# Patient Record
Sex: FEMALE | Race: WHITE | NOT HISPANIC OR LATINO | Employment: FULL TIME | URBAN - METROPOLITAN AREA
[De-identification: names, ages, dates, MRNs, and addresses within clinical notes are randomized per-mention and may not be internally consistent; named-entity substitution may affect disease eponyms.]

---

## 2024-06-04 ENCOUNTER — OFFICE VISIT (OUTPATIENT)
Dept: PRIMARY CARE | Facility: CLINIC | Age: 34
End: 2024-06-04
Payer: COMMERCIAL

## 2024-06-04 ENCOUNTER — APPOINTMENT (OUTPATIENT)
Dept: PRIMARY CARE | Facility: CLINIC | Age: 34
End: 2024-06-04
Payer: COMMERCIAL

## 2024-06-04 VITALS
WEIGHT: 119 LBS | OXYGEN SATURATION: 93 % | HEART RATE: 85 BPM | DIASTOLIC BLOOD PRESSURE: 54 MMHG | SYSTOLIC BLOOD PRESSURE: 90 MMHG | TEMPERATURE: 98.2 F

## 2024-06-04 DIAGNOSIS — R29.0 TETANY: ICD-10-CM

## 2024-06-04 DIAGNOSIS — R42 VERTIGO: ICD-10-CM

## 2024-06-04 DIAGNOSIS — R27.0 ATAXIA: Primary | ICD-10-CM

## 2024-06-04 PROCEDURE — 99204 OFFICE O/P NEW MOD 45 MIN: CPT

## 2024-06-04 RX ORDER — ESCITALOPRAM OXALATE 5 MG/1
TABLET ORAL
COMMUNITY
Start: 2024-05-05

## 2024-06-04 ASSESSMENT — PATIENT HEALTH QUESTIONNAIRE - PHQ9
2. FEELING DOWN, DEPRESSED OR HOPELESS: NOT AT ALL
SUM OF ALL RESPONSES TO PHQ9 QUESTIONS 1 AND 2: 0
1. LITTLE INTEREST OR PLEASURE IN DOING THINGS: NOT AT ALL

## 2024-06-04 ASSESSMENT — PAIN SCALES - GENERAL: PAINLEVEL: 2
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
## 2024-06-06 ENCOUNTER — LAB (OUTPATIENT)
Dept: LAB | Facility: LAB | Age: 34
End: 2024-06-06
Payer: COMMERCIAL

## 2024-06-06 DIAGNOSIS — R42 VERTIGO: ICD-10-CM

## 2024-06-06 DIAGNOSIS — R29.0 TETANY: ICD-10-CM

## 2024-06-06 DIAGNOSIS — R27.0 ATAXIA: ICD-10-CM

## 2024-06-06 LAB
ALBUMIN SERPL-MCNC: 4.4 G/DL (ref 3.5–5)
ALP BLD-CCNC: 66 U/L (ref 35–125)
ALT SERPL-CCNC: 18 U/L (ref 5–40)
ANION GAP SERPL CALC-SCNC: 9 MMOL/L
AST SERPL-CCNC: 21 U/L (ref 5–40)
BASOPHILS # BLD AUTO: 0.03 X10*3/UL (ref 0–0.1)
BASOPHILS NFR BLD AUTO: 0.6 %
BILIRUB SERPL-MCNC: 0.5 MG/DL (ref 0.1–1.2)
BUN SERPL-MCNC: 9 MG/DL (ref 8–25)
CALCIUM SERPL-MCNC: 9.1 MG/DL (ref 8.5–10.4)
CHLORIDE SERPL-SCNC: 108 MMOL/L (ref 97–107)
CO2 SERPL-SCNC: 26 MMOL/L (ref 24–31)
CREAT SERPL-MCNC: 0.6 MG/DL (ref 0.4–1.6)
EGFRCR SERPLBLD CKD-EPI 2021: >90 ML/MIN/1.73M*2
EOSINOPHIL # BLD AUTO: 0.05 X10*3/UL (ref 0–0.7)
EOSINOPHIL NFR BLD AUTO: 1 %
ERYTHROCYTE [DISTWIDTH] IN BLOOD BY AUTOMATED COUNT: 12.4 % (ref 11.5–14.5)
GLUCOSE SERPL-MCNC: 84 MG/DL (ref 65–99)
HCT VFR BLD AUTO: 38.7 % (ref 36–46)
HGB BLD-MCNC: 12.6 G/DL (ref 12–16)
IMM GRANULOCYTES # BLD AUTO: 0.01 X10*3/UL (ref 0–0.7)
IMM GRANULOCYTES NFR BLD AUTO: 0.2 % (ref 0–0.9)
LYMPHOCYTES # BLD AUTO: 1.47 X10*3/UL (ref 1.2–4.8)
LYMPHOCYTES NFR BLD AUTO: 29.7 %
MCH RBC QN AUTO: 28.1 PG (ref 26–34)
MCHC RBC AUTO-ENTMCNC: 32.6 G/DL (ref 32–36)
MCV RBC AUTO: 86 FL (ref 80–100)
MONOCYTES # BLD AUTO: 0.48 X10*3/UL (ref 0.1–1)
MONOCYTES NFR BLD AUTO: 9.7 %
NEUTROPHILS # BLD AUTO: 2.91 X10*3/UL (ref 1.2–7.7)
NEUTROPHILS NFR BLD AUTO: 58.8 %
NRBC BLD-RTO: 0 /100 WBCS (ref 0–0)
PLATELET # BLD AUTO: 207 X10*3/UL (ref 150–450)
POTASSIUM SERPL-SCNC: 4.3 MMOL/L (ref 3.4–5.1)
PROT SERPL-MCNC: 6.6 G/DL (ref 5.9–7.9)
RBC # BLD AUTO: 4.48 X10*6/UL (ref 4–5.2)
SODIUM SERPL-SCNC: 143 MMOL/L (ref 133–145)
TSH SERPL DL<=0.05 MIU/L-ACNC: 0.85 MIU/L (ref 0.27–4.2)
VIT B12 SERPL-MCNC: 433 PG/ML (ref 211–946)
WBC # BLD AUTO: 5 X10*3/UL (ref 4.4–11.3)

## 2024-06-06 PROCEDURE — 80053 COMPREHEN METABOLIC PANEL: CPT

## 2024-06-06 PROCEDURE — 82607 VITAMIN B-12: CPT

## 2024-06-06 PROCEDURE — 36415 COLL VENOUS BLD VENIPUNCTURE: CPT

## 2024-06-06 PROCEDURE — 84443 ASSAY THYROID STIM HORMONE: CPT

## 2024-06-06 PROCEDURE — 85025 COMPLETE CBC W/AUTO DIFF WBC: CPT

## 2024-06-09 ASSESSMENT — VISUAL ACUITY: OU: 1

## 2024-06-20 ENCOUNTER — APPOINTMENT (OUTPATIENT)
Dept: PRIMARY CARE | Facility: CLINIC | Age: 34
End: 2024-06-20
Payer: COMMERCIAL

## 2024-07-08 DIAGNOSIS — H91.92 DECREASED HEARING OF LEFT EAR: ICD-10-CM

## 2024-07-08 DIAGNOSIS — R42 VERTIGO: Primary | ICD-10-CM

## 2024-07-29 ENCOUNTER — TELEPHONE (OUTPATIENT)
Dept: PRIMARY CARE | Facility: CLINIC | Age: 34
End: 2024-07-29
Payer: COMMERCIAL

## 2024-07-29 DIAGNOSIS — F41.9 ANXIETY: ICD-10-CM

## 2024-07-29 RX ORDER — ESCITALOPRAM OXALATE 5 MG/1
5 TABLET ORAL DAILY
Qty: 90 TABLET | Refills: 1 | Status: SHIPPED | OUTPATIENT
Start: 2024-07-29 | End: 2024-07-30 | Stop reason: SDUPTHER

## 2024-07-30 ENCOUNTER — PATIENT MESSAGE (OUTPATIENT)
Dept: PRIMARY CARE | Facility: CLINIC | Age: 34
End: 2024-07-30
Payer: COMMERCIAL

## 2024-07-30 DIAGNOSIS — F41.9 ANXIETY: ICD-10-CM

## 2024-07-30 RX ORDER — ESCITALOPRAM OXALATE 5 MG/1
5 TABLET ORAL DAILY
Qty: 90 TABLET | Refills: 1 | Status: ON HOLD | OUTPATIENT
Start: 2024-07-30

## 2024-08-02 ENCOUNTER — HOSPITAL ENCOUNTER (EMERGENCY)
Facility: HOSPITAL | Age: 34
Discharge: HOME | End: 2024-08-03
Attending: GENERAL PRACTICE
Payer: COMMERCIAL

## 2024-08-02 ENCOUNTER — HOSPITAL ENCOUNTER (OUTPATIENT)
Dept: RADIOLOGY | Facility: CLINIC | Age: 34
Discharge: HOME | End: 2024-08-02
Payer: COMMERCIAL

## 2024-08-02 DIAGNOSIS — G72.9 MYOPATHY, UNSPECIFIED: ICD-10-CM

## 2024-08-02 DIAGNOSIS — G93.89 BRAIN MASS: Primary | ICD-10-CM

## 2024-08-02 DIAGNOSIS — R20.2 PARESTHESIA OF SKIN: ICD-10-CM

## 2024-08-02 PROCEDURE — 72141 MRI NECK SPINE W/O DYE: CPT

## 2024-08-02 PROCEDURE — A9575 INJ GADOTERATE MEGLUMI 0.1ML: HCPCS | Performed by: PSYCHIATRY & NEUROLOGY

## 2024-08-02 PROCEDURE — 2550000001 HC RX 255 CONTRASTS: Performed by: PSYCHIATRY & NEUROLOGY

## 2024-08-02 PROCEDURE — 70553 MRI BRAIN STEM W/O & W/DYE: CPT

## 2024-08-02 PROCEDURE — 99282 EMERGENCY DEPT VISIT SF MDM: CPT

## 2024-08-02 RX ORDER — GADOTERATE MEGLUMINE 376.9 MG/ML
10 INJECTION INTRAVENOUS
Status: COMPLETED | OUTPATIENT
Start: 2024-08-02 | End: 2024-08-02

## 2024-08-02 ASSESSMENT — PAIN SCALES - GENERAL
PAINLEVEL_OUTOF10: 0 - NO PAIN

## 2024-08-02 ASSESSMENT — PAIN - FUNCTIONAL ASSESSMENT: PAIN_FUNCTIONAL_ASSESSMENT: 0-10

## 2024-08-02 ASSESSMENT — COLUMBIA-SUICIDE SEVERITY RATING SCALE - C-SSRS
1. IN THE PAST MONTH, HAVE YOU WISHED YOU WERE DEAD OR WISHED YOU COULD GO TO SLEEP AND NOT WAKE UP?: NO
2. HAVE YOU ACTUALLY HAD ANY THOUGHTS OF KILLING YOURSELF?: NO
6. HAVE YOU EVER DONE ANYTHING, STARTED TO DO ANYTHING, OR PREPARED TO DO ANYTHING TO END YOUR LIFE?: NO

## 2024-08-02 NOTE — ED TRIAGE NOTES
Pt came in after she had a MRI an hour ago. Pt was told to go to the ER. They did not state what was wrong.

## 2024-08-03 ENCOUNTER — APPOINTMENT (OUTPATIENT)
Dept: CARDIOLOGY | Facility: HOSPITAL | Age: 34
DRG: 003 | End: 2024-08-03
Payer: COMMERCIAL

## 2024-08-03 ENCOUNTER — APPOINTMENT (OUTPATIENT)
Dept: RADIOLOGY | Facility: HOSPITAL | Age: 34
DRG: 003 | End: 2024-08-03
Payer: COMMERCIAL

## 2024-08-03 ENCOUNTER — HOSPITAL ENCOUNTER (INPATIENT)
Facility: HOSPITAL | Age: 34
End: 2024-08-03
Attending: STUDENT IN AN ORGANIZED HEALTH CARE EDUCATION/TRAINING PROGRAM | Admitting: NEUROLOGICAL SURGERY
Payer: COMMERCIAL

## 2024-08-03 ENCOUNTER — CLINICAL SUPPORT (OUTPATIENT)
Dept: EMERGENCY MEDICINE | Facility: HOSPITAL | Age: 34
DRG: 003 | End: 2024-08-03
Payer: COMMERCIAL

## 2024-08-03 VITALS
OXYGEN SATURATION: 98 % | BODY MASS INDEX: 22.45 KG/M2 | HEIGHT: 62 IN | TEMPERATURE: 97.5 F | WEIGHT: 122 LBS | HEART RATE: 85 BPM | SYSTOLIC BLOOD PRESSURE: 128 MMHG | DIASTOLIC BLOOD PRESSURE: 78 MMHG | RESPIRATION RATE: 16 BRPM

## 2024-08-03 DIAGNOSIS — I26.99 PULMONARY EMBOLISM, UNSPECIFIED CHRONICITY, UNSPECIFIED PULMONARY EMBOLISM TYPE, UNSPECIFIED WHETHER ACUTE COR PULMONALE PRESENT (MULTI): ICD-10-CM

## 2024-08-03 DIAGNOSIS — Q21.10 ATRIAL SEPTAL DEFECT (HHS-HCC): ICD-10-CM

## 2024-08-03 DIAGNOSIS — D36.10 SCHWANNOMA: Primary | ICD-10-CM

## 2024-08-03 DIAGNOSIS — Z98.890 S/P CRANIOTOMY: ICD-10-CM

## 2024-08-03 DIAGNOSIS — G89.18 ACUTE POST-OPERATIVE PAIN: ICD-10-CM

## 2024-08-03 DIAGNOSIS — F41.9 ANXIETY: ICD-10-CM

## 2024-08-03 LAB
AORTIC VALVE MEAN GRADIENT: 3 MMHG
AORTIC VALVE PEAK VELOCITY: 1.08 M/S
APPEARANCE UR: ABNORMAL
AV PEAK GRADIENT: 4.7 MMHG
AVA (PEAK VEL): 2.22 CM2
AVA (VTI): 2.03 CM2
BILIRUB UR STRIP.AUTO-MCNC: NEGATIVE MG/DL
COLOR UR: ABNORMAL
EJECTION FRACTION APICAL 4 CHAMBER: 55.2
EJECTION FRACTION: 57 %
GLUCOSE UR STRIP.AUTO-MCNC: NORMAL MG/DL
KETONES UR STRIP.AUTO-MCNC: NEGATIVE MG/DL
LEFT VENTRICLE INTERNAL DIMENSION DIASTOLE: 3.9 CM (ref 3.5–6)
LEFT VENTRICULAR OUTFLOW TRACT DIAMETER: 2 CM
LEUKOCYTE ESTERASE UR QL STRIP.AUTO: NEGATIVE
MITRAL VALVE E/A RATIO: 1.77
MITRAL VALVE E/E' RATIO: 5.2
NITRITE UR QL STRIP.AUTO: NEGATIVE
PH UR STRIP.AUTO: 6.5 [PH]
PROT UR STRIP.AUTO-MCNC: ABNORMAL MG/DL
RBC # UR STRIP.AUTO: NEGATIVE /UL
RBC #/AREA URNS AUTO: ABNORMAL /HPF
RIGHT VENTRICLE FREE WALL PEAK S': 11.7 CM/S
SP GR UR STRIP.AUTO: 1.03
TRICUSPID ANNULAR PLANE SYSTOLIC EXCURSION: 2.3 CM
UROBILINOGEN UR STRIP.AUTO-MCNC: NORMAL MG/DL
WBC #/AREA URNS AUTO: ABNORMAL /HPF

## 2024-08-03 PROCEDURE — 2500000001 HC RX 250 WO HCPCS SELF ADMINISTERED DRUGS (ALT 637 FOR MEDICARE OP)

## 2024-08-03 PROCEDURE — 93306 TTE W/DOPPLER COMPLETE: CPT

## 2024-08-03 PROCEDURE — 71260 CT THORAX DX C+: CPT | Performed by: STUDENT IN AN ORGANIZED HEALTH CARE EDUCATION/TRAINING PROGRAM

## 2024-08-03 PROCEDURE — 70480 CT ORBIT/EAR/FOSSA W/O DYE: CPT | Performed by: RADIOLOGY

## 2024-08-03 PROCEDURE — 86901 BLOOD TYPING SEROLOGIC RH(D): CPT

## 2024-08-03 PROCEDURE — 80069 RENAL FUNCTION PANEL: CPT

## 2024-08-03 PROCEDURE — 99285 EMERGENCY DEPT VISIT HI MDM: CPT | Mod: 25

## 2024-08-03 PROCEDURE — 2500000004 HC RX 250 GENERAL PHARMACY W/ HCPCS (ALT 636 FOR OP/ED)

## 2024-08-03 PROCEDURE — 85610 PROTHROMBIN TIME: CPT

## 2024-08-03 PROCEDURE — 70480 CT ORBIT/EAR/FOSSA W/O DYE: CPT

## 2024-08-03 PROCEDURE — 1200000002 HC GENERAL ROOM WITH TELEMETRY DAILY

## 2024-08-03 PROCEDURE — 2550000001 HC RX 255 CONTRASTS: Performed by: STUDENT IN AN ORGANIZED HEALTH CARE EDUCATION/TRAINING PROGRAM

## 2024-08-03 PROCEDURE — 93005 ELECTROCARDIOGRAM TRACING: CPT

## 2024-08-03 PROCEDURE — 84702 CHORIONIC GONADOTROPIN TEST: CPT

## 2024-08-03 PROCEDURE — 93306 TTE W/DOPPLER COMPLETE: CPT | Performed by: INTERNAL MEDICINE

## 2024-08-03 PROCEDURE — 99223 1ST HOSP IP/OBS HIGH 75: CPT | Performed by: ANESTHESIOLOGY

## 2024-08-03 PROCEDURE — 36415 COLL VENOUS BLD VENIPUNCTURE: CPT

## 2024-08-03 PROCEDURE — 70450 CT HEAD/BRAIN W/O DYE: CPT | Performed by: RADIOLOGY

## 2024-08-03 PROCEDURE — 85027 COMPLETE CBC AUTOMATED: CPT

## 2024-08-03 PROCEDURE — 81001 URINALYSIS AUTO W/SCOPE: CPT

## 2024-08-03 PROCEDURE — 70450 CT HEAD/BRAIN W/O DYE: CPT

## 2024-08-03 PROCEDURE — 74177 CT ABD & PELVIS W/CONTRAST: CPT

## 2024-08-03 PROCEDURE — 74177 CT ABD & PELVIS W/CONTRAST: CPT | Performed by: STUDENT IN AN ORGANIZED HEALTH CARE EDUCATION/TRAINING PROGRAM

## 2024-08-03 PROCEDURE — 71045 X-RAY EXAM CHEST 1 VIEW: CPT

## 2024-08-03 RX ORDER — ACETAMINOPHEN 325 MG/1
650 TABLET ORAL EVERY 6 HOURS SCHEDULED
Status: DISCONTINUED | OUTPATIENT
Start: 2024-08-03 | End: 2024-08-06

## 2024-08-03 RX ORDER — HEPARIN SODIUM 5000 [USP'U]/ML
5000 INJECTION, SOLUTION INTRAVENOUS; SUBCUTANEOUS EVERY 8 HOURS
Status: DISPENSED | OUTPATIENT
Start: 2024-08-03 | End: 2024-08-04

## 2024-08-03 RX ORDER — ACETAMINOPHEN 325 MG/1
975 TABLET ORAL ONCE
Status: COMPLETED | OUTPATIENT
Start: 2024-08-03 | End: 2024-08-03

## 2024-08-03 RX ORDER — OXYCODONE HYDROCHLORIDE 5 MG/1
2.5 TABLET ORAL EVERY 4 HOURS PRN
Status: DISCONTINUED | OUTPATIENT
Start: 2024-08-03 | End: 2024-08-06

## 2024-08-03 RX ORDER — POLYETHYLENE GLYCOL 3350 17 G/17G
17 POWDER, FOR SOLUTION ORAL DAILY
Status: DISCONTINUED | OUTPATIENT
Start: 2024-08-03 | End: 2024-08-06

## 2024-08-03 RX ORDER — ESCITALOPRAM OXALATE 5 MG/1
5 TABLET ORAL DAILY
Status: DISCONTINUED | OUTPATIENT
Start: 2024-08-03 | End: 2024-08-06

## 2024-08-03 RX ORDER — NALOXONE HYDROCHLORIDE 0.4 MG/ML
0.2 INJECTION, SOLUTION INTRAMUSCULAR; INTRAVENOUS; SUBCUTANEOUS EVERY 5 MIN PRN
Status: DISCONTINUED | OUTPATIENT
Start: 2024-08-03 | End: 2024-08-14 | Stop reason: HOSPADM

## 2024-08-03 RX ORDER — AMOXICILLIN 250 MG
2 CAPSULE ORAL 2 TIMES DAILY
Status: DISCONTINUED | OUTPATIENT
Start: 2024-08-03 | End: 2024-08-06

## 2024-08-03 RX ADMIN — ACETAMINOPHEN 650 MG: 325 TABLET ORAL at 17:32

## 2024-08-03 RX ADMIN — ESCITALOPRAM OXALATE 5 MG: 10 TABLET ORAL at 15:57

## 2024-08-03 RX ADMIN — PERFLUTREN 2.5 ML OF DILUTION: 6.52 INJECTION, SUSPENSION INTRAVENOUS at 14:39

## 2024-08-03 RX ADMIN — IOHEXOL 90 ML: 350 INJECTION, SOLUTION INTRAVENOUS at 11:35

## 2024-08-03 RX ADMIN — SENNOSIDES AND DOCUSATE SODIUM 2 TABLET: 50; 8.6 TABLET ORAL at 21:11

## 2024-08-03 RX ADMIN — ACETAMINOPHEN 975 MG: 325 TABLET ORAL at 02:13

## 2024-08-03 RX ADMIN — HEPARIN SODIUM 5000 UNITS: 5000 INJECTION INTRAVENOUS; SUBCUTANEOUS at 16:07

## 2024-08-03 SDOH — ECONOMIC STABILITY: HOUSING INSECURITY: AT ANY TIME IN THE PAST 12 MONTHS, WERE YOU HOMELESS OR LIVING IN A SHELTER (INCLUDING NOW)?: NO

## 2024-08-03 SDOH — SOCIAL STABILITY: SOCIAL INSECURITY: ARE THERE ANY APPARENT SIGNS OF INJURIES/BEHAVIORS THAT COULD BE RELATED TO ABUSE/NEGLECT?: NO

## 2024-08-03 SDOH — ECONOMIC STABILITY: INCOME INSECURITY: IN THE LAST 12 MONTHS, WAS THERE A TIME WHEN YOU WERE NOT ABLE TO PAY THE MORTGAGE OR RENT ON TIME?: NO

## 2024-08-03 SDOH — ECONOMIC STABILITY: TRANSPORTATION INSECURITY
IN THE PAST 12 MONTHS, HAS THE LACK OF TRANSPORTATION KEPT YOU FROM MEDICAL APPOINTMENTS OR FROM GETTING MEDICATIONS?: NO

## 2024-08-03 SDOH — SOCIAL STABILITY: SOCIAL INSECURITY: ABUSE: ADULT

## 2024-08-03 SDOH — ECONOMIC STABILITY: INCOME INSECURITY: HOW HARD IS IT FOR YOU TO PAY FOR THE VERY BASICS LIKE FOOD, HOUSING, MEDICAL CARE, AND HEATING?: NOT VERY HARD

## 2024-08-03 SDOH — ECONOMIC STABILITY: HOUSING INSECURITY: IN THE PAST 12 MONTHS, HOW MANY TIMES HAVE YOU MOVED WHERE YOU WERE LIVING?: 1

## 2024-08-03 SDOH — SOCIAL STABILITY: SOCIAL INSECURITY: DO YOU FEEL ANYONE HAS EXPLOITED OR TAKEN ADVANTAGE OF YOU FINANCIALLY OR OF YOUR PERSONAL PROPERTY?: NO

## 2024-08-03 SDOH — SOCIAL STABILITY: SOCIAL INSECURITY: ARE YOU OR HAVE YOU BEEN THREATENED OR ABUSED PHYSICALLY, EMOTIONALLY, OR SEXUALLY BY ANYONE?: NO

## 2024-08-03 SDOH — SOCIAL STABILITY: SOCIAL INSECURITY: DOES ANYONE TRY TO KEEP YOU FROM HAVING/CONTACTING OTHER FRIENDS OR DOING THINGS OUTSIDE YOUR HOME?: NO

## 2024-08-03 SDOH — SOCIAL STABILITY: SOCIAL INSECURITY: HAVE YOU HAD THOUGHTS OF HARMING ANYONE ELSE?: NO

## 2024-08-03 SDOH — ECONOMIC STABILITY: TRANSPORTATION INSECURITY
IN THE PAST 12 MONTHS, HAS LACK OF TRANSPORTATION KEPT YOU FROM MEETINGS, WORK, OR FROM GETTING THINGS NEEDED FOR DAILY LIVING?: NO

## 2024-08-03 SDOH — SOCIAL STABILITY: SOCIAL INSECURITY: DO YOU FEEL UNSAFE GOING BACK TO THE PLACE WHERE YOU ARE LIVING?: NO

## 2024-08-03 SDOH — SOCIAL STABILITY: SOCIAL INSECURITY: HAVE YOU HAD ANY THOUGHTS OF HARMING ANYONE ELSE?: NO

## 2024-08-03 SDOH — SOCIAL STABILITY: SOCIAL INSECURITY: HAS ANYONE EVER THREATENED TO HURT YOUR FAMILY OR YOUR PETS?: NO

## 2024-08-03 ASSESSMENT — LIFESTYLE VARIABLES
HOW OFTEN DO YOU HAVE A DRINK CONTAINING ALCOHOL: NEVER
HAVE PEOPLE ANNOYED YOU BY CRITICIZING YOUR DRINKING: NO
SKIP TO QUESTIONS 9-10: 1
HOW OFTEN DO YOU HAVE 6 OR MORE DRINKS ON ONE OCCASION: NEVER
EVER FELT BAD OR GUILTY ABOUT YOUR DRINKING: NO
HOW MANY STANDARD DRINKS CONTAINING ALCOHOL DO YOU HAVE ON A TYPICAL DAY: PATIENT DOES NOT DRINK
EVER HAD A DRINK FIRST THING IN THE MORNING TO STEADY YOUR NERVES TO GET RID OF A HANGOVER: NO
AUDIT-C TOTAL SCORE: 0
TOTAL SCORE: 0
HAVE YOU EVER FELT YOU SHOULD CUT DOWN ON YOUR DRINKING: NO
AUDIT-C TOTAL SCORE: 0

## 2024-08-03 ASSESSMENT — COGNITIVE AND FUNCTIONAL STATUS - GENERAL
MOBILITY SCORE: 24
DAILY ACTIVITIY SCORE: 24
MOBILITY SCORE: 24
DAILY ACTIVITIY SCORE: 24
PATIENT BASELINE BEDBOUND: NO

## 2024-08-03 ASSESSMENT — PAIN SCALES - GENERAL
PAINLEVEL_OUTOF10: 0 - NO PAIN
PAINLEVEL_OUTOF10: 3
PAINLEVEL_OUTOF10: 0 - NO PAIN
PAINLEVEL_OUTOF10: 0 - NO PAIN

## 2024-08-03 ASSESSMENT — ENCOUNTER SYMPTOMS
RESPIRATORY NEGATIVE: 1
EYES NEGATIVE: 1
CONSTITUTIONAL NEGATIVE: 1
ENDOCRINE NEGATIVE: 1
MUSCULOSKELETAL NEGATIVE: 1
GASTROINTESTINAL NEGATIVE: 1
CARDIOVASCULAR NEGATIVE: 1

## 2024-08-03 ASSESSMENT — ACTIVITIES OF DAILY LIVING (ADL)
FEEDING YOURSELF: INDEPENDENT
HEARING - LEFT EAR: FUNCTIONAL
WALKS IN HOME: INDEPENDENT
BATHING: INDEPENDENT
JUDGMENT_ADEQUATE_SAFELY_COMPLETE_DAILY_ACTIVITIES: YES
HEARING - RIGHT EAR: FUNCTIONAL
TOILETING: INDEPENDENT
GROOMING: INDEPENDENT
DRESSING YOURSELF: INDEPENDENT
ADEQUATE_TO_COMPLETE_ADL: YES
PATIENT'S MEMORY ADEQUATE TO SAFELY COMPLETE DAILY ACTIVITIES?: YES

## 2024-08-03 ASSESSMENT — COLUMBIA-SUICIDE SEVERITY RATING SCALE - C-SSRS
2. HAVE YOU ACTUALLY HAD ANY THOUGHTS OF KILLING YOURSELF?: NO
6. HAVE YOU EVER DONE ANYTHING, STARTED TO DO ANYTHING, OR PREPARED TO DO ANYTHING TO END YOUR LIFE?: NO
1. IN THE PAST MONTH, HAVE YOU WISHED YOU WERE DEAD OR WISHED YOU COULD GO TO SLEEP AND NOT WAKE UP?: NO

## 2024-08-03 ASSESSMENT — PAIN - FUNCTIONAL ASSESSMENT
PAIN_FUNCTIONAL_ASSESSMENT: 0-10

## 2024-08-03 ASSESSMENT — PATIENT HEALTH QUESTIONNAIRE - PHQ9
1. LITTLE INTEREST OR PLEASURE IN DOING THINGS: NOT AT ALL
2. FEELING DOWN, DEPRESSED OR HOPELESS: NOT AT ALL
SUM OF ALL RESPONSES TO PHQ9 QUESTIONS 1 & 2: 0

## 2024-08-03 ASSESSMENT — PAIN DESCRIPTION - LOCATION: LOCATION: HEAD

## 2024-08-03 NOTE — ED PROVIDER NOTES
HPI   Chief Complaint   Patient presents with    neuro consult       Patient is a 33-year-old female sent to [facility] for concern for intracranial mass concerning for schwannoma versus meningioma seen on outpatient imaging.  Initially presented with chronic vertigo, ataxia.          Patient History   Past Medical History:   Diagnosis Date    Depression     Visual impairment      History reviewed. No pertinent surgical history.  Family History   Problem Relation Name Age of Onset    Blood clot Mother Delmis Mantilla-  of clot 2016      labor Sister Megan      Social History     Tobacco Use    Smoking status: Never    Smokeless tobacco: Never   Substance Use Topics    Alcohol use: Never    Drug use: Not Currently     Frequency: 1.0 times per week     Types: Marijuana     Comment: Vape       Physical Exam   ED Triage Vitals   Temperature Heart Rate Respirations BP   24 0522 24 0522 24 0522 24 0522   36.7 °C (98 °F) 91 19 124/80      Pulse Ox Temp src Heart Rate Source Patient Position   24 0522 -- 24 1047 24 1047   97 %  Monitor Sitting      BP Location FiO2 (%)     24 1047 --     Right arm        Physical Exam  Constitutional:       Appearance: Normal appearance.   HENT:      Head: Normocephalic and atraumatic.   Eyes:      Extraocular Movements: Extraocular movements intact.   Cardiovascular:      Rate and Rhythm: Normal rate.   Pulmonary:      Effort: Pulmonary effort is normal.   Musculoskeletal:         General: Normal range of motion.      Cervical back: Normal range of motion.   Skin:     General: Skin is warm and dry.   Neurological:      Mental Status: She is alert.      Comments: Reported decreased hearing in the left ear.  Appropriate strength in upper and lower extremities.  Sensation intact in upper and lower extremities.  Ambulates without deficit.   Psychiatric:         Mood and Affect: Mood normal.         Behavior: Behavior normal.           ED  Course & MDM   Diagnoses as of 08/03/24 1215   Schwannoma                       [place] Coma Scale Score: 15                        Medical Decision Making  Patient is a 33-year-old female sent to [facility] for concern for intracranial mass concerning for schwannoma versus meningioma seen on outpatient imaging.  Neurosurgery consulted.  Recommended completion of imaging with CT chest abdomen pelvis, CT head, CT ICU that was obtained.  CT abdomen pelvis with lung nodules but no clear source of primary malignancy.  Patient admitted to neurosurgery for further management.    Patient seen and discussed with Dr. Singer Jonathan Granda MD, PhD  Emergency Medicine PGY3          Procedure  Procedures     Jonathan Granda MD  Resident  08/04/24 2219

## 2024-08-03 NOTE — H&P (VIEW-ONLY)
Consults    Reason For Consult  Preoperative eval    History Of Present Illness  Denae Nolasco is a 33 y.o. female presenting with left CP Angle tumor      On my review today, pt and her  and pt's father  together provided history though  needed to remind patient of some of her prior illnesses and deficits on multiple occasions during my conversation.  Pt noted worsening gait balance, intermittent slurred speech  lasting few min, bilateral arm tremors intermittently. She is an avid mountain bike rider and few months ago had a bad fall due to imbalance. Per  she veers into walls while walking. In addition, left sided hearing diminished over past few weeks.      When headaches worsened she was evaluated by Neurologist. An MRI showed Left CP Angle tumor and pt admitted for evaluation.    At baseline pt has the following medical conditions:    Congenital Siddharth Nystagmus  Atrial Septal defect repaired with transcatheter Occlusive device in 1997- serial follow with Pediatric Cardiology until 2006 and declared stable with no additional follow  ups needed.  Left Leg longer than Rt ( pt did not realize this until she went for a bicycle purchase).  Raynaud phenomenon      No known HTN/CAD/CHF/CKD/CVA/DM/DVT/PE/Congenital anomalies. No dysphagia/dysphonia/wt loss  No h/o skin lesion tumors/hyperpigmented patches/neurofibromatosis/developmental neuro cardiac anomalies.  Pt denies any developmental delays/ADHD    Physically agile and active and along with  biked 50 miles/d on some occasions without sob.  No hospitalizations in the past 10yrs for any illnesess          Past Medical History  She has a past medical history of Depression and Visual impairment.    Surgical History  She has no past surgical history on file.     Social History  She reports that she has never smoked. She has never used smokeless tobacco. She reports that she does not currently use drugs after having used the following drugs:  Marijuana. Frequency: 1.00 time per week. She reports that she does not drink alcohol.    Family History  Family History   Problem Relation Name Age of Onset    Blood clot Mother Delmis Mantilla-  of clot 2016      labor Sister Megan         Allergies  Patient has no known allergies.    Review of Systems  Review of Systems   Constitutional: Negative.    HENT: Negative.     Eyes: Negative.    Respiratory: Negative.     Cardiovascular: Negative.    Gastrointestinal: Negative.    Endocrine: Negative.    Genitourinary: Negative.    Musculoskeletal: Negative.    Skin: Negative.          Physical Exam  AOX 3, shy child like personality  No pedal edema, Rt LE shorter and slightly atrophic than left  Prominent upper jaw, no neck swellings  No pallor or JVD  SIS2- no murmurs  Lungs clear       Last Recorded Vitals  /73 (BP Location: Right arm, Patient Position: Sitting)   Pulse 87   Temp 36.7 °C (98 °F)   Resp 18   Wt 55.3 kg (122 lb)   SpO2 97%     Relevant Results  No results found for this or any previous visit (from the past 24 hour(s)).      Assessment/Plan     33yr old lady with h/o transcatheter ASD repair in  no presents with Left CP angle tumor needing intervention.    Surgery- Craniotomy and CP angle tumor resection ? Transcochlear vs retroSig    Timing- urgent to- time sensitive    On my eval she does not have any evidence of ACS/Acute CHF/Fatal arrhythmias/ Severe valvular disease    Her RCRI clinical risk score is low and she is due to proceed for Intermediate risk surgery.  Given her h/o ASD closure would like to get EKG and Echo as last follow up was .  Pt has excellent functional capacity at baseline being to bicycle long distances without limitations per .  If no abnormalities noted, pt is felt to be acceptable risk to proceed     Discussed with pt, family and Neurosurg team      Raad Quevedo MD

## 2024-08-03 NOTE — H&P
History Of Present Illness  Denae Nolasco is a 33 y.o. 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  Endorses mild headache and decreased hearing on the left side.  Otherwise denies change in vision, nausea, vomiting, weakness, change sensation, bowel/bladder dysfunction.    Patient not on any AC/AP.    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 No pertinent surgical history.     Social History  She reports that she has never smoked. She has never used smokeless tobacco. She reports that she does not currently use drugs after having used the following drugs: Marijuana. Frequency: 1.00 time per week. She reports that she does not drink alcohol.    Family History  Family History   Problem Relation Name Age of Onset    Blood clot Mother Delmis Mantilla-  of clot 2016      labor Sister Megan         Allergies  Patient has no known allergies.    Review of Systems   Review of systems was reviewed and otherwise negative other than what was listed in the HPI.    Physical Exam  GENERAL APPEARANCE:  No distress, alert, interactive and cooperative.   RESP: breathing comfortably  CARDIOVASCULAR: Regular rate and rhythm. Radial pulses +2 and equal. No swelling, varicosities, edema, or tenderness to palpation.   NEURO:     NAD, A&Ox3     Cranial Nerves II-XII: PERRL, EOMI, Face symmetric, Facial SILT, Palate/Tongue midline and symmetric, shoulder shrugs symmetric, hearing intact to finger rubs on the left       MOTOR:         Muscle bulk and tone were normal in both upper and lower extremities.          " RUE D5 / B5 / T5 / HG 5/ IO 5       LUE D5 / B5 / T5 / HG 5/ IO 5       Negative mckeon         RLE HF5 / KE 5/ DF 5/ PF 5       LLE HF5 / KE 5/ DF 5/ PF 5       No clonus      SENSORY:  In both upper and lower extremities, sensation was intact to light touch      COORDINATION:        BUE, finger-nose-finger was intact without dysmetria or overshoot.       BLE, heel-to-shin was intact.      PSYCH: appropriate  SKIN: no obvious lesions       Last Recorded Vitals  Blood pressure 120/73, pulse 87, temperature 36.7 °C (98 °F), resp. rate 18, height 1.575 m (5' 2\"), weight 55.3 kg (122 lb), last menstrual period 07/24/2024, SpO2 97%.    Relevant Results        No results found for this or any previous visit (from the past 24 hour(s)).       Assessment/Plan   Principal Problem:    Schwannoma      Denae Nolasco is a 33 y.o. 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  Has decreased hearing on left side otherwise no focal neurologic deficits.    Plan  Tele  OR Mon 8/5 for L retrosig for tumor resection  Sidagam consult for preop evaluation  CBC/RFP/coag/T&S/UA/EKG/CXR  PTOT  SCDs, [facility]             Nicole Davila MD    "

## 2024-08-03 NOTE — PROGRESS NOTES
ATTENDING NOTE for Kurt Ventura MD:    ATTENDING ATTESTATION:  The patient was seen by the resident/fellow.  I have personally performed a substantive portion of the encounter.  I have seen and examined the patient; agree with the workup, evaluation, MDM, management and diagnosis.  The care plan has been discussed with the resident/fellow; I have reviewed the resident/fellow´s note and agree with the documented findings with the exception/addition of the following:    Patient is a 33-year-old with history of headaches and decreased hearing in her left ear as well as balance issues who had an outpatient MRI that showed a large mass that was causing hydrocephalus and could be consistent with either a schwannoma or meningioma.  Patient was sent here for neurosurgical evaluation.  Patient is currently protecting her airway and does not seem to have evidence of herniation at this time.  Neurosurgery was consulted to help with disposition planning.    ---------------------------------------------------------

## 2024-08-03 NOTE — ED TRIAGE NOTES
Enters ED as transfer from [facility] for neuro consult r/t brain mass per pt. Associated symptoms include hearing loss, vertigo, HA pain, dizziness, unsteady gait, and muscle spasms.

## 2024-08-04 ENCOUNTER — APPOINTMENT (OUTPATIENT)
Dept: CARDIOLOGY | Facility: HOSPITAL | Age: 34
DRG: 003 | End: 2024-08-04
Payer: COMMERCIAL

## 2024-08-04 ENCOUNTER — APPOINTMENT (OUTPATIENT)
Dept: RADIOLOGY | Facility: HOSPITAL | Age: 34
DRG: 003 | End: 2024-08-04
Payer: COMMERCIAL

## 2024-08-04 ENCOUNTER — APPOINTMENT (OUTPATIENT)
Dept: RADIOLOGY | Facility: HOSPITAL | Age: 34
End: 2024-08-04
Payer: COMMERCIAL

## 2024-08-04 VITALS
HEART RATE: 84 BPM | RESPIRATION RATE: 16 BRPM | HEIGHT: 62 IN | BODY MASS INDEX: 22.45 KG/M2 | SYSTOLIC BLOOD PRESSURE: 95 MMHG | DIASTOLIC BLOOD PRESSURE: 63 MMHG | WEIGHT: 122 LBS | OXYGEN SATURATION: 97 % | TEMPERATURE: 98.4 F

## 2024-08-04 LAB
ABO GROUP (TYPE) IN BLOOD: NORMAL
ABO GROUP (TYPE) IN BLOOD: NORMAL
ALBUMIN SERPL BCP-MCNC: 4.3 G/DL (ref 3.4–5)
ALBUMIN SERPL BCP-MCNC: 4.4 G/DL (ref 3.4–5)
ANION GAP SERPL CALC-SCNC: 10 MMOL/L (ref 10–20)
ANION GAP SERPL CALC-SCNC: 11 MMOL/L (ref 10–20)
ANTIBODY SCREEN: NORMAL
ANTIBODY SCREEN: NORMAL
APPEARANCE UR: CLEAR
APTT PPP: 32 SECONDS (ref 27–38)
APTT PPP: 33 SECONDS (ref 27–38)
B-HCG SERPL-ACNC: <3 MIU/ML
BILIRUB UR STRIP.AUTO-MCNC: NEGATIVE MG/DL
BUN SERPL-MCNC: 9 MG/DL (ref 6–23)
BUN SERPL-MCNC: 9 MG/DL (ref 6–23)
CALCIUM SERPL-MCNC: 8.9 MG/DL (ref 8.6–10.6)
CALCIUM SERPL-MCNC: 9.7 MG/DL (ref 8.6–10.6)
CHLORIDE SERPL-SCNC: 105 MMOL/L (ref 98–107)
CHLORIDE SERPL-SCNC: 107 MMOL/L (ref 98–107)
CO2 SERPL-SCNC: 25 MMOL/L (ref 21–32)
CO2 SERPL-SCNC: 27 MMOL/L (ref 21–32)
COLOR UR: COLORLESS
CREAT SERPL-MCNC: 0.46 MG/DL (ref 0.5–1.05)
CREAT SERPL-MCNC: 0.57 MG/DL (ref 0.5–1.05)
EGFRCR SERPLBLD CKD-EPI 2021: >90 ML/MIN/1.73M*2
EGFRCR SERPLBLD CKD-EPI 2021: >90 ML/MIN/1.73M*2
ERYTHROCYTE [DISTWIDTH] IN BLOOD BY AUTOMATED COUNT: 12.1 % (ref 11.5–14.5)
ERYTHROCYTE [DISTWIDTH] IN BLOOD BY AUTOMATED COUNT: 12.5 % (ref 11.5–14.5)
GLUCOSE SERPL-MCNC: 129 MG/DL (ref 74–99)
GLUCOSE SERPL-MCNC: 82 MG/DL (ref 74–99)
GLUCOSE UR STRIP.AUTO-MCNC: NORMAL MG/DL
HCT VFR BLD AUTO: 39.1 % (ref 36–46)
HCT VFR BLD AUTO: 41.1 % (ref 36–46)
HGB BLD-MCNC: 12.8 G/DL (ref 12–16)
HGB BLD-MCNC: 13.6 G/DL (ref 12–16)
HOLD SPECIMEN: NORMAL
INR PPP: 1.1 (ref 0.9–1.1)
INR PPP: 1.1 (ref 0.9–1.1)
KETONES UR STRIP.AUTO-MCNC: NEGATIVE MG/DL
LEUKOCYTE ESTERASE UR QL STRIP.AUTO: NEGATIVE
MCH RBC QN AUTO: 28.3 PG (ref 26–34)
MCH RBC QN AUTO: 28.8 PG (ref 26–34)
MCHC RBC AUTO-ENTMCNC: 32.7 G/DL (ref 32–36)
MCHC RBC AUTO-ENTMCNC: 33.1 G/DL (ref 32–36)
MCV RBC AUTO: 87 FL (ref 80–100)
MCV RBC AUTO: 87 FL (ref 80–100)
NITRITE UR QL STRIP.AUTO: NEGATIVE
NRBC BLD-RTO: 0 /100 WBCS (ref 0–0)
NRBC BLD-RTO: 0 /100 WBCS (ref 0–0)
PH UR STRIP.AUTO: 6 [PH]
PHOSPHATE SERPL-MCNC: 3.7 MG/DL (ref 2.5–4.9)
PHOSPHATE SERPL-MCNC: 4.5 MG/DL (ref 2.5–4.9)
PLATELET # BLD AUTO: 237 X10*3/UL (ref 150–450)
PLATELET # BLD AUTO: 253 X10*3/UL (ref 150–450)
POTASSIUM SERPL-SCNC: 3.9 MMOL/L (ref 3.5–5.3)
POTASSIUM SERPL-SCNC: 4.4 MMOL/L (ref 3.5–5.3)
PROT UR STRIP.AUTO-MCNC: NEGATIVE MG/DL
PROTHROMBIN TIME: 11.9 SECONDS (ref 9.8–12.8)
PROTHROMBIN TIME: 12 SECONDS (ref 9.8–12.8)
RBC # BLD AUTO: 4.52 X10*6/UL (ref 4–5.2)
RBC # BLD AUTO: 4.73 X10*6/UL (ref 4–5.2)
RBC # UR STRIP.AUTO: NEGATIVE /UL
RH FACTOR (ANTIGEN D): NORMAL
RH FACTOR (ANTIGEN D): NORMAL
SODIUM SERPL-SCNC: 138 MMOL/L (ref 136–145)
SODIUM SERPL-SCNC: 139 MMOL/L (ref 136–145)
SP GR UR STRIP.AUTO: 1.01
UROBILINOGEN UR STRIP.AUTO-MCNC: NORMAL MG/DL
WBC # BLD AUTO: 4.7 X10*3/UL (ref 4.4–11.3)
WBC # BLD AUTO: 5.2 X10*3/UL (ref 4.4–11.3)

## 2024-08-04 PROCEDURE — 71045 X-RAY EXAM CHEST 1 VIEW: CPT

## 2024-08-04 PROCEDURE — 2500000001 HC RX 250 WO HCPCS SELF ADMINISTERED DRUGS (ALT 637 FOR MEDICARE OP)

## 2024-08-04 PROCEDURE — 85027 COMPLETE CBC AUTOMATED: CPT

## 2024-08-04 PROCEDURE — 86901 BLOOD TYPING SEROLOGIC RH(D): CPT

## 2024-08-04 PROCEDURE — 70460 CT HEAD/BRAIN W/DYE: CPT

## 2024-08-04 PROCEDURE — 2550000001 HC RX 255 CONTRASTS: Performed by: NEUROLOGICAL SURGERY

## 2024-08-04 PROCEDURE — 99222 1ST HOSP IP/OBS MODERATE 55: CPT | Performed by: NEUROLOGICAL SURGERY

## 2024-08-04 PROCEDURE — 84100 ASSAY OF PHOSPHORUS: CPT

## 2024-08-04 PROCEDURE — 81003 URINALYSIS AUTO W/O SCOPE: CPT

## 2024-08-04 PROCEDURE — 70553 MRI BRAIN STEM W/O & W/DYE: CPT

## 2024-08-04 PROCEDURE — 93010 ELECTROCARDIOGRAM REPORT: CPT | Performed by: INTERNAL MEDICINE

## 2024-08-04 PROCEDURE — 2500000004 HC RX 250 GENERAL PHARMACY W/ HCPCS (ALT 636 FOR OP/ED)

## 2024-08-04 PROCEDURE — 93005 ELECTROCARDIOGRAM TRACING: CPT

## 2024-08-04 PROCEDURE — 85610 PROTHROMBIN TIME: CPT

## 2024-08-04 PROCEDURE — 1200000002 HC GENERAL ROOM WITH TELEMETRY DAILY

## 2024-08-04 PROCEDURE — 70553 MRI BRAIN STEM W/O & W/DYE: CPT | Performed by: RADIOLOGY

## 2024-08-04 PROCEDURE — A9575 INJ GADOTERATE MEGLUMI 0.1ML: HCPCS | Performed by: NEUROLOGICAL SURGERY

## 2024-08-04 PROCEDURE — 36415 COLL VENOUS BLD VENIPUNCTURE: CPT

## 2024-08-04 RX ORDER — CHLORHEXIDINE GLUCONATE 40 MG/ML
SOLUTION TOPICAL ONCE
Status: COMPLETED | OUTPATIENT
Start: 2024-08-04 | End: 2024-08-04

## 2024-08-04 RX ORDER — GADOTERATE MEGLUMINE 376.9 MG/ML
10 INJECTION INTRAVENOUS
Status: COMPLETED | OUTPATIENT
Start: 2024-08-04 | End: 2024-08-04

## 2024-08-04 RX ADMIN — ACETAMINOPHEN 650 MG: 325 TABLET ORAL at 16:35

## 2024-08-04 RX ADMIN — HEPARIN SODIUM 5000 UNITS: 5000 INJECTION INTRAVENOUS; SUBCUTANEOUS at 12:41

## 2024-08-04 RX ADMIN — Medication: at 22:45

## 2024-08-04 RX ADMIN — GADOTERATE MEGLUMINE 10 ML: 376.9 INJECTION INTRAVENOUS at 08:25

## 2024-08-04 RX ADMIN — ESCITALOPRAM OXALATE 5 MG: 10 TABLET ORAL at 08:56

## 2024-08-04 ASSESSMENT — COGNITIVE AND FUNCTIONAL STATUS - GENERAL
DAILY ACTIVITIY SCORE: 24
MOBILITY SCORE: 24

## 2024-08-04 ASSESSMENT — PAIN - FUNCTIONAL ASSESSMENT
PAIN_FUNCTIONAL_ASSESSMENT: 0-10

## 2024-08-04 ASSESSMENT — PAIN SCALES - GENERAL
PAINLEVEL_OUTOF10: 1
PAINLEVEL_OUTOF10: 0 - NO PAIN

## 2024-08-04 NOTE — PROGRESS NOTES
"Denae Nolasco is a 33 y.o. female on day 1 of admission presenting with Schwannoma.    Subjective   No acute events overnight. Patient resting comfortably in bed and feeling ready for OR tomorrow.        Objective     Physical Exam    NAD, A&Ox3     Cranial Nerves II-XII: PERRL, EOMI, Face symmetric, Facial SILT, Palate/Tongue midline and symmetric, shoulder shrugs symmetric, L ear decr hearing        MOTOR:         Muscle bulk and tone were normal in both upper and lower extremities.           RUE D5 / B5 / T5 / HG 5/ IO 5       LUE D5 / B5 / T5 / HG 5/ IO 5       Negative mckeon          RLE HF5 / KE 5/ DF 5/ PF 5       LLE HF5 / KE 5/ DF 5/ PF 5       No clonus       SENSORY:  In both upper and lower extremities, sensation was intact to light touch       COORDINATION:        BUE, finger-nose-finger was intact without dysmetria or overshoot.       BLE, heel-to-shin was intact.       PSYCH: appropriate  SKIN: no obvious lesions      Last Recorded Vitals  Blood pressure 108/72, pulse 63, temperature 36 °C (96.8 °F), temperature source Oral, resp. rate 16, height 1.575 m (5' 2\"), weight 55.3 kg (122 lb), last menstrual period 07/24/2024, SpO2 97%.  Intake/Output last 3 Shifts:  No intake/output data recorded.    Relevant Results                             Assessment/Plan   Principal Problem:    Schwannoma    Denae Nolasco is a 33 y.o. 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  Has decreased hearing on left side otherwise no focal neurologic deficits.    Plan  Floor   OR Mon 8/5 for L retrosig for tumor resection   NPO @midnight  Hold AC/AP   Perioperative medicine recs for risk stratification and medical optimization   PTOT   SCDs           Will Rayo MD      "

## 2024-08-04 NOTE — CARE PLAN
The patient's goals for the shift include saftey    The clinical goals for the shift include Pt will remain safe and free from falls this shift      Problem: Pain - Adult  Goal: Verbalizes/displays adequate comfort level or baseline comfort level  Outcome: Progressing     Problem: Safety - Adult  Goal: Free from fall injury  Outcome: Progressing     Problem: Discharge Planning  Goal: Discharge to home or other facility with appropriate resources  Outcome: Progressing     Problem: Chronic Conditions and Co-morbidities  Goal: Patient's chronic conditions and co-morbidity symptoms are monitored and maintained or improved  Outcome: Progressing

## 2024-08-04 NOTE — PROGRESS NOTES
Pharmacy Medication History Review    Denae Nolasco is a 33 y.o. female admitted for Schwannoma. Pharmacy reviewed the patient's [id number] medications and allergies for accuracy.    Medications ADDED:  Iron OTC  Medications CHANGED:  N/A  Medications REMOVED:   N/A     The list below reflects the updated PTA list. Comments regarding how patient may be taking medications differently can be found in the Admit Orders Activity  Prior to Admission Medications   Prescriptions Last Dose Informant   escitalopram (Lexapro) 5 mg tablet  Self   Sig: Take 1 tablet (5 mg) by mouth once daily.   ferrous sulfate (IRON ORAL)  Self   Sig: Take 1 tablet by mouth once daily.      Facility-Administered Medications: None       The list below reflects the updated allergy list. Please review each documented allergy for additional clarification and justification.  Allergies  Reviewed by Duyen Harris RN on 8/3/2024   No Known Allergies         Patient declines M2B at discharge. Pharmacy has been updated to [facility] in [place].    Sources used to complete the med history include out patient fill history, OARRS, and patient interview   Reliable historian    Below are additional concerns with the patient's PTA list.  N/A    Alexx Carson PharmD  Transitions of Care Pharmacist  [facility]  Please reach out via Secure Chat for questions, or if no response call [facility] or [facility]

## 2024-08-04 NOTE — PROGRESS NOTES
Pharmacy Admission Order Reconciliation Review    Denae Nolasco is a 33 y.o. female admitted for Schwannoma. Pharmacy reviewed the patient's unreconciled admission medications.    Prior to admission medications that were reviewed and acted on by the pharmacist include:  Iron  These medications have been reconciled.     Any other unreconcilied medications have been addressed and will be ordered or held by the patient's medical team. Medications addressed by the pharmacist may be added or changed by the patient's medical team at any time.    Alexx Carson, PharmD  Transitions of Care Pharmacist  [facility]  Please reach out via Secure Chat for questions

## 2024-08-05 ENCOUNTER — APPOINTMENT (OUTPATIENT)
Dept: NEUROLOGY | Facility: HOSPITAL | Age: 34
DRG: 003 | End: 2024-08-05
Payer: COMMERCIAL

## 2024-08-05 ENCOUNTER — APPOINTMENT (OUTPATIENT)
Dept: RADIOLOGY | Facility: HOSPITAL | Age: 34
DRG: 003 | End: 2024-08-05
Payer: COMMERCIAL

## 2024-08-05 ENCOUNTER — ANESTHESIA EVENT (OUTPATIENT)
Dept: OPERATING ROOM | Facility: HOSPITAL | Age: 34
End: 2024-08-05
Payer: COMMERCIAL

## 2024-08-05 ENCOUNTER — ANESTHESIA (OUTPATIENT)
Dept: OPERATING ROOM | Facility: HOSPITAL | Age: 34
End: 2024-08-05
Payer: COMMERCIAL

## 2024-08-05 PROBLEM — Z98.890 PONV (POSTOPERATIVE NAUSEA AND VOMITING): Status: ACTIVE | Noted: 2024-08-05

## 2024-08-05 PROBLEM — R11.2 PONV (POSTOPERATIVE NAUSEA AND VOMITING): Status: ACTIVE | Noted: 2024-08-05

## 2024-08-05 LAB
ANION GAP BLDA CALCULATED.4IONS-SCNC: 11 MMO/L (ref 10–25)
ANION GAP BLDA CALCULATED.4IONS-SCNC: 12 MMO/L (ref 10–25)
ANION GAP BLDA CALCULATED.4IONS-SCNC: 12 MMO/L (ref 10–25)
ANION GAP BLDA CALCULATED.4IONS-SCNC: 14 MMO/L (ref 10–25)
ATRIAL RATE: 91 BPM
BASE EXCESS BLDA CALC-SCNC: -3.5 MMOL/L (ref -2–3)
BASE EXCESS BLDA CALC-SCNC: -4 MMOL/L (ref -2–3)
BASE EXCESS BLDA CALC-SCNC: -5.6 MMOL/L (ref -2–3)
BASE EXCESS BLDA CALC-SCNC: -5.7 MMOL/L (ref -2–3)
BASE EXCESS BLDA CALC-SCNC: -6.3 MMOL/L (ref -2–3)
BODY TEMPERATURE: 37 DEGREES CELSIUS
CA-I BLDA-SCNC: 1.06 MMOL/L (ref 1.1–1.33)
CA-I BLDA-SCNC: 1.15 MMOL/L (ref 1.1–1.33)
CA-I BLDA-SCNC: 1.15 MMOL/L (ref 1.1–1.33)
CA-I BLDA-SCNC: 1.18 MMOL/L (ref 1.1–1.33)
CHLORIDE BLDA-SCNC: 110 MMOL/L (ref 98–107)
CHLORIDE BLDA-SCNC: 110 MMOL/L (ref 98–107)
CHLORIDE BLDA-SCNC: 112 MMOL/L (ref 98–107)
CHLORIDE BLDA-SCNC: 115 MMOL/L (ref 98–107)
GLUCOSE BLDA-MCNC: 178 MG/DL (ref 74–99)
GLUCOSE BLDA-MCNC: 180 MG/DL (ref 74–99)
GLUCOSE BLDA-MCNC: 204 MG/DL (ref 74–99)
GLUCOSE BLDA-MCNC: 204 MG/DL (ref 74–99)
HCO3 BLDA-SCNC: 18 MMOL/L (ref 22–26)
HCO3 BLDA-SCNC: 18.1 MMOL/L (ref 22–26)
HCO3 BLDA-SCNC: 18.2 MMOL/L (ref 22–26)
HCO3 BLDA-SCNC: 18.5 MMOL/L (ref 22–26)
HCO3 BLDA-SCNC: 19.9 MMOL/L (ref 22–26)
HCT VFR BLD EST: 34 % (ref 36–46)
HCT VFR BLD EST: 35 % (ref 36–46)
HCT VFR BLD EST: 36 % (ref 36–46)
HCT VFR BLD EST: 39 % (ref 36–46)
HGB BLDA-MCNC: 11.3 G/DL (ref 12–16)
HGB BLDA-MCNC: 11.7 G/DL (ref 12–16)
HGB BLDA-MCNC: 12 G/DL (ref 12–16)
HGB BLDA-MCNC: 12.9 G/DL (ref 12–16)
HOLD SPECIMEN: NORMAL
INHALED O2 CONCENTRATION: 45 %
INHALED O2 CONCENTRATION: 45 %
INHALED O2 CONCENTRATION: 50 %
LACTATE BLDA-SCNC: 1.6 MMOL/L (ref 0.4–2)
LACTATE BLDA-SCNC: 1.7 MMOL/L (ref 0.4–2)
LACTATE BLDA-SCNC: 1.8 MMOL/L (ref 0.4–2)
LACTATE BLDA-SCNC: 2.4 MMOL/L (ref 0.4–2)
OXYHGB MFR BLDA: 97.4 % (ref 94–98)
OXYHGB MFR BLDA: 97.5 % (ref 94–98)
OXYHGB MFR BLDA: 97.6 % (ref 94–98)
OXYHGB MFR BLDA: 97.8 % (ref 94–98)
OXYHGB MFR BLDA: 97.9 % (ref 94–98)
P AXIS: 66 DEGREES
P OFFSET: 181 MS
P ONSET: 138 MS
PCO2 BLDA: 26 MM HG (ref 38–42)
PCO2 BLDA: 29 MM HG (ref 38–42)
PCO2 BLDA: 30 MM HG (ref 38–42)
PCO2 BLDA: 30 MM HG (ref 38–42)
PCO2 BLDA: 32 MM HG (ref 38–42)
PH BLDA: 7.36 PH (ref 7.38–7.42)
PH BLDA: 7.39 PH (ref 7.38–7.42)
PH BLDA: 7.4 PH (ref 7.38–7.42)
PH BLDA: 7.43 PH (ref 7.38–7.42)
PH BLDA: 7.46 PH (ref 7.38–7.42)
PO2 BLDA: 205 MM HG (ref 85–95)
PO2 BLDA: 211 MM HG (ref 85–95)
PO2 BLDA: 216 MM HG (ref 85–95)
PO2 BLDA: 225 MM HG (ref 85–95)
PO2 BLDA: 233 MM HG (ref 85–95)
POTASSIUM BLDA-SCNC: 3.3 MMOL/L (ref 3.5–5.3)
POTASSIUM BLDA-SCNC: 3.5 MMOL/L (ref 3.5–5.3)
POTASSIUM BLDA-SCNC: 3.5 MMOL/L (ref 3.5–5.3)
POTASSIUM BLDA-SCNC: 3.6 MMOL/L (ref 3.5–5.3)
PR INTERVAL: 156 MS
Q ONSET: 216 MS
QRS COUNT: 15 BEATS
QRS DURATION: 98 MS
QT INTERVAL: 378 MS
QTC CALCULATION(BAZETT): 464 MS
QTC FREDERICIA: 434 MS
R AXIS: 94 DEGREES
SAO2 % BLDA: 100 % (ref 94–100)
SODIUM BLDA-SCNC: 135 MMOL/L (ref 136–145)
SODIUM BLDA-SCNC: 139 MMOL/L (ref 136–145)
SODIUM BLDA-SCNC: 141 MMOL/L (ref 136–145)
SODIUM BLDA-SCNC: 142 MMOL/L (ref 136–145)
T AXIS: 33 DEGREES
T OFFSET: 405 MS
VENTRICULAR RATE: 91 BPM

## 2024-08-05 PROCEDURE — 3600000010 HC OR TIME - EACH INCREMENTAL 1 MINUTE - PROCEDURE LEVEL FIVE: Performed by: NEUROLOGICAL SURGERY

## 2024-08-05 PROCEDURE — 3600000005 HC OR TIME - INITIAL BASE CHARGE - PROCEDURE LEVEL FIVE: Performed by: NEUROLOGICAL SURGERY

## 2024-08-05 PROCEDURE — 61107 TDH PNXR IMPLT VENTR CATH: CPT | Performed by: NEUROLOGICAL SURGERY

## 2024-08-05 PROCEDURE — 84132 ASSAY OF SERUM POTASSIUM: CPT

## 2024-08-05 PROCEDURE — A61500 PR CRANIECT EXCIS SKULL BONE LESN: Performed by: ANESTHESIOLOGY

## 2024-08-05 PROCEDURE — 2500000004 HC RX 250 GENERAL PHARMACY W/ HCPCS (ALT 636 FOR OP/ED)

## 2024-08-05 PROCEDURE — 2500000004 HC RX 250 GENERAL PHARMACY W/ HCPCS (ALT 636 FOR OP/ED): Performed by: STUDENT IN AN ORGANIZED HEALTH CARE EDUCATION/TRAINING PROGRAM

## 2024-08-05 PROCEDURE — 2500000005 HC RX 250 GENERAL PHARMACY W/O HCPCS: Performed by: NEUROLOGICAL SURGERY

## 2024-08-05 PROCEDURE — 88307 TISSUE EXAM BY PATHOLOGIST: CPT | Mod: TC,SUR | Performed by: NEUROLOGICAL SURGERY

## 2024-08-05 PROCEDURE — 2720000007 HC OR 272 NO HCPCS: Performed by: NEUROLOGICAL SURGERY

## 2024-08-05 PROCEDURE — 71045 X-RAY EXAM CHEST 1 VIEW: CPT

## 2024-08-05 PROCEDURE — 61520 REMOVAL OF BRAIN LESION: CPT | Performed by: NEUROLOGICAL SURGERY

## 2024-08-05 PROCEDURE — 95870 NDL EMG LMTD STD MUSC 1 XTR: CPT

## 2024-08-05 PROCEDURE — 3700000001 HC GENERAL ANESTHESIA TIME - INITIAL BASE CHARGE: Performed by: NEUROLOGICAL SURGERY

## 2024-08-05 PROCEDURE — 8E09XBZ COMPUTER ASSISTED PROCEDURE OF HEAD AND NECK REGION: ICD-10-PCS | Performed by: NEUROLOGICAL SURGERY

## 2024-08-05 PROCEDURE — 00BN0ZZ EXCISION OF ACOUSTIC NERVE, OPEN APPROACH: ICD-10-PCS | Performed by: NEUROLOGICAL SURGERY

## 2024-08-05 PROCEDURE — 88331 PATH CONSLTJ SURG 1 BLK 1SPC: CPT | Performed by: PATHOLOGY

## 2024-08-05 PROCEDURE — 88307 TISSUE EXAM BY PATHOLOGIST: CPT | Performed by: PATHOLOGY

## 2024-08-05 PROCEDURE — 69990 MICROSURGERY ADD-ON: CPT | Performed by: NEUROLOGICAL SURGERY

## 2024-08-05 PROCEDURE — 2500000001 HC RX 250 WO HCPCS SELF ADMINISTERED DRUGS (ALT 637 FOR MEDICARE OP)

## 2024-08-05 PROCEDURE — 3700000002 HC GENERAL ANESTHESIA TIME - EACH INCREMENTAL 1 MINUTE: Performed by: NEUROLOGICAL SURGERY

## 2024-08-05 PROCEDURE — 84132 ASSAY OF SERUM POTASSIUM: CPT | Performed by: STUDENT IN AN ORGANIZED HEALTH CARE EDUCATION/TRAINING PROGRAM

## 2024-08-05 PROCEDURE — 82805 BLOOD GASES W/O2 SATURATION: CPT

## 2024-08-05 PROCEDURE — P9045 ALBUMIN (HUMAN), 5%, 250 ML: HCPCS | Mod: JZ | Performed by: STUDENT IN AN ORGANIZED HEALTH CARE EDUCATION/TRAINING PROGRAM

## 2024-08-05 PROCEDURE — C1713 ANCHOR/SCREW BN/BN,TIS/BN: HCPCS | Performed by: NEUROLOGICAL SURGERY

## 2024-08-05 PROCEDURE — 5A1945Z RESPIRATORY VENTILATION, 24-96 CONSECUTIVE HOURS: ICD-10-PCS | Performed by: NEUROLOGICAL SURGERY

## 2024-08-05 PROCEDURE — 2500000001 HC RX 250 WO HCPCS SELF ADMINISTERED DRUGS (ALT 637 FOR MEDICARE OP): Performed by: NEUROLOGICAL SURGERY

## 2024-08-05 PROCEDURE — 2780000003 HC OR 278 NO HCPCS: Performed by: NEUROLOGICAL SURGERY

## 2024-08-05 PROCEDURE — 1200000002 HC GENERAL ROOM WITH TELEMETRY DAILY

## 2024-08-05 PROCEDURE — 2500000005 HC RX 250 GENERAL PHARMACY W/O HCPCS

## 2024-08-05 PROCEDURE — 009630Z DRAINAGE OF CEREBRAL VENTRICLE WITH DRAINAGE DEVICE, PERCUTANEOUS APPROACH: ICD-10-PCS | Performed by: NEUROLOGICAL SURGERY

## 2024-08-05 PROCEDURE — C1760 CLOSURE DEV, VASC: HCPCS | Performed by: NEUROLOGICAL SURGERY

## 2024-08-05 PROCEDURE — 61781 SCAN PROC CRANIAL INTRA: CPT | Performed by: NEUROLOGICAL SURGERY

## 2024-08-05 DEVICE — CRANIAL CURVE
Type: IMPLANTABLE DEVICE | Site: SKULL | Status: FUNCTIONAL
Brand: MEDPOR TITAN

## 2024-08-05 DEVICE — DURA-GUARD DURAL REPAIR PATCH IS PREPARED FROM BOVINE PERICARDIUM WHICH IS CROSS-LINKED WITH GLUTARALDEHYDE. THE PERICARDIUM IS PROCURED FROM CATTLE ORIGINATING IN THE UNITED STATES. DURA-GUARD DURAL REPAIR PATCH IS CHEMICALLY STERILIZED USING ETHANOL AND PROPYLENE OXIDE. DURA-GUARD DURAL REPAIR PATCH HAS BEEN TREATED WITH 1 MOLAR SODIUM HYDROXIDE FOR A MINIMUM OF 60 MINUTES AT 20 - 25 C.  DURA-GUARD DURAL REPAIR PATCH IS PACKAGED IN A CONTAINER FILLED WITH STERILE, NON-PYROGENIC WATER CONTAINING PROPYLENE OXIDE. THE CONTENTS OF THE UNOPENED, UNDAMAGED CONTAINER ARE STERILE.
Type: IMPLANTABLE DEVICE | Site: CRANIAL | Status: FUNCTIONAL
Brand: DURA-GUARD DURAL REPAIR PATCH

## 2024-08-05 DEVICE — SCREW, 1.5 X 4 HT SD XDR: Type: IMPLANTABLE DEVICE | Site: CRANIAL | Status: FUNCTIONAL

## 2024-08-05 DEVICE — IMPLANTABLE DEVICE: Type: IMPLANTABLE DEVICE | Site: CRANIAL | Status: FUNCTIONAL

## 2024-08-05 RX ORDER — MIDAZOLAM HYDROCHLORIDE 1 MG/ML
INJECTION INTRAMUSCULAR; INTRAVENOUS AS NEEDED
Status: DISCONTINUED | OUTPATIENT
Start: 2024-08-05 | End: 2024-08-06

## 2024-08-05 RX ORDER — MANNITOL 20 G/100ML
INJECTION, SOLUTION INTRAVENOUS AS NEEDED
Status: DISCONTINUED | OUTPATIENT
Start: 2024-08-05 | End: 2024-08-06

## 2024-08-05 RX ORDER — CEFTRIAXONE 2 G/1
INJECTION, POWDER, FOR SOLUTION INTRAMUSCULAR; INTRAVENOUS AS NEEDED
Status: DISCONTINUED | OUTPATIENT
Start: 2024-08-05 | End: 2024-08-06

## 2024-08-05 RX ORDER — LIDOCAINE HCL/PF 100 MG/5ML
SYRINGE (ML) INTRAVENOUS AS NEEDED
Status: DISCONTINUED | OUTPATIENT
Start: 2024-08-05 | End: 2024-08-06

## 2024-08-05 RX ORDER — LIDOCAINE HYDROCHLORIDE AND EPINEPHRINE 5; 5 MG/ML; UG/ML
INJECTION, SOLUTION INFILTRATION; PERINEURAL AS NEEDED
Status: DISCONTINUED | OUTPATIENT
Start: 2024-08-05 | End: 2024-08-06 | Stop reason: HOSPADM

## 2024-08-05 RX ORDER — NIMODIPINE 30 MG/1
60 CAPSULE, LIQUID FILLED ORAL
Status: CANCELLED | OUTPATIENT
Start: 2024-08-05

## 2024-08-05 RX ORDER — SODIUM CHLORIDE 234 MG/ML
30 INJECTION, SOLUTION INTRAVENOUS ONCE
Status: COMPLETED | OUTPATIENT
Start: 2024-08-05 | End: 2024-08-05

## 2024-08-05 RX ORDER — ONDANSETRON HYDROCHLORIDE 2 MG/ML
INJECTION, SOLUTION INTRAVENOUS AS NEEDED
Status: DISCONTINUED | OUTPATIENT
Start: 2024-08-05 | End: 2024-08-06

## 2024-08-05 RX ORDER — ESMOLOL HYDROCHLORIDE 10 MG/ML
INJECTION INTRAVENOUS AS NEEDED
Status: DISCONTINUED | OUTPATIENT
Start: 2024-08-05 | End: 2024-08-06

## 2024-08-05 RX ORDER — VANCOMYCIN HYDROCHLORIDE 1 G/20ML
INJECTION, POWDER, LYOPHILIZED, FOR SOLUTION INTRAVENOUS AS NEEDED
Status: DISCONTINUED | OUTPATIENT
Start: 2024-08-05 | End: 2024-08-06

## 2024-08-05 RX ORDER — SODIUM CHLORIDE 0.9 G/100ML
IRRIGANT IRRIGATION AS NEEDED
Status: DISCONTINUED | OUTPATIENT
Start: 2024-08-05 | End: 2024-08-06 | Stop reason: HOSPADM

## 2024-08-05 RX ORDER — FUROSEMIDE 10 MG/ML
INJECTION INTRAMUSCULAR; INTRAVENOUS AS NEEDED
Status: DISCONTINUED | OUTPATIENT
Start: 2024-08-05 | End: 2024-08-06

## 2024-08-05 RX ORDER — SODIUM CHLORIDE 234 MG/ML
30 INJECTION, SOLUTION INTRAVENOUS ONCE
Status: CANCELLED | OUTPATIENT
Start: 2024-08-05 | End: 2024-08-05

## 2024-08-05 RX ORDER — ROCURONIUM BROMIDE 10 MG/ML
INJECTION, SOLUTION INTRAVENOUS AS NEEDED
Status: DISCONTINUED | OUTPATIENT
Start: 2024-08-05 | End: 2024-08-06

## 2024-08-05 RX ORDER — ALBUMIN HUMAN 50 G/1000ML
SOLUTION INTRAVENOUS AS NEEDED
Status: DISCONTINUED | OUTPATIENT
Start: 2024-08-05 | End: 2024-08-06

## 2024-08-05 RX ORDER — SCOLOPAMINE TRANSDERMAL SYSTEM 1 MG/1
PATCH, EXTENDED RELEASE TRANSDERMAL AS NEEDED
Status: DISCONTINUED | OUTPATIENT
Start: 2024-08-05 | End: 2024-08-06

## 2024-08-05 RX ORDER — ACETAMINOPHEN 10 MG/ML
INJECTION, SOLUTION INTRAVENOUS AS NEEDED
Status: DISCONTINUED | OUTPATIENT
Start: 2024-08-05 | End: 2024-08-06

## 2024-08-05 RX ORDER — PHENYLEPHRINE HCL IN 0.9% NACL 0.4MG/10ML
SYRINGE (ML) INTRAVENOUS AS NEEDED
Status: DISCONTINUED | OUTPATIENT
Start: 2024-08-05 | End: 2024-08-06

## 2024-08-05 RX ORDER — HYDROMORPHONE HYDROCHLORIDE 1 MG/ML
INJECTION, SOLUTION INTRAMUSCULAR; INTRAVENOUS; SUBCUTANEOUS AS NEEDED
Status: DISCONTINUED | OUTPATIENT
Start: 2024-08-05 | End: 2024-08-06

## 2024-08-05 RX ORDER — PHENYLEPHRINE 10 MG/250 ML(40 MCG/ML)IN 0.9 % SOD.CHLORIDE INTRAVENOUS
CONTINUOUS PRN
Status: DISCONTINUED | OUTPATIENT
Start: 2024-08-05 | End: 2024-08-06

## 2024-08-05 RX ORDER — SODIUM CHLORIDE, SODIUM LACTATE, POTASSIUM CHLORIDE, CALCIUM CHLORIDE 600; 310; 30; 20 MG/100ML; MG/100ML; MG/100ML; MG/100ML
INJECTION, SOLUTION INTRAVENOUS CONTINUOUS PRN
Status: DISCONTINUED | OUTPATIENT
Start: 2024-08-05 | End: 2024-08-06

## 2024-08-05 RX ORDER — FENTANYL CITRATE 50 UG/ML
INJECTION, SOLUTION INTRAMUSCULAR; INTRAVENOUS AS NEEDED
Status: DISCONTINUED | OUTPATIENT
Start: 2024-08-05 | End: 2024-08-06

## 2024-08-05 RX ORDER — SODIUM CHLORIDE 234 MG/ML
30 INJECTION, SOLUTION INTRAVENOUS ONCE
Status: DISCONTINUED | OUTPATIENT
Start: 2024-08-05 | End: 2024-08-06

## 2024-08-05 RX ORDER — PROPOFOL 10 MG/ML
INJECTION, EMULSION INTRAVENOUS CONTINUOUS PRN
Status: DISCONTINUED | OUTPATIENT
Start: 2024-08-05 | End: 2024-08-06

## 2024-08-05 SDOH — HEALTH STABILITY: MENTAL HEALTH: CURRENT SMOKER: 0

## 2024-08-05 ASSESSMENT — PAIN - FUNCTIONAL ASSESSMENT
PAIN_FUNCTIONAL_ASSESSMENT: 0-10
PAIN_FUNCTIONAL_ASSESSMENT: 0-10

## 2024-08-05 ASSESSMENT — PAIN SCALES - GENERAL
PAINLEVEL_OUTOF10: 0 - NO PAIN
PAINLEVEL_OUTOF10: 0 - NO PAIN

## 2024-08-05 NOTE — ANESTHESIA PREPROCEDURE EVALUATION
Patient: Denae Nolasco    Procedure Information       Date/Time: 08/05/24 0715    Procedure: Craniotomy with Excision Tissue (Left) - left retrosigmoid craniotomy for acoustic neuroma resection    Location: [facility] OR 25 / Virtual [facility] OR    Surgeons: Renetta Amanda MD            Relevant Problems   Anesthesia   (+) PONV (postoperative nausea and vomiting)   (-) Family history of malignant hyperthermia      Cardiac  ASD closure as a child       Pulmonary (within normal limits)      Neuro  Brain mass, left sided hearing issues, balance issues, dizziness      GI (within normal limits)      /Renal (within normal limits)      Liver (within normal limits)      Endocrine (within normal limits)      Hematology (within normal limits)       Clinical information reviewed:   Tobacco  Allergies  Meds   Med Hx  Surg Hx   Fam Hx  Soc Hx        NPO Detail:  NPO/Void Status  Date of Last Liquid: 08/04/24  Date of Last Solid: 08/04/24         Physical Exam    Airway  Mallampati: I  TM distance: >3 FB     Cardiovascular    Dental    Pulmonary    Abdominal            Anesthesia Plan    History of general anesthesia?: yes  History of complications of general anesthesia?: no    ASA 3     general   (GETA, arterial line, additional PIV access; central line per surgery team - to be placed by surgery team )  The patient is not a current smoker.    intravenous induction   Postoperative administration of opioids is intended.  Trial extubation is planned.  Anesthetic plan and risks discussed with patient.  Use of blood products discussed with patient who consented to blood products.    Plan discussed with attending.

## 2024-08-05 NOTE — ANESTHESIA PROCEDURE NOTES
Peripheral IV  Date/Time: 8/5/2024 8:05 AM      Placement  Needle size: 16 G  Laterality: right  Location: hand  Site prep: alcohol  Technique: anatomical landmarks  Attempts: 1

## 2024-08-05 NOTE — HOSPITAL COURSE
Denae Nolasco is a 33 y.o. female with a past medical history of raynaud's and depression who presented to the [facility] ED on 8/3/2024 with worsening vertigo and ataxia. 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 CT CAP with R middle lobe calcifications consistent wthi granulomatous disease, dilated CBD, intrahepatic dilatation (rec'd MRCP or ERCP). TTE EF 57% with no motion abnormality, no residual interatrial shunt with bubble study. 8/4 MRI IAC CISS stable. Patient admitted to neurosurgery service for operative management.     8/5 OR for L retrosigmoid craniotomy for tumor resection with RF EVD placement. CTH with resection bed hemorrhage.   8/6 MRI brain L transverse sigmoid sinus thrombosis, GTR.   8/7 Extubated   8/8 ENT scope with bilateral VC paralysis, L CB VII, XII paralysis.   8/9 Trach/PEG. BLE US negative.   8/10 CTH stable. EVD dc'd. ABX course complete. Tarsorrhapy completed per ophthalmology.   8/12 CTH stable. Downgrade to ODILIA. Holcomb replaced for acute retention.   8/14 ENT removed sutures from trach site. Patient accepted to acute rehab.     PT/OT evaluated patient and recommended placement at acute rehab after discharge.     Patient discharged with detailed instructions and scheduled outpatient follow up.

## 2024-08-05 NOTE — ED PROVIDER NOTES
HPI   Chief Complaint   Patient presents with    abnormal MRI       HPI: 33-year-old female presents for evaluation of a newly found brain mass.  She reports that for the past year she has been having issues with balance.  She used to ride technical trails on her mountain bike and reports that she can barely ride on a flat surface now.  She underwent an outpatient MRI today which showed a large brain mass.  She denies change in vision, fever, chills and states that she currently feels well      Limitations to history: None  Independent Historians: Patient  External Records Reviewed: HIE, outpatient notes, inpatient notes  ------------------------------------------------------------------------------------------------------------------------------------------  ROS: a ten point review of systems was performed and was negative except as per HPI.  ------------------------------------------------------------------------------------------------------------------------------------------  PMH / PSH: as per HPI, otherwise reviewed in EMR  MEDS: as per HPI, otherwise reviewed in EMR  ALLERGIES: as per HPI, otherwise reviewed in EMR  SocH:  as per HPI, otherwise reviewed in EMR  FH:  as per HPI, otherwise reviewed in EMR  ------------------------------------------------------------------------------------------------------------------------------------------  Physical Exam:  VS: As documented in the triage note and EMR flowsheet from this visit was reviewed  General: Well appearing. No acute distress.   Eyes:  Extraocular movements grossly intact. No scleral icterus. No discharge  HEENT:  Normocephalic.  Atraumatic  Neck: Moves neck freely. No gross masses  CV: Regular rhythm. No murmurs, rubs or gallops   Resp: Clear to auscultation bilaterally. No respiratory distress.    GI: Soft, no masses, nontender. No rebound tenderness or guarding  MSK: Symmetric muscle bulk. No deformities. No lower extremity edema.    Skin: Warm,  dry, intact.   Neuro: Alert and oriented.  Mild dysmetria of the left upper extremity.  No sensory deficits or facial droop.  Psych: Appropriate for situation  ------------------------------------------------------------------------------------------------------------------------------------------  Hospital Course / Medical Decision Making:  Independent Interpretations: MRI brain  EKG as interpreted by me: N/A    MDM: 33-year-old female presents for evaluation of a newly found brain mass.  She has mild dysmetria of the left upper extremity.  I spoke to the on-call neurosurgery resident who independently reviewed the brain MRI and asked that the patient come to the ED at [facility] for neurosurgery evaluation.  She is with her  who agrees to drive her there.  They agreed to go straight to the ED at [facility].    Discussion of Management with Other Providers:   I discussed the patient/results with: Emergency medicine team    Final diagnosis and disposition as below.    Labs Reviewed - No data to display                Patient History   Past Medical History:   Diagnosis Date    Depression     Visual impairment      No past surgical history on file.  Family History   Problem Relation Name Age of Onset    Blood clot Mother Delmis Mantilla-  of clot 2016      labor Sister Megan      Social History     Tobacco Use    Smoking status: Never    Smokeless tobacco: Never   Vaping Use    Vaping status: Never Used   Substance Use Topics    Alcohol use: Never    Drug use: Not Currently     Frequency: 1.0 times per week     Types: Marijuana     Comment: Vape       Physical Exam   ED Triage Vitals [24 1631]   Temperature Heart Rate Respirations BP   36.4 °C (97.5 °F) 86 16 121/73      Pulse Ox Temp Source Heart Rate Source Patient Position   98 % Temporal Monitor Sitting      BP Location FiO2 (%)     Left arm --       Physical Exam      ED Course & MDM   Diagnoses as of 24  1100   Brain mass                       [place] Coma Scale Score: 15                        Medical Decision Making      Procedure  Procedures     Wing Martinez DO  08/05/24 1102

## 2024-08-05 NOTE — ANESTHESIA PROCEDURE NOTES
Peripheral IV  Date/Time: 8/5/2024 8:04 AM      Placement  Needle size: 16 G  Laterality: right  Location: hand  Site prep: alcohol  Technique: anatomical landmarks  Attempts: 1

## 2024-08-05 NOTE — DOCUMENTATION CLARIFICATION NOTE
"    PATIENT:               ANGEL MARIE  ACCT #:                  [id number]  MRN:                       [id number]  :                       1990  ADMIT DATE:       8/3/2024 7:25 AM  DISCH DATE:  RESPONDING PROVIDER #:        [id number]          PROVIDER RESPONSE TEXT:    Radiological findings are clinically insignificant for cerebral edema    CDI QUERY TEXT:    Clarification    Instruction:    Based on your assessment of the patient and the clinical information, please provide the requested documentation by clicking on the appropriate radio button and enter any additional information if prompted.    Question: Please further clarify if there is a diagnosis related to the clinical information    When answering this query, please exercise your independent professional judgment. The fact that a question is being asked, does not imply that any particular answer is desired or expected.    The patient's clinical indicators include:  Clinical Information:  Patient is a 33 year old female who presented as a transfer with an intracranial mass concerning for schwannoma versus meningioma as seen on outpatient imaging.    Clinical Indicators:  From the CT of the Head on 8/3, \" There is hypo-density within the surrounding brain parenchymal compatible with surrounding brain parenchymal edema \"    From the MR IAC on 8/3, \" There is increased signal noted within the brachium pontis and cerebellum compatible with edema. \"    Treatment:  OR planning for mass resection, frequent neuro checks, safety precautions    Risk Factors:  Brain mass, brain compression, hydrocephalus, ataxia, vertigo, left ear hearing loss, slurred speech, tremor, vision loss  Options provided:  -- Cerebral Edema  -- Radiological findings are clinically insignificant for cerebral edema  -- Other - I will add my own diagnosis  -- Refer to Clinical Documentation Reviewer    Query created by: Emmie Browning on 2024 4:55 PM      Electronically signed by:  " SANTOS MARIEE MD 8/5/2024 6:48 AM

## 2024-08-05 NOTE — PROGRESS NOTES
"    [facility]  NEUROSCIENCE INTENSIVE CARE UNIT  RECOVERY NOTE       Patient Name: Denae Nolasco     MRN: [id number]     Admit Date: 8/3/2024     : 1990 AGE: 33 y.o. GENDER: female    Dx: Schwannoma  PROCEDURE: Left Retrosigmoid Craniotomy for Tumor Resection   Subjective    33 year-old female with past medical history of depression and visual impairment who presented to [facility] ED on 24 with complaints of hearing loss, vertigo, ataxia (since ), headache, dizziness, and muscle spasms. Patient transferred to [facility] for concern for intracranial mass concerning for schwannoma vs. meningioma seen on outpatient imaging. 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    Arrival to NSU: 24 at 0203  Report received from anesthesia and neurosurgery.    OR Events:   : s/p L retrosignmoid craniotomy for tumor resection with RF EVD placement    I/O:   3.6L crystalloids  250cc albumin   4.1L UOP      Interval Events: Admitted to NSU post-operative.     Objective   VITALS:  Heart Rate:  []   Temp:  [36 °C (96.8 °F)-37.1 °C (98.8 °F)]   Resp:  [15-18]   BP: (114-148)/()   Height:  [157.5 cm (5' 2\")]   Weight:  [55.3 kg (121 lb 14.6 oz)]   SpO2:  [98 %-100 %]      INTAKE/OUTPUT:    Intake/Output Summary (Last 24 hours) at 2024 0303  Last data filed at 2024 0213  Gross per 24 hour   Intake 3600 ml   Output 4175 ml   Net -575 ml      PHYSICAL EXAM:  NEURO:  - 2mm reactive to light (pupil checks only)   - RF EVD clamped   CV:  - RRR on telemetry, NSR  - Right radial arterial line in place  RESP:  - Intubated, regular, unlabored  - Vent: AC  :  - Indwelling catheter in place  GI:  - Abdomen NT/ND, soft  SKIN:  - Surgical incision clean/dry/intact    MEDICATIONS:  Scheduled: PRN: Continuous:   acetaminophen, 650 mg, nasogastric tube, q6h ENOCH  cefTRIAXone, 2 g, intravenous, q12h  dexAMETHasone, 6 mg, intravenous, " q4h  escitalopram, 5 mg, nasogastric tube, Daily  niMODipine, 60 mg, nasogastric tube, q4h ENOCH  perflutren protein A microsphere, 0.5 mL, intravenous, Once in imaging  polyethylene glycol, 17 g, nasogastric tube, Daily  sennosides-docusate sodium, 2 tablet, nasogastric tube, BID  vancomycin, 1,250 mg, intravenous, q12h     PRN medications: hydrALAZINE, labetaloL, naloxone, oxyCODONE, oxygen, vancomycin niCARdipine, 2.5-15 mg/hr, Last Rate: 2.5 mg/hr (08/06/24 0223)  propofol, 5-50 mcg/kg/min, Last Rate: 50 mcg/kg/min (08/06/24 0224)         LAB RESULTS:  Results from last 72 hours   Lab Units 08/04/24 1211 08/03/24  2350   GLUCOSE mg/dL 129* 82   SODIUM mmol/L 139 138   POTASSIUM mmol/L 4.4 3.9   CHLORIDE mmol/L 105 107   CO2 mmol/L 27 25   ANION GAP mmol/L 11 10   BUN mg/dL 9 9   CREATININE mg/dL 0.57 0.46*   EGFR mL/min/1.73m*2 >90 >90   CALCIUM mg/dL 9.7 8.9   PHOSPHORUS mg/dL 3.7 4.5   ALBUMIN g/dL 4.4 4.3      Results from last 72 hours   Lab Units 08/04/24  1211 08/03/24  2350   WBC AUTO x10*3/uL 4.7 5.2   NRBC AUTO /100 WBCs 0.0 0.0   RBC AUTO x10*6/uL 4.73 4.52   HEMOGLOBIN g/dL 13.6 12.8   HEMATOCRIT % 41.1 39.1   MCV fL 87 87   MCH pg 28.8 28.3   MCHC g/dL 33.1 32.7   RDW % 12.1 12.5   PLATELETS AUTO x10*3/uL 253 237      Results from last 72 hours   Lab Units 08/04/24  1211 08/03/24  2350   WBC AUTO x10*3/uL 4.7 5.2   NRBC AUTO /100 WBCs 0.0 0.0   RBC AUTO x10*6/uL 4.73 4.52   HEMOGLOBIN g/dL 13.6 12.8   HEMATOCRIT % 41.1 39.1   MCV fL 87 87   MCH pg 28.8 28.3   MCHC g/dL 33.1 32.7   RDW % 12.1 12.5   PLATELETS AUTO x10*3/uL 253 237      Results from last 72 hours   Lab Units 08/04/24  1211 08/03/24  2350   PROTIME seconds 11.9 12.0   INR  1.1 1.1   APTT seconds 32 33      Results from last 72 hours   Lab Units 08/04/24  1211 08/03/24  2350   ALBUMIN g/dL 4.4 4.3     IMAGING RESULTS:  XR chest 1 view   Final Result   1.  No evidence of acute cardiopulmonary process.   2. Medical devices as above.                   MACRO:   None        Signed by: Keshav Treadwell 8/5/2024 3:47 PM   Dictation workstation:   [id number]      CT head wo IV contrast volumetric surgical planning   Final Result   1. Preoperative volumetric CT imaging obtained. Mass within the left   cerebellopontine angle better characterized on same day MRI IAC. Mild   surrounding vasogenic edema.   2. Similar asymmetric effacement of the right basal cisterns and 4th   ventricle with mild upstream ventricular prominence. Mild asymmetry   of the lateral ventricles.        I personally reviewed the image(s)/study and resident interpretation   as stated by Dr. Delilah Rader MD. I agree with the findings as   stated. This study was interpreted at [facility], [place], [place].        MACRO:   None        Signed by: Scooter Vinson 8/5/2024 6:22 AM   Dictation workstation:   [id number]      XR chest 1 view   Final Result   1. No evidence of acute cardiopulmonary process.        Signed by: Cliff Juares 8/4/2024 2:11 PM   Dictation workstation:   [id number]      MR IAC w and wo IV contrast   Final Result   1. There is a heterogeneously enhancing mass within the left   cerebellar pontine angle cistern and internal auditory canal   measuring 3.5 x 2.9 x 3.2 cm. There is mild thickening of the   adjacent tentorium measuring 0.2 cm in thickness. Differential   considerations include a vestibular schwannoma or possibly a   meningioma. There is marked mass effect upon the leticia, brachium   pontis and cerebellum. There is near-complete effacement of the 4th   ventricle. There is dilatation of the lateral and 3rd ventricles,   similar to the prior exams.        MACRO:   None        Signed by: Suzy Martinez 8/4/2024 9:24 AM   Dictation workstation:   [id number]      Transthoracic Echo (TTE) Complete   Final Result      XR chest 1 view   Final Result   No evidence of acute intrathoracic abnormality.        Signed by: Aneesh Bhatti 8/3/2024  1:08 PM   Dictation workstation:   [id number]      CT internal auditory canals posterior fossa wo IV contrast   Final Result   There is again evidence of a large extra-axial mass centered within   left cerebellopontine angle cistern which is better defined on the   prior MRI with and without gadolinium dated 08/02/2024 when compared   with this noncontrast CT study. There is again evidence of mass   effect upon the adjacent brainstem, left cerebellar peduncle, and   cerebellum. There is hypodensity within the surrounding brain   parenchymal compatible with surrounding brain parenchymal edema.   There is again evidence of near-complete effacement of the 4th   ventricle. No abnormal calcification is noted associated with the   lesion. There is minimal asymmetric widening of the left internal   auditory canal when compared with the right.        There is again evidence of mild ventriculomegaly involving the   lateral ventricles and 3rd ventricle similar when compared with the   prior MRI without significant effacement of surrounding sulci of the   cerebral hemispheres.        Mild nonspecific white matter changes are again noted within cerebral   hemispheres bilaterally. While nonspecific, white matter changes can   be seen with small-vessel ischemic change or demyelinating processes   among others.        MACRO:   None.        Signed by: Kyle Zamora 8/3/2024 11:48 AM   Dictation workstation:   [id number]      CT head wo IV contrast   Final Result   There is again evidence of a large extra-axial mass centered within   left cerebellopontine angle cistern which is better defined on the   prior MRI with and without gadolinium dated 08/02/2024 when compared   with this noncontrast CT study. There is again evidence of mass   effect upon the adjacent brainstem, left cerebellar peduncle, and   cerebellum. There is hypodensity within the surrounding brain   parenchymal compatible with surrounding brain parenchymal edema.    There is again evidence of near-complete effacement of the 4th   ventricle. No abnormal calcification is noted associated with the   lesion. There is minimal asymmetric widening of the left internal   auditory canal when compared with the right.        There is again evidence of mild ventriculomegaly involving the   lateral ventricles and 3rd ventricle similar when compared with the   prior MRI without significant effacement of surrounding sulci of the   cerebral hemispheres.        Mild nonspecific white matter changes are again noted within cerebral   hemispheres bilaterally. While nonspecific, white matter changes can   be seen with small-vessel ischemic change or demyelinating processes   among others.        MACRO:   None.        Signed by: Kyle Zamora 8/3/2024 11:48 AM   Dictation workstation:   [id number]      CT chest abdomen pelvis w IV contrast   Final Result   1. Dilated common bile duct measuring to 1.1 cm with gradual   transition to normal caliber and associated mild intrahepatic biliary   dilation. No evidence of radiopaque choledocholithiasis, radiopaque   cholelithiasis, pancreatic duct dilation, or abnormal focal   pancreatic enhancement. Findings can be secondary to chronic   periampullary stricture, however neoplasm given patient's history can   not be excluded. Nonemergent MRCP and/or ERCP is recommended for   further assessment.   2. Heterogeneous cervix with hypodense areas, which can be secondary   to nabothian cysts. Nonemergent pelvic ultrasound can be obtained for   further assessment as per clinical discretion.   3. Subpleural nodular opacity within the right middle lobe with   internal calcifications, as above. Additional calcified granulomas   and calcified right hilar lymph nodes, compatible with remote   granulomatous disease.   4. Additional findings as discussed above.        I personally reviewed the images/study and I agree with the findings   as stated by Goyo  Randolph Hendricks MD (Radiology Resident).   This study was interpreted at [facility], [place], [place].        MACRO:   None.        Signed by: Griselda Gamez 8/3/2024 12:40 PM   Dictation workstation:   [id number]      CT head wo IV contrast    (Results Pending)   CT head wo IV contrast    (Results Pending)   XR abdomen 1 view    (Results Pending)   XR chest 1 view    (Results Pending)     Assessment/Plan    33 year-old female with past medical history of depression and visual impairment who presented to [facility] ED on 8/2/24 with complaints of hearing loss, vertigo, ataxia (since 2020), headache, dizziness, and muscle spasms. Patient transferred to [facility] for concern for intracranial mass concerning for schwannoma vs. meningioma seen on outpatient imaging. 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 Patient admitted to NSU s/p L retrosigmoid craniotomy for tumor resection.     NEURO:  #s/p L retrosignmoid craniotomy for tumor resection with RF EVD placement  #Depression  Assessment:  - Neurologically: see above  - RF EVD clamped per NSGY   - Na 139 (8/4/24)  - Home medication: escitalopram 5mg daily  Plan:  - NSU  - Neuro Checks: Q1H pupil checks   - Please keep RF EVD clamped per NSGY   - CTH STAT   - HOB>45 degrees  - Vanc/CTX   - -120  - Q6h Na, Goal Na>145, 23% HTS boluses as needed   - Nimodipine 60q4   - Dex 4q6  - Sedation: Propofol and Fentanyl, send lipid panel given high dose propofol throughout OR case  - Ok to wean prop to precedex if needed   - Pain: acetaminophen Q4H and oxycodone Q6H  - Nausea: ondansetron  - PT/OT/SLP  [] Post-op labs STAT    CARDIOVASCULAR:  #No active issues  Assessment:  - ECHO: EF 57%   Plan:  - Continue to monitor on telemetry  - -120    --> PRN: Labetalol, Hydralazine, and Nicardipine     RESPIRATORY:  #Respiratory insufficiency 2/2 craniotomy unable to extubate post-op  leading to remain intubated  Assessment:  - Vent: AC  Plan:  - Continuous pulse oximetry   - O2 PRN to maintain SpO2 > 94%, wean as tolerated  - Incentive spirometry while awake    RENAL/:  #No active issues  Assessment:  Results from last 72 hours   Lab Units 24  1211 24  2350   BUN mg/dL 9 9   CREATININE mg/dL 0.57 0.46*   Plan:  - Monitor with daily RFP  - Maintain velazquez catheter for: critically ill patient who need accurate urinary output measurements    FEN/GI:  Assessment:  - Last BM: PTA  Plan:  - Monitor and replace electrolytes per protocol  - Diet: NPO   - Bowel Regimen: Docusate-Senna BID and Miralax QD    ENDOCRINE:  Assessment:  Results from last 7 days   Lab Units 24  1211 24  2350   GLUCOSE mg/dL 129* 82      Plan:  - Accuchecks & ISS Q4H while NPO   - Hypoglycemia protocol     HEMATOLOGY:  #No active issues  Assessment:  Results from last 7 days   Lab Units 24  1211 24  2350   HEMOGLOBIN g/dL 13.6 12.8   HEMATOCRIT % 41.1 39.1   PLATELETS AUTO x10*3/uL 253 237   Plan:  - Continue to monitor with daily CBC and Coag panel    INFECTIOUS DISEASE:  #No active issues  Assessment:  Results from last 7 days   Lab Units 24  1211 24  2350   WBC AUTO x10*3/uL 4.7 5.2   - Temp (24hrs), Av.6 °C (97.9 °F), Min:36 °C (96.8 °F), Max:37.1 °C (98.8 °F)  Plan:  - Continue to monitor for s/sx of infection  - Pan culture for temperature > 38.4 C    MUSCULOSKELETAL:  - No acute issues    SKIN:  - No acute issues  - Turns and skin care per NSU protocol    ACCESS:  - PIVs  - Right radial arterial line (--)  - R subclavian CVC (--)    PROPHYLAXIS:  - DVT Ppx: SCDs  - GI Ppx: Pantoprazole    RESTRAINTS:  I agree with nursing assessment of the patient's need for restraints to protect the patient from injury and facilitate healing. The patient is unable to cooperate with the plan of care and at risk for disrupting critical therapy (i.e., removing medical devices,  lines, tubes and/or dressings).  Please see order for specifics. Restraints can be removed when the patient is able to cooperate with plan of care and allow healing to occur, or the medical devices at risk are discontinued by the medical team.     BLU Reyna-CNP  Neuroscience Intensive Care    Total critical care time of 60 minutes, with > 50% of time spent in direct contact with patient/family for education, counseling and coordination of care.

## 2024-08-05 NOTE — ANESTHESIA PROCEDURE NOTES
Central Venous Line:    Date/Time: 8/5/2024 8:30 AM    A central venous line was placed in the OR for the following indication(s):   Staffing  Performed: resident   Authorized by: Maritza Galarza DO    Performed by: Maritza Galarza DO  Number of Lumens: triple lumen          Additional notes:  Triple lumen catheter placed in right subclavian by neurosurgery resident and per supervision of the neurosurgery attending. NOT PLACED OR SUPERVISED by anesthesia team.

## 2024-08-05 NOTE — ANESTHESIA PROCEDURE NOTES
Arterial Line:    Date/Time: 8/5/2024 7:57 AM    Staffing  Performed: [facility]   Authorized by: Maritza Galarza DO    Performed by: MATT Hanna    An arterial line was placed. Procedure performed using ultrasound guidance.in the OR for the following indication(s): continuous blood pressure monitoring and blood sampling needed.    A 20 gauge (size), 1 and 3/4 inch (length), Angiocath (type) catheter was placed into the Right radial artery, secured by Tegaderm,   Seldinger technique not used.  Events:  patient tolerated procedure well with no complications.

## 2024-08-05 NOTE — ANESTHESIA PROCEDURE NOTES
Airway  Date/Time: 8/5/2024 7:55 AM  Urgency: elective    Airway not difficult    Staffing  Performed: CAA and COURTNEY   Authorized by: Maritza Galarza DO    Performed by: MATT Hanna  Patient location during procedure: OR    Indications and Patient Condition  Indications for airway management: anesthesia  Spontaneous Ventilation: absent  Sedation level: deep  Preoxygenated: yes  Mask difficulty assessment: 1 - vent by mask  Planned trial extubation    Final Airway Details  Final airway type: endotracheal airway      Successful airway: NIM tube  Cuffed: yes   Successful intubation technique: video laryngoscopy  Facilitating devices/methods: intubating stylet  Endotracheal tube insertion site: oral  Blade: Juan  Blade size: #3  Cormack-Lehane Classification: grade I - full view of glottis  Placement verified by: chest auscultation and capnometry   Measured from: lips  ETT to lips (cm): 23  Number of attempts at approach: 1

## 2024-08-05 NOTE — PROGRESS NOTES
"Denae Nolasco is a 33 y.o. female on day 2 of admission presenting with Schwannoma.    Subjective   No acute events overnight. Patient resting comfortably in bed and feeling ready for OR Today.        Objective     Physical Exam    NAD, A&Ox3     Cranial Nerves II-XII: PERRL, EOMI, Face symmetric, Facial SILT, Palate/Tongue midline and symmetric, shoulder shrugs symmetric, L ear decr hearing        MOTOR:         Muscle bulk and tone were normal in both upper and lower extremities.           RUE D5 / B5 / T5 / HG 5/ IO 5       LUE D5 / B5 / T5 / HG 5/ IO 5       Negative mckeon          RLE HF5 / KE 5/ DF 5/ PF 5       LLE HF5 / KE 5/ DF 5/ PF 5       No clonus       SENSORY:  In both upper and lower extremities, sensation was intact to light touch       COORDINATION:        BUE, finger-nose-finger was intact without dysmetria or overshoot.       BLE, heel-to-shin was intact.       PSYCH: appropriate  SKIN: no obvious lesions      Last Recorded Vitals  Blood pressure 121/80, pulse 103, temperature 37.1 °C (98.8 °F), temperature source Temporal, resp. rate 16, height 1.575 m (5' 2\"), weight 55.3 kg (121 lb 14.6 oz), last menstrual period 07/24/2024, SpO2 99%.  Intake/Output last 3 Shifts:  No intake/output data recorded.    Relevant Results                             Assessment/Plan   Principal Problem:    Schwannoma  Active Problems:    PONV (postoperative nausea and vomiting)    Denae Nolasco is a 33 y.o. 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  Has decreased hearing on left side otherwise no focal neurologic deficits.    Plan  OR Today 8/5 for L retrosig for tumor resection   Perioperative medicine recs for risk stratification and medical optimization   PTOT   SCDs           Will " MD Perri

## 2024-08-06 ENCOUNTER — APPOINTMENT (OUTPATIENT)
Dept: RADIOLOGY | Facility: HOSPITAL | Age: 34
DRG: 003 | End: 2024-08-06
Payer: COMMERCIAL

## 2024-08-06 LAB
ALBUMIN SERPL BCP-MCNC: 4.1 G/DL (ref 3.4–5)
ANION GAP BLDA CALCULATED.4IONS-SCNC: 14 MMO/L (ref 10–25)
ANION GAP BLDA CALCULATED.4IONS-SCNC: 16 MMO/L (ref 10–25)
ANION GAP SERPL CALC-SCNC: 19 MMOL/L (ref 10–20)
BASE EXCESS BLDA CALC-SCNC: -4.1 MMOL/L (ref -2–3)
BASE EXCESS BLDA CALC-SCNC: -5.2 MMOL/L (ref -2–3)
BASOPHILS # BLD AUTO: 0 X10*3/UL (ref 0–0.1)
BASOPHILS NFR BLD AUTO: 0 %
BODY TEMPERATURE: 37 DEGREES CELSIUS
BODY TEMPERATURE: ABNORMAL
BUN SERPL-MCNC: 5 MG/DL (ref 6–23)
CA-I BLD-SCNC: 1.09 MMOL/L (ref 1.1–1.33)
CA-I BLDA-SCNC: 1.06 MMOL/L (ref 1.1–1.33)
CA-I BLDA-SCNC: 1.12 MMOL/L (ref 1.1–1.33)
CALCIUM SERPL-MCNC: 8.1 MG/DL (ref 8.6–10.6)
CHLORIDE BLDA-SCNC: 108 MMOL/L (ref 98–107)
CHLORIDE BLDA-SCNC: 117 MMOL/L (ref 98–107)
CHLORIDE SERPL-SCNC: 109 MMOL/L (ref 98–107)
CHOLEST SERPL-MCNC: 179 MG/DL (ref 0–199)
CHOLESTEROL/HDL RATIO: 3.7
CO2 SERPL-SCNC: 19 MMOL/L (ref 21–32)
CREAT SERPL-MCNC: 0.49 MG/DL (ref 0.5–1.05)
EGFRCR SERPLBLD CKD-EPI 2021: >90 ML/MIN/1.73M*2
EOSINOPHIL # BLD AUTO: 0 X10*3/UL (ref 0–0.7)
EOSINOPHIL NFR BLD AUTO: 0 %
ERYTHROCYTE [DISTWIDTH] IN BLOOD BY AUTOMATED COUNT: 12.5 % (ref 11.5–14.5)
GLUCOSE BLD MANUAL STRIP-MCNC: 142 MG/DL (ref 74–99)
GLUCOSE BLD MANUAL STRIP-MCNC: 153 MG/DL (ref 74–99)
GLUCOSE BLD MANUAL STRIP-MCNC: 166 MG/DL (ref 74–99)
GLUCOSE BLDA-MCNC: 177 MG/DL (ref 74–99)
GLUCOSE BLDA-MCNC: 200 MG/DL (ref 74–99)
GLUCOSE SERPL-MCNC: 182 MG/DL (ref 74–99)
HCO3 BLDA-SCNC: 18.2 MMOL/L (ref 22–26)
HCO3 BLDA-SCNC: 20 MMOL/L (ref 22–26)
HCT VFR BLD AUTO: 32.5 % (ref 36–46)
HCT VFR BLD EST: 32 % (ref 36–46)
HCT VFR BLD EST: 40 % (ref 36–46)
HDLC SERPL-MCNC: 48.1 MG/DL
HGB BLD-MCNC: 11.3 G/DL (ref 12–16)
HGB BLDA-MCNC: 10.5 G/DL (ref 12–16)
HGB BLDA-MCNC: 13.4 G/DL (ref 12–16)
IMM GRANULOCYTES # BLD AUTO: 0.05 X10*3/UL (ref 0–0.7)
IMM GRANULOCYTES NFR BLD AUTO: 0.4 % (ref 0–0.9)
INHALED O2 CONCENTRATION: 40 %
INHALED O2 CONCENTRATION: 50 %
LACTATE BLDA-SCNC: 2.5 MMOL/L (ref 0.4–2)
LACTATE BLDA-SCNC: 2.6 MMOL/L (ref 0.4–2)
LACTATE BLDV-SCNC: 2.3 MMOL/L (ref 0.4–2)
LDLC SERPL CALC-MCNC: 83 MG/DL
LYMPHOCYTES # BLD AUTO: 0.22 X10*3/UL (ref 1.2–4.8)
LYMPHOCYTES NFR BLD AUTO: 1.9 %
MAGNESIUM SERPL-MCNC: 1.51 MG/DL (ref 1.6–2.4)
MCH RBC QN AUTO: 28.8 PG (ref 26–34)
MCHC RBC AUTO-ENTMCNC: 34.8 G/DL (ref 32–36)
MCV RBC AUTO: 83 FL (ref 80–100)
MONOCYTES # BLD AUTO: 0.5 X10*3/UL (ref 0.1–1)
MONOCYTES NFR BLD AUTO: 4.3 %
NEUTROPHILS # BLD AUTO: 10.8 X10*3/UL (ref 1.2–7.7)
NEUTROPHILS NFR BLD AUTO: 93.4 %
NON HDL CHOLESTEROL: 131 MG/DL (ref 0–149)
NRBC BLD-RTO: 0 /100 WBCS (ref 0–0)
OSMOLALITY SERPL: 297 MOSM/KG (ref 280–300)
OSMOLALITY SERPL: 302 MOSM/KG (ref 280–300)
OSMOLALITY SERPL: 311 MOSM/KG (ref 280–300)
OXYHGB MFR BLDA: 97.7 % (ref 94–98)
OXYHGB MFR BLDA: 98.4 % (ref 94–98)
PCO2 BLDA: 28 MM HG (ref 38–42)
PCO2 BLDA: 33 MM HG (ref 38–42)
PH BLDA: 7.39 PH (ref 7.38–7.42)
PH BLDA: 7.42 PH (ref 7.38–7.42)
PHOSPHATE SERPL-MCNC: 2.3 MG/DL (ref 2.5–4.9)
PLATELET # BLD AUTO: 218 X10*3/UL (ref 150–450)
PO2 BLDA: 201 MM HG (ref 85–95)
PO2 BLDA: 212 MM HG (ref 85–95)
POTASSIUM BLDA-SCNC: 3.3 MMOL/L (ref 3.5–5.3)
POTASSIUM BLDA-SCNC: 3.7 MMOL/L (ref 3.5–5.3)
POTASSIUM SERPL-SCNC: 3.2 MMOL/L (ref 3.5–5.3)
RBC # BLD AUTO: 3.92 X10*6/UL (ref 4–5.2)
SAO2 % BLDA: 100 % (ref 94–100)
SAO2 % BLDA: 99 % (ref 94–100)
SODIUM BLDA-SCNC: 139 MMOL/L (ref 136–145)
SODIUM BLDA-SCNC: 147 MMOL/L (ref 136–145)
SODIUM SERPL-SCNC: 144 MMOL/L (ref 136–145)
SODIUM SERPL-SCNC: 146 MMOL/L (ref 136–145)
SODIUM SERPL-SCNC: 149 MMOL/L (ref 136–145)
SODIUM SERPL-SCNC: 150 MMOL/L (ref 136–145)
TRIGL SERPL-MCNC: 242 MG/DL (ref 0–149)
VLDL: 48 MG/DL (ref 0–40)
WBC # BLD AUTO: 11.6 X10*3/UL (ref 4.4–11.3)

## 2024-08-06 PROCEDURE — 70450 CT HEAD/BRAIN W/O DYE: CPT

## 2024-08-06 PROCEDURE — 99291 CRITICAL CARE FIRST HOUR: CPT | Performed by: REGISTERED NURSE

## 2024-08-06 PROCEDURE — 2500000005 HC RX 250 GENERAL PHARMACY W/O HCPCS

## 2024-08-06 PROCEDURE — 94799 UNLISTED PULMONARY SVC/PX: CPT

## 2024-08-06 PROCEDURE — 83735 ASSAY OF MAGNESIUM: CPT | Performed by: REGISTERED NURSE

## 2024-08-06 PROCEDURE — 94002 VENT MGMT INPAT INIT DAY: CPT

## 2024-08-06 PROCEDURE — 2500000001 HC RX 250 WO HCPCS SELF ADMINISTERED DRUGS (ALT 637 FOR MEDICARE OP)

## 2024-08-06 PROCEDURE — 71045 X-RAY EXAM CHEST 1 VIEW: CPT | Performed by: RADIOLOGY

## 2024-08-06 PROCEDURE — 93010 ELECTROCARDIOGRAM REPORT: CPT | Performed by: INTERNAL MEDICINE

## 2024-08-06 PROCEDURE — 84132 ASSAY OF SERUM POTASSIUM: CPT

## 2024-08-06 PROCEDURE — 2500000004 HC RX 250 GENERAL PHARMACY W/ HCPCS (ALT 636 FOR OP/ED)

## 2024-08-06 PROCEDURE — 84295 ASSAY OF SERUM SODIUM: CPT | Performed by: REGISTERED NURSE

## 2024-08-06 PROCEDURE — 84132 ASSAY OF SERUM POTASSIUM: CPT | Performed by: REGISTERED NURSE

## 2024-08-06 PROCEDURE — 2500000001 HC RX 250 WO HCPCS SELF ADMINISTERED DRUGS (ALT 637 FOR MEDICARE OP): Performed by: REGISTERED NURSE

## 2024-08-06 PROCEDURE — 2020000001 HC ICU ROOM DAILY

## 2024-08-06 PROCEDURE — 2500000005 HC RX 250 GENERAL PHARMACY W/O HCPCS: Performed by: REGISTERED NURSE

## 2024-08-06 PROCEDURE — 80061 LIPID PANEL: CPT

## 2024-08-06 PROCEDURE — 82947 ASSAY GLUCOSE BLOOD QUANT: CPT

## 2024-08-06 PROCEDURE — 74018 RADEX ABDOMEN 1 VIEW: CPT

## 2024-08-06 PROCEDURE — 70553 MRI BRAIN STEM W/O & W/DYE: CPT

## 2024-08-06 PROCEDURE — 71045 X-RAY EXAM CHEST 1 VIEW: CPT

## 2024-08-06 PROCEDURE — 99291 CRITICAL CARE FIRST HOUR: CPT

## 2024-08-06 PROCEDURE — 83605 ASSAY OF LACTIC ACID: CPT

## 2024-08-06 PROCEDURE — 2500000002 HC RX 250 W HCPCS SELF ADMINISTERED DRUGS (ALT 637 FOR MEDICARE OP, ALT 636 FOR OP/ED)

## 2024-08-06 PROCEDURE — 83930 ASSAY OF BLOOD OSMOLALITY: CPT | Performed by: REGISTERED NURSE

## 2024-08-06 PROCEDURE — 2550000001 HC RX 255 CONTRASTS: Performed by: NEUROLOGICAL SURGERY

## 2024-08-06 PROCEDURE — 82330 ASSAY OF CALCIUM: CPT | Performed by: REGISTERED NURSE

## 2024-08-06 PROCEDURE — 70450 CT HEAD/BRAIN W/O DYE: CPT | Performed by: RADIOLOGY

## 2024-08-06 PROCEDURE — 99204 OFFICE O/P NEW MOD 45 MIN: CPT | Performed by: OTOLARYNGOLOGY

## 2024-08-06 PROCEDURE — 2500000004 HC RX 250 GENERAL PHARMACY W/ HCPCS (ALT 636 FOR OP/ED): Performed by: REGISTERED NURSE

## 2024-08-06 PROCEDURE — 3E0G76Z INTRODUCTION OF NUTRITIONAL SUBSTANCE INTO UPPER GI, VIA NATURAL OR ARTIFICIAL OPENING: ICD-10-PCS | Performed by: NEUROLOGICAL SURGERY

## 2024-08-06 PROCEDURE — C9113 INJ PANTOPRAZOLE SODIUM, VIA: HCPCS | Performed by: REGISTERED NURSE

## 2024-08-06 PROCEDURE — A9575 INJ GADOTERATE MEGLUMI 0.1ML: HCPCS | Performed by: NEUROLOGICAL SURGERY

## 2024-08-06 PROCEDURE — 37799 UNLISTED PX VASCULAR SURGERY: CPT | Performed by: REGISTERED NURSE

## 2024-08-06 PROCEDURE — 74018 RADEX ABDOMEN 1 VIEW: CPT | Performed by: RADIOLOGY

## 2024-08-06 PROCEDURE — 85025 COMPLETE CBC W/AUTO DIFF WBC: CPT | Performed by: REGISTERED NURSE

## 2024-08-06 PROCEDURE — 84132 ASSAY OF SERUM POTASSIUM: CPT | Performed by: STUDENT IN AN ORGANIZED HEALTH CARE EDUCATION/TRAINING PROGRAM

## 2024-08-06 PROCEDURE — 70553 MRI BRAIN STEM W/O & W/DYE: CPT | Performed by: STUDENT IN AN ORGANIZED HEALTH CARE EDUCATION/TRAINING PROGRAM

## 2024-08-06 PROCEDURE — 99214 OFFICE O/P EST MOD 30 MIN: CPT | Performed by: OTOLARYNGOLOGY

## 2024-08-06 RX ORDER — MAGNESIUM SULFATE HEPTAHYDRATE 40 MG/ML
2 INJECTION, SOLUTION INTRAVENOUS EVERY 6 HOURS PRN
Status: DISCONTINUED | OUTPATIENT
Start: 2024-08-06 | End: 2024-08-14

## 2024-08-06 RX ORDER — AMOXICILLIN 250 MG
2 CAPSULE ORAL 2 TIMES DAILY
Status: DISCONTINUED | OUTPATIENT
Start: 2024-08-06 | End: 2024-08-09

## 2024-08-06 RX ORDER — POTASSIUM CHLORIDE 29.8 MG/ML
40 INJECTION INTRAVENOUS ONCE
Status: COMPLETED | OUTPATIENT
Start: 2024-08-06 | End: 2024-08-06

## 2024-08-06 RX ORDER — ESCITALOPRAM OXALATE 5 MG/1
5 TABLET ORAL DAILY
Status: DISCONTINUED | OUTPATIENT
Start: 2024-08-06 | End: 2024-08-09

## 2024-08-06 RX ORDER — POLYETHYLENE GLYCOL 3350 17 G/17G
17 POWDER, FOR SOLUTION ORAL DAILY
Status: DISCONTINUED | OUTPATIENT
Start: 2024-08-06 | End: 2024-08-09

## 2024-08-06 RX ORDER — SODIUM CHLORIDE 9 MG/ML
75 INJECTION, SOLUTION INTRAVENOUS CONTINUOUS
Status: DISCONTINUED | OUTPATIENT
Start: 2024-08-06 | End: 2024-08-06

## 2024-08-06 RX ORDER — POTASSIUM CHLORIDE 20 MEQ/1
40 TABLET, EXTENDED RELEASE ORAL EVERY 6 HOURS PRN
Status: DISCONTINUED | OUTPATIENT
Start: 2024-08-06 | End: 2024-08-09

## 2024-08-06 RX ORDER — NOREPINEPHRINE BITARTRATE/D5W 8 MG/250ML
PLASTIC BAG, INJECTION (ML) INTRAVENOUS
Status: COMPLETED
Start: 2024-08-06 | End: 2024-08-06

## 2024-08-06 RX ORDER — LABETALOL HYDROCHLORIDE 5 MG/ML
5 INJECTION, SOLUTION INTRAVENOUS EVERY 10 MIN PRN
Status: DISCONTINUED | OUTPATIENT
Start: 2024-08-06 | End: 2024-08-11

## 2024-08-06 RX ORDER — DEXMEDETOMIDINE HYDROCHLORIDE 4 UG/ML
.2-1.5 INJECTION, SOLUTION INTRAVENOUS CONTINUOUS
Status: DISCONTINUED | OUTPATIENT
Start: 2024-08-06 | End: 2024-08-07

## 2024-08-06 RX ORDER — POTASSIUM CHLORIDE 20 MEQ/1
20 TABLET, EXTENDED RELEASE ORAL EVERY 6 HOURS PRN
Status: DISCONTINUED | OUTPATIENT
Start: 2024-08-06 | End: 2024-08-14

## 2024-08-06 RX ORDER — CEFTRIAXONE 2 G/50ML
2 INJECTION, SOLUTION INTRAVENOUS EVERY 12 HOURS
Status: COMPLETED | OUTPATIENT
Start: 2024-08-06 | End: 2024-08-10

## 2024-08-06 RX ORDER — POTASSIUM CHLORIDE 1.5 G/1.58G
20 POWDER, FOR SOLUTION ORAL EVERY 6 HOURS PRN
Status: DISCONTINUED | OUTPATIENT
Start: 2024-08-06 | End: 2024-08-14

## 2024-08-06 RX ORDER — POTASSIUM CHLORIDE 20 MEQ/1
40 TABLET, EXTENDED RELEASE ORAL EVERY 6 HOURS PRN
Status: DISCONTINUED | OUTPATIENT
Start: 2024-08-06 | End: 2024-08-06

## 2024-08-06 RX ORDER — INSULIN LISPRO 100 [IU]/ML
0-5 INJECTION, SOLUTION INTRAVENOUS; SUBCUTANEOUS
Status: DISCONTINUED | OUTPATIENT
Start: 2024-08-06 | End: 2024-08-14 | Stop reason: HOSPADM

## 2024-08-06 RX ORDER — MAGNESIUM SULFATE HEPTAHYDRATE 40 MG/ML
4 INJECTION, SOLUTION INTRAVENOUS EVERY 6 HOURS PRN
Status: DISCONTINUED | OUTPATIENT
Start: 2024-08-06 | End: 2024-08-14

## 2024-08-06 RX ORDER — PROPOFOL 10 MG/ML
5-50 INJECTION, EMULSION INTRAVENOUS CONTINUOUS
Status: DISCONTINUED | OUTPATIENT
Start: 2024-08-06 | End: 2024-08-06

## 2024-08-06 RX ORDER — POTASSIUM CHLORIDE 1.5 G/1.58G
20 POWDER, FOR SOLUTION ORAL EVERY 6 HOURS PRN
Status: DISCONTINUED | OUTPATIENT
Start: 2024-08-06 | End: 2024-08-06

## 2024-08-06 RX ORDER — PANTOPRAZOLE SODIUM 40 MG/1
40 TABLET, DELAYED RELEASE ORAL
Status: DISCONTINUED | OUTPATIENT
Start: 2024-08-06 | End: 2024-08-14 | Stop reason: HOSPADM

## 2024-08-06 RX ORDER — POTASSIUM CHLORIDE 29.8 MG/ML
40 INJECTION INTRAVENOUS EVERY 6 HOURS PRN
Status: DISCONTINUED | OUTPATIENT
Start: 2024-08-06 | End: 2024-08-14

## 2024-08-06 RX ORDER — NICARDIPINE HYDROCHLORIDE 0.2 MG/ML
2.5-15 INJECTION INTRAVENOUS CONTINUOUS
Status: DISCONTINUED | OUTPATIENT
Start: 2024-08-06 | End: 2024-08-07

## 2024-08-06 RX ORDER — SCOLOPAMINE TRANSDERMAL SYSTEM 1 MG/1
1 PATCH, EXTENDED RELEASE TRANSDERMAL
Status: DISCONTINUED | OUTPATIENT
Start: 2024-08-06 | End: 2024-08-14 | Stop reason: HOSPADM

## 2024-08-06 RX ORDER — GADOTERATE MEGLUMINE 376.9 MG/ML
10 INJECTION INTRAVENOUS
Status: COMPLETED | OUTPATIENT
Start: 2024-08-06 | End: 2024-08-06

## 2024-08-06 RX ORDER — CALCIUM GLUCONATE 20 MG/ML
2 INJECTION, SOLUTION INTRAVENOUS EVERY 6 HOURS PRN
Status: DISCONTINUED | OUTPATIENT
Start: 2024-08-06 | End: 2024-08-14

## 2024-08-06 RX ORDER — HYDRALAZINE HYDROCHLORIDE 20 MG/ML
10 INJECTION INTRAMUSCULAR; INTRAVENOUS EVERY 30 MIN PRN
Status: DISCONTINUED | OUTPATIENT
Start: 2024-08-06 | End: 2024-08-11

## 2024-08-06 RX ORDER — LABETALOL HYDROCHLORIDE 5 MG/ML
INJECTION, SOLUTION INTRAVENOUS
Status: DISPENSED
Start: 2024-08-06 | End: 2024-08-07

## 2024-08-06 RX ORDER — FENTANYL CITRATE-0.9 % NACL/PF 10 MCG/ML
25-200 PLASTIC BAG, INJECTION (ML) INTRAVENOUS CONTINUOUS
Status: DISCONTINUED | OUTPATIENT
Start: 2024-08-06 | End: 2024-08-07

## 2024-08-06 RX ORDER — NOREPINEPHRINE BITARTRATE/D5W 8 MG/250ML
.01-1 PLASTIC BAG, INJECTION (ML) INTRAVENOUS CONTINUOUS
Status: DISCONTINUED | OUTPATIENT
Start: 2024-08-06 | End: 2024-08-07

## 2024-08-06 RX ORDER — PANTOPRAZOLE SODIUM 40 MG/10ML
40 INJECTION, POWDER, LYOPHILIZED, FOR SOLUTION INTRAVENOUS
Status: DISCONTINUED | OUTPATIENT
Start: 2024-08-06 | End: 2024-08-14 | Stop reason: HOSPADM

## 2024-08-06 RX ORDER — POTASSIUM CHLORIDE 1.5 G/1.58G
40 POWDER, FOR SOLUTION ORAL EVERY 6 HOURS PRN
Status: DISCONTINUED | OUTPATIENT
Start: 2024-08-06 | End: 2024-08-06

## 2024-08-06 RX ORDER — DEXTROSE 50 % IN WATER (D50W) INTRAVENOUS SYRINGE
25
Status: DISCONTINUED | OUTPATIENT
Start: 2024-08-06 | End: 2024-08-14 | Stop reason: HOSPADM

## 2024-08-06 RX ORDER — DEXAMETHASONE SODIUM PHOSPHATE 10 MG/ML
6 INJECTION INTRAMUSCULAR; INTRAVENOUS EVERY 4 HOURS
Status: DISCONTINUED | OUTPATIENT
Start: 2024-08-06 | End: 2024-08-07

## 2024-08-06 RX ORDER — NOREPINEPHRINE BITARTRATE/D5W 8 MG/250ML
.01-1 PLASTIC BAG, INJECTION (ML) INTRAVENOUS CONTINUOUS
Status: DISCONTINUED | OUTPATIENT
Start: 2024-08-06 | End: 2024-08-06

## 2024-08-06 RX ORDER — NICARDIPINE HYDROCHLORIDE 0.2 MG/ML
2.5-15 INJECTION INTRAVENOUS CONTINUOUS
Status: DISCONTINUED | OUTPATIENT
Start: 2024-08-06 | End: 2024-08-06

## 2024-08-06 RX ORDER — POTASSIUM CHLORIDE 20 MEQ/1
20 TABLET, EXTENDED RELEASE ORAL EVERY 6 HOURS PRN
Status: DISCONTINUED | OUTPATIENT
Start: 2024-08-06 | End: 2024-08-06

## 2024-08-06 RX ORDER — LABETALOL HYDROCHLORIDE 5 MG/ML
5 INJECTION, SOLUTION INTRAVENOUS EVERY 10 MIN PRN
Status: DISCONTINUED | OUTPATIENT
Start: 2024-08-06 | End: 2024-08-06

## 2024-08-06 RX ORDER — SODIUM CHLORIDE 9 MG/ML
75 INJECTION, SOLUTION INTRAVENOUS CONTINUOUS
Status: DISCONTINUED | OUTPATIENT
Start: 2024-08-06 | End: 2024-08-08

## 2024-08-06 RX ORDER — OFLOXACIN 3 MG/ML
5 SOLUTION AURICULAR (OTIC) 2 TIMES DAILY
Status: DISPENSED | OUTPATIENT
Start: 2024-08-06 | End: 2024-08-13

## 2024-08-06 RX ORDER — POTASSIUM CHLORIDE 1.5 G/1.58G
40 POWDER, FOR SOLUTION ORAL EVERY 6 HOURS PRN
Status: DISCONTINUED | OUTPATIENT
Start: 2024-08-06 | End: 2024-08-09

## 2024-08-06 RX ORDER — VANCOMYCIN HYDROCHLORIDE 1 G/20ML
INJECTION, POWDER, LYOPHILIZED, FOR SOLUTION INTRAVENOUS DAILY PRN
Status: DISCONTINUED | OUTPATIENT
Start: 2024-08-06 | End: 2024-08-09

## 2024-08-06 RX ORDER — CALCIUM GLUCONATE 20 MG/ML
1 INJECTION, SOLUTION INTRAVENOUS EVERY 6 HOURS PRN
Status: DISCONTINUED | OUTPATIENT
Start: 2024-08-06 | End: 2024-08-14

## 2024-08-06 RX ORDER — SODIUM CHLORIDE 234 MG/ML
30 INJECTION, SOLUTION INTRAVENOUS ONCE
Status: COMPLETED | OUTPATIENT
Start: 2024-08-06 | End: 2024-08-06

## 2024-08-06 RX ORDER — OXYCODONE HYDROCHLORIDE 5 MG/1
2.5 TABLET ORAL EVERY 4 HOURS PRN
Status: DISCONTINUED | OUTPATIENT
Start: 2024-08-06 | End: 2024-08-09

## 2024-08-06 RX ORDER — DEXTROSE 50 % IN WATER (D50W) INTRAVENOUS SYRINGE
12.5
Status: DISCONTINUED | OUTPATIENT
Start: 2024-08-06 | End: 2024-08-14 | Stop reason: HOSPADM

## 2024-08-06 RX ORDER — ESOMEPRAZOLE MAGNESIUM 40 MG/1
40 GRANULE, DELAYED RELEASE ORAL
Status: DISCONTINUED | OUTPATIENT
Start: 2024-08-06 | End: 2024-08-14 | Stop reason: HOSPADM

## 2024-08-06 RX ORDER — HYDRALAZINE HYDROCHLORIDE 20 MG/ML
10 INJECTION INTRAMUSCULAR; INTRAVENOUS EVERY 30 MIN PRN
Status: DISCONTINUED | OUTPATIENT
Start: 2024-08-06 | End: 2024-08-06

## 2024-08-06 RX ORDER — ACETAMINOPHEN 325 MG/1
650 TABLET ORAL EVERY 6 HOURS SCHEDULED
Status: DISCONTINUED | OUTPATIENT
Start: 2024-08-06 | End: 2024-08-07

## 2024-08-06 RX ADMIN — SODIUM CHLORIDE 75 ML/HR: 9 INJECTION, SOLUTION INTRAVENOUS at 15:38

## 2024-08-06 RX ADMIN — Medication 0.02 MCG/KG/MIN: at 05:20

## 2024-08-06 RX ADMIN — POTASSIUM CHLORIDE 40 MEQ: 29.8 INJECTION, SOLUTION INTRAVENOUS at 04:51

## 2024-08-06 RX ADMIN — Medication 75 MCG/HR: at 20:57

## 2024-08-06 RX ADMIN — SENNOSIDES AND DOCUSATE SODIUM 2 TABLET: 50; 8.6 TABLET ORAL at 20:34

## 2024-08-06 RX ADMIN — Medication 30 PERCENT: at 08:05

## 2024-08-06 RX ADMIN — GADOTERATE MEGLUMINE 10 ML: 376.9 INJECTION INTRAVENOUS at 22:01

## 2024-08-06 RX ADMIN — NIMODIPINE 60 MG: 30 SOLUTION ORAL at 14:31

## 2024-08-06 RX ADMIN — DEXMEDETOMIDINE HYDROCHLORIDE 0.2 MCG/KG/HR: 400 INJECTION INTRAVENOUS at 03:32

## 2024-08-06 RX ADMIN — INSULIN LISPRO 1 UNITS: 100 INJECTION, SOLUTION INTRAVENOUS; SUBCUTANEOUS at 18:15

## 2024-08-06 RX ADMIN — ACETAMINOPHEN 650 MG: 325 TABLET ORAL at 18:15

## 2024-08-06 RX ADMIN — DEXAMETHASONE SODIUM PHOSPHATE 6 MG: 10 INJECTION INTRAMUSCULAR; INTRAVENOUS at 23:13

## 2024-08-06 RX ADMIN — SODIUM CHLORIDE 75 ML/HR: 9 INJECTION, SOLUTION INTRAVENOUS at 05:22

## 2024-08-06 RX ADMIN — ESCITALOPRAM 5 MG: 5 TABLET, FILM COATED ORAL at 09:23

## 2024-08-06 RX ADMIN — DEXAMETHASONE SODIUM PHOSPHATE 6 MG: 10 INJECTION INTRAMUSCULAR; INTRAVENOUS at 18:37

## 2024-08-06 RX ADMIN — CARBOXYMETHYLCELLULOSE SODIUM 1 DROP: 5 SOLUTION/ DROPS OPHTHALMIC at 23:15

## 2024-08-06 RX ADMIN — NIMODIPINE 60 MG: 30 SOLUTION ORAL at 18:15

## 2024-08-06 RX ADMIN — ACETAMINOPHEN 650 MG: 325 TABLET ORAL at 23:13

## 2024-08-06 RX ADMIN — POTASSIUM PHOSPHATE, MONOBASIC POTASSIUM PHOSPHATE, DIBASIC 15 MMOL: 224; 236 INJECTION, SOLUTION, CONCENTRATE INTRAVENOUS at 06:13

## 2024-08-06 RX ADMIN — NOREPINEPHRINE BITARTRATE 0.01 MCG/KG/MIN: 8 INJECTION, SOLUTION INTRAVENOUS at 09:39

## 2024-08-06 RX ADMIN — INSULIN LISPRO 1 UNITS: 100 INJECTION, SOLUTION INTRAVENOUS; SUBCUTANEOUS at 12:40

## 2024-08-06 RX ADMIN — CEFTRIAXONE SODIUM 2 G: 2 INJECTION, SOLUTION INTRAVENOUS at 15:38

## 2024-08-06 RX ADMIN — SODIUM CHLORIDE 500 ML: 9 INJECTION, SOLUTION INTRAVENOUS at 06:13

## 2024-08-06 RX ADMIN — ACETAMINOPHEN 650 MG: 325 TABLET ORAL at 12:40

## 2024-08-06 RX ADMIN — NOREPINEPHRINE BITARTRATE 0.02 MCG/KG/MIN: 8 INJECTION, SOLUTION INTRAVENOUS at 05:20

## 2024-08-06 RX ADMIN — PANTOPRAZOLE SODIUM 40 MG: 40 INJECTION, POWDER, FOR SOLUTION INTRAVENOUS at 09:23

## 2024-08-06 RX ADMIN — SODIUM CHLORIDE 500 ML: 9 INJECTION, SOLUTION INTRAVENOUS at 04:13

## 2024-08-06 RX ADMIN — NIMODIPINE 60 MG: 30 SOLUTION ORAL at 23:12

## 2024-08-06 RX ADMIN — DEXAMETHASONE SODIUM PHOSPHATE 6 MG: 10 INJECTION INTRAMUSCULAR; INTRAVENOUS at 12:40

## 2024-08-06 RX ADMIN — SCOPOLAMINE 1 PATCH: 1.5 PATCH, EXTENDED RELEASE TRANSDERMAL at 20:34

## 2024-08-06 RX ADMIN — DEXMEDETOMIDINE HYDROCHLORIDE 0.2 MCG/KG/HR: 400 INJECTION INTRAVENOUS at 18:17

## 2024-08-06 RX ADMIN — VANCOMYCIN HYDROCHLORIDE 1.25 G: 5 INJECTION, POWDER, LYOPHILIZED, FOR SOLUTION INTRAVENOUS at 23:00

## 2024-08-06 RX ADMIN — DEXAMETHASONE SODIUM PHOSPHATE 6 MG: 10 INJECTION INTRAMUSCULAR; INTRAVENOUS at 15:38

## 2024-08-06 RX ADMIN — PROPOFOL 50 MCG/KG/MIN: 10 INJECTION, EMULSION INTRAVENOUS at 02:24

## 2024-08-06 RX ADMIN — VASOPRESSIN 120 MEQ: 20 INJECTION, SOLUTION INTRAVENOUS at 04:21

## 2024-08-06 RX ADMIN — DEXAMETHASONE SODIUM PHOSPHATE 6 MG: 10 INJECTION INTRAMUSCULAR; INTRAVENOUS at 03:35

## 2024-08-06 RX ADMIN — DEXMEDETOMIDINE HYDROCHLORIDE 0.2 MCG/KG/HR: 400 INJECTION INTRAVENOUS at 21:03

## 2024-08-06 RX ADMIN — WHITE PETROLATUM 57.7 %-MINERAL OIL 31.9 % EYE OINTMENT 1 APPLICATION: at 23:16

## 2024-08-06 RX ADMIN — SODIUM CHLORIDE 100 ML/HR: 9 INJECTION, SOLUTION INTRAVENOUS at 23:45

## 2024-08-06 RX ADMIN — Medication 150 MCG/HR: at 08:32

## 2024-08-06 RX ADMIN — Medication 50 MCG/HR: at 03:16

## 2024-08-06 RX ADMIN — NICARDIPINE HYDROCHLORIDE 2.5 MG/HR: 0.2 INJECTION, SOLUTION INTRAVENOUS at 02:23

## 2024-08-06 RX ADMIN — VANCOMYCIN HYDROCHLORIDE 1250 MG: 5 INJECTION, POWDER, LYOPHILIZED, FOR SOLUTION INTRAVENOUS at 09:23

## 2024-08-06 RX ADMIN — CEFTRIAXONE SODIUM 2 G: 2 INJECTION, SOLUTION INTRAVENOUS at 04:14

## 2024-08-06 RX ADMIN — DEXAMETHASONE SODIUM PHOSPHATE 6 MG: 10 INJECTION INTRAMUSCULAR; INTRAVENOUS at 06:12

## 2024-08-06 RX ADMIN — Medication 50 PERCENT: at 02:00

## 2024-08-06 RX ADMIN — OFLOXACIN OTIC 5 DROP: 3 SOLUTION AURICULAR (OTIC) at 13:06

## 2024-08-06 ASSESSMENT — PAIN SCALES - GENERAL
PAINLEVEL_OUTOF10: 0 - NO PAIN

## 2024-08-06 ASSESSMENT — COGNITIVE AND FUNCTIONAL STATUS - GENERAL
DAILY ACTIVITIY SCORE: 6
DRESSING REGULAR UPPER BODY CLOTHING: TOTAL
DRESSING REGULAR LOWER BODY CLOTHING: TOTAL
MOVING TO AND FROM BED TO CHAIR: TOTAL
MOBILITY SCORE: 6
EATING MEALS: TOTAL
PERSONAL GROOMING: TOTAL
TOILETING: TOTAL
STANDING UP FROM CHAIR USING ARMS: TOTAL
MOVING TO AND FROM BED TO CHAIR: TOTAL
TURNING FROM BACK TO SIDE WHILE IN FLAT BAD: TOTAL
WALKING IN HOSPITAL ROOM: TOTAL
HELP NEEDED FOR BATHING: TOTAL
DRESSING REGULAR UPPER BODY CLOTHING: TOTAL
CLIMB 3 TO 5 STEPS WITH RAILING: TOTAL
TOILETING: TOTAL
MOBILITY SCORE: 6
HELP NEEDED FOR BATHING: TOTAL
DAILY ACTIVITIY SCORE: 6
WALKING IN HOSPITAL ROOM: TOTAL
TOILETING: TOTAL
CLIMB 3 TO 5 STEPS WITH RAILING: TOTAL
DAILY ACTIVITIY SCORE: 6
PERSONAL GROOMING: TOTAL
PERSONAL GROOMING: TOTAL
MOVING TO AND FROM BED TO CHAIR: TOTAL
MOVING FROM LYING ON BACK TO SITTING ON SIDE OF FLAT BED WITH BEDRAILS: TOTAL
STANDING UP FROM CHAIR USING ARMS: TOTAL
MOVING FROM LYING ON BACK TO SITTING ON SIDE OF FLAT BED WITH BEDRAILS: TOTAL
CLIMB 3 TO 5 STEPS WITH RAILING: TOTAL
DRESSING REGULAR LOWER BODY CLOTHING: TOTAL
EATING MEALS: TOTAL
MOBILITY SCORE: 6
MOVING FROM LYING ON BACK TO SITTING ON SIDE OF FLAT BED WITH BEDRAILS: TOTAL
WALKING IN HOSPITAL ROOM: TOTAL
HELP NEEDED FOR BATHING: TOTAL
EATING MEALS: TOTAL
DRESSING REGULAR UPPER BODY CLOTHING: TOTAL
STANDING UP FROM CHAIR USING ARMS: TOTAL
TURNING FROM BACK TO SIDE WHILE IN FLAT BAD: TOTAL
DRESSING REGULAR LOWER BODY CLOTHING: TOTAL
TURNING FROM BACK TO SIDE WHILE IN FLAT BAD: TOTAL

## 2024-08-06 ASSESSMENT — PAIN - FUNCTIONAL ASSESSMENT
PAIN_FUNCTIONAL_ASSESSMENT: CPOT (CRITICAL CARE PAIN OBSERVATION TOOL)

## 2024-08-06 NOTE — ANESTHESIA POSTPROCEDURE EVALUATION
Patient: Denae Nolasco    Procedure Summary       Date: 08/05/24 Room / Location: [facility] OR 25 / [facility] [place] OR    Anesthesia Start: 0725 Anesthesia Stop: 08/06/24 0217    Procedure: Craniotomy with Excision Tissue (Left: Head) Diagnosis:       Schwannoma      (Schwannoma [D36.10])    Surgeons: Renetta Amanda MD Responsible Provider: Hi Webb DO    Anesthesia Type: general ASA Status: 3            Anesthesia Type: general    Vitals Value Taken Time   /100 08/06/24 0202   Temp 36 08/06/24 0221   Pulse 115 08/06/24 0221   Resp 16 08/06/24 0221   SpO2 100 % 08/06/24 0221   Vitals shown include unfiled device data.    Anesthesia Post Evaluation    Patient location during evaluation: ICU  Patient participation: complete - patient cannot participate  Level of consciousness: sedated  Pain management: adequate  Airway patency: patent  Cardiovascular status: acceptable  Respiratory status: acceptable, ventilator, ETT and intubated  Hydration status: acceptable  Postoperative Nausea and Vomiting: none        No notable events documented.

## 2024-08-06 NOTE — CONSULTS
Vancomycin Dosing by Pharmacy- INITIAL    Denae Nolasco is a 33 y.o. year old female who Pharmacy has been consulted for vancomycin dosing for CNS/meningoencephalitis/surgical prophylaxis. Based on the patient's indication and renal status this patient will be dosed based on a goal AUC of 500-600.     Renal function is currently stable.    Visit Vitals  /80   Pulse 103   Temp 37.1 °C (98.8 °F) (Temporal)   Resp 18        Lab Results   Component Value Date    CREATININE 0.57 2024    CREATININE 0.46 (L) 2024    CREATININE 0.60 2024        Patient weight is as follows:   Vitals:    24 0703   Weight: 55.3 kg (121 lb 14.6 oz)       Cultures:  No results found for the encounter in last 14 days.        I/O last 3 completed shifts:  In: 1400 (25.3 mL/kg) [I.V.:500 (9 mL/kg); IV Piggyback:900]  Out: 950 (17.2 mL/kg) [Urine:950 (0.5 mL/kg/hr)]  Weight: 55.3 kg   I/O during current shift:  I/O this shift:  In: 2200 [I.V.:1000; IV Piggyback:1200]  Out: 3225 [Urine:3225]    Temp (24hrs), Av.6 °C (97.8 °F), Min:36 °C (96.8 °F), Max:37.1 °C (98.8 °F)         Assessment/Plan     Patient has already been given a loading dose of 2000 mg.  Will initiate vancomycin maintenance,  1250 mg every 12 hours.    This dosing regimen is predicted by [employer] to result in the following pharmacokinetic parameters:    Loading dose: N/A  Regimen: 1250 mg IV every 12 hours.  Start time: 08:51 on 2024  Exposure target: AUC24 (range)400-600 mg/L.hr   AUC24,ss: 547 mg/L.hr  Probability of AUC24 > 400: 79 %  Ctrough,ss: 14.9 mg/L  Probability of Ctrough,ss > 20: 30 %  Probability of nephrotoxicity (Lodise AQUILES ): 10 %    Follow-up level will be ordered on  at AM labs, unless clinically indicated sooner.  Will continue to monitor renal function daily while on vancomycin and order serum creatinine at least every 48 hours if not already ordered.  Follow for continued vancomycin needs, clinical response, and  signs/symptoms of toxicity.       AGUS LEMOS, PharmD

## 2024-08-06 NOTE — H&P (VIEW-ONLY)
ENT DEPARTMENT CONSULTATION NOTE  Name: Denae Nolasco  MRN: [id number]  : 1990  Consulting Team: Neurosurgery; MD Treasure  Reason for Consult: bleeding from R ear canal    History of Present Illness  The patient is a 33 y.o. female who presented to [facility] on 8/3/2024 for hearing loss, vertigo, ataxia, headache, dizziness, and muscle spasms and was found to have an intracranial mass concerning for left vestibular schwannoma with fourth ventricular effacement and brainstem compression.  Patient underwent left retrosigmoid craniotomy for tumor resection 2024.    ENT was consulted for bleeding from the right ear canal.  It was noted that the patient had nerve stimulation probe was placed in her right ear canal intraoperatively.  The patient is currently intubated and sedated was unable to provide ROS.        Review of Systems  14 point review of systems completed and all negative except as noted in HPI.    Past Medical History  Past Medical History:   Diagnosis Date    Depression     Visual impairment        Past Surgical History  History reviewed. No pertinent surgical history.    Allergies  No Known Allergies    Medications    Current Facility-Administered Medications:     acetaminophen (Tylenol) tablet 650 mg, 650 mg, nasogastric tube, q6h ENOCH, Margarette Floresis, APRN-CNP    calcium gluconate 1 g in sodium chloride (iso) IV 50 mL, 1 g, intravenous, q6h PRN, Nektarios Kriaris, APRN-CNP    calcium gluconate 2 g in sodium chloride (iso)  mL, 2 g, intravenous, q6h PRN, Nektarios Kriaris, APRN-CNP    cefTRIAXone (Rocephin) 2 g in dextrose (iso) IV 50 mL, 2 g, intravenous, q12h, Amanda Portillo MD, Stopped at 24 0444    dexAMETHasone (Decadron) injection 6 mg, 6 mg, intravenous, q4h, Amanda Portillo MD, 6 mg at 24 0612    dexmedeTOMIDine (Precedex) 400 mcg in 100 mL (4 mcg/mL) sodium chloride 0.9% infusion, 0.2-1.5 mcg/kg/hr, intravenous, Continuous, Margarette Gonzalez APRLENORE-CNP, Last  Rate: 8.3 mL/hr at 08/06/24 0445, 0.6 mcg/kg/hr at 08/06/24 0445    escitalopram (Lexapro) tablet 5 mg, 5 mg, nasogastric tube, Daily, Nektarios Kriaris, APRN-CNP    pantoprazole (ProtoNix) EC tablet 40 mg, 40 mg, oral, Daily before breakfast **OR** esomeprazole (NexIUM) suspension 40 mg, 40 mg, nasogastric tube, Daily before breakfast **OR** pantoprazole (ProtoNix) injection 40 mg, 40 mg, intravenous, Daily before breakfast, Nektarios Kriaris, APRN-CNP    fentanyl (Sublimaze) 1000 mcg in sodium chloride 0.9% 100 mL (10 mcg/mL) infusion (premix),  mcg/hr, intravenous, Continuous, Nektarios Kriaris, APRN-CNP, Last Rate: 12.5 mL/hr at 08/06/24 0621, 125 mcg/hr at 08/06/24 0621    [Held by provider] hydrALAZINE (Apresoline) injection 10 mg, 10 mg, intravenous, q30 min PRN, Nektarios Kriaris, APRN-CNP    [Held by provider] labetaloL (Normodyne,Trandate) injection 5 mg, 5 mg, intravenous, q10 min PRN, Nektarios Kriaris, APRN-CNP    magnesium sulfate 2 g in sterile water for injection 50 mL, 2 g, intravenous, q6h PRN, Nektarios Kriaris, APRN-CNP    magnesium sulfate 4 g in sterile water for injection 100 mL, 4 g, intravenous, q6h PRN, Nektarios Kriaris, APRN-CNP    naloxone (Narcan) injection 0.2 mg, 0.2 mg, intravenous, q5 min PRN, Nektarios Kriaris, APRN-CNP    [Held by provider] niCARdipine (Cardene) 40 mg in sodium chloride 200 mL (0.2 mg/mL) infusion (premix), 2.5-15 mg/hr, intravenous, Continuous, Nektarios Kriaris, APRN-CNP, Stopped at 08/06/24 0349    niMODipine (Nymalize) liquid 60 mg, 60 mg, nasogastric tube, q4h ENOCH, Margarette Gonzalez, APRN-CNP    norepinephrine (Levophed) 8 mg in dextrose 5% 250 mL (0.032 mg/mL) infusion (premix), 0.01-1 mcg/kg/min, intravenous, Continuous, Margarette Gonzalez APRN-CNP, Stopped at 08/06/24 0703    oxyCODONE (Roxicodone) immediate release tablet 2.5 mg, 2.5 mg, nasogastric tube, q4h PRN, Margarette Gonzalez, APRN-CNP    oxygen (O2) therapy, , inhalation, Continuous PRN -  O2/gases, Nektarios Kriaris, APRN-CNP, 30 percent at 24 0805    perflutren protein A microsphere (Optison) injection 0.5 mL, 0.5 mL, intravenous, Once in imaging, Nektarios Codyiaris, APRN-CNP    polyethylene glycol (Glycolax, Miralax) packet 17 g, 17 g, nasogastric tube, Daily, Nektarios Kriaris, APRN-CNP    potassium chloride CR (Klor-Con M20) ER tablet 20 mEq, 20 mEq, nasogastric tube, q6h PRN **OR** potassium chloride (Klor-Con) packet 20 mEq, 20 mEq, nasogastric tube, q6h PRN, Nektarios Kriaris, APRN-CNP    potassium chloride CR (Klor-Con M20) ER tablet 40 mEq, 40 mEq, nasogastric tube, q6h PRN **OR** potassium chloride (Klor-Con) packet 40 mEq, 40 mEq, nasogastric tube, q6h PRN, Nektarios Kriaris, APRN-CNP    potassium chloride 40 mEq in sterile water for injection 100 mL, 40 mEq, intravenous, q6h PRN, Nektarios Kriaris, APRN-CNP    potassium chloride 40 mEq in sterile water for injection 100 mL, 40 mEq, intravenous, Once, Nektarios Kriaris, APRN-CNP, Last Rate: 25 mL/hr at 24 0451, 40 mEq at 24 0451    potassium phosphates 15 mmol in dextrose 5% 250 mL IV, 15 mmol, intravenous, Once, Nektarios Kriaris, APRN-CNP, Last Rate: 63.8 mL/hr at 24 0613, 15 mmol at 24 0613    sennosides-docusate sodium (Becky-Colace) 8.6-50 mg per tablet 2 tablet, 2 tablet, nasogastric tube, BID, Nektarios Kriaris, APRN-CNP    sodium chloride 0.9% infusion, 75 mL/hr, intravenous, Continuous, Nektarios Kriaris, APRN-CNP, Last Rate: 75 mL/hr at 24, 75 mL/hr at 24 05    vancomycin (Vancocin) in dextrose 5 % water (D5W) 250 mL IV 1,250 mg, 1,250 mg, intravenous, q12h, Amanda Portillo MD    vancomycin (Vancocin) pharmacy to dose - pharmacy monitoring, , miscellaneous, Daily PRN, Amanda Portillo MD    Family History  Family History   Problem Relation Name Age of Onset    Blood clot Mother Delmis Mantilla-  of clot       labor Sister Megan        Social History  Social History      Socioeconomic History    Marital status:      Spouse name: Not on file    Number of children: Not on file    Years of education: Not on file    Highest education level: Not on file   Occupational History    Not on file   Tobacco Use    Smoking status: Never    Smokeless tobacco: Never   Vaping Use    Vaping status: Never Used   Substance and Sexual Activity    Alcohol use: Never    Drug use: Not Currently     Frequency: 1.0 times per week     Types: Marijuana     Comment: Vape    Sexual activity: Yes     Partners: Male     Birth control/protection: Male Sterilization   Other Topics Concern    Not on file   Social History Narrative    Not on file     Social Determinants of Health     Financial Resource Strain: Low Risk  (8/3/2024)    Overall Financial Resource Strain (CARDIA)     Difficulty of Paying Living Expenses: Not very hard   Food Insecurity: Not on file   Transportation Needs: No Transportation Needs (8/3/2024)    PRAPARE - Transportation     Lack of Transportation (Medical): No     Lack of Transportation (Non-Medical): No   Physical Activity: Not on file   Stress: Not on file   Social Connections: Not on file   Intimate Partner Violence: Not on file   Housing Stability: Low Risk  (8/3/2024)    Housing Stability Vital Sign     Unable to Pay for Housing in the Last Year: No     Number of Times Moved in the Last Year: 1     Homeless in the Last Year: No       Vital Signs  Vitals:    08/06/24 0700   BP: 120/83   Pulse: 60   Resp: 16   Temp:    SpO2: 100%       Physical Examination  GEN: Intubated and sedated  VOICE: Unable to assess  RESP: Intubated  Vent Mode: Volume control/assist control  FiO2 (%):  [40 %-50 %] 40 %  S RR:  [14-16] 14  S VT:  [350 mL-400 mL] 350 mL  PEEP/CPAP (cm H2O):  [5 cm H20] 5 cm H20  MAP (cm H2O):  [7.5-8.3] 8  CV: Clinically well perfused   NEURO: Sedated, unable to assess  HEAD: Incision on left scalp from neurosurgical intervention  EARS: Normal external ears, right EAC  with minimal blood in canal and perforation observed involving the annulus approximately 20-30% of the inferior aspect of the tympanic membrane.  Left EAC and tympanic membrane within normal limits.    NOSE: External nose appears normal    Laboratory and Data  Results for orders placed or performed during the hospital encounter of 08/03/24 (from the past 24 hour(s))   Blood Gas Arterial   Result Value Ref Range    POCT pH, Arterial 7.36 (L) 7.38 - 7.42 pH    POCT pCO2, Arterial 32 (L) 38 - 42 mm Hg    POCT pO2, Arterial 225 (H) 85 - 95 mm Hg    POCT SO2, Arterial 100 94 - 100 %    POCT Oxy Hemoglobin, Arterial 97.4 94.0 - 98.0 %    POCT Base Excess, Arterial -6.3 (L) -2.0 - 3.0 mmol/L    POCT HCO3 Calculated, Arterial 18.1 (L) 22.0 - 26.0 mmol/L    Patient Temperature 37.0 degrees Celsius    FiO2 45 %   BLOOD GAS ARTERIAL FULL PANEL   Result Value Ref Range    POCT pH, Arterial 7.39 7.38 - 7.42 pH    POCT pCO2, Arterial 30 (L) 38 - 42 mm Hg    POCT pO2, Arterial 233 (H) 85 - 95 mm Hg    POCT SO2, Arterial 100 94 - 100 %    POCT Oxy Hemoglobin, Arterial 97.5 94.0 - 98.0 %    POCT Hematocrit Calculated, Arterial 39.0 36.0 - 46.0 %    POCT Sodium, Arterial 139 136 - 145 mmol/L    POCT Potassium, Arterial 3.5 3.5 - 5.3 mmol/L    POCT Chloride, Arterial 110 (H) 98 - 107 mmol/L    POCT Ionized Calcium, Arterial 1.15 1.10 - 1.33 mmol/L    POCT Glucose, Arterial 204 (H) 74 - 99 mg/dL    POCT Lactate, Arterial 1.8 0.4 - 2.0 mmol/L    POCT Base Excess, Arterial -5.6 (L) -2.0 - 3.0 mmol/L    POCT HCO3 Calculated, Arterial 18.2 (L) 22.0 - 26.0 mmol/L    POCT Hemoglobin, Arterial 12.9 12.0 - 16.0 g/dL    POCT Anion Gap, Arterial 14 10 - 25 mmo/L    Patient Temperature 37.0 degrees Celsius    FiO2 45 %   Blood Gas Arterial Full Panel   Result Value Ref Range    POCT pH, Arterial 7.46 (H) 7.38 - 7.42 pH    POCT pCO2, Arterial 26 (L) 38 - 42 mm Hg    POCT pO2, Arterial 205 (H) 85 - 95 mm Hg    POCT SO2, Arterial 100 94 - 100 %     POCT Oxy Hemoglobin, Arterial 97.6 94.0 - 98.0 %    POCT Hematocrit Calculated, Arterial 36.0 36.0 - 46.0 %    POCT Sodium, Arterial 142 136 - 145 mmol/L    POCT Potassium, Arterial 3.6 3.5 - 5.3 mmol/L    POCT Chloride, Arterial 115 (H) 98 - 107 mmol/L    POCT Ionized Calcium, Arterial 1.18 1.10 - 1.33 mmol/L    POCT Glucose, Arterial 180 (H) 74 - 99 mg/dL    POCT Lactate, Arterial 1.7 0.4 - 2.0 mmol/L    POCT Base Excess, Arterial -4.0 (L) -2.0 - 3.0 mmol/L    POCT HCO3 Calculated, Arterial 18.5 (L) 22.0 - 26.0 mmol/L    POCT Hemoglobin, Arterial 12.0 12.0 - 16.0 g/dL    POCT Anion Gap, Arterial 12 10 - 25 mmo/L    Patient Temperature 37.0 degrees Celsius    FiO2 50 %   Blood Gas Arterial Full Panel   Result Value Ref Range    POCT pH, Arterial 7.43 (H) 7.38 - 7.42 pH    POCT pCO2, Arterial 30 (L) 38 - 42 mm Hg    POCT pO2, Arterial 211 (H) 85 - 95 mm Hg    POCT SO2, Arterial 100 94 - 100 %    POCT Oxy Hemoglobin, Arterial 97.9 94.0 - 98.0 %    POCT Hematocrit Calculated, Arterial 35.0 (L) 36.0 - 46.0 %    POCT Sodium, Arterial 141 136 - 145 mmol/L    POCT Potassium, Arterial 3.3 (L) 3.5 - 5.3 mmol/L    POCT Chloride, Arterial 112 (H) 98 - 107 mmol/L    POCT Ionized Calcium, Arterial 1.15 1.10 - 1.33 mmol/L    POCT Glucose, Arterial 178 (H) 74 - 99 mg/dL    POCT Lactate, Arterial 1.6 0.4 - 2.0 mmol/L    POCT Base Excess, Arterial -3.5 (L) -2.0 - 3.0 mmol/L    POCT HCO3 Calculated, Arterial 19.9 (L) 22.0 - 26.0 mmol/L    POCT Hemoglobin, Arterial 11.7 (L) 12.0 - 16.0 g/dL    POCT Anion Gap, Arterial 12 10 - 25 mmo/L    Patient Temperature 37.0 degrees Celsius    FiO2 50 %   Blood Gas Arterial Full Panel   Result Value Ref Range    POCT pH, Arterial 7.40 7.38 - 7.42 pH    POCT pCO2, Arterial 29 (L) 38 - 42 mm Hg    POCT pO2, Arterial 216 (H) 85 - 95 mm Hg    POCT SO2, Arterial 100 94 - 100 %    POCT Oxy Hemoglobin, Arterial 97.8 94.0 - 98.0 %    POCT Hematocrit Calculated, Arterial 34.0 (L) 36.0 - 46.0 %    POCT  Sodium, Arterial 135 (L) 136 - 145 mmol/L    POCT Potassium, Arterial 3.5 3.5 - 5.3 mmol/L    POCT Chloride, Arterial 110 (H) 98 - 107 mmol/L    POCT Ionized Calcium, Arterial 1.06 (L) 1.10 - 1.33 mmol/L    POCT Glucose, Arterial 204 (H) 74 - 99 mg/dL    POCT Lactate, Arterial 2.4 (H) 0.4 - 2.0 mmol/L    POCT Base Excess, Arterial -5.7 (L) -2.0 - 3.0 mmol/L    POCT HCO3 Calculated, Arterial 18.0 (L) 22.0 - 26.0 mmol/L    POCT Hemoglobin, Arterial 11.3 (L) 12.0 - 16.0 g/dL    POCT Anion Gap, Arterial 11 10 - 25 mmo/L    Patient Temperature 37.0 degrees Celsius    FiO2 50 %   Blood Gas Arterial Full Panel   Result Value Ref Range    POCT pH, Arterial 7.39 7.38 - 7.42 pH    POCT pCO2, Arterial 33 (L) 38 - 42 mm Hg    POCT pO2, Arterial 212 (H) 85 - 95 mm Hg    POCT SO2, Arterial 100 94 - 100 %    POCT Oxy Hemoglobin, Arterial 97.7 94.0 - 98.0 %    POCT Hematocrit Calculated, Arterial 40.0 36.0 - 46.0 %    POCT Sodium, Arterial 139 136 - 145 mmol/L    POCT Potassium, Arterial 3.3 (L) 3.5 - 5.3 mmol/L    POCT Chloride, Arterial 108 (H) 98 - 107 mmol/L    POCT Ionized Calcium, Arterial 1.12 1.10 - 1.33 mmol/L    POCT Glucose, Arterial 200 (H) 74 - 99 mg/dL    POCT Lactate, Arterial 2.6 (H) 0.4 - 2.0 mmol/L    POCT Base Excess, Arterial -4.1 (L) -2.0 - 3.0 mmol/L    POCT HCO3 Calculated, Arterial 20.0 (L) 22.0 - 26.0 mmol/L    POCT Hemoglobin, Arterial 13.4 12.0 - 16.0 g/dL    POCT Anion Gap, Arterial 14 10 - 25 mmo/L    Patient Temperature 37.0 degrees Celsius    FiO2 50 %   CBC and Auto Differential   Result Value Ref Range    WBC 11.6 (H) 4.4 - 11.3 x10*3/uL    nRBC 0.0 0.0 - 0.0 /100 WBCs    RBC 3.92 (L) 4.00 - 5.20 x10*6/uL    Hemoglobin 11.3 (L) 12.0 - 16.0 g/dL    Hematocrit 32.5 (L) 36.0 - 46.0 %    MCV 83 80 - 100 fL    MCH 28.8 26.0 - 34.0 pg    MCHC 34.8 32.0 - 36.0 g/dL    RDW 12.5 11.5 - 14.5 %    Platelets 218 150 - 450 x10*3/uL    Neutrophils % 93.4 40.0 - 80.0 %    Immature Granulocytes %, Automated 0.4 0.0  - 0.9 %    Lymphocytes % 1.9 13.0 - 44.0 %    Monocytes % 4.3 2.0 - 10.0 %    Eosinophils % 0.0 0.0 - 6.0 %    Basophils % 0.0 0.0 - 2.0 %    Neutrophils Absolute 10.80 (H) 1.20 - 7.70 x10*3/uL    Immature Granulocytes Absolute, Automated 0.05 0.00 - 0.70 x10*3/uL    Lymphocytes Absolute 0.22 (L) 1.20 - 4.80 x10*3/uL    Monocytes Absolute 0.50 0.10 - 1.00 x10*3/uL    Eosinophils Absolute 0.00 0.00 - 0.70 x10*3/uL    Basophils Absolute 0.00 0.00 - 0.10 x10*3/uL   Calcium, Ionized   Result Value Ref Range    POCT Calcium, Ionized 1.09 (L) 1.1 - 1.33 mmol/L   Renal Function Panel   Result Value Ref Range    Glucose 182 (H) 74 - 99 mg/dL    Sodium 144 136 - 145 mmol/L    Potassium 3.2 (L) 3.5 - 5.3 mmol/L    Chloride 109 (H) 98 - 107 mmol/L    Bicarbonate 19 (L) 21 - 32 mmol/L    Anion Gap 19 10 - 20 mmol/L    Urea Nitrogen 5 (L) 6 - 23 mg/dL    Creatinine 0.49 (L) 0.50 - 1.05 mg/dL    eGFR >90 >60 mL/min/1.73m*2    Calcium 8.1 (L) 8.6 - 10.6 mg/dL    Phosphorus 2.3 (L) 2.5 - 4.9 mg/dL    Albumin 4.1 3.4 - 5.0 g/dL   Magnesium   Result Value Ref Range    Magnesium 1.51 (L) 1.60 - 2.40 mg/dL   Lipid panel   Result Value Ref Range    Cholesterol 179 0 - 199 mg/dL    HDL-Cholesterol 48.1 mg/dL    Cholesterol/HDL Ratio 3.7     LDL Calculated 83 mg/dL    VLDL 48 (H) 0 - 40 mg/dL    Triglycerides 242 (H) 0 - 149 mg/dL    Non HDL Cholesterol 131 0 - 149 mg/dL   Sodium   Result Value Ref Range    Sodium 150 (H) 136 - 145 mmol/L   Osmolality   Result Value Ref Range    Osmolality, Serum 311 (H) 280 - 300 mOsm/kg         Assessment  Denae Nolasco is a 33 y.o. 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  ENT was consulted for bleeding from the right ear canal in the setting of nerve stimulation probe was placed in the ear canal intraoperatively.  Patient was found to have a right tympanic membrane perforation measuring approximately 20-30% involving the annulus in the  inferior aspect of the TM.    Recommendations  -Ofloxacin drops to the right ear canal BID for 7 days  -No other acute ENT intervention  -ENT to coordinate otology and audiology follow up outpatient    Radha Brown MD - PGY2  Otolaryngology - Head & Neck Surgery  [facility] [place]    ENT Consult pager: [phone]  ENT Peds pager: [phone]  ENT Head & Neck Surgery Phone: [phone]  ENT subspecialty team: Devorah individual resident who wrote today's note  ENT Outpatient scheduling number: [phone]  Please Page if Urgent      I saw and evaluated the patient. I personally obtained the key and critical portions of the history and physical exam or was physically present for key and critical portions performed by the resident/fellow. I reviewed the resident/fellow's documentation and discussed the patient with the resident/fellow. I agree with the resident/fellow's medical decision making as documented in the note.  - Exam confirms resident documentation as above with right TM perforation  - Plan as above with abx drops and otology & audiology follow up   - No family present at the time of my visit but ENT is happy to review with family when they are available - ENT did update primary team regarding findings as well as nursing team    Jayleen Jewell MD

## 2024-08-06 NOTE — PROGRESS NOTES
[facility]  NEUROSCIENCE INTENSIVE CARE UNIT  DAILY PROGRESS NOTE       Patient Name: Denae Nolasco   MRN: [id number]     Admit Date: 8/3/2024     : 1990 AGE: 33 y.o. GENDER: female        Subjective    Denae Nolasco is a 33 y.o. female with a past medical history of raynaud's and depression who presented to the [facility] ED on 8/3/2024 who worsening vertigo and ataxia. 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 Patient admitted to neurosurgery service for operative management.     : s/p L retrosigmoid craniotomy for tumor resection with RF EVD placement    Interval Events: Admitted to NSU post-operative     Objective   VITALS (24H):  Temp:  [36.4 °C (97.5 °F)-36.8 °C (98.2 °F)] 36.8 °C (98.2 °F)  Heart Rate:  [] 60  Resp:  [14-24] 16  BP: (107-151)/() 120/83  Arterial Line BP 1: ()/(44-84) 124/71  FiO2 (%):  [40 %-50 %] 40 %  INTAKE/OUTPUT:  Intake/Output Summary (Last 24 hours) at 2024 0717  Last data filed at 2024 0700  Gross per 24 hour   Intake 4762.37 ml   Output 4910 ml   Net -147.63 ml     VENT SETTINGS:  Vent Mode: Volume control/assist control  FiO2 (%):  [40 %-50 %] 40 %  S RR:  [16] 16  S VT:  [400 mL] 400 mL  PEEP/CPAP (cm H2O):  [5 cm H20] 5 cm H20  MAP (cm H2O):  [7.5-8.3] 7.5     PHYSICAL EXAM:  NEURO:  - 3mm reactive to light (pupil checks only)  - RF EVD clamped  CV:  - RRR on telemetry, NSR  - Right radial arterial line in place  RESP:  - Intubated, regular, unlabored  - Vent: AC  :  - Indwelling catheter in place  GI:  - Abdomen NT/ND, soft  SKIN:  - Surgical incision clean/dry/Intact    MEDICATIONS:  Scheduled: PRN: Continuous:   acetaminophen, 650 mg, nasogastric tube, q6h ENOCH  cefTRIAXone, 2 g, intravenous, q12h  dexAMETHasone, 6 mg, intravenous, q4h  escitalopram, 5 mg, nasogastric tube, Daily  pantoprazole, 40 mg, oral, Daily before breakfast   Or  esomeprazole, 40 mg,  nasogastric tube, Daily before breakfast   Or  pantoprazole, 40 mg, intravenous, Daily before breakfast  niMODipine, 60 mg, nasogastric tube, q4h ENOCH  perflutren protein A microsphere, 0.5 mL, intravenous, Once in imaging  polyethylene glycol, 17 g, nasogastric tube, Daily  potassium chloride, 40 mEq, intravenous, Once  potassium phosphate, 15 mmol, intravenous, Once  sennosides-docusate sodium, 2 tablet, nasogastric tube, BID  vancomycin, 1,250 mg, intravenous, q12h     PRN medications: calcium gluconate, calcium gluconate, [Held by provider] hydrALAZINE, [Held by provider] labetaloL, magnesium sulfate, magnesium sulfate, naloxone, oxyCODONE, oxygen, potassium chloride CR **OR** potassium chloride, potassium chloride CR **OR** potassium chloride, potassium chloride, vancomycin dexmedeTOMIDine, 0.2-1.5 mcg/kg/hr, Last Rate: 0.6 mcg/kg/hr (08/06/24 0445)  fentaNYL,  mcg/hr, Last Rate: 125 mcg/hr (08/06/24 0621)  [Held by provider] niCARdipine, 2.5-15 mg/hr, Last Rate: Stopped (08/06/24 0349)  norepinephrine, 0.01-1 mcg/kg/min, Last Rate: Stopped (08/06/24 0703)  sodium chloride 0.9%, 75 mL/hr, Last Rate: 75 mL/hr (08/06/24 0522)         LAB RESULTS:      IMAGING RESULTS:  XR abdomen 1 view   Preliminary Result   1. Enteric tube tip projects over the expected distal stomach/gastric   antrum versus proximal duodenum.   2. Nonobstructive nonspecific bowel-gas pattern.             I personally reviewed the images/study and I agree with Dr. Kiran Hernandez findings as stated. This study was interpreted at [facility], [place], [place]             Dictation workstation:   [id number]      XR chest 1 view         CT head wo IV contrast   Final Result   1. New extensive postoperative changes from left retrosigmoid   craniectomy with cranioplasty for mass resection. There is persistent   mass effect within the posterior fossa with effacement of the 4th   ventricle.   2. Right frontal  approach ventriculostomy shunt with catheter tip   terminating in the foramen of Monro. There has been some interval   decrease in the caliber of the left lateral and 3rd ventricles   compared with the previous exam.   3. New small extra-axial hematoma near the right frontal arcelia hole.   There is either artifact versus small subdural hematoma along the   left cerebral convexity.        I personally reviewed the images/study and I agree with Dr. Kiran Hernandez findings as stated.        MACRO:   None        Signed by: Gi Cohen 8/6/2024 7:03 AM   Dictation workstation:   [id number]      XR chest 1 view   Final Result   1.  No evidence of acute cardiopulmonary process.   2. Medical devices as above.                  MACRO:   None        Signed by: Keshav Treadwell 8/5/2024 3:47 PM   Dictation workstation:   [id number]      CT head wo IV contrast volumetric surgical planning   Final Result   1. Preoperative volumetric CT imaging obtained. Mass within the left   cerebellopontine angle better characterized on same day MRI IAC. Mild   surrounding vasogenic edema.   2. Similar asymmetric effacement of the right basal cisterns and 4th   ventricle with mild upstream ventricular prominence. Mild asymmetry   of the lateral ventricles.        I personally reviewed the image(s)/study and resident interpretation   as stated by Dr. Delilah Rader MD. I agree with the findings as   stated. This study was interpreted at [facility], [place], [place].        MACRO:   None        Signed by: Scooter Vinson 8/5/2024 6:22 AM   Dictation workstation:   [id number]      XR chest 1 view   Final Result   1. No evidence of acute cardiopulmonary process.        Signed by: Cliff Juares 8/4/2024 2:11 PM   Dictation workstation:   [id number]      MR IAC w and wo IV contrast   Final Result   1. There is a heterogeneously enhancing mass within the left   cerebellar pontine angle cistern and internal  auditory canal   measuring 3.5 x 2.9 x 3.2 cm. There is mild thickening of the   adjacent tentorium measuring 0.2 cm in thickness. Differential   considerations include a vestibular schwannoma or possibly a   meningioma. There is marked mass effect upon the leticia, brachium   pontis and cerebellum. There is near-complete effacement of the 4th   ventricle. There is dilatation of the lateral and 3rd ventricles,   similar to the prior exams.        MACRO:   None        Signed by: Suzy Martinez 8/4/2024 9:24 AM   Dictation workstation:   [id number]      Transthoracic Echo (TTE) Complete   Final Result      XR chest 1 view   Final Result   No evidence of acute intrathoracic abnormality.        Signed by: Aneesh Bhatti 8/3/2024 1:08 PM   Dictation workstation:   [id number]      CT internal auditory canals posterior fossa wo IV contrast   Final Result   There is again evidence of a large extra-axial mass centered within   left cerebellopontine angle cistern which is better defined on the   prior MRI with and without gadolinium dated 08/02/2024 when compared   with this noncontrast CT study. There is again evidence of mass   effect upon the adjacent brainstem, left cerebellar peduncle, and   cerebellum. There is hypodensity within the surrounding brain   parenchymal compatible with surrounding brain parenchymal edema.   There is again evidence of near-complete effacement of the 4th   ventricle. No abnormal calcification is noted associated with the   lesion. There is minimal asymmetric widening of the left internal   auditory canal when compared with the right.        There is again evidence of mild ventriculomegaly involving the   lateral ventricles and 3rd ventricle similar when compared with the   prior MRI without significant effacement of surrounding sulci of the   cerebral hemispheres.        Mild nonspecific white matter changes are again noted within cerebral   hemispheres bilaterally. While nonspecific, white matter  changes can   be seen with small-vessel ischemic change or demyelinating processes   among others.        MACRO:   None.        Signed by: Kyle Zamora 8/3/2024 11:48 AM   Dictation workstation:   [id number]      CT head wo IV contrast   Final Result   There is again evidence of a large extra-axial mass centered within   left cerebellopontine angle cistern which is better defined on the   prior MRI with and without gadolinium dated 08/02/2024 when compared   with this noncontrast CT study. There is again evidence of mass   effect upon the adjacent brainstem, left cerebellar peduncle, and   cerebellum. There is hypodensity within the surrounding brain   parenchymal compatible with surrounding brain parenchymal edema.   There is again evidence of near-complete effacement of the 4th   ventricle. No abnormal calcification is noted associated with the   lesion. There is minimal asymmetric widening of the left internal   auditory canal when compared with the right.        There is again evidence of mild ventriculomegaly involving the   lateral ventricles and 3rd ventricle similar when compared with the   prior MRI without significant effacement of surrounding sulci of the   cerebral hemispheres.        Mild nonspecific white matter changes are again noted within cerebral   hemispheres bilaterally. While nonspecific, white matter changes can   be seen with small-vessel ischemic change or demyelinating processes   among others.        MACRO:   None.        Signed by: Kyle Zamora 8/3/2024 11:48 AM   Dictation workstation:   [id number]      CT chest abdomen pelvis w IV contrast   Final Result   1. Dilated common bile duct measuring to 1.1 cm with gradual   transition to normal caliber and associated mild intrahepatic biliary   dilation. No evidence of radiopaque choledocholithiasis, radiopaque   cholelithiasis, pancreatic duct dilation, or abnormal focal   pancreatic enhancement. Findings can be secondary to chronic    periampullary stricture, however neoplasm given patient's history can   not be excluded. Nonemergent MRCP and/or ERCP is recommended for   further assessment.   2. Heterogeneous cervix with hypodense areas, which can be secondary   to nabothian cysts. Nonemergent pelvic ultrasound can be obtained for   further assessment as per clinical discretion.   3. Subpleural nodular opacity within the right middle lobe with   internal calcifications, as above. Additional calcified granulomas   and calcified right hilar lymph nodes, compatible with remote   granulomatous disease.   4. Additional findings as discussed above.        I personally reviewed the images/study and I agree with the findings   as stated by Goyo Hendricks MD (Radiology Resident).   This study was interpreted at [facility], [place], [place].        MACRO:   None.        Signed by: Griselda Gamez 8/3/2024 12:40 PM   Dictation workstation:   [id number]      CT head wo IV contrast    (Results Pending)         Assessment/Plan      33 year-old female with past medical history of depression and visual impairment who presented to OSH ED on 8/2/24 with complaints of hearing loss, vertigo, ataxia (since 2020), headache, dizziness, and muscle spasms. Patient transferred to [facility] for concern for intracranial mass concerning for schwannoma vs. meningioma seen on outpatient imaging. 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 Patient admitted to NSU s/p L retrosigmoid craniotomy for tumor resection.     NEURO:  #s/p L retrosigmoid craniotomy for tumor resection w RF EVD placement  Assessment:  - See physical exam  - RF EVD clamped per NSGY  - Na 150 8/6 0600  - 500ml NS bolus this AM  Plan:  - NSU  - Neuro Checks: Q1H pupil checks  - Keep RF EVD clamped  - CTH this AM  - HOB >45 degrees  - Vanc/CTX  - -140  - q6 Na, Goal Na>145, boluses as needed  -  Nimodipine 60q4  - Dex 4q6  - Precedex and fent, weaning propofol  - Pain: acetaminophen Q4H and oxycodone Q6H  - Nausea: ondansetron  - PT/OT/SLP  - ABG (repeat lactate)  - Keep net even IOs    CARDIOVASCULAR:  #no active issues  Assessment:  - ECHO: EF 57%    Plan:  - Continue to monitor on telemetry  - -120    --> PRN: Labetalol, Hydralazine, and Nicardipine     RESPIRATORY:  #Respiratory insufficiency 2/2 craniotomy unable to extubate post-op  Assessment:  - Vent: AC  Plan:  - Continuous pulse oximetry   - Consider weaning to extubate depending on CT head  - O2 PRN to maintain SpO2 > 94%, wean as tolerated    RENAL/:  #no active issues  Assessment:  Results from last 72 hours   Lab Units 24  0216 24  1211   BUN mg/dL 5* 9   CREATININE mg/dL 0.49* 0.57       Plan:  - Monitor with daily RFP  - Maintain velazquez catheter for: critically ill patient who need accurate urinary output measurements    FEN/GI:  #no active issues  Assessment:  - Last BM: PTA  Plan:  - Monitor and replace electrolytes per protocol  - IVF: NS @ 75 mL/hr  - Diet: NPO   - Bowel Regimen: Docusate-Senna BID and Miralax QD    ENDOCRINE:  Assessment:  Results from last 7 days   Lab Units 24  0216 24  1211   GLUCOSE mg/dL 182* 129*      Plan:  - Accuchecks & ISS     HEMATOLOGY:  #no active issues  Assessment:  - Baseline Hgb: 12.8  - Baseline Plts: 237  Results from last 7 days   Lab Units 24  0216 24  1211   HEMOGLOBIN g/dL 11.3* 13.6   HEMATOCRIT % 32.5* 41.1   PLATELETS AUTO x10*3/uL 218 253     Plan:  - Continue to monitor with daily CBC and Coag panel    INFECTIOUS DISEASE:  #no active issues  Assessment:  Results from last 7 days   Lab Units 24  0216 24  1211   WBC AUTO x10*3/uL 11.6* 4.7    - Temp (24hrs), Av.6 °C (97.9 °F), Min:36.4 °C (97.5 °F), Max:36.8 °C (98.2 °F)     Plan:  - Continue to monitor for s/sx of infection  - Pan culture for temperature > 38.4 C    MUSCULOSKELETAL:  -  No acute issues    ENT:  - Right tympanic membrane perforation     - ENT oxacillin drops BID for 7 days    SKIN:  - No acute issues  - Turns and skin care per NSU protocol    ACCESS:  - PIVs  - Right radial arterial line (8/5--)  - R subclavian CVC (8/5--)    PROPHYLAXIS:  - DVT Ppx: SCDs  - GI Ppx: Pantoprazole    RESTRAINTS:  I agree with nursing assessment of the patient's need for restraints to protect the patient from injury and facilitate healing. The patient is unable to cooperate with the plan of care and at risk for disrupting critical therapy (i.e., removing medical devices, lines, tubes and/or dressings).  Please see order for specifics. Restraints can be removed when the patient is able to cooperate with plan of care and allow healing to occur, or the medical devices at risk are discontinued by the medical team.     Kyle Ma MD  Neuroscience Intensive Care       Plan of care to be discussed with neurocritical care attending

## 2024-08-06 NOTE — CONSULTS
ENT DEPARTMENT CONSULTATION NOTE  Name: Denae Nolasco  MRN: [id number]  : 1990  Consulting Team: Neurosurgery; MD Treasure  Reason for Consult: bleeding from R ear canal    History of Present Illness  The patient is a 33 y.o. 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  Patient underwent left retrosigmoid craniotomy for tumor resection 2024.    ENT was consulted for bleeding from the right ear canal.  It was noted that the patient had nerve stimulation probe was placed in her right ear canal intraoperatively.  The patient is currently intubated and sedated was unable to provide ROS.        Review of Systems  14 point review of systems completed and all negative except as noted in HPI.    Past Medical History  Past Medical History:   Diagnosis Date    Depression     Visual impairment        Past Surgical History  History reviewed. No pertinent surgical history.    Allergies  No Known Allergies    Medications    Current Facility-Administered Medications:     acetaminophen (Tylenol) tablet 650 mg, 650 mg, nasogastric tube, q6h ENOCH, Margarette Floresis, APRN-CNP    calcium gluconate 1 g in sodium chloride (iso) IV 50 mL, 1 g, intravenous, q6h PRN, Nektarios Kriaris, APRN-CNP    calcium gluconate 2 g in sodium chloride (iso)  mL, 2 g, intravenous, q6h PRN, Nektarios Kriaris, APRN-CNP    cefTRIAXone (Rocephin) 2 g in dextrose (iso) IV 50 mL, 2 g, intravenous, q12h, Amanda Portillo MD, Stopped at 24 0444    dexAMETHasone (Decadron) injection 6 mg, 6 mg, intravenous, q4h, Aamnda Portillo MD, 6 mg at 24 0612    dexmedeTOMIDine (Precedex) 400 mcg in 100 mL (4 mcg/mL) sodium chloride 0.9% infusion, 0.2-1.5 mcg/kg/hr, intravenous, Continuous, Margarette Gonzalez APRLENORE-CNP, Last  Rate: 8.3 mL/hr at 08/06/24 0445, 0.6 mcg/kg/hr at 08/06/24 0445    escitalopram (Lexapro) tablet 5 mg, 5 mg, nasogastric tube, Daily, Nektarios Kriaris, APRN-CNP    pantoprazole (ProtoNix) EC tablet 40 mg, 40 mg, oral, Daily before breakfast **OR** esomeprazole (NexIUM) suspension 40 mg, 40 mg, nasogastric tube, Daily before breakfast **OR** pantoprazole (ProtoNix) injection 40 mg, 40 mg, intravenous, Daily before breakfast, Nektarios Kriaris, APRN-CNP    fentanyl (Sublimaze) 1000 mcg in sodium chloride 0.9% 100 mL (10 mcg/mL) infusion (premix),  mcg/hr, intravenous, Continuous, Nektarios Kriaris, APRN-CNP, Last Rate: 12.5 mL/hr at 08/06/24 0621, 125 mcg/hr at 08/06/24 0621    [Held by provider] hydrALAZINE (Apresoline) injection 10 mg, 10 mg, intravenous, q30 min PRN, Nektarios Kriaris, APRN-CNP    [Held by provider] labetaloL (Normodyne,Trandate) injection 5 mg, 5 mg, intravenous, q10 min PRN, Nektarios Kriaris, APRN-CNP    magnesium sulfate 2 g in sterile water for injection 50 mL, 2 g, intravenous, q6h PRN, Nektarios Kriaris, APRN-CNP    magnesium sulfate 4 g in sterile water for injection 100 mL, 4 g, intravenous, q6h PRN, Nektarios Kriaris, APRN-CNP    naloxone (Narcan) injection 0.2 mg, 0.2 mg, intravenous, q5 min PRN, Nektarios Kriaris, APRN-CNP    [Held by provider] niCARdipine (Cardene) 40 mg in sodium chloride 200 mL (0.2 mg/mL) infusion (premix), 2.5-15 mg/hr, intravenous, Continuous, Nektarios Kriaris, APRN-CNP, Stopped at 08/06/24 0349    niMODipine (Nymalize) liquid 60 mg, 60 mg, nasogastric tube, q4h ENOCH, Margarette Gonzalez, APRN-CNP    norepinephrine (Levophed) 8 mg in dextrose 5% 250 mL (0.032 mg/mL) infusion (premix), 0.01-1 mcg/kg/min, intravenous, Continuous, Margarette Gonzalez APRN-CNP, Stopped at 08/06/24 0703    oxyCODONE (Roxicodone) immediate release tablet 2.5 mg, 2.5 mg, nasogastric tube, q4h PRN, Margarette Gonzalez, APRN-CNP    oxygen (O2) therapy, , inhalation, Continuous PRN -  O2/gases, Nektarios Kriaris, APRN-CNP, 30 percent at 24 0805    perflutren protein A microsphere (Optison) injection 0.5 mL, 0.5 mL, intravenous, Once in imaging, Nektarios Codyiaris, APRN-CNP    polyethylene glycol (Glycolax, Miralax) packet 17 g, 17 g, nasogastric tube, Daily, Nektarios Kriaris, APRN-CNP    potassium chloride CR (Klor-Con M20) ER tablet 20 mEq, 20 mEq, nasogastric tube, q6h PRN **OR** potassium chloride (Klor-Con) packet 20 mEq, 20 mEq, nasogastric tube, q6h PRN, Nektarios Kriaris, APRN-CNP    potassium chloride CR (Klor-Con M20) ER tablet 40 mEq, 40 mEq, nasogastric tube, q6h PRN **OR** potassium chloride (Klor-Con) packet 40 mEq, 40 mEq, nasogastric tube, q6h PRN, Nektarios Kriaris, APRN-CNP    potassium chloride 40 mEq in sterile water for injection 100 mL, 40 mEq, intravenous, q6h PRN, Nektarios Kriaris, APRN-CNP    potassium chloride 40 mEq in sterile water for injection 100 mL, 40 mEq, intravenous, Once, Nektarios Kriaris, APRN-CNP, Last Rate: 25 mL/hr at 24 0451, 40 mEq at 24 0451    potassium phosphates 15 mmol in dextrose 5% 250 mL IV, 15 mmol, intravenous, Once, Nektarios Kriaris, APRN-CNP, Last Rate: 63.8 mL/hr at 24 0613, 15 mmol at 24 0613    sennosides-docusate sodium (Becky-Colace) 8.6-50 mg per tablet 2 tablet, 2 tablet, nasogastric tube, BID, Nektarios Kriaris, APRN-CNP    sodium chloride 0.9% infusion, 75 mL/hr, intravenous, Continuous, Nektarios Kriaris, APRN-CNP, Last Rate: 75 mL/hr at 24, 75 mL/hr at 24 05    vancomycin (Vancocin) in dextrose 5 % water (D5W) 250 mL IV 1,250 mg, 1,250 mg, intravenous, q12h, Amanda Portillo MD    vancomycin (Vancocin) pharmacy to dose - pharmacy monitoring, , miscellaneous, Daily PRN, Amanda Portillo MD    Family History  Family History   Problem Relation Name Age of Onset    Blood clot Mother Delmis Mantilla-  of clot       labor Sister Megan        Social History  Social History      Socioeconomic History    Marital status:      Spouse name: Not on file    Number of children: Not on file    Years of education: Not on file    Highest education level: Not on file   Occupational History    Not on file   Tobacco Use    Smoking status: Never    Smokeless tobacco: Never   Vaping Use    Vaping status: Never Used   Substance and Sexual Activity    Alcohol use: Never    Drug use: Not Currently     Frequency: 1.0 times per week     Types: Marijuana     Comment: Vape    Sexual activity: Yes     Partners: Male     Birth control/protection: Male Sterilization   Other Topics Concern    Not on file   Social History Narrative    Not on file     Social Determinants of Health     Financial Resource Strain: Low Risk  (8/3/2024)    Overall Financial Resource Strain (CARDIA)     Difficulty of Paying Living Expenses: Not very hard   Food Insecurity: Not on file   Transportation Needs: No Transportation Needs (8/3/2024)    PRAPARE - Transportation     Lack of Transportation (Medical): No     Lack of Transportation (Non-Medical): No   Physical Activity: Not on file   Stress: Not on file   Social Connections: Not on file   Intimate Partner Violence: Not on file   Housing Stability: Low Risk  (8/3/2024)    Housing Stability Vital Sign     Unable to Pay for Housing in the Last Year: No     Number of Times Moved in the Last Year: 1     Homeless in the Last Year: No       Vital Signs  Vitals:    08/06/24 0700   BP: 120/83   Pulse: 60   Resp: 16   Temp:    SpO2: 100%       Physical Examination  GEN: Intubated and sedated  VOICE: Unable to assess  RESP: Intubated  Vent Mode: Volume control/assist control  FiO2 (%):  [40 %-50 %] 40 %  S RR:  [14-16] 14  S VT:  [350 mL-400 mL] 350 mL  PEEP/CPAP (cm H2O):  [5 cm H20] 5 cm H20  MAP (cm H2O):  [7.5-8.3] 8  CV: Clinically well perfused   NEURO: Sedated, unable to assess  HEAD: Incision on left scalp from neurosurgical intervention  EARS: Normal external ears, right EAC  with minimal blood in canal and perforation observed involving the annulus approximately 20-30% of the inferior aspect of the tympanic membrane.  Left EAC and tympanic membrane within normal limits.    NOSE: External nose appears normal    Laboratory and Data  Results for orders placed or performed during the hospital encounter of 08/03/24 (from the past 24 hour(s))   Blood Gas Arterial   Result Value Ref Range    POCT pH, Arterial 7.36 (L) 7.38 - 7.42 pH    POCT pCO2, Arterial 32 (L) 38 - 42 mm Hg    POCT pO2, Arterial 225 (H) 85 - 95 mm Hg    POCT SO2, Arterial 100 94 - 100 %    POCT Oxy Hemoglobin, Arterial 97.4 94.0 - 98.0 %    POCT Base Excess, Arterial -6.3 (L) -2.0 - 3.0 mmol/L    POCT HCO3 Calculated, Arterial 18.1 (L) 22.0 - 26.0 mmol/L    Patient Temperature 37.0 degrees Celsius    FiO2 45 %   BLOOD GAS ARTERIAL FULL PANEL   Result Value Ref Range    POCT pH, Arterial 7.39 7.38 - 7.42 pH    POCT pCO2, Arterial 30 (L) 38 - 42 mm Hg    POCT pO2, Arterial 233 (H) 85 - 95 mm Hg    POCT SO2, Arterial 100 94 - 100 %    POCT Oxy Hemoglobin, Arterial 97.5 94.0 - 98.0 %    POCT Hematocrit Calculated, Arterial 39.0 36.0 - 46.0 %    POCT Sodium, Arterial 139 136 - 145 mmol/L    POCT Potassium, Arterial 3.5 3.5 - 5.3 mmol/L    POCT Chloride, Arterial 110 (H) 98 - 107 mmol/L    POCT Ionized Calcium, Arterial 1.15 1.10 - 1.33 mmol/L    POCT Glucose, Arterial 204 (H) 74 - 99 mg/dL    POCT Lactate, Arterial 1.8 0.4 - 2.0 mmol/L    POCT Base Excess, Arterial -5.6 (L) -2.0 - 3.0 mmol/L    POCT HCO3 Calculated, Arterial 18.2 (L) 22.0 - 26.0 mmol/L    POCT Hemoglobin, Arterial 12.9 12.0 - 16.0 g/dL    POCT Anion Gap, Arterial 14 10 - 25 mmo/L    Patient Temperature 37.0 degrees Celsius    FiO2 45 %   Blood Gas Arterial Full Panel   Result Value Ref Range    POCT pH, Arterial 7.46 (H) 7.38 - 7.42 pH    POCT pCO2, Arterial 26 (L) 38 - 42 mm Hg    POCT pO2, Arterial 205 (H) 85 - 95 mm Hg    POCT SO2, Arterial 100 94 - 100 %     POCT Oxy Hemoglobin, Arterial 97.6 94.0 - 98.0 %    POCT Hematocrit Calculated, Arterial 36.0 36.0 - 46.0 %    POCT Sodium, Arterial 142 136 - 145 mmol/L    POCT Potassium, Arterial 3.6 3.5 - 5.3 mmol/L    POCT Chloride, Arterial 115 (H) 98 - 107 mmol/L    POCT Ionized Calcium, Arterial 1.18 1.10 - 1.33 mmol/L    POCT Glucose, Arterial 180 (H) 74 - 99 mg/dL    POCT Lactate, Arterial 1.7 0.4 - 2.0 mmol/L    POCT Base Excess, Arterial -4.0 (L) -2.0 - 3.0 mmol/L    POCT HCO3 Calculated, Arterial 18.5 (L) 22.0 - 26.0 mmol/L    POCT Hemoglobin, Arterial 12.0 12.0 - 16.0 g/dL    POCT Anion Gap, Arterial 12 10 - 25 mmo/L    Patient Temperature 37.0 degrees Celsius    FiO2 50 %   Blood Gas Arterial Full Panel   Result Value Ref Range    POCT pH, Arterial 7.43 (H) 7.38 - 7.42 pH    POCT pCO2, Arterial 30 (L) 38 - 42 mm Hg    POCT pO2, Arterial 211 (H) 85 - 95 mm Hg    POCT SO2, Arterial 100 94 - 100 %    POCT Oxy Hemoglobin, Arterial 97.9 94.0 - 98.0 %    POCT Hematocrit Calculated, Arterial 35.0 (L) 36.0 - 46.0 %    POCT Sodium, Arterial 141 136 - 145 mmol/L    POCT Potassium, Arterial 3.3 (L) 3.5 - 5.3 mmol/L    POCT Chloride, Arterial 112 (H) 98 - 107 mmol/L    POCT Ionized Calcium, Arterial 1.15 1.10 - 1.33 mmol/L    POCT Glucose, Arterial 178 (H) 74 - 99 mg/dL    POCT Lactate, Arterial 1.6 0.4 - 2.0 mmol/L    POCT Base Excess, Arterial -3.5 (L) -2.0 - 3.0 mmol/L    POCT HCO3 Calculated, Arterial 19.9 (L) 22.0 - 26.0 mmol/L    POCT Hemoglobin, Arterial 11.7 (L) 12.0 - 16.0 g/dL    POCT Anion Gap, Arterial 12 10 - 25 mmo/L    Patient Temperature 37.0 degrees Celsius    FiO2 50 %   Blood Gas Arterial Full Panel   Result Value Ref Range    POCT pH, Arterial 7.40 7.38 - 7.42 pH    POCT pCO2, Arterial 29 (L) 38 - 42 mm Hg    POCT pO2, Arterial 216 (H) 85 - 95 mm Hg    POCT SO2, Arterial 100 94 - 100 %    POCT Oxy Hemoglobin, Arterial 97.8 94.0 - 98.0 %    POCT Hematocrit Calculated, Arterial 34.0 (L) 36.0 - 46.0 %    POCT  Sodium, Arterial 135 (L) 136 - 145 mmol/L    POCT Potassium, Arterial 3.5 3.5 - 5.3 mmol/L    POCT Chloride, Arterial 110 (H) 98 - 107 mmol/L    POCT Ionized Calcium, Arterial 1.06 (L) 1.10 - 1.33 mmol/L    POCT Glucose, Arterial 204 (H) 74 - 99 mg/dL    POCT Lactate, Arterial 2.4 (H) 0.4 - 2.0 mmol/L    POCT Base Excess, Arterial -5.7 (L) -2.0 - 3.0 mmol/L    POCT HCO3 Calculated, Arterial 18.0 (L) 22.0 - 26.0 mmol/L    POCT Hemoglobin, Arterial 11.3 (L) 12.0 - 16.0 g/dL    POCT Anion Gap, Arterial 11 10 - 25 mmo/L    Patient Temperature 37.0 degrees Celsius    FiO2 50 %   Blood Gas Arterial Full Panel   Result Value Ref Range    POCT pH, Arterial 7.39 7.38 - 7.42 pH    POCT pCO2, Arterial 33 (L) 38 - 42 mm Hg    POCT pO2, Arterial 212 (H) 85 - 95 mm Hg    POCT SO2, Arterial 100 94 - 100 %    POCT Oxy Hemoglobin, Arterial 97.7 94.0 - 98.0 %    POCT Hematocrit Calculated, Arterial 40.0 36.0 - 46.0 %    POCT Sodium, Arterial 139 136 - 145 mmol/L    POCT Potassium, Arterial 3.3 (L) 3.5 - 5.3 mmol/L    POCT Chloride, Arterial 108 (H) 98 - 107 mmol/L    POCT Ionized Calcium, Arterial 1.12 1.10 - 1.33 mmol/L    POCT Glucose, Arterial 200 (H) 74 - 99 mg/dL    POCT Lactate, Arterial 2.6 (H) 0.4 - 2.0 mmol/L    POCT Base Excess, Arterial -4.1 (L) -2.0 - 3.0 mmol/L    POCT HCO3 Calculated, Arterial 20.0 (L) 22.0 - 26.0 mmol/L    POCT Hemoglobin, Arterial 13.4 12.0 - 16.0 g/dL    POCT Anion Gap, Arterial 14 10 - 25 mmo/L    Patient Temperature 37.0 degrees Celsius    FiO2 50 %   CBC and Auto Differential   Result Value Ref Range    WBC 11.6 (H) 4.4 - 11.3 x10*3/uL    nRBC 0.0 0.0 - 0.0 /100 WBCs    RBC 3.92 (L) 4.00 - 5.20 x10*6/uL    Hemoglobin 11.3 (L) 12.0 - 16.0 g/dL    Hematocrit 32.5 (L) 36.0 - 46.0 %    MCV 83 80 - 100 fL    MCH 28.8 26.0 - 34.0 pg    MCHC 34.8 32.0 - 36.0 g/dL    RDW 12.5 11.5 - 14.5 %    Platelets 218 150 - 450 x10*3/uL    Neutrophils % 93.4 40.0 - 80.0 %    Immature Granulocytes %, Automated 0.4 0.0  - 0.9 %    Lymphocytes % 1.9 13.0 - 44.0 %    Monocytes % 4.3 2.0 - 10.0 %    Eosinophils % 0.0 0.0 - 6.0 %    Basophils % 0.0 0.0 - 2.0 %    Neutrophils Absolute 10.80 (H) 1.20 - 7.70 x10*3/uL    Immature Granulocytes Absolute, Automated 0.05 0.00 - 0.70 x10*3/uL    Lymphocytes Absolute 0.22 (L) 1.20 - 4.80 x10*3/uL    Monocytes Absolute 0.50 0.10 - 1.00 x10*3/uL    Eosinophils Absolute 0.00 0.00 - 0.70 x10*3/uL    Basophils Absolute 0.00 0.00 - 0.10 x10*3/uL   Calcium, Ionized   Result Value Ref Range    POCT Calcium, Ionized 1.09 (L) 1.1 - 1.33 mmol/L   Renal Function Panel   Result Value Ref Range    Glucose 182 (H) 74 - 99 mg/dL    Sodium 144 136 - 145 mmol/L    Potassium 3.2 (L) 3.5 - 5.3 mmol/L    Chloride 109 (H) 98 - 107 mmol/L    Bicarbonate 19 (L) 21 - 32 mmol/L    Anion Gap 19 10 - 20 mmol/L    Urea Nitrogen 5 (L) 6 - 23 mg/dL    Creatinine 0.49 (L) 0.50 - 1.05 mg/dL    eGFR >90 >60 mL/min/1.73m*2    Calcium 8.1 (L) 8.6 - 10.6 mg/dL    Phosphorus 2.3 (L) 2.5 - 4.9 mg/dL    Albumin 4.1 3.4 - 5.0 g/dL   Magnesium   Result Value Ref Range    Magnesium 1.51 (L) 1.60 - 2.40 mg/dL   Lipid panel   Result Value Ref Range    Cholesterol 179 0 - 199 mg/dL    HDL-Cholesterol 48.1 mg/dL    Cholesterol/HDL Ratio 3.7     LDL Calculated 83 mg/dL    VLDL 48 (H) 0 - 40 mg/dL    Triglycerides 242 (H) 0 - 149 mg/dL    Non HDL Cholesterol 131 0 - 149 mg/dL   Sodium   Result Value Ref Range    Sodium 150 (H) 136 - 145 mmol/L   Osmolality   Result Value Ref Range    Osmolality, Serum 311 (H) 280 - 300 mOsm/kg         Assessment  Denae Nolasco is a 33 y.o. 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  ENT was consulted for bleeding from the right ear canal in the setting of nerve stimulation probe was placed in the ear canal intraoperatively.  Patient was found to have a right tympanic membrane perforation measuring approximately 20-30% involving the annulus in the  inferior aspect of the TM.    Recommendations  -Ofloxacin drops to the right ear canal BID for 7 days  -No other acute ENT intervention  -ENT to coordinate otology and audiology follow up outpatient    Radha Brown MD - PGY2  Otolaryngology - Head & Neck Surgery  [facility]    ENT Consult pager: [phone]  ENT Peds pager: [phone]  ENT Head & Neck Surgery Phone: [phone]  ENT subspecialty team: Devorah individual resident who wrote today's note  ENT Outpatient scheduling number: [phone]  Please Page if Urgent      I saw and evaluated the patient. I personally obtained the key and critical portions of the history and physical exam or was physically present for key and critical portions performed by the resident/fellow. I reviewed the resident/fellow's documentation and discussed the patient with the resident/fellow. I agree with the resident/fellow's medical decision making as documented in the note.  - Exam confirms resident documentation as above with right TM perforation  - Plan as above with abx drops and otology & audiology follow up   - No family present at the time of my visit but ENT is happy to review with family when they are available - ENT did update primary team regarding findings as well as nursing team    Jayleen Jewell MD

## 2024-08-06 NOTE — CARE PLAN
Problem: Pain - Adult  Goal: Verbalizes/displays adequate comfort level or baseline comfort level  Outcome: Progressing     Problem: Safety - Adult  Goal: Free from fall injury  Outcome: Progressing     Problem: Discharge Planning  Goal: Discharge to home or other facility with appropriate resources  Outcome: Progressing     Problem: Chronic Conditions and Co-morbidities  Goal: Patient's chronic conditions and co-morbidity symptoms are monitored and maintained or improved  Outcome: Progressing     Problem: Safety - Medical Restraint  Goal: Remains free of injury from restraints (Restraint for Interference with Medical Device)  Outcome: Progressing  Goal: Free from restraint(s) (Restraint for Interference with Medical Device)  Outcome: Progressing   The patient's goals for the shift include saftey    The clinical goals for the shift include Pt will remain safe and free from falls this shift

## 2024-08-06 NOTE — PROGRESS NOTES
"Denae Nolasco is a 33 y.o. female on day 3 of admission presenting with Schwannoma.    Subjective   Patient tachycardic after return from OR. Given fentanyl bolus w/ min improvement. Lactate slightly elevated, fluid bolus given with improvement in HR     Objective     Physical Exam  Intubated   OU3R  EVD in place     Last Recorded Vitals  Blood pressure 120/83, pulse 60, temperature 36.8 °C (98.2 °F), temperature source Temporal, resp. rate 16, height 1.575 m (5' 2.01\"), weight 55.3 kg (121 lb 14.6 oz), last menstrual period 07/24/2024, SpO2 100%.  Intake/Output last 3 Shifts:  I/O last 3 completed shifts:  In: 4273.7 (77.3 mL/kg) [I.V.:1623.7 (29.4 mL/kg); IV Piggyback:2650]  Out: 4910 (88.8 mL/kg) [Urine:4910 (2.5 mL/kg/hr)]  Weight: 55.3 kg     Relevant Results  Lab Results   Component Value Date    WBC 11.6 (H) 08/06/2024    HGB 11.3 (L) 08/06/2024    HCT 32.5 (L) 08/06/2024    MCV 83 08/06/2024     08/06/2024     Lab Results   Component Value Date    GLUCOSE 182 (H) 08/06/2024    CALCIUM 8.1 (L) 08/06/2024     (H) 08/06/2024    K 3.2 (L) 08/06/2024    CO2 19 (L) 08/06/2024     (H) 08/06/2024    BUN 5 (L) 08/06/2024    CREATININE 0.49 (L) 08/06/2024        This patient currently has cardiac telemetry ordered; if you would like to modify or discontinue the telemetry order, click here to go to the orders activity to modify/discontinue the order.  This patient has a central line   Reason for the central line remaining today? Hemodynamic monitoring      This patient is intubated   Reason for patient to remain intubated today? 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, CT CAP R middle lobe " calcifications c/w granulomatous disease, dilated CBD, intrahepatic dilatation (rec'd MRCP or ERCP), CTH/CT IAC done, TTE EF 57% no wall motion abnormality, no residual interatrial shunt w bubble study, 8/4 MRI IAC CISS stable, vCTH done, 8/5 s/p L retrosig for tumor resection, RF EVD, 8/6 CTH resection bed hemorrhage    NSU  Pupil checks  RF EVD (clamped, transduce ICPs)  HOB >45  Na goal >145 w/ 23% boluses PRN  Q6 Na  Strict IO   ENT recs - R ear injury  Will obtain BLE DVT US this week   CTH this morning  SCDs  Con't vanc, ceftriaxone  Dex 6q4           Jermaine Pleitez MD

## 2024-08-07 ENCOUNTER — APPOINTMENT (OUTPATIENT)
Dept: RADIOLOGY | Facility: HOSPITAL | Age: 34
DRG: 003 | End: 2024-08-07
Payer: COMMERCIAL

## 2024-08-07 LAB
ALBUMIN SERPL BCP-MCNC: 3.7 G/DL (ref 3.4–5)
ANION GAP BLDA CALCULATED.4IONS-SCNC: 11 MMO/L (ref 10–25)
ANION GAP SERPL CALC-SCNC: 14 MMOL/L (ref 10–20)
BASE EXCESS BLDA CALC-SCNC: 2.2 MMOL/L (ref -2–3)
BASOPHILS # BLD AUTO: 0.01 X10*3/UL (ref 0–0.1)
BASOPHILS NFR BLD AUTO: 0.1 %
BODY TEMPERATURE: ABNORMAL
BUN SERPL-MCNC: 7 MG/DL (ref 6–23)
CA-I BLD-SCNC: 1.05 MMOL/L (ref 1.1–1.33)
CA-I BLDA-SCNC: 1.11 MMOL/L (ref 1.1–1.33)
CALCIUM SERPL-MCNC: 7.8 MG/DL (ref 8.6–10.6)
CHLORIDE BLDA-SCNC: 105 MMOL/L (ref 98–107)
CHLORIDE SERPL-SCNC: 112 MMOL/L (ref 98–107)
CO2 SERPL-SCNC: 23 MMOL/L (ref 21–32)
CREAT SERPL-MCNC: 0.35 MG/DL (ref 0.5–1.05)
EGFRCR SERPLBLD CKD-EPI 2021: >90 ML/MIN/1.73M*2
EOSINOPHIL # BLD AUTO: 0 X10*3/UL (ref 0–0.7)
EOSINOPHIL NFR BLD AUTO: 0 %
ERYTHROCYTE [DISTWIDTH] IN BLOOD BY AUTOMATED COUNT: 13.5 % (ref 11.5–14.5)
GLUCOSE BLD MANUAL STRIP-MCNC: 144 MG/DL (ref 74–99)
GLUCOSE BLD MANUAL STRIP-MCNC: 150 MG/DL (ref 74–99)
GLUCOSE BLD MANUAL STRIP-MCNC: 150 MG/DL (ref 74–99)
GLUCOSE BLD MANUAL STRIP-MCNC: 154 MG/DL (ref 74–99)
GLUCOSE BLD MANUAL STRIP-MCNC: 162 MG/DL (ref 74–99)
GLUCOSE BLDA-MCNC: 165 MG/DL (ref 74–99)
GLUCOSE SERPL-MCNC: 176 MG/DL (ref 74–99)
HCO3 BLDA-SCNC: 26.5 MMOL/L (ref 22–26)
HCT VFR BLD AUTO: 28.7 % (ref 36–46)
HCT VFR BLD EST: 30 % (ref 36–46)
HGB BLD-MCNC: 9.5 G/DL (ref 12–16)
HGB BLDA-MCNC: 10 G/DL (ref 12–16)
IMM GRANULOCYTES # BLD AUTO: 0.13 X10*3/UL (ref 0–0.7)
IMM GRANULOCYTES NFR BLD AUTO: 0.8 % (ref 0–0.9)
INHALED O2 CONCENTRATION: 21 %
LACTATE BLDA-SCNC: 1.4 MMOL/L (ref 0.4–2)
LYMPHOCYTES # BLD AUTO: 0.23 X10*3/UL (ref 1.2–4.8)
LYMPHOCYTES NFR BLD AUTO: 1.4 %
MAGNESIUM SERPL-MCNC: 1.65 MG/DL (ref 1.6–2.4)
MCH RBC QN AUTO: 28.7 PG (ref 26–34)
MCHC RBC AUTO-ENTMCNC: 33.1 G/DL (ref 32–36)
MCV RBC AUTO: 87 FL (ref 80–100)
MONOCYTES # BLD AUTO: 0.55 X10*3/UL (ref 0.1–1)
MONOCYTES NFR BLD AUTO: 3.3 %
NEUTROPHILS # BLD AUTO: 15.62 X10*3/UL (ref 1.2–7.7)
NEUTROPHILS NFR BLD AUTO: 94.4 %
NRBC BLD-RTO: 0 /100 WBCS (ref 0–0)
OSMOLALITY SERPL: 288 MOSM/KG (ref 280–300)
OSMOLALITY SERPL: 297 MOSM/KG (ref 280–300)
OSMOLALITY SERPL: 299 MOSM/KG (ref 280–300)
OSMOLALITY SERPL: 304 MOSM/KG (ref 280–300)
OXYHGB MFR BLDA: 95.5 % (ref 94–98)
PCO2 BLDA: 39 MM HG (ref 38–42)
PH BLDA: 7.44 PH (ref 7.38–7.42)
PHOSPHATE SERPL-MCNC: 3.1 MG/DL (ref 2.5–4.9)
PLATELET # BLD AUTO: 166 X10*3/UL (ref 150–450)
PO2 BLDA: 80 MM HG (ref 85–95)
POTASSIUM BLDA-SCNC: 4.2 MMOL/L (ref 3.5–5.3)
POTASSIUM SERPL-SCNC: 3.3 MMOL/L (ref 3.5–5.3)
RBC # BLD AUTO: 3.31 X10*6/UL (ref 4–5.2)
SAO2 % BLDA: 98 % (ref 94–100)
SODIUM BLDA-SCNC: 138 MMOL/L (ref 136–145)
SODIUM SERPL-SCNC: 140 MMOL/L (ref 136–145)
SODIUM SERPL-SCNC: 144 MMOL/L (ref 136–145)
SODIUM SERPL-SCNC: 146 MMOL/L (ref 136–145)
SODIUM SERPL-SCNC: 146 MMOL/L (ref 136–145)
VANCOMYCIN SERPL-MCNC: 10.3 UG/ML (ref 5–20)
WBC # BLD AUTO: 16.5 X10*3/UL (ref 4.4–11.3)

## 2024-08-07 PROCEDURE — 94003 VENT MGMT INPAT SUBQ DAY: CPT

## 2024-08-07 PROCEDURE — 2500000005 HC RX 250 GENERAL PHARMACY W/O HCPCS

## 2024-08-07 PROCEDURE — 2500000004 HC RX 250 GENERAL PHARMACY W/ HCPCS (ALT 636 FOR OP/ED)

## 2024-08-07 PROCEDURE — 99291 CRITICAL CARE FIRST HOUR: CPT

## 2024-08-07 PROCEDURE — 2020000001 HC ICU ROOM DAILY

## 2024-08-07 PROCEDURE — 74018 RADEX ABDOMEN 1 VIEW: CPT | Performed by: RADIOLOGY

## 2024-08-07 PROCEDURE — 83930 ASSAY OF BLOOD OSMOLALITY: CPT | Performed by: REGISTERED NURSE

## 2024-08-07 PROCEDURE — 2500000002 HC RX 250 W HCPCS SELF ADMINISTERED DRUGS (ALT 637 FOR MEDICARE OP, ALT 636 FOR OP/ED)

## 2024-08-07 PROCEDURE — 84295 ASSAY OF SERUM SODIUM: CPT | Performed by: REGISTERED NURSE

## 2024-08-07 PROCEDURE — 84132 ASSAY OF SERUM POTASSIUM: CPT | Performed by: STUDENT IN AN ORGANIZED HEALTH CARE EDUCATION/TRAINING PROGRAM

## 2024-08-07 PROCEDURE — 84295 ASSAY OF SERUM SODIUM: CPT

## 2024-08-07 PROCEDURE — 43761 REPOSITION GASTROSTOMY TUBE: CPT

## 2024-08-07 PROCEDURE — 71045 X-RAY EXAM CHEST 1 VIEW: CPT | Performed by: RADIOLOGY

## 2024-08-07 PROCEDURE — 37799 UNLISTED PX VASCULAR SURGERY: CPT | Performed by: REGISTERED NURSE

## 2024-08-07 PROCEDURE — 2500000001 HC RX 250 WO HCPCS SELF ADMINISTERED DRUGS (ALT 637 FOR MEDICARE OP): Performed by: REGISTERED NURSE

## 2024-08-07 PROCEDURE — 80202 ASSAY OF VANCOMYCIN: CPT

## 2024-08-07 PROCEDURE — 82947 ASSAY GLUCOSE BLOOD QUANT: CPT

## 2024-08-07 PROCEDURE — 2500000004 HC RX 250 GENERAL PHARMACY W/ HCPCS (ALT 636 FOR OP/ED): Performed by: REGISTERED NURSE

## 2024-08-07 PROCEDURE — C9113 INJ PANTOPRAZOLE SODIUM, VIA: HCPCS | Performed by: REGISTERED NURSE

## 2024-08-07 PROCEDURE — 74018 RADEX ABDOMEN 1 VIEW: CPT

## 2024-08-07 PROCEDURE — 71045 X-RAY EXAM CHEST 1 VIEW: CPT

## 2024-08-07 PROCEDURE — 2500000004 HC RX 250 GENERAL PHARMACY W/ HCPCS (ALT 636 FOR OP/ED): Performed by: STUDENT IN AN ORGANIZED HEALTH CARE EDUCATION/TRAINING PROGRAM

## 2024-08-07 RX ORDER — DEXAMETHASONE SODIUM PHOSPHATE 10 MG/ML
6 INJECTION INTRAMUSCULAR; INTRAVENOUS EVERY 6 HOURS
Status: DISCONTINUED | OUTPATIENT
Start: 2024-08-07 | End: 2024-08-08

## 2024-08-07 RX ORDER — DEXAMETHASONE SODIUM PHOSPHATE 10 MG/ML
6 INJECTION INTRAMUSCULAR; INTRAVENOUS EVERY 4 HOURS
Status: DISCONTINUED | OUTPATIENT
Start: 2024-08-07 | End: 2024-08-07

## 2024-08-07 RX ORDER — HEPARIN SODIUM 5000 [USP'U]/ML
5000 INJECTION, SOLUTION INTRAVENOUS; SUBCUTANEOUS EVERY 8 HOURS
Status: DISCONTINUED | OUTPATIENT
Start: 2024-08-07 | End: 2024-08-14 | Stop reason: HOSPADM

## 2024-08-07 RX ORDER — HEPARIN SODIUM 5000 [USP'U]/ML
5000 INJECTION, SOLUTION INTRAVENOUS; SUBCUTANEOUS EVERY 12 HOURS
Status: DISCONTINUED | OUTPATIENT
Start: 2024-08-07 | End: 2024-08-07

## 2024-08-07 RX ORDER — ONDANSETRON HYDROCHLORIDE 2 MG/ML
4 INJECTION, SOLUTION INTRAVENOUS EVERY 4 HOURS PRN
Status: DISCONTINUED | OUTPATIENT
Start: 2024-08-07 | End: 2024-08-07

## 2024-08-07 RX ORDER — PROCHLORPERAZINE EDISYLATE 5 MG/ML
10 INJECTION INTRAMUSCULAR; INTRAVENOUS EVERY 6 HOURS PRN
Status: DISCONTINUED | OUTPATIENT
Start: 2024-08-07 | End: 2024-08-14 | Stop reason: HOSPADM

## 2024-08-07 RX ORDER — PROCHLORPERAZINE MALEATE 10 MG
10 TABLET ORAL EVERY 6 HOURS PRN
Status: DISCONTINUED | OUTPATIENT
Start: 2024-08-07 | End: 2024-08-14 | Stop reason: HOSPADM

## 2024-08-07 RX ORDER — ONDANSETRON HYDROCHLORIDE 2 MG/ML
4 INJECTION, SOLUTION INTRAVENOUS EVERY 6 HOURS
Status: DISCONTINUED | OUTPATIENT
Start: 2024-08-07 | End: 2024-08-08

## 2024-08-07 RX ORDER — POTASSIUM CHLORIDE 29.8 MG/ML
40 INJECTION INTRAVENOUS ONCE
Status: COMPLETED | OUTPATIENT
Start: 2024-08-07 | End: 2024-08-07

## 2024-08-07 RX ORDER — ONDANSETRON HYDROCHLORIDE 2 MG/ML
4 INJECTION, SOLUTION INTRAVENOUS ONCE
Status: COMPLETED | OUTPATIENT
Start: 2024-08-07 | End: 2024-08-07

## 2024-08-07 RX ORDER — POTASSIUM CHLORIDE 1.5 G/1.58G
40 POWDER, FOR SOLUTION ORAL ONCE
Status: COMPLETED | OUTPATIENT
Start: 2024-08-07 | End: 2024-08-07

## 2024-08-07 RX ORDER — ONDANSETRON HYDROCHLORIDE 2 MG/ML
INJECTION, SOLUTION INTRAVENOUS
Status: COMPLETED
Start: 2024-08-07 | End: 2024-08-07

## 2024-08-07 RX ORDER — PROCHLORPERAZINE 25 MG/1
25 SUPPOSITORY RECTAL EVERY 12 HOURS PRN
Status: DISCONTINUED | OUTPATIENT
Start: 2024-08-07 | End: 2024-08-14 | Stop reason: HOSPADM

## 2024-08-07 RX ADMIN — NIMODIPINE 60 MG: 30 SOLUTION ORAL at 02:54

## 2024-08-07 RX ADMIN — CALCIUM GLUCONATE 1 G: 20 INJECTION, SOLUTION INTRAVENOUS at 01:37

## 2024-08-07 RX ADMIN — SODIUM CHLORIDE 125 ML/HR: 9 INJECTION, SOLUTION INTRAVENOUS at 05:59

## 2024-08-07 RX ADMIN — ACETAMINOPHEN 650 MG: 325 TABLET ORAL at 05:56

## 2024-08-07 RX ADMIN — POTASSIUM CHLORIDE 40 MEQ: 1.5 POWDER, FOR SOLUTION ORAL at 01:37

## 2024-08-07 RX ADMIN — WHITE PETROLATUM 57.7 %-MINERAL OIL 31.9 % EYE OINTMENT 1 APPLICATION: at 03:25

## 2024-08-07 RX ADMIN — CARBOXYMETHYLCELLULOSE SODIUM 1 DROP: 5 SOLUTION/ DROPS OPHTHALMIC at 20:20

## 2024-08-07 RX ADMIN — ONDANSETRON 4 MG: 2 INJECTION INTRAMUSCULAR; INTRAVENOUS at 10:39

## 2024-08-07 RX ADMIN — NIMODIPINE 60 MG: 30 SOLUTION ORAL at 17:58

## 2024-08-07 RX ADMIN — DEXAMETHASONE SODIUM PHOSPHATE 6 MG: 10 INJECTION INTRAMUSCULAR; INTRAVENOUS at 06:02

## 2024-08-07 RX ADMIN — POTASSIUM CHLORIDE 40 MEQ: 29.8 INJECTION, SOLUTION INTRAVENOUS at 03:06

## 2024-08-07 RX ADMIN — CEFTRIAXONE SODIUM 2 G: 2 INJECTION, SOLUTION INTRAVENOUS at 15:44

## 2024-08-07 RX ADMIN — DEXAMETHASONE SODIUM PHOSPHATE 6 MG: 10 INJECTION INTRAMUSCULAR; INTRAVENOUS at 20:22

## 2024-08-07 RX ADMIN — INSULIN LISPRO 1 UNITS: 100 INJECTION, SOLUTION INTRAVENOUS; SUBCUTANEOUS at 09:05

## 2024-08-07 RX ADMIN — NIMODIPINE 60 MG: 30 SOLUTION ORAL at 13:00

## 2024-08-07 RX ADMIN — VANCOMYCIN HYDROCHLORIDE 1.75 G: 5 INJECTION, POWDER, LYOPHILIZED, FOR SOLUTION INTRAVENOUS at 10:19

## 2024-08-07 RX ADMIN — OXYCODONE HYDROCHLORIDE 2.5 MG: 5 TABLET ORAL at 12:59

## 2024-08-07 RX ADMIN — POLYETHYLENE GLYCOL 3350 17 G: 17 POWDER, FOR SOLUTION ORAL at 09:08

## 2024-08-07 RX ADMIN — PROCHLORPERAZINE EDISYLATE 10 MG: 5 INJECTION INTRAMUSCULAR; INTRAVENOUS at 15:44

## 2024-08-07 RX ADMIN — HEPARIN SODIUM 5000 UNITS: 5000 INJECTION INTRAVENOUS; SUBCUTANEOUS at 02:54

## 2024-08-07 RX ADMIN — NIMODIPINE 60 MG: 30 SOLUTION ORAL at 05:57

## 2024-08-07 RX ADMIN — OFLOXACIN OTIC 5 DROP: 3 SOLUTION AURICULAR (OTIC) at 09:07

## 2024-08-07 RX ADMIN — WHITE PETROLATUM 57.7 %-MINERAL OIL 31.9 % EYE OINTMENT 1 APPLICATION: at 09:07

## 2024-08-07 RX ADMIN — ONDANSETRON HYDROCHLORIDE 4 MG: 2 INJECTION, SOLUTION INTRAVENOUS at 10:39

## 2024-08-07 RX ADMIN — ONDANSETRON 4 MG: 2 INJECTION INTRAMUSCULAR; INTRAVENOUS at 19:45

## 2024-08-07 RX ADMIN — VANCOMYCIN HYDROCHLORIDE 1.75 G: 5 INJECTION, POWDER, LYOPHILIZED, FOR SOLUTION INTRAVENOUS at 20:23

## 2024-08-07 RX ADMIN — PANTOPRAZOLE SODIUM 40 MG: 40 INJECTION, POWDER, FOR SOLUTION INTRAVENOUS at 09:05

## 2024-08-07 RX ADMIN — DEXAMETHASONE SODIUM PHOSPHATE 6 MG: 10 INJECTION INTRAMUSCULAR; INTRAVENOUS at 09:19

## 2024-08-07 RX ADMIN — WHITE PETROLATUM 57.7 %-MINERAL OIL 31.9 % EYE OINTMENT 1 APPLICATION: at 20:20

## 2024-08-07 RX ADMIN — DEXAMETHASONE SODIUM PHOSPHATE 6 MG: 10 INJECTION INTRAMUSCULAR; INTRAVENOUS at 15:43

## 2024-08-07 RX ADMIN — CARBOXYMETHYLCELLULOSE SODIUM 1 DROP: 5 SOLUTION/ DROPS OPHTHALMIC at 16:13

## 2024-08-07 RX ADMIN — HEPARIN SODIUM 5000 UNITS: 5000 INJECTION INTRAVENOUS; SUBCUTANEOUS at 10:19

## 2024-08-07 RX ADMIN — HEPARIN SODIUM 5000 UNITS: 5000 INJECTION INTRAVENOUS; SUBCUTANEOUS at 18:01

## 2024-08-07 RX ADMIN — WHITE PETROLATUM 57.7 %-MINERAL OIL 31.9 % EYE OINTMENT 1 APPLICATION: at 13:00

## 2024-08-07 RX ADMIN — OFLOXACIN OTIC 5 DROP: 3 SOLUTION AURICULAR (OTIC) at 20:23

## 2024-08-07 RX ADMIN — SENNOSIDES AND DOCUSATE SODIUM 2 TABLET: 50; 8.6 TABLET ORAL at 09:03

## 2024-08-07 RX ADMIN — CARBOXYMETHYLCELLULOSE SODIUM 1 DROP: 5 SOLUTION/ DROPS OPHTHALMIC at 13:00

## 2024-08-07 RX ADMIN — SODIUM CHLORIDE 75 ML/HR: 9 INJECTION, SOLUTION INTRAVENOUS at 18:14

## 2024-08-07 RX ADMIN — NIMODIPINE 60 MG: 30 SOLUTION ORAL at 09:19

## 2024-08-07 RX ADMIN — CEFTRIAXONE SODIUM 2 G: 2 INJECTION, SOLUTION INTRAVENOUS at 03:19

## 2024-08-07 RX ADMIN — CARBOXYMETHYLCELLULOSE SODIUM 1 DROP: 5 SOLUTION/ DROPS OPHTHALMIC at 09:06

## 2024-08-07 RX ADMIN — Medication 5 MG: at 15:21

## 2024-08-07 RX ADMIN — MAGNESIUM SULFATE HEPTAHYDRATE 4 G: 40 INJECTION, SOLUTION INTRAVENOUS at 01:37

## 2024-08-07 RX ADMIN — ONDANSETRON 4 MG: 2 INJECTION INTRAMUSCULAR; INTRAVENOUS at 14:12

## 2024-08-07 RX ADMIN — WHITE PETROLATUM 57.7 %-MINERAL OIL 31.9 % EYE OINTMENT 1 APPLICATION: at 16:13

## 2024-08-07 RX ADMIN — DEXAMETHASONE SODIUM PHOSPHATE 6 MG: 10 INJECTION INTRAMUSCULAR; INTRAVENOUS at 02:54

## 2024-08-07 RX ADMIN — Medication 2 L/MIN: at 09:41

## 2024-08-07 RX ADMIN — ESCITALOPRAM 5 MG: 5 TABLET, FILM COATED ORAL at 09:05

## 2024-08-07 RX ADMIN — CARBOXYMETHYLCELLULOSE SODIUM 1 DROP: 5 SOLUTION/ DROPS OPHTHALMIC at 03:25

## 2024-08-07 ASSESSMENT — PAIN - FUNCTIONAL ASSESSMENT
PAIN_FUNCTIONAL_ASSESSMENT: 0-10
PAIN_FUNCTIONAL_ASSESSMENT: CPOT (CRITICAL CARE PAIN OBSERVATION TOOL)
PAIN_FUNCTIONAL_ASSESSMENT: CPOT (CRITICAL CARE PAIN OBSERVATION TOOL)
PAIN_FUNCTIONAL_ASSESSMENT: 0-10

## 2024-08-07 ASSESSMENT — PAIN SCALES - GENERAL
PAINLEVEL_OUTOF10: 0 - NO PAIN
PAINLEVEL_OUTOF10: 5 - MODERATE PAIN

## 2024-08-07 ASSESSMENT — PAIN DESCRIPTION - LOCATION: LOCATION: HEAD

## 2024-08-07 NOTE — PROGRESS NOTES
Speech-Language Pathology                 Therapy Communication Note    Patient Name: Dneae Nolasco  MRN: [id number]  Today's Date: 8/7/2024     Discipline: Speech Language Pathology    Missed Visit Reason:  Pt too lethargic and nauseated to participate in a clinical swallow evaluation. Discuss with medical team to switch out NG with dobhoff to minimize affect of short term means of nutrition on swallowing.     Missed Time: Attempt

## 2024-08-07 NOTE — SIGNIFICANT EVENT
08/07/24 0910   Readings   Resp (!) 9   Daily Screen   Total RSBI 23   Weaning Parameters   Weaning Start Time 0700   Weaning Vital Capacity 980 mL   Negative Inspiratory Force (NIF) -33   Spontaneous Minute Volume (MV) 3.69   Weaning Tidal Volume 422 mL   Respiratory Depth/Rhythm Regular   Respiratory Effort Unlabored     + cuffleak

## 2024-08-07 NOTE — CARE PLAN
Problem: Pain - Adult  Goal: Verbalizes/displays adequate comfort level or baseline comfort level  Outcome: Progressing     Problem: Safety - Adult  Goal: Free from fall injury  Outcome: Progressing     Problem: Discharge Planning  Goal: Discharge to home or other facility with appropriate resources  Outcome: Progressing     Problem: Chronic Conditions and Co-morbidities  Goal: Patient's chronic conditions and co-morbidity symptoms are monitored and maintained or improved  Outcome: Progressing     Problem: Safety - Medical Restraint  Goal: Remains free of injury from restraints (Restraint for Interference with Medical Device)  Outcome: Progressing  Goal: Free from restraint(s) (Restraint for Interference with Medical Device)  Outcome: Progressing

## 2024-08-07 NOTE — CARE PLAN
Problem: Pain - Adult  Goal: Verbalizes/displays adequate comfort level or baseline comfort level  Outcome: Progressing     Problem: Safety - Adult  Goal: Free from fall injury  Outcome: Met     Problem: Discharge Planning  Goal: Discharge to home or other facility with appropriate resources  Outcome: Progressing     Problem: Safety - Medical Restraint  Goal: Remains free of injury from restraints (Restraint for Interference with Medical Device)  Outcome: Met  Goal: Free from restraint(s) (Restraint for Interference with Medical Device)  Outcome: Met     Problem: Skin  Goal: Prevent/manage excess moisture  Outcome: Progressing  Goal: Prevent/minimize sheer/friction injuries  Outcome: Progressing  Goal: Promote/optimize nutrition  Outcome: Progressing  Goal: Promote skin healing  Outcome: Progressing     Problem: Respiratory  Goal: Clear secretions with interventions this shift  Outcome: Progressing  Goal: Minimize anxiety/maximize coping throughout shift  Outcome: Progressing  Goal: Patent airway maintained this shift  Outcome: Met  Goal: Increase self care and/or family involvement in next 24 hours  Outcome: Progressing

## 2024-08-07 NOTE — PROGRESS NOTES
[facility]  NEUROSCIENCE INTENSIVE CARE UNIT  DAILY PROGRESS NOTE       Patient Name: Denae Nolasco   MRN: [id number]     Admit Date: 8/3/2024     : 1990 AGE: 33 y.o. GENDER: female        Subjective    Denae Nolasco is a 33 y.o. female with a past medical history of raynaud's and depression who presented to the [facility] ED on 8/3/2024 who worsening vertigo and ataxia. 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 Patient admitted to neurosurgery service for operative management.     : s/p L retrosigmoid craniotomy for tumor resection with RF EVD placement      Interval Events: Admitted to NSU post-operative     Objective   VITALS (24H):  Temp:  [35.8 °C (96.4 °F)-36.2 °C (97.2 °F)] 35.9 °C (96.6 °F)  Heart Rate:  [] 93  Resp:  [10-18] 12  BP: ()/(59-81) 119/71  Arterial Line BP 1: ()/(46-85) 131/61  FiO2 (%):  [30 %] 30 %  INTAKE/OUTPUT:  Intake/Output Summary (Last 24 hours) at 2024 0915  Last data filed at 2024 0706  Gross per 24 hour   Intake 3621.95 ml   Output 1615 ml   Net 2006.95 ml     VENT SETTINGS:  Vent Mode: Pressure support  FiO2 (%):  [30 %] 30 %  S RR:  [12] 12  S VT:  [350 mL] 350 mL  PEEP/CPAP (cm H2O):  [5 cm H20] 5 cm H20  WV SUP:  [5 cm H20-8 cm H20] 5 cm H20  MAP (cm H2O):  [7-7.3] 7.3     PHYSICAL EXAM:  NEURO:  - 3mm reactive to light  - EOV  - L HB4  - L side 4/5  - RUE prox 2/5, distal 4-/5  - RLE 4/5  - RF EVD clamped  CV:  - RRR on telemetry, NSR  - Right radial arterial line in place  RESP:  - Intubated, regular, unlabored  - Vent: AC  :  - Indwelling catheter in place  GI:  - Abdomen NT/ND, soft  SKIN:  - Surgical incision clean/dry/Intact    MEDICATIONS:  Scheduled: PRN: Continuous:   acetaminophen, 650 mg, nasogastric tube, q6h ENOCH  cefTRIAXone, 2 g, intravenous, q12h  dexAMETHasone, 6 mg, intravenous, q4h  escitalopram, 5 mg, nasogastric tube, Daily  pantoprazole, 40 mg,  oral, Daily before breakfast   Or  esomeprazole, 40 mg, nasogastric tube, Daily before breakfast   Or  pantoprazole, 40 mg, intravenous, Daily before breakfast  heparin (porcine), 5,000 Units, subcutaneous, q8h  insulin lispro, 0-5 Units, subcutaneous, TID  lubricating eye drops, 1 drop, Left Eye, q4h  niMODipine, 60 mg, nasogastric tube, q4h ENOCH  ofloxacin, 5 drop, Right Ear, BID  perflutren protein A microsphere, 0.5 mL, intravenous, Once in imaging  polyethylene glycol, 17 g, nasogastric tube, Daily  scopolamine, 1 patch, transdermal, q72h  sennosides-docusate sodium, 2 tablet, nasogastric tube, BID  vancomycin (Vancocin) 1.75 g in sodium chloride 0.9% 250 mL IV, 1,750 mg, intravenous, q12h  white petrolatum-mineral oiL, 1 Application, Left Eye, q4h     PRN medications: calcium gluconate, calcium gluconate, dextrose, dextrose, glucagon, glucagon, hydrALAZINE, labetaloL, magnesium sulfate, magnesium sulfate, naloxone, oxyCODONE, oxygen, potassium chloride CR **OR** potassium chloride, potassium chloride CR **OR** potassium chloride, potassium chloride, vancomycin dexmedeTOMIDine, 0.2-1.5 mcg/kg/hr, Last Rate: 0.1 mcg/kg/hr (08/07/24 0225)  fentaNYL,  mcg/hr, Last Rate: Stopped (08/07/24 0658)  niCARdipine, 2.5-15 mg/hr  norepinephrine, 0.01-1 mcg/kg/min, Last Rate: Stopped (08/06/24 1216)  sodium chloride 0.9%, 125 mL/hr, Last Rate: 125 mL/hr (08/07/24 0559)         LAB RESULTS:      IMAGING RESULTS:  MR brain w and wo IV contrast   Final Result   1. Postsurgical changes from left retrosigmoid craniectomy for   cerebellopontine angle mass resection. Filling defect within the left   transverse and sigmoid sinuses compatible with dural venous sinus   thrombosis.   2. Persistent similar mass effect within the posterior fossa with   effacement of the 4th ventricle and inferior displacement of the   cerebellar tonsils through the foramen magnum.   3. Right frontal approach ventriculostomy catheter in unchanged    position. Unchanged right frontal subdural and intraparenchymal   hematoma along the catheter tract.        I personally reviewed the images/study and I agree with Patrica Givens DO's (radiology resident) findings as stated. This study   was interpreted at [place], [place].        MACRO:   Patrica Givens discussed the significance and urgency of this   critical finding by telephone with  Nicole Davila on 8/6/2024 at 11:12   pm.  (**-RCF-**) Findings:  See findings.             Signed by: Nikolay Bailey 8/7/2024 7:36 AM   Dictation workstation:   [id number]      CT head wo IV contrast   Final Result   1. Postsurgical change of left retrosigmoid craniectomy with   cranioplasty for left vestibular schwannoma resection with persistent   mass effect within the posterior fossa with effacement of the 4th   ventricle and slight inferior displacement of the cerebellar tonsils   into the foramen magnum.   2. Slight interval increase in a extra-axial fluid collection   immediately deep to the craniotomy defect measuring 6 mm. Otherwise,   stable appearance of postoperative blood products scattered within   the posterior fossa adjacent to the resection cavity as well as an   additional subdural hematoma along the left tentorial leaflet.   3. Right frontal approach ventriculostomy shunt catheter unchanged in   positioning when compared to prior exam. Persistent overlying   subdural hematoma measuring up to 0.4 cm along the right frontal lobe.             I personally reviewed the images/study, and I agree with the findings   as stated above. This study was interpreted at [place], [place].        MACRO:   None        Signed by: Madi Parham 8/6/2024 10:23 AM   Dictation workstation:   [id number]      XR abdomen 1 view   Final Result   1. Enteric tube tip projects over the expected body of stomach.   2. Nonobstructive  nonspecific bowel-gas pattern.             I personally reviewed the images/study and I agree with Dr. Kiran Hernandez findings as stated. This study was interpreted at [place], [place]        Signed by: Scooter Aldrich 8/6/2024 6:45 PM   Dictation workstation:   [id number]      XR chest 1 view   Final Result   1.  No evidence of acute cardiopulmonary process.   2. Similarly positioned medical devices as above.        I personally reviewed the images/study and I agree with Dr. Kiran Hernandez findings as stated. This study was interpreted at [place], [place]        MACRO:   None        Signed by: Scooter Aldrich 8/6/2024 6:44 PM   Dictation workstation:   [id number]      CT head wo IV contrast   Final Result   1. New extensive postoperative changes from left retrosigmoid   craniectomy with cranioplasty for mass resection. There is persistent   mass effect within the posterior fossa with effacement of the 4th   ventricle.   2. Right frontal approach ventriculostomy shunt with catheter tip   terminating in the foramen of Monro. There has been some interval   decrease in the caliber of the left lateral and 3rd ventricles   compared with the previous exam.   3. New small extra-axial hematoma near the right frontal arcelia hole.   There is either artifact versus small subdural hematoma along the   left cerebral convexity.        I personally reviewed the images/study and I agree with Dr. Kiran Hernandez findings as stated.        MACRO:   None        Signed by: Gi Cohen 8/6/2024 7:03 AM   Dictation workstation:   [id number]      XR chest 1 view   Final Result   1.  No evidence of acute cardiopulmonary process.   2. Medical devices as above.                  MACRO:   None        Signed by: Keshav Treadwell 8/5/2024 3:47 PM   Dictation workstation:   [id number]      CT head wo IV contrast volumetric surgical planning    Final Result   1. Preoperative volumetric CT imaging obtained. Mass within the left   cerebellopontine angle better characterized on same day MRI IAC. Mild   surrounding vasogenic edema.   2. Similar asymmetric effacement of the right basal cisterns and 4th   ventricle with mild upstream ventricular prominence. Mild asymmetry   of the lateral ventricles.        I personally reviewed the image(s)/study and resident interpretation   as stated by Dr. Delilah Rader MD. I agree with the findings as   stated. This study was interpreted at [facility], [place], [place].        MACRO:   None        Signed by: Scooter Vinson 8/5/2024 6:22 AM   Dictation workstation:   [id number]      XR chest 1 view   Final Result   1. No evidence of acute cardiopulmonary process.        Signed by: Cliff Juares 8/4/2024 2:11 PM   Dictation workstation:   [id number]      MR IAC w and wo IV contrast   Final Result   1. There is a heterogeneously enhancing mass within the left   cerebellar pontine angle cistern and internal auditory canal   measuring 3.5 x 2.9 x 3.2 cm. There is mild thickening of the   adjacent tentorium measuring 0.2 cm in thickness. Differential   considerations include a vestibular schwannoma or possibly a   meningioma. There is marked mass effect upon the leticia, brachium   pontis and cerebellum. There is near-complete effacement of the 4th   ventricle. There is dilatation of the lateral and 3rd ventricles,   similar to the prior exams.        MACRO:   None        Signed by: Suzy Martinez 8/4/2024 9:24 AM   Dictation workstation:   [id number]      Transthoracic Echo (TTE) Complete   Final Result      XR chest 1 view   Final Result   No evidence of acute intrathoracic abnormality.        Signed by: Aneesh Bhatti 8/3/2024 1:08 PM   Dictation workstation:   [id number]      CT internal auditory canals posterior fossa wo IV contrast   Final Result   There is again evidence of a large  extra-axial mass centered within   left cerebellopontine angle cistern which is better defined on the   prior MRI with and without gadolinium dated 08/02/2024 when compared   with this noncontrast CT study. There is again evidence of mass   effect upon the adjacent brainstem, left cerebellar peduncle, and   cerebellum. There is hypodensity within the surrounding brain   parenchymal compatible with surrounding brain parenchymal edema.   There is again evidence of near-complete effacement of the 4th   ventricle. No abnormal calcification is noted associated with the   lesion. There is minimal asymmetric widening of the left internal   auditory canal when compared with the right.        There is again evidence of mild ventriculomegaly involving the   lateral ventricles and 3rd ventricle similar when compared with the   prior MRI without significant effacement of surrounding sulci of the   cerebral hemispheres.        Mild nonspecific white matter changes are again noted within cerebral   hemispheres bilaterally. While nonspecific, white matter changes can   be seen with small-vessel ischemic change or demyelinating processes   among others.        MACRO:   None.        Signed by: Kyle Zamora 8/3/2024 11:48 AM   Dictation workstation:   [id number]      CT head wo IV contrast   Final Result   There is again evidence of a large extra-axial mass centered within   left cerebellopontine angle cistern which is better defined on the   prior MRI with and without gadolinium dated 08/02/2024 when compared   with this noncontrast CT study. There is again evidence of mass   effect upon the adjacent brainstem, left cerebellar peduncle, and   cerebellum. There is hypodensity within the surrounding brain   parenchymal compatible with surrounding brain parenchymal edema.   There is again evidence of near-complete effacement of the 4th   ventricle. No abnormal calcification is noted associated with the   lesion. There is minimal  asymmetric widening of the left internal   auditory canal when compared with the right.        There is again evidence of mild ventriculomegaly involving the   lateral ventricles and 3rd ventricle similar when compared with the   prior MRI without significant effacement of surrounding sulci of the   cerebral hemispheres.        Mild nonspecific white matter changes are again noted within cerebral   hemispheres bilaterally. While nonspecific, white matter changes can   be seen with small-vessel ischemic change or demyelinating processes   among others.        MACRO:   None.        Signed by: Kyle Zamora 8/3/2024 11:48 AM   Dictation workstation:   [id number]      CT chest abdomen pelvis w IV contrast   Final Result   1. Dilated common bile duct measuring to 1.1 cm with gradual   transition to normal caliber and associated mild intrahepatic biliary   dilation. No evidence of radiopaque choledocholithiasis, radiopaque   cholelithiasis, pancreatic duct dilation, or abnormal focal   pancreatic enhancement. Findings can be secondary to chronic   periampullary stricture, however neoplasm given patient's history can   not be excluded. Nonemergent MRCP and/or ERCP is recommended for   further assessment.   2. Heterogeneous cervix with hypodense areas, which can be secondary   to nabothian cysts. Nonemergent pelvic ultrasound can be obtained for   further assessment as per clinical discretion.   3. Subpleural nodular opacity within the right middle lobe with   internal calcifications, as above. Additional calcified granulomas   and calcified right hilar lymph nodes, compatible with remote   granulomatous disease.   4. Additional findings as discussed above.        I personally reviewed the images/study and I agree with the findings   as stated by Goyo Hendricks MD (Radiology Resident).   This study was interpreted at [facility], [place], [place].        MACRO:   None.         Signed by: Griselda Gamez 8/3/2024 12:40 PM   Dictation workstation:   [id number]      XR chest 1 view    (Results Pending)         Assessment/Plan      33 year-old female with past medical history of depression and visual impairment who presented to [facility] ED on 8/2/24 with complaints of hearing loss, vertigo, ataxia (since 2020), headache, dizziness, and muscle spasms. Patient transferred to [facility] for concern for intracranial mass concerning for schwannoma vs. meningioma seen on outpatient imaging. 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 Patient admitted to NSU s/p L retrosigmoid craniotomy for tumor resection.     NEURO:  #s/p L retrosigmoid craniotomy for tumor resection w RF EVD placement  Assessment:  - See physical exam  - RF EVD clamped per NSGY  - Na 144<-146<-146 8/7 0600  - 75mL/hr NS  Plan:  - NSU  - Neuro Checks: Q1H pupil checks  - Keep RF EVD clamped  - HOB >45 degrees  - Vanc/CTX  - -140  - q6 Na, Goal Na>145, boluses as needed  - Nimodipine 60q4  - Dex 6q6  - Precedex and fent, weaning propofol  - Pain: acetaminophen Q4H and oxycodone Q6H  - Nausea: ondansetron  - PT/OT/SLP  - ABG (repeat lactate)  - Keep net even IOs    CARDIOVASCULAR:  #no active issues  Assessment:  - ECHO: EF 57%    Plan:  - Continue to monitor on telemetry  - -140    --> PRN: Labetalol, Hydralazine, and Nicardipine     RESPIRATORY:  #Respiratory insufficiency 2/2 craniotomy unable to extubate post-op  Assessment:  - Extubated today  - Chest x-ray- low concern for pneumonia  Plan:  - Continuous pulse oximetry   - O2 PRN to maintain SpO2 > 94%, wean as tolerated    RENAL/:  #no active issues  Assessment:  Results from last 72 hours   Lab Units 08/06/24  2326 08/06/24  0216   BUN mg/dL 7 5*   CREATININE mg/dL 0.35* 0.49*       Plan:  - Monitor with daily RFP  - Maintain velazquez catheter for: critically ill patient who need accurate urinary output  measurements    FEN/GI:  #no active issues  Assessment:  - Last BM: PTA  - Has NG  Plan:  - Monitor and replace electrolytes per protocol  - Have nutrition see  - IVF: NS @ 75 mL/hr  - Diet: NPO   - Bowel Regimen: Docusate-Senna BID and Miralax QD    ENDOCRINE:  Assessment:  Results from last 7 days   Lab Units 24  0813 24  2326 24  1948 24  1658 24  1203 24  0216   POCT GLUCOSE mg/dL 162*  --  142* 153* 166*  --    GLUCOSE mg/dL  --  176*  --   --   --  182*      Plan:  - Accuchecks & ISS     HEMATOLOGY:  #no active issues  Assessment:  - Baseline Hgb: 12.8  - Baseline Plts: 237  Results from last 7 days   Lab Units 24  0216   HEMOGLOBIN g/dL 9.5* 11.3*   HEMATOCRIT % 28.7* 32.5*   PLATELETS AUTO x10*3/uL 166 218     Plan:  - Continue to monitor with daily CBC and Coag panel    INFECTIOUS DISEASE:  #no active issues  Assessment:  Results from last 7 days   Lab Units 24  23224  0216   WBC AUTO x10*3/uL 16.5* 11.6*    - Temp (24hrs), Av.9 °C (96.7 °F), Min:35.8 °C (96.4 °F), Max:36.2 °C (97.2 °F)     Plan:  - Continue to monitor for s/sx of infection  - Pan culture for temperature > 38.4 C    MUSCULOSKELETAL:  - No acute issues    ENT:  - Right tympanic membrane perforation     - ENT oxacillin drops BID for 7 days    SKIN:  - No acute issues  - Turns and skin care per NSU protocol    ACCESS:  - PIVs  - Right radial arterial line (--)  - R subclavian CVC (--)    PROPHYLAXIS:  - DVT Ppx: SCDs  - GI Ppx: Pantoprazole    RESTRAINTS:  I agree with nursing assessment of the patient's need for restraints to protect the patient from injury and facilitate healing. The patient is unable to cooperate with the plan of care and at risk for disrupting critical therapy (i.e., removing medical devices, lines, tubes and/or dressings).  Please see order for specifics. Restraints can be removed when the patient is able to cooperate with plan of care and  allow healing to occur, or the medical devices at risk are discontinued by the medical team.     Kyle Ma MD  Neuroscience Intensive Care       Plan of care to be discussed with neurocritical care attending

## 2024-08-07 NOTE — PROGRESS NOTES
"Denae Nolasco is a 33 y.o. female on day 4 of admission presenting with Schwannoma.    Subjective   NAEO    Objective     Physical Exam  Intubated   EOV  OU3R  L HB4  L 4/5  RUE prox 2 dis 4-  RLE 4/5    Last Recorded Vitals  Blood pressure 105/60, pulse 71, temperature 36 °C (96.8 °F), temperature source Temporal, resp. rate 12, height 1.575 m (5' 2.01\"), weight 59.7 kg (131 lb 9.8 oz), last menstrual period 07/24/2024, SpO2 100%.  Intake/Output last 3 Shifts:  I/O last 3 completed shifts:  In: 6065.8 (109.7 mL/kg) [I.V.:2915.8 (52.7 mL/kg); IV Piggyback:3150]  Out: 5415 (97.9 mL/kg) [Urine:5415 (2.7 mL/kg/hr)]  Weight: 55.3 kg     Relevant Results  Lab Results   Component Value Date    WBC 16.5 (H) 08/06/2024    HGB 9.5 (L) 08/06/2024    HCT 28.7 (L) 08/06/2024    MCV 87 08/06/2024     08/06/2024     Lab Results   Component Value Date    GLUCOSE 176 (H) 08/06/2024    CALCIUM 7.8 (L) 08/06/2024     (H) 08/06/2024     (H) 08/06/2024    K 3.3 (L) 08/06/2024    CO2 23 08/06/2024     (H) 08/06/2024    BUN 7 08/06/2024    CREATININE 0.35 (L) 08/06/2024        This patient currently has cardiac telemetry ordered; if you would like to modify or discontinue the telemetry order, click here to go to the orders activity to modify/discontinue the order.  This patient has a central line   Reason for the central line remaining today? Hemodynamic monitoring      This patient is intubated   Reason for patient to remain intubated today? 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, CT CAP R middle lobe calcifications c/w granulomatous disease, dilated CBD, intrahepatic dilatation (rec'd MRCP or ERCP), " CTH/CT IAC done, TTE EF 57% no wall motion abnormality, no residual interatrial shunt w bubble study, 8/4 MRI IAC CISS stable, vCTH done, 8/5 s/p L retrosig for tumor resection, RF EVD, 8/6 CTH resection bed hemorrhage     8/6 MRI brain L transverse sigmoid sinus thrombosis, GTR    NSU  WTE in AM   RF EVD (clamped, transduce ICPs)  HOB >45  Na goal >145 w/ 23% boluses PRN  Q6 Na  Strict IO   Dex 6q4  ENT recs - R ear injury  Will obtain BLE DVT US this week   SCDs, SQH  Con't vanc, ceftriaxone         Nicole Davila MD

## 2024-08-07 NOTE — PROGRESS NOTES
Social Work Discharge Planning note:    -Patient discussed during interdisciplinary rounds.   -Team members present: Fellow, PT and OT, and SW  -Plan per medical team: Pt is not medically ready for discharge.   -Payer: MMO  -Status: Inpatient  -Discharge disposition: TBD - SW will follow for PT and OT recommendations, and to assist with discharge planning.   -Support to Pt/family: Pt is currently intubated and sedated so SW met with Pt's , Cristóbal, to offer support and to further discuss discharge planning. Cristóbal reported that he and Pt live in a two story house with no step to get in and, if needed, they would be able to do a first floor set up. Cristóbal reported that Pt was fully independent with her ADL's at baseline. Cristóbal anticipates that Pt may need rehab placement and he plans to follow PT and OT discharge level of care recommendations. Whether Pt returns home at discharge or after a rehab placement, Cristóbal reported that he will be able to take FMLA and can provide up to 24 hour assistance in the home if needed. SW will follow to assist with FMLA. Cristóbal reported there to be no other issues or concerns at this time. SW will continue to follow for emotional/incidental support to Pt/family.    -Potential Barriers: none  -Anticipated Date of Discharge:  8/13/24    SOLEDAD [place], JULIANNAW

## 2024-08-07 NOTE — CARE PLAN
Interprofessional Rounds    Summary:  schwannoma resection, extubated this morning. Trending sodiums, continue ICU status,  involved in care.     Participants: Advance Practice Provider, Dietitian, Ethicist, Physical Therapist, Physician, RN, and     Care Plan Reviewed with:  Interdisciplinary Team

## 2024-08-07 NOTE — PROGRESS NOTES
Vancomycin Dosing by Pharmacy- FOLLOW UP    Denae Nolasco is a 33 y.o. year old female who Pharmacy has been consulted for vancomycin dosing for other (surgical prophylaxis) . Based on the patient's indication and renal status this patient is being dosed based on a goal AUC of 400-600.     Renal function is currently stable.    Current vancomycin dose: 1250 mg given every 12 hours    Estimated vancomycin AUC on current dose: 367 mg/L.hr     Visit Vitals  /71   Pulse 93   Temp 35.9 °C (96.6 °F) (Temporal)   Resp 12        Lab Results   Component Value Date    CREATININE 0.35 (L) 2024    CREATININE 0.49 (L) 2024    CREATININE 0.57 2024    CREATININE 0.46 (L) 2024        Patient weight is as follows:   Vitals:    24 0000   Weight: 59.7 kg (131 lb 9.8 oz)       Cultures:  No results found for the encounter in last 14 days.       I/O last 3 completed shifts:  In: 6755.5 (113.2 mL/kg) [I.V.:3810.5 (63.8 mL/kg); NG/GT:270; IV Piggyback:2675]  Out: 5525 (92.5 mL/kg) [Urine:5525 (2.6 mL/kg/hr)]  Weight: 59.7 kg   I/O during current shift:  I/O this shift:  In: 128.8 [I.V.:128.8]  Out: 150 [Urine:150]    Temp (24hrs), Av °C (96.8 °F), Min:35.8 °C (96.4 °F), Max:36.2 °C (97.2 °F)      Assessment/Plan    Below goal AUC. Orders placed for new vancomcyin regimen of 1750 every 12 hours to begin at 1100.     This dosing regimen is predicted by [employer] to result in the following pharmacokinetic parameters:  Loading dose: N/A  Regimen: 1750 mg IV every 12 hours.  Start time: 11:00 on 2024  Exposure target: AUC24 (range)400-600 mg/L.hr   AUC24,ss: 514 mg/L.hr  Probability of AUC24 > 400: 93 %  Ctrough,ss: 10.9 mg/L  Probability of Ctrough,ss > 20: 0 %  Probability of nephrotoxicity (Lodise AQUILES ): 6 %    The next level will be obtained on 8 at AM. May be obtained sooner if clinically indicated.   Will continue to monitor renal function daily while on vancomycin and order serum  creatinine at least every 48 hours if not already ordered.  Follow for continued vancomycin needs, clinical response, and signs/symptoms of toxicity.       TEDDY NELSON, PharmD

## 2024-08-07 NOTE — PROCEDURES
Patient requiring small bore feeding tube placement for medication and nutrition administration. All labs and images examined for appropriateness of tube placement. Procedure explained to the patient and/or family.    Patient positioned and Cortrak tubing prepped per device protocol. Tubing inserted into the left nare and, using Cortrak electromagnetic sensing device and tubing, was advanced per imaging guidance into gastric antrum and advanced post-pyloric. Tube is bridled at 78 cm at the nares. Chest x-ray ordered to confirm placement.

## 2024-08-08 ENCOUNTER — APPOINTMENT (OUTPATIENT)
Dept: VASCULAR MEDICINE | Facility: HOSPITAL | Age: 34
DRG: 003 | End: 2024-08-08
Payer: COMMERCIAL

## 2024-08-08 LAB
ALBUMIN SERPL BCP-MCNC: 3.9 G/DL (ref 3.4–5)
ANION GAP BLDA CALCULATED.4IONS-SCNC: ABNORMAL MMOL/L
ANION GAP SERPL CALC-SCNC: 10 MMOL/L (ref 10–20)
ATRIAL RATE: 74 BPM
BASE EXCESS BLDA CALC-SCNC: 2 MMOL/L (ref -2–3)
BASOPHILS # BLD AUTO: 0.01 X10*3/UL (ref 0–0.1)
BASOPHILS NFR BLD AUTO: 0.1 %
BODY TEMPERATURE: ABNORMAL
BUN SERPL-MCNC: 6 MG/DL (ref 6–23)
CA-I BLD-SCNC: 1.14 MMOL/L (ref 1.1–1.33)
CA-I BLDA-SCNC: 1.11 MMOL/L (ref 1.1–1.33)
CALCIUM SERPL-MCNC: 8.5 MG/DL (ref 8.6–10.6)
CHLORIDE BLDA-SCNC: ABNORMAL MMOL/L
CHLORIDE SERPL-SCNC: 105 MMOL/L (ref 98–107)
CO2 SERPL-SCNC: 29 MMOL/L (ref 21–32)
CREAT SERPL-MCNC: 0.36 MG/DL (ref 0.5–1.05)
EGFRCR SERPLBLD CKD-EPI 2021: >90 ML/MIN/1.73M*2
EOSINOPHIL # BLD AUTO: 0 X10*3/UL (ref 0–0.7)
EOSINOPHIL NFR BLD AUTO: 0 %
ERYTHROCYTE [DISTWIDTH] IN BLOOD BY AUTOMATED COUNT: 13.3 % (ref 11.5–14.5)
GLUCOSE BLD MANUAL STRIP-MCNC: 120 MG/DL (ref 74–99)
GLUCOSE BLD MANUAL STRIP-MCNC: 143 MG/DL (ref 74–99)
GLUCOSE BLD MANUAL STRIP-MCNC: 163 MG/DL (ref 74–99)
GLUCOSE BLDA-MCNC: 134 MG/DL (ref 74–99)
GLUCOSE SERPL-MCNC: 152 MG/DL (ref 74–99)
HCO3 BLDA-SCNC: 26.5 MMOL/L (ref 22–26)
HCT VFR BLD AUTO: 27.3 % (ref 36–46)
HCT VFR BLD EST: 31 % (ref 36–46)
HGB BLD-MCNC: 9.4 G/DL (ref 12–16)
HGB BLDA-MCNC: 10.3 G/DL (ref 12–16)
IMM GRANULOCYTES # BLD AUTO: 0.12 X10*3/UL (ref 0–0.7)
IMM GRANULOCYTES NFR BLD AUTO: 0.7 % (ref 0–0.9)
INHALED O2 CONCENTRATION: 21 %
LACTATE BLDA-SCNC: 0.5 MMOL/L (ref 0.4–2)
LYMPHOCYTES # BLD AUTO: 0.32 X10*3/UL (ref 1.2–4.8)
LYMPHOCYTES NFR BLD AUTO: 2 %
MAGNESIUM SERPL-MCNC: 2.31 MG/DL (ref 1.6–2.4)
MCH RBC QN AUTO: 28.8 PG (ref 26–34)
MCHC RBC AUTO-ENTMCNC: 34.4 G/DL (ref 32–36)
MCV RBC AUTO: 84 FL (ref 80–100)
MONOCYTES # BLD AUTO: 0.7 X10*3/UL (ref 0.1–1)
MONOCYTES NFR BLD AUTO: 4.3 %
NEUTROPHILS # BLD AUTO: 14.98 X10*3/UL (ref 1.2–7.7)
NEUTROPHILS NFR BLD AUTO: 92.9 %
NRBC BLD-RTO: 0 /100 WBCS (ref 0–0)
OSMOLALITY SERPL: 292 MOSM/KG (ref 280–300)
OSMOLALITY SERPL: 293 MOSM/KG (ref 280–300)
OSMOLALITY SERPL: 295 MOSM/KG (ref 280–300)
OXYHGB MFR BLDA: 96.7 % (ref 94–98)
P AXIS: 71 DEGREES
P OFFSET: 189 MS
P ONSET: 141 MS
PCO2 BLDA: 40 MM HG (ref 38–42)
PH BLDA: 7.43 PH (ref 7.38–7.42)
PHOSPHATE SERPL-MCNC: 2.1 MG/DL (ref 2.5–4.9)
PLATELET # BLD AUTO: 165 X10*3/UL (ref 150–450)
PO2 BLDA: 95 MM HG (ref 85–95)
POTASSIUM BLDA-SCNC: 3.6 MMOL/L (ref 3.5–5.3)
POTASSIUM SERPL-SCNC: 3.8 MMOL/L (ref 3.5–5.3)
PR INTERVAL: 170 MS
Q ONSET: 226 MS
QRS COUNT: 12 BEATS
QRS DURATION: 92 MS
QT INTERVAL: 432 MS
QTC CALCULATION(BAZETT): 479 MS
QTC FREDERICIA: 463 MS
R AXIS: 86 DEGREES
RBC # BLD AUTO: 3.26 X10*6/UL (ref 4–5.2)
SAO2 % BLDA: 98 % (ref 94–100)
SODIUM BLDA-SCNC: 142 MMOL/L (ref 136–145)
SODIUM SERPL-SCNC: 140 MMOL/L (ref 136–145)
SODIUM SERPL-SCNC: 140 MMOL/L (ref 136–145)
SODIUM SERPL-SCNC: 142 MMOL/L (ref 136–145)
SODIUM SERPL-SCNC: 143 MMOL/L (ref 136–145)
T AXIS: 68 DEGREES
T OFFSET: 442 MS
VENTRICULAR RATE: 74 BPM
WBC # BLD AUTO: 16.1 X10*3/UL (ref 4.4–11.3)

## 2024-08-08 PROCEDURE — 2500000004 HC RX 250 GENERAL PHARMACY W/ HCPCS (ALT 636 FOR OP/ED)

## 2024-08-08 PROCEDURE — 2020000001 HC ICU ROOM DAILY

## 2024-08-08 PROCEDURE — 85025 COMPLETE CBC W/AUTO DIFF WBC: CPT | Performed by: REGISTERED NURSE

## 2024-08-08 PROCEDURE — 83930 ASSAY OF BLOOD OSMOLALITY: CPT | Performed by: REGISTERED NURSE

## 2024-08-08 PROCEDURE — 84295 ASSAY OF SERUM SODIUM: CPT

## 2024-08-08 PROCEDURE — 2500000001 HC RX 250 WO HCPCS SELF ADMINISTERED DRUGS (ALT 637 FOR MEDICARE OP)

## 2024-08-08 PROCEDURE — 31575 DIAGNOSTIC LARYNGOSCOPY: CPT | Performed by: OTOLARYNGOLOGY

## 2024-08-08 PROCEDURE — 97166 OT EVAL MOD COMPLEX 45 MIN: CPT | Mod: GO

## 2024-08-08 PROCEDURE — 82947 ASSAY GLUCOSE BLOOD QUANT: CPT

## 2024-08-08 PROCEDURE — 82330 ASSAY OF CALCIUM: CPT | Performed by: REGISTERED NURSE

## 2024-08-08 PROCEDURE — 2500000005 HC RX 250 GENERAL PHARMACY W/O HCPCS

## 2024-08-08 PROCEDURE — 2500000001 HC RX 250 WO HCPCS SELF ADMINISTERED DRUGS (ALT 637 FOR MEDICARE OP): Performed by: REGISTERED NURSE

## 2024-08-08 PROCEDURE — 92607 EX FOR SPEECH DEVICE RX 1HR: CPT | Mod: GN | Performed by: SPEECH-LANGUAGE PATHOLOGIST

## 2024-08-08 PROCEDURE — 2500000004 HC RX 250 GENERAL PHARMACY W/ HCPCS (ALT 636 FOR OP/ED): Performed by: STUDENT IN AN ORGANIZED HEALTH CARE EDUCATION/TRAINING PROGRAM

## 2024-08-08 PROCEDURE — C9113 INJ PANTOPRAZOLE SODIUM, VIA: HCPCS | Performed by: REGISTERED NURSE

## 2024-08-08 PROCEDURE — 99291 CRITICAL CARE FIRST HOUR: CPT

## 2024-08-08 PROCEDURE — 0CJS8ZZ INSPECTION OF LARYNX, VIA NATURAL OR ARTIFICIAL OPENING ENDOSCOPIC: ICD-10-PCS | Performed by: OTOLARYNGOLOGY

## 2024-08-08 PROCEDURE — 97530 THERAPEUTIC ACTIVITIES: CPT | Mod: GP

## 2024-08-08 PROCEDURE — 97530 THERAPEUTIC ACTIVITIES: CPT | Mod: GO

## 2024-08-08 PROCEDURE — 2500000004 HC RX 250 GENERAL PHARMACY W/ HCPCS (ALT 636 FOR OP/ED): Performed by: REGISTERED NURSE

## 2024-08-08 PROCEDURE — 37799 UNLISTED PX VASCULAR SURGERY: CPT | Performed by: REGISTERED NURSE

## 2024-08-08 PROCEDURE — 83735 ASSAY OF MAGNESIUM: CPT | Performed by: REGISTERED NURSE

## 2024-08-08 PROCEDURE — 2500000002 HC RX 250 W HCPCS SELF ADMINISTERED DRUGS (ALT 637 FOR MEDICARE OP, ALT 636 FOR OP/ED)

## 2024-08-08 PROCEDURE — 99232 SBSQ HOSP IP/OBS MODERATE 35: CPT | Performed by: OTOLARYNGOLOGY

## 2024-08-08 PROCEDURE — 80069 RENAL FUNCTION PANEL: CPT | Performed by: REGISTERED NURSE

## 2024-08-08 PROCEDURE — 84132 ASSAY OF SERUM POTASSIUM: CPT

## 2024-08-08 PROCEDURE — 83930 ASSAY OF BLOOD OSMOLALITY: CPT

## 2024-08-08 PROCEDURE — 97162 PT EVAL MOD COMPLEX 30 MIN: CPT | Mod: GP

## 2024-08-08 PROCEDURE — 92610 EVALUATE SWALLOWING FUNCTION: CPT | Mod: GN | Performed by: SPEECH-LANGUAGE PATHOLOGIST

## 2024-08-08 RX ORDER — SODIUM CHLORIDE 0.9 % (FLUSH) 0.9 %
250 SYRINGE (ML) INJECTION EVERY 6 HOURS
Status: DISCONTINUED | OUTPATIENT
Start: 2024-08-08 | End: 2024-08-08

## 2024-08-08 RX ORDER — SODIUM CHLORIDE 0.9 % (FLUSH) 0.9 %
150 SYRINGE (ML) INJECTION EVERY 8 HOURS SCHEDULED
Status: DISCONTINUED | OUTPATIENT
Start: 2024-08-08 | End: 2024-08-08

## 2024-08-08 RX ORDER — LANOLIN ALCOHOL/MO/W.PET/CERES
100 CREAM (GRAM) TOPICAL DAILY
Status: DISCONTINUED | OUTPATIENT
Start: 2024-08-08 | End: 2024-08-09

## 2024-08-08 RX ORDER — ONDANSETRON HYDROCHLORIDE 2 MG/ML
4 INJECTION, SOLUTION INTRAVENOUS EVERY 6 HOURS PRN
Status: DISCONTINUED | OUTPATIENT
Start: 2024-08-08 | End: 2024-08-14 | Stop reason: HOSPADM

## 2024-08-08 RX ORDER — SODIUM CHLORIDE 1000 MG
2000 TABLET, SOLUBLE MISCELLANEOUS
Status: DISCONTINUED | OUTPATIENT
Start: 2024-08-08 | End: 2024-08-08

## 2024-08-08 RX ORDER — SODIUM,POTASSIUM PHOSPHATES 280-250MG
1 POWDER IN PACKET (EA) ORAL ONCE
Status: COMPLETED | OUTPATIENT
Start: 2024-08-08 | End: 2024-08-08

## 2024-08-08 RX ADMIN — NIMODIPINE 60 MG: 30 SOLUTION ORAL at 02:29

## 2024-08-08 RX ADMIN — CARBOXYMETHYLCELLULOSE SODIUM 1 DROP: 5 SOLUTION/ DROPS OPHTHALMIC at 11:35

## 2024-08-08 RX ADMIN — HEPARIN SODIUM 5000 UNITS: 5000 INJECTION INTRAVENOUS; SUBCUTANEOUS at 10:07

## 2024-08-08 RX ADMIN — WHITE PETROLATUM 57.7 %-MINERAL OIL 31.9 % EYE OINTMENT 1 APPLICATION: at 00:14

## 2024-08-08 RX ADMIN — WHITE PETROLATUM 57.7 %-MINERAL OIL 31.9 % EYE OINTMENT 1 APPLICATION: at 08:07

## 2024-08-08 RX ADMIN — NIMODIPINE 60 MG: 30 SOLUTION ORAL at 21:43

## 2024-08-08 RX ADMIN — SENNOSIDES AND DOCUSATE SODIUM 2 TABLET: 50; 8.6 TABLET ORAL at 21:37

## 2024-08-08 RX ADMIN — VANCOMYCIN HYDROCHLORIDE 1.75 G: 5 INJECTION, POWDER, LYOPHILIZED, FOR SOLUTION INTRAVENOUS at 08:07

## 2024-08-08 RX ADMIN — ONDANSETRON 4 MG: 2 INJECTION INTRAMUSCULAR; INTRAVENOUS at 06:17

## 2024-08-08 RX ADMIN — ESCITALOPRAM 5 MG: 5 TABLET, FILM COATED ORAL at 08:06

## 2024-08-08 RX ADMIN — CEFTRIAXONE SODIUM 2 G: 2 INJECTION, SOLUTION INTRAVENOUS at 04:42

## 2024-08-08 RX ADMIN — HEPARIN SODIUM 5000 UNITS: 5000 INJECTION INTRAVENOUS; SUBCUTANEOUS at 02:28

## 2024-08-08 RX ADMIN — PANTOPRAZOLE SODIUM 40 MG: 40 INJECTION, POWDER, FOR SOLUTION INTRAVENOUS at 08:06

## 2024-08-08 RX ADMIN — NIMODIPINE 60 MG: 30 SOLUTION ORAL at 06:06

## 2024-08-08 RX ADMIN — WHITE PETROLATUM 57.7 %-MINERAL OIL 31.9 % EYE OINTMENT 1 APPLICATION: at 19:40

## 2024-08-08 RX ADMIN — THIAMINE HCL TAB 100 MG 100 MG: 100 TAB at 15:09

## 2024-08-08 RX ADMIN — CARBOXYMETHYLCELLULOSE SODIUM 1 DROP: 5 SOLUTION/ DROPS OPHTHALMIC at 00:14

## 2024-08-08 RX ADMIN — WHITE PETROLATUM 57.7 %-MINERAL OIL 31.9 % EYE OINTMENT 1 APPLICATION: at 15:35

## 2024-08-08 RX ADMIN — POTASSIUM & SODIUM PHOSPHATES POWDER PACK 280-160-250 MG 1 PACKET: 280-160-250 PACK at 06:07

## 2024-08-08 RX ADMIN — OFLOXACIN OTIC 5 DROP: 3 SOLUTION AURICULAR (OTIC) at 08:07

## 2024-08-08 RX ADMIN — CEFTRIAXONE SODIUM 2 G: 2 INJECTION, SOLUTION INTRAVENOUS at 15:39

## 2024-08-08 RX ADMIN — DEXAMETHASONE SODIUM PHOSPHATE 4 MG: 4 INJECTION, SOLUTION INTRA-ARTICULAR; INTRALESIONAL; INTRAMUSCULAR; INTRAVENOUS; SOFT TISSUE at 08:06

## 2024-08-08 RX ADMIN — DEXAMETHASONE SODIUM PHOSPHATE 4 MG: 4 INJECTION, SOLUTION INTRA-ARTICULAR; INTRALESIONAL; INTRAMUSCULAR; INTRAVENOUS; SOFT TISSUE at 21:37

## 2024-08-08 RX ADMIN — CARBOXYMETHYLCELLULOSE SODIUM 1 DROP: 5 SOLUTION/ DROPS OPHTHALMIC at 04:42

## 2024-08-08 RX ADMIN — NIMODIPINE 60 MG: 30 SOLUTION ORAL at 15:09

## 2024-08-08 RX ADMIN — ONDANSETRON 4 MG: 2 INJECTION INTRAMUSCULAR; INTRAVENOUS at 00:21

## 2024-08-08 RX ADMIN — DEXAMETHASONE SODIUM PHOSPHATE 6 MG: 10 INJECTION INTRAMUSCULAR; INTRAVENOUS at 02:28

## 2024-08-08 RX ADMIN — SENNOSIDES AND DOCUSATE SODIUM 2 TABLET: 50; 8.6 TABLET ORAL at 08:06

## 2024-08-08 RX ADMIN — NIMODIPINE 60 MG: 30 SOLUTION ORAL at 18:31

## 2024-08-08 RX ADMIN — CARBOXYMETHYLCELLULOSE SODIUM 1 DROP: 5 SOLUTION/ DROPS OPHTHALMIC at 08:06

## 2024-08-08 RX ADMIN — NIMODIPINE 60 MG: 30 SOLUTION ORAL at 10:07

## 2024-08-08 RX ADMIN — WHITE PETROLATUM 57.7 %-MINERAL OIL 31.9 % EYE OINTMENT 1 APPLICATION: at 11:35

## 2024-08-08 RX ADMIN — INSULIN LISPRO 1 UNITS: 100 INJECTION, SOLUTION INTRAVENOUS; SUBCUTANEOUS at 11:35

## 2024-08-08 RX ADMIN — DEXAMETHASONE SODIUM PHOSPHATE 4 MG: 4 INJECTION, SOLUTION INTRA-ARTICULAR; INTRALESIONAL; INTRAMUSCULAR; INTRAVENOUS; SOFT TISSUE at 15:09

## 2024-08-08 RX ADMIN — WHITE PETROLATUM 57.7 %-MINERAL OIL 31.9 % EYE OINTMENT 1 APPLICATION: at 04:42

## 2024-08-08 RX ADMIN — OXYCODONE HYDROCHLORIDE 2.5 MG: 5 TABLET ORAL at 02:28

## 2024-08-08 RX ADMIN — SODIUM CHLORIDE 75 ML/HR: 9 INJECTION, SOLUTION INTRAVENOUS at 06:49

## 2024-08-08 RX ADMIN — VANCOMYCIN HYDROCHLORIDE 1.75 G: 5 INJECTION, POWDER, LYOPHILIZED, FOR SOLUTION INTRAVENOUS at 21:37

## 2024-08-08 RX ADMIN — SODIUM CHLORIDE 75 ML/HR: 9 INJECTION, SOLUTION INTRAVENOUS at 07:25

## 2024-08-08 RX ADMIN — CARBOXYMETHYLCELLULOSE SODIUM 1 DROP: 5 SOLUTION/ DROPS OPHTHALMIC at 15:34

## 2024-08-08 RX ADMIN — CARBOXYMETHYLCELLULOSE SODIUM 1 DROP: 5 SOLUTION/ DROPS OPHTHALMIC at 19:39

## 2024-08-08 RX ADMIN — HEPARIN SODIUM 5000 UNITS: 5000 INJECTION INTRAVENOUS; SUBCUTANEOUS at 18:31

## 2024-08-08 RX ADMIN — POLYETHYLENE GLYCOL 3350 17 G: 17 POWDER, FOR SOLUTION ORAL at 08:06

## 2024-08-08 RX ADMIN — OFLOXACIN OTIC 5 DROP: 3 SOLUTION AURICULAR (OTIC) at 21:37

## 2024-08-08 ASSESSMENT — PAIN SCALES - GENERAL
PAINLEVEL_OUTOF10: 5 - MODERATE PAIN
PAINLEVEL_OUTOF10: 0 - NO PAIN
PAINLEVEL_OUTOF10: 4
PAINLEVEL_OUTOF10: 0 - NO PAIN

## 2024-08-08 ASSESSMENT — PAIN - FUNCTIONAL ASSESSMENT
PAIN_FUNCTIONAL_ASSESSMENT: 0-10

## 2024-08-08 ASSESSMENT — COGNITIVE AND FUNCTIONAL STATUS - GENERAL
DRESSING REGULAR LOWER BODY CLOTHING: A LOT
CLIMB 3 TO 5 STEPS WITH RAILING: TOTAL
WALKING IN HOSPITAL ROOM: TOTAL
HELP NEEDED FOR BATHING: A LOT
STANDING UP FROM CHAIR USING ARMS: A LOT
TOILETING: A LOT
MOVING FROM LYING ON BACK TO SITTING ON SIDE OF FLAT BED WITH BEDRAILS: A LITTLE
TURNING FROM BACK TO SIDE WHILE IN FLAT BAD: A LOT
PERSONAL GROOMING: A LOT
EATING MEALS: TOTAL
MOVING TO AND FROM BED TO CHAIR: TOTAL
DAILY ACTIVITIY SCORE: 11
DRESSING REGULAR UPPER BODY CLOTHING: A LOT
MOBILITY SCORE: 10

## 2024-08-08 ASSESSMENT — ACTIVITIES OF DAILY LIVING (ADL)
ADL_ASSISTANCE: INDEPENDENT
ADL_ASSISTANCE: INDEPENDENT
BATHING_ASSISTANCE: MAXIMAL
ADLS_ADDRESSED: NO

## 2024-08-08 NOTE — PROGRESS NOTES
"    Daily Progress Note - 08/08/24    Denae Nolasco is a 33 y.o. female on day 5 of admission presenting with Schwannoma.    Subjective:  Called by neurosurgery team given very weak voice.  Preoperatively, the patient was able to converse and had minimal neurologic deficits with mild weakness of cranial nerve VII according to the neurosurgery team but no other cranial nerve or ambulatory deficits.  When evaluating the patient, the patient had a very breathy and soft voice that was only able to produce a whisper.    Objective:  Constitutional: Extubated, breathing room air, appears somnolent  Voice: [id number]; breathy voice with easy tiring  Respiration:  Breathing room air no stridor  Neuro: Patient is unable to move right upper extremity.  Patient's facial nerve is completely out House-Brackman 6/6 on the left side.  Patient notes intact sensation bilaterally.  Cranial nerve XI severely diminished left side.  Tongue deviating to the right.  No mobility of the palate.  Very little soft whisper voice produced.  Bilateral lower extremities mobile with good strength.  Head and Face:  Symmetric facial features, no masses or lesions  Nose: Dobbhoff in the left nare  Oral Cavity/Oropharynx/Lips:  Normal mucous membranes, normal floor of mouth/tongue/OP, no masses or lesions.  Tongue deviation to the right  Neck/Lymph:  No LAD, no thyroid masses  Skin:  Neck skin is without scar or injury    Last Recorded Vitals:  Blood pressure 116/75, pulse 104, temperature 37.5 °C (99.5 °F), resp. rate 11, height 1.575 m (5' 2.01\"), weight 59.7 kg (131 lb 9.8 oz), last menstrual period 07/24/2024, SpO2 94%.    Intake/Output last 3 Shifts:  I/O last 3 completed shifts:  In: 4598 (77 mL/kg) [I.V.:3108 (52.1 mL/kg); NG/GT:330; IV Piggyback:1160]  Out: 5930 (99.3 mL/kg) [Urine:5930 (2.8 mL/kg/hr)]  Weight: 59.7 kg     Relevant Results:    Lab Results   Component Value Date    WBC 16.1 (H) 08/08/2024    HGB 9.4 (L) 08/08/2024    HCT 27.3 " (L) 08/08/2024    MCV 84 08/08/2024     08/08/2024     Lab Results   Component Value Date    GLUCOSE 152 (H) 08/08/2024    CALCIUM 8.5 (L) 08/08/2024     08/08/2024     08/08/2024    K 3.8 08/08/2024    CO2 29 08/08/2024     08/08/2024    BUN 6 08/08/2024    CREATININE 0.36 (L) 08/08/2024       Procedure Note: Flexible Nasolaryngoscopy  4% lidocaine mixed with phenylephrine was prepared and dripped into the nose. It was placed in the right nare. Following an appropriate amount of time to allow for adequate anesthesia, a flexible fiberoptic nasolaryngoscope was placed into the patient's right naris. The nasal cavity, nasopharynx, oropharynx, hypopharynx, and all endolaryngeal structures were visualized and were normal except as listed below. Significant findings included:  -Bilateral vocal cord immobility in the paramedian position  -No significant secretion burden      Assessment & Plan:    Denae Nolasco is a 33 y.o. 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  ENT was initially consulted for bleeding from the right ear canal in the setting of nerve stimulation probe was placed in the ear canal intraoperatively.  Patient was found to have a right tympanic membrane perforation measuring approximately 20-30% involving the annulus in the inferior aspect of the TM.  ENT was reengaged for vocal cord evaluation in the setting of recent extubation and concerns for the patient having developed intracranial hemorrhage.  The patient's bilateral vocal cords are mobile, left facial nerve completely out, left shoulder shrug cranial nerve XI out, tongue deviated to the right, and right upper extremity immobile.    Recommendations:  -Closely monitor airway status.  Patient has bilateral vocal cord immobility/paralysis and may need tracheostomy placement if airway status decompensates  -Recommend ophthalmology consultation given incomplete closure of  eye on the left  -Recommend moisture chamber, moisturizing eyedrops, and taping left eye at night pending further ophthalmology recommendations    Radha Brown MD - PGY2  Otolaryngology - Head & Neck Surgery  [facility]    ENT Consult pager: [phone]  ENT Peds pager: [phone]  ENT Head & Neck Surgery Phone: [phone]  ENT subspecialty team: [place] individual resident who wrote today's note  ENT Outpatient scheduling number: [phone]  Please Page if Urgent

## 2024-08-08 NOTE — PROGRESS NOTES
"Denae Nolasco is a 33 y.o. female on day 5 of admission presenting with Schwannoma.    Subjective   NAEO    Objective     Physical Exam  Awake, nods to questions, attempts to whisper   OU3R  L HB6  R CN XII palsy  + R corneal, weak L corneal  L 5/5  RUE prox 1 dis 4-  BLE 5/5    Last Recorded Vitals  Blood pressure 113/78, pulse 79, temperature 37.5 °C (99.5 °F), resp. rate 13, height 1.575 m (5' 2.01\"), weight 59.7 kg (131 lb 9.8 oz), last menstrual period 07/24/2024, SpO2 97%.  Intake/Output last 3 Shifts:  I/O last 3 completed shifts:  In: 5458.8 (91.4 mL/kg) [I.V.:3803.8 (63.7 mL/kg); NG/GT:330; IV Piggyback:1325]  Out: 4010 (67.2 mL/kg) [Urine:4010 (1.9 mL/kg/hr)]  Weight: 59.7 kg     Relevant Results  Lab Results   Component Value Date    WBC 16.1 (H) 08/08/2024    HGB 9.4 (L) 08/08/2024    HCT 27.3 (L) 08/08/2024    MCV 84 08/08/2024     08/08/2024     Lab Results   Component Value Date    GLUCOSE 152 (H) 08/08/2024    CALCIUM 8.5 (L) 08/08/2024     08/08/2024     08/08/2024    K 3.8 08/08/2024    CO2 29 08/08/2024     08/08/2024    BUN 6 08/08/2024    CREATININE 0.36 (L) 08/08/2024        This patient currently has cardiac telemetry ordered; if you would like to modify or discontinue the telemetry order, click here to go to the orders activity to modify/discontinue the order.  This patient has a central line   Reason for the central line remaining today? Hemodynamic monitoring      This patient is intubated   Reason for patient to remain intubated today? 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, CT CAP R middle lobe calcifications c/w granulomatous disease, dilated CBD, " intrahepatic dilatation (rec'd MRCP or ERCP), CTH/CT IAC done, TTE EF 57% no wall motion abnormality, no residual interatrial shunt w bubble study, 8/4 MRI IAC CISS stable, vCTH done, 8/5 s/p L retrosig for tumor resection, RF EVD, 8/6 CTH resection bed hemorrhage     8/6 MRI brain L transverse sigmoid sinus thrombosis, GTR    8/7 s/p extubation    Plan:  NSU  RF EVD (clamped, transduce ICPs)  HOB >45  Na goal >140, space out Na checks  Strict IO   Dex taper  ENT recs - R ear injury, will scope in AM 8/8 re vocal cord injury  BLE DVT US today   Con't vanc, ceftriaxone (8/10)  SCDs, SQH  PTOT - ok to work with without restriction, please work with every day       Jigna Lund MD

## 2024-08-08 NOTE — PROGRESS NOTES
Physical Therapy    Physical Therapy Evaluation & Treatment    Patient Name: Denae oNlasco  MRN: [id number]  Today's Date: 8/8/2024   Room: 13/13-A  Time Calculation  Start Time: 1023  Stop Time: 1056  Time Calculation (min): 33 min    Assessment/Plan   PT Assessment  PT Assessment Results: Decreased strength, Decreased endurance, Impaired balance, Decreased mobility, Decreased coordination, Pain, Impaired vision  Rehab Prognosis: Excellent  Barriers to Discharge: Medical acuity  Evaluation/Treatment Tolerance: Patient tolerated treatment well  Strengths: Attitude of self, Support and attitude of living partners  End of Session Communication: Bedside nurse  Assessment Comment: Pt to benefit from ongoing PT services to address the above limitations and to prepare pt for timely return to prior level of function  End of Session Patient Position: Bed, 3 rail up, Alarm off, not on at start of session  IP OR SWING BED PT PLAN  Inpatient or Swing Bed: Inpatient  PT Plan  Treatment/Interventions: Bed mobility, Transfer training, Gait training, Stair training, Balance training, Neuromuscular re-education, Strengthening, Endurance training, Therapeutic exercise, Therapeutic activity, Home exercise program, Positioning, Postural re-education  PT Plan: Ongoing PT  PT Frequency: 5 times per week  PT Discharge Recommendations: High intensity level of continued care  Equipment Recommended upon Discharge:  (TBD)  PT Recommended Transfer Status: Assist x2, Assistive device  PT - OK to Discharge: Yes (When medically ready)      Subjective   General Visit Information:  Reason for Referral: Pt p/w worsening vertigo and ataxia. 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 On CT CAP: R middle lobe calcifications c/w granulomatous disease, dilated CBD, intrahepatic dilatation. 8/5 s/p L retrosig for tumor resection, RF EVD, 8/6 CTH resection bed hemorrhage. 8/6 MRI brain L transverse  sigmoid sinus thrombosis, GTR. 8/7 s/p extubation.  Past Medical History Relevant to Rehab: ASD s/p closure in 1997, raynaud, depression  Co-Treatment:  (Rehab aide assisted with session)  Prior to Session Communication: Bedside nurse  Patient Position Received: Bed, 3 rail up, Alarm off, not on at start of session  Family/Caregiver Present: Yes  Caregiver Feedback: Pt's spouse present and supportive     Home Living:  Home Living  Type of Home: House  Lives With: Spouse  Home Adaptive Equipment: None  Home Layout: Two level, 1/2 bath on main level, Stairs to alternate level with rails  Alternate Level Stairs-Number of Steps: 12  Home Access: Stairs to enter without rails  Entrance Stairs-Number of Steps: 1 small threshold step    Prior Level of Function:  Prior Function Per Pt/Caregiver Report  Level of [place]: Independent with ADLs and functional transfers, Independent with homemaking with ambulation  ADL Assistance: Independent  Homemaking Assistance: Independent  Ambulatory Assistance: Independent  Vocational: Full time employment ()  Leisure: Playing Dance Dance Revolution, biking  Hand Dominance: Right  Prior Function Comments: Drives, denies history of falling, community ambulator    Precautions:  Precautions  Hearing/Visual Limitations: R-beating horizontal nystagmus with R smooth pursuit. Reduced saccadic gain. Crosses midline bilaterally. Hearing WFL  Medical Precautions: Fall precautions, External Ventricular Device (EVD)  Precautions Comment: SBP <140. EVD clamped for mobility.    Vital Signs:  Vital Signs  Heart Rate: 105 (115 max, 101 post)  SpO2: 96 % (96-97%)  BP: 121/75 (129/76 (87) during, 121/79 (91) post)  MAP (mmHg): 88  BP Method: Automatic    Objective   Lines/Tubes/Drains:  CVC 08/05/24 Triple lumen Right Subclavian (Active)   Number of days: 3       Arterial Line 08/05/24 Right Radial (Active)   Number of days: 3       Urethral Catheter Double-lumen;Non-latex 16 Fr. (Active)    Number of days: 3       NG/OG/Feeding Tube Left nostril (Active)   Number of days: 0       Intracranial Pressure/Ventriculostomy Ventricular drainage catheter with ICP transducer Right (Active)   Number of days: 2       Continuous Medications/Drips:  sodium chloride 0.9%, 75 mL/hr, Last Rate: 75 mL/hr (08/08/24 6263)        Oxygen: room air    Pain:  Pain Assessment  Pain Assessment: 0-10  0-10 (Numeric) Pain Score: 5 - Moderate pain  Pain Type: Acute pain  Pain Location: Head  Pain Interventions:  (Mobility + rest)    Cognition:  Cognition  Overall Cognitive Status: Within Functional Limits  Orientation Level: Oriented X4  Cognition Comments: Expressive language deficits appreciated.  Insight: Within function limits  Impulsive: Within functional limits  Processing Speed: Within funtional limits      Extremity/Trunk Assessments:  Strength:       RUE   RUE : Exceptions to WFL (PROM WFL)  RUE Strength  R Shoulder Flexion: 2+/5  R Elbow Flexion: 2+/5  R Elbow Extension: 2+/5    LUE   LUE: Exceptions to WFL (ROM WFL)  LUE Strength  L Shoulder Flexion: 3+/5  L Elbow Flexion: 3+/5  L Elbow Extension: 3+/5    RLE   RLE : Exceptions to WFL (ROM WFL)  Strength RLE  R Hip Flexion: 3+/5  R Knee Flexion: 4/5  R Knee Extension: 4/5  R Ankle Dorsiflexion: 4/5  R Ankle Plantar Flexion: 4/5    LLE   LLE : Exceptions to WFL (ROM WFL)  Strength LLE  L Hip Flexion: 3+/5  L Knee Flexion: 4/5  L Knee Extension: 4/5  L Ankle Dorsiflexion: 4/5  L Ankle Plantar Flexion: 4/5    General Assessments:         Activity Tolerance  Endurance: Tolerates 30 min exercise with multiple rests  Early Mobility/Exercise Safety Screen: Proceed with mobilization - No exclusion criteria met  Sensation  Light Touch: No apparent deficits  Coordination  Movements are Fluid and Coordinated: No  Upper Body Coordination: Bradykinesia  Static Sitting Balance  Static Sitting-Balance Support: Bilateral upper extremity supported, Feet supported  Static  Sitting-Comment/Number of Minutes: MIN A x1 progressing to close sup with cues for UE support  Dynamic Sitting Balance  Dynamic Sitting-Balance Support: Bilateral upper extremity supported, Feet supported  Dynamic Sitting-Comments: MIN A x1 to scoot to edge of bed  Static Standing Balance  Static Standing-Balance Support: Bilateral upper extremity supported  Static Standing-Level of Assistance: Maximum assistance (x1)  Static Standing-Comment/Number of Minutes: 1  min  Dynamic Standing Balance  Dynamic Standing-Balance Support: Bilateral upper extremity supported  Dynamic Standing-Comments: MOD A x2    Functional Assessments:  Bed Mobility  Bed Mobility: Yes  Bed Mobility 1  Bed Mobility 1: Supine to sitting, Sitting to supine  Level of Assistance 1: Maximum assistance, +1 to manage equipment (x1)  Bed Mobility Comments 1: HOB elevated      Ambulation/Gait Training  Ambulation/Gait Training Performed: Yes  Ambulation/Gait Training 1  Surface 1: Level tile  Device 1: Rolling walker  Assistance 1: Moderate assistance (x2)  Quality of Gait 1: Narrow base of support, Diminished heel strike, Decreased step length, Shuffling gait, Antalgic (Therapist assists with walker mgmt and maintaining R grasp on walker. Cues for increased B step length and proper posture.)  Comments/Distance (ft) 1: 3 ft laterally at bedside    Stairs  Stairs: No         Treatment:  Skilled PT treatment was provided beyond the scope of evaluation, as follows:    Transfers  Transfer: Yes  Transfer 1  Transfer From 1: Bed to  Transfer to 1: Stand  Technique 1: Sit to stand, Stand to sit  Transfer Device 1: Walker  Transfer Level of Assistance 1: Maximum assistance (x1)  Trials/Comments 1: Performs 2x. Verbal/tactile cues for proper hand placement and controlling speed.    Therapeutic Activity  Therapeutic Activity Performed: Yes  Therapeutic Activity 1: Progressive chair position + vitals monitoring to promote increased tolerance of upright position  prior to sitting at edge of bed      Outcome Measures:  [facility] Basic Mobility  Turning from your back to your side while in a flat bed without using bedrails: A little  Moving from lying on your back to sitting on the side of a flat bed without using bedrails: A lot  Moving to and from bed to chair (including a wheelchair): Total  Standing up from a chair using your arms (e.g. wheelchair or bedside chair): A lot  To walk in hospital room: Total  Climbing 3-5 steps with railing: Total  Basic Mobility - Total Score: 10                   FSS-ICU  Ambulation: Walks <50 feet with any assistance x1 or walks any distance with assistance x2 people  Rolling: Minimal assistance (performs 75% or more of task)  Sitting: Minimal assistance (performs 75% or more of task)  Transfer Sit-to-Stand: Maximal assistance (performs 25% - 49% of task)  Transfer Supine-to-Sit: Maximal assistance (performs 25% - 49% of task)  Total Score: 13    ICU Mobility Screen  Early Mobility/Exercise Safety Screen: Proceed with mobilization - No exclusion criteria met  ICU Mobility Scale: Walking with assistance of 2 or more people                        Encounter Problems       Encounter Problems (Active)       PT Problem       Patient will score >/= 24/28 points on the [place] to indicate low risk of falling.        Start:  08/08/24    Expected End:  08/22/24            Patient will ambulate at least 50  ft. with </= CGA and LRD to improve tolerance of community distances.          Start:  08/08/24    Expected End:  08/22/24            Patient will perform sit to stand and stand to sit transfers with </= close sup and LRD to increase functional strength.        Start:  08/08/24    Expected End:  08/22/24            BLE 5/5       Start:  08/08/24    Expected End:  08/22/24            Patient will ascend and descend >/= 1 step with LRD and </= CGA to facilitate safe navigation of step to enter home.          Start:  08/08/24    Expected End:  08/22/24                Pain - Adult              Education Documentation  Precautions, taught by Norma Lennon, PT at 8/8/2024 11:56 AM.  Learner: Significant Other, Patient  Readiness: Acceptance  Method: Explanation  Response: Verbalizes Understanding    Body Mechanics, taught by Norma Lennon PT at 8/8/2024 11:56 AM.  Learner: Significant Other, Patient  Readiness: Acceptance  Method: Explanation  Response: Verbalizes Understanding    Mobility Training, taught by Norma Lennon PT at 8/8/2024 11:56 AM.  Learner: Significant Other, Patient  Readiness: Acceptance  Method: Explanation  Response: Verbalizes Understanding    Education Comments  No comments found.            08/08/24 at 11:57 AM   Norma Lennon PT   Rehab Office: [phone]

## 2024-08-08 NOTE — PROGRESS NOTES
[facility]  NEUROSCIENCE INTENSIVE CARE UNIT  DAILY PROGRESS NOTE       Patient Name: Denae Nolsaco   MRN: [id number]     Admit Date: 8/3/2024     : 1990 AGE: 33 y.o. GENDER: female        Subjective    Denae Nolasco is a 33 y.o. female with a past medical history of raynaud's and depression who presented to the [facility] ED on 8/3/2024 who worsening vertigo and ataxia. 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 Patient admitted to neurosurgery service for operative management.     : s/p L retrosigmoid craniotomy for tumor resection with RF EVD placement  : Patient extubated    Interval Events: NAEON     Objective   VITALS (24H):  Temp:  [35.9 °C (96.6 °F)-37.6 °C (99.7 °F)] 37.5 °C (99.5 °F)  Heart Rate:  [] 104  Resp:  [6-25] 11  BP: (108-131)/(65-90) 116/75  Arterial Line BP 1: (119-155)/(57-84) 123/65  INTAKE/OUTPUT:  Intake/Output Summary (Last 24 hours) at 2024 0737  Last data filed at 2024 0724  Gross per 24 hour   Intake 2778.75 ml   Output 4720 ml   Net -1941.25 ml     VENT SETTINGS:  Vent Mode: Pressure support     PHYSICAL EXAM:  NEURO:  - 3mm reactive to light  - EOV, Ox3  - Brisk R corneal, weak L corneal  - L HB4  - LUE 4/5  - RUE prox 2/5, distal 4-/5  - RLE HF4 ow 5  - LLE 5  - RF EVD clamped  CV:  - RRR on telemetry, NSR  - Right radial arterial line in place  RESP:  - Intubated, regular, unlabored  - Vent: AC  :  - Indwelling catheter in place  GI:  - Abdomen NT/ND, soft  SKIN:  - Surgical incision clean/dry/Intact    MEDICATIONS:  Scheduled: PRN: Continuous:   cefTRIAXone, 2 g, intravenous, q12h  dexAMETHasone, 4 mg, intravenous, q6h  escitalopram, 5 mg, nasogastric tube, Daily  pantoprazole, 40 mg, oral, Daily before breakfast   Or  esomeprazole, 40 mg, nasogastric tube, Daily before breakfast   Or  pantoprazole, 40 mg, intravenous, Daily before breakfast  heparin (porcine), 5,000 Units,  subcutaneous, q8h  insulin lispro, 0-5 Units, subcutaneous, TID  lubricating eye drops, 1 drop, Left Eye, q4h  niMODipine, 60 mg, nasogastric tube, q4h ENOCH  ofloxacin, 5 drop, Right Ear, BID  perflutren protein A microsphere, 0.5 mL, intravenous, Once in imaging  polyethylene glycol, 17 g, nasogastric tube, Daily  scopolamine, 1 patch, transdermal, q72h  sennosides-docusate sodium, 2 tablet, nasogastric tube, BID  sodium chloride, 500 mL, intravenous, Once  vancomycin (Vancocin) 1.75 g in sodium chloride 0.9% 250 mL IV, 1,750 mg, intravenous, q12h  white petrolatum-mineral oiL, 1 Application, Left Eye, q4h     PRN medications: calcium gluconate, calcium gluconate, dextrose, dextrose, glucagon, glucagon, hydrALAZINE, labetaloL, magnesium sulfate, magnesium sulfate, naloxone, ondansetron, oxyCODONE, oxygen, potassium chloride CR **OR** potassium chloride, potassium chloride CR **OR** potassium chloride, potassium chloride, prochlorperazine **OR** prochlorperazine **OR** prochlorperazine, vancomycin sodium chloride 0.9%, 75 mL/hr, Last Rate: 75 mL/hr (08/08/24 2213)         LAB RESULTS:      IMAGING RESULTS:  XR abdomen 1 view         XR chest 1 view   Final Result   1.  No evidence of acute cardiopulmonary process.   2. Medical devices as above.                  MACRO:   None        Signed by: Davy Ball 8/7/2024 2:12 PM   Dictation workstation:   [id number]      MR brain w and wo IV contrast   Final Result   1. Postsurgical changes from left retrosigmoid craniectomy for   cerebellopontine angle mass resection. Filling defect within the left   transverse and sigmoid sinuses compatible with dural venous sinus   thrombosis.   2. Persistent similar mass effect within the posterior fossa with   effacement of the 4th ventricle and inferior displacement of the   cerebellar tonsils through the foramen magnum.   3. Right frontal approach ventriculostomy catheter in unchanged   position. Unchanged right frontal subdural and  intraparenchymal   hematoma along the catheter tract.        I personally reviewed the images/study and I agree with Patrica Givens DO's (radiology resident) findings as stated. This study   was interpreted at [place], [place].        MACRO:   Patrica Givens discussed the significance and urgency of this   critical finding by telephone with  Nicole Davila on 8/6/2024 at 11:12   pm.  (**-RCF-**) Findings:  See findings.             Signed by: Nikolay Bailey 8/7/2024 7:36 AM   Dictation workstation:   [id number]      CT head wo IV contrast   Final Result   1. Postsurgical change of left retrosigmoid craniectomy with   cranioplasty for left vestibular schwannoma resection with persistent   mass effect within the posterior fossa with effacement of the 4th   ventricle and slight inferior displacement of the cerebellar tonsils   into the foramen magnum.   2. Slight interval increase in a extra-axial fluid collection   immediately deep to the craniotomy defect measuring 6 mm. Otherwise,   stable appearance of postoperative blood products scattered within   the posterior fossa adjacent to the resection cavity as well as an   additional subdural hematoma along the left tentorial leaflet.   3. Right frontal approach ventriculostomy shunt catheter unchanged in   positioning when compared to prior exam. Persistent overlying   subdural hematoma measuring up to 0.4 cm along the right frontal lobe.             I personally reviewed the images/study, and I agree with the findings   as stated above. This study was interpreted at [place], [place].        MACRO:   None        Signed by: Madi Parham 8/6/2024 10:23 AM   Dictation workstation:   [id number]      XR abdomen 1 view   Final Result   1. Enteric tube tip projects over the expected body of stomach.   2. Nonobstructive nonspecific bowel-gas pattern.             I  personally reviewed the images/study and I agree with Dr. Kiran Hernandez findings as stated. This study was interpreted at [place], [place]        Signed by: Scooter Aldrich 8/6/2024 6:45 PM   Dictation workstation:   [id number]      XR chest 1 view   Final Result   1.  No evidence of acute cardiopulmonary process.   2. Similarly positioned medical devices as above.        I personally reviewed the images/study and I agree with Dr. Kiran Hernandez findings as stated. This study was interpreted at [place], [place]        MACRO:   None        Signed by: Scooter Aldrich 8/6/2024 6:44 PM   Dictation workstation:   [id number]      CT head wo IV contrast   Final Result   1. New extensive postoperative changes from left retrosigmoid   craniectomy with cranioplasty for mass resection. There is persistent   mass effect within the posterior fossa with effacement of the 4th   ventricle.   2. Right frontal approach ventriculostomy shunt with catheter tip   terminating in the foramen of Monro. There has been some interval   decrease in the caliber of the left lateral and 3rd ventricles   compared with the previous exam.   3. New small extra-axial hematoma near the right frontal arcelia hole.   There is either artifact versus small subdural hematoma along the   left cerebral convexity.        I personally reviewed the images/study and I agree with Dr. Kiran Hernandez findings as stated.        MACRO:   None        Signed by: Gi Cohen 8/6/2024 7:03 AM   Dictation workstation:   [id number]      XR chest 1 view   Final Result   1.  No evidence of acute cardiopulmonary process.   2. Medical devices as above.                  MACRO:   None        Signed by: Keshav Treadwell 8/5/2024 3:47 PM   Dictation workstation:   [id number]      CT head wo IV contrast volumetric surgical planning   Final Result   1. Preoperative volumetric CT  imaging obtained. Mass within the left   cerebellopontine angle better characterized on same day MRI IAC. Mild   surrounding vasogenic edema.   2. Similar asymmetric effacement of the right basal cisterns and 4th   ventricle with mild upstream ventricular prominence. Mild asymmetry   of the lateral ventricles.        I personally reviewed the image(s)/study and resident interpretation   as stated by Dr. Delilah Rader MD. I agree with the findings as   stated. This study was interpreted at [facility], [place], [place].        MACRO:   None        Signed by: Scooter Vinson 8/5/2024 6:22 AM   Dictation workstation:   [id number]      XR chest 1 view   Final Result   1. No evidence of acute cardiopulmonary process.        Signed by: Cliff Juares 8/4/2024 2:11 PM   Dictation workstation:   [id number]      MR IAC w and wo IV contrast   Final Result   1. There is a heterogeneously enhancing mass within the left   cerebellar pontine angle cistern and internal auditory canal   measuring 3.5 x 2.9 x 3.2 cm. There is mild thickening of the   adjacent tentorium measuring 0.2 cm in thickness. Differential   considerations include a vestibular schwannoma or possibly a   meningioma. There is marked mass effect upon the leticia, brachium   pontis and cerebellum. There is near-complete effacement of the 4th   ventricle. There is dilatation of the lateral and 3rd ventricles,   similar to the prior exams.        MACRO:   None        Signed by: Suzy Martinez 8/4/2024 9:24 AM   Dictation workstation:   [id number]      Transthoracic Echo (TTE) Complete   Final Result      XR chest 1 view   Final Result   No evidence of acute intrathoracic abnormality.        Signed by: Aneesh Bhatti 8/3/2024 1:08 PM   Dictation workstation:   [id number]      CT internal auditory canals posterior fossa wo IV contrast   Final Result   There is again evidence of a large extra-axial mass centered within   left  cerebellopontine angle cistern which is better defined on the   prior MRI with and without gadolinium dated 08/02/2024 when compared   with this noncontrast CT study. There is again evidence of mass   effect upon the adjacent brainstem, left cerebellar peduncle, and   cerebellum. There is hypodensity within the surrounding brain   parenchymal compatible with surrounding brain parenchymal edema.   There is again evidence of near-complete effacement of the 4th   ventricle. No abnormal calcification is noted associated with the   lesion. There is minimal asymmetric widening of the left internal   auditory canal when compared with the right.        There is again evidence of mild ventriculomegaly involving the   lateral ventricles and 3rd ventricle similar when compared with the   prior MRI without significant effacement of surrounding sulci of the   cerebral hemispheres.        Mild nonspecific white matter changes are again noted within cerebral   hemispheres bilaterally. While nonspecific, white matter changes can   be seen with small-vessel ischemic change or demyelinating processes   among others.        MACRO:   None.        Signed by: Kyle Zamora 8/3/2024 11:48 AM   Dictation workstation:   [id number]      CT head wo IV contrast   Final Result   There is again evidence of a large extra-axial mass centered within   left cerebellopontine angle cistern which is better defined on the   prior MRI with and without gadolinium dated 08/02/2024 when compared   with this noncontrast CT study. There is again evidence of mass   effect upon the adjacent brainstem, left cerebellar peduncle, and   cerebellum. There is hypodensity within the surrounding brain   parenchymal compatible with surrounding brain parenchymal edema.   There is again evidence of near-complete effacement of the 4th   ventricle. No abnormal calcification is noted associated with the   lesion. There is minimal asymmetric widening of the left internal    auditory canal when compared with the right.        There is again evidence of mild ventriculomegaly involving the   lateral ventricles and 3rd ventricle similar when compared with the   prior MRI without significant effacement of surrounding sulci of the   cerebral hemispheres.        Mild nonspecific white matter changes are again noted within cerebral   hemispheres bilaterally. While nonspecific, white matter changes can   be seen with small-vessel ischemic change or demyelinating processes   among others.        MACRO:   None.        Signed by: Kyle Zamora 8/3/2024 11:48 AM   Dictation workstation:   [id number]      CT chest abdomen pelvis w IV contrast   Final Result   1. Dilated common bile duct measuring to 1.1 cm with gradual   transition to normal caliber and associated mild intrahepatic biliary   dilation. No evidence of radiopaque choledocholithiasis, radiopaque   cholelithiasis, pancreatic duct dilation, or abnormal focal   pancreatic enhancement. Findings can be secondary to chronic   periampullary stricture, however neoplasm given patient's history can   not be excluded. Nonemergent MRCP and/or ERCP is recommended for   further assessment.   2. Heterogeneous cervix with hypodense areas, which can be secondary   to nabothian cysts. Nonemergent pelvic ultrasound can be obtained for   further assessment as per clinical discretion.   3. Subpleural nodular opacity within the right middle lobe with   internal calcifications, as above. Additional calcified granulomas   and calcified right hilar lymph nodes, compatible with remote   granulomatous disease.   4. Additional findings as discussed above.        I personally reviewed the images/study and I agree with the findings   as stated by Goyo Hendricks MD (Radiology Resident).   This study was interpreted at [facility], [place], [place].        MACRO:   None.        Signed by: Griselda Gamez 8/3/2024 12:40  PM   Dictation workstation:   [id number]      Vascular US lower extremity venous duplex bilateral    (Results Pending)         Assessment/Plan      33 year-old female with past medical history of depression and visual impairment who presented to OSH ED on 8/2/24 with complaints of hearing loss, vertigo, ataxia (since 2020), headache, dizziness, and muscle spasms. Patient transferred to [facility] for concern for intracranial mass concerning for schwannoma vs. meningioma seen on outpatient imaging. 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 Patient admitted to NSU s/p L retrosigmoid craniotomy for tumor resection.     NEURO:  #s/p L retrosigmoid craniotomy for tumor resection w RF EVD placement  Assessment:  - See physical exam  - RF EVD clamped per NSGY  - Na 140<-140<-144<-146<-146  - 75mL/hr NS  Plan:  - NSU  - Neuro Checks: Q1H pupil checks  - Keep RF EVD clamped  - Transition from maintenance fluids to tube feeds/flushes  - HOB >45 degrees  - Vanc/CTX (till 8/10)  - -140  - q12 Na, Goal Na>140  - Nimodipine 60q4  - Dex 4q6 (from 6q6)  - Pain: acetaminophen Q4H and oxycodone Q6H  - Nausea: ondansetron PRN  - PT/OT/SLP/nutrition  - ABG (oxygen)  - Keep net even IOs    CARDIOVASCULAR:  #no active issues  Assessment:  - ECHO: EF 57%    Plan:  - Continue to monitor on telemetry  - -140    --> PRN: Labetalol, Hydralazine, and Nicardipine     RESPIRATORY:  #Edematous uvula  #Dysphonia  Assessment:  - extubated yesterday  Plan:  - ENT to scope  - Continuous pulse oximetry   - O2 PRN to maintain SpO2 > 94%, wean as tolerated  - SLP    RENAL/:  #no active issues  Assessment:  Results from last 72 hours   Lab Units 08/08/24  0020 08/06/24  2326   BUN mg/dL 6 7   CREATININE mg/dL 0.36* 0.35*       Plan:  - Monitor with daily RFP  - Maintain velazquez catheter for: critically ill patient who need accurate urinary output measurements    FEN/GI:  #no active  issues  Assessment:  - Last BM: PTA  - Has NG  Plan:  - Monitor and replace electrolytes per protocol  - Have nutrition see  - IVF: NS @ 75 mL/hr  - Diet: NPO   - Bowel Regimen: Docusate-Senna BID and Miralax QD    ENDOCRINE:  Assessment:  Results from last 7 days   Lab Units 24  0020 24  1940 24  1809 24  1553 24  1140 24  0813 24  2326 24  1948   POCT GLUCOSE mg/dL  --  150* 144* 154* 150* 162*  --  142*   GLUCOSE mg/dL 152*  --   --   --   --   --  176*  --       Plan:  - Accuchecks & ISS     HEMATOLOGY:  #no active issues  Assessment:  - Baseline Hgb: 12.8  - Baseline Plts: 237  Results from last 7 days   Lab Units 24  0020 24  2326   HEMOGLOBIN g/dL 9.4* 9.5*   HEMATOCRIT % 27.3* 28.7*   PLATELETS AUTO x10*3/uL 165 166     Plan:  - Continue to monitor with daily CBC and Coag panel    INFECTIOUS DISEASE:  #no active issues  Assessment:  Results from last 7 days   Lab Units 24  0020 24  2326   WBC AUTO x10*3/uL 16.1* 16.5*    - Temp (24hrs), Av.9 °C (98.4 °F), Min:35.9 °C (96.6 °F), Max:37.6 °C (99.7 °F)     Plan:  - Continue to monitor for s/sx of infection  - Pan culture for temperature > 38.4 C    MUSCULOSKELETAL:  - No acute issues    ENT:  - Right tympanic membrane perforation     - ENT oxacillin drops BID for 7 days    SKIN:  - No acute issues  - Turns and skin care per NSU protocol    ACCESS:  - PIVs  - Right radial arterial line (--)  - R subclavian CVC (--)    PROPHYLAXIS:  - DVT Ppx: SCDs  - GI Ppx: Pantoprazole    RESTRAINTS:  I agree with nursing assessment of the patient's need for restraints to protect the patient from injury and facilitate healing. The patient is unable to cooperate with the plan of care and at risk for disrupting critical therapy (i.e., removing medical devices, lines, tubes and/or dressings).  Please see order for specifics. Restraints can be removed when the patient is able to cooperate with plan  of care and allow healing to occur, or the medical devices at risk are discontinued by the medical team.     Kyle Ma MD  Neuroscience Intensive Care       Plan of care to be discussed with neurocritical care attending

## 2024-08-08 NOTE — PROGRESS NOTES
Speech-Language Pathology  Adult Inpatient Clinical Bedside Swallow and Speech Generation Device Evaluation    Patient Name: Denae Nolasco  MRN: [id number]  Today's Date: 8/8/2024   Start Time: 850  Stop Time: 942  Time Calculation (min): 52  Swallow evaluation: 20 min  Generation speech device evaluate: 32    History of Present Illness:   Denae Nolasco is a 33 y.o. female with h/o raynaud, depression p/w worsening vertigo, ataxia (since 2020), MRI L vestibular schwannoma w/ 4th ventricular effacement and brainstem compression       Assessment:   Clinical bedside swallow evaluation completed. Pt A&O x4. L facial, labial and lingual weakness and decrease ROM. Limited ability to produce a bilabial seal, tongue deviated to the R. No soft palate elevation. Pt essentially aphonic with only a whisper for a voice. Given ice chips x3, adequate mastication and manipulation without any anterior loss. Pt appeared to swallow for each ice chip, however difficulty to determine toleration due to very weak cough. And most likely reduced sensation. Deferred further PO trials due to high risk for potential aspiration. Pt would benefit from continued SLP services in order to determine readiness for PO vs need for an instrumental exam.     Speech Generation device: Pt essentially aphonic therefore assessed effectiveness of communication board to aid communicating wants and needs. Initially trial of a 5x5 board with basic picture symbols. Pt only require min verbal cueing to scan to L to ensure 100% accuracy. Trial of EZ-board which is more phrases and woods. Pt successfully ID 90% of the word/phrases without cueing. 100% when again cue to scan to the L. Left both boards in room for medical staff and pt to utilize for communication until and if voice returns. SLP will continue to follow.      Recommendations:  NPO    Frequent, aggressive oral care is strongly recommended to improve infection control as well as reduce dental plaque  and bacteria on oropharyngeal surfaces which may increase the risk nosocomial infections, including pneumonia.    OK for small amounts of ice chips (one at a time, 1-3x/hour) for pleasure/swallow stimulation, but only after aggressive oral care to avoid colonization of bacteria within oral cavity.    Goal:   Pt. will tolerate least restrictive diet without overt s/s of aspiration with 100% accuracy.    Pt will utilize communication boards independently with medical staff and family.        Plan:  SLP Services Indicated: Yes  Frequency: 2x week  Discussed POC with patient  SLP - OK to Discharge    Pain:   0-10  0 = No pain.     Inpatient Education:  Extensive education provided to patient regarding current swallow function, recommendations/results, and POC.      Consultations/Referrals/Coordination of Services:   N/A

## 2024-08-08 NOTE — CARE PLAN
Problem: Pain - Adult  Goal: Verbalizes/displays adequate comfort level or baseline comfort level  Outcome: Met     Problem: Discharge Planning  Goal: Discharge to home or other facility with appropriate resources  Outcome: Progressing     Problem: Skin  Goal: Participates in plan/prevention/treatment measures  Outcome: Progressing  Goal: Prevent/minimize sheer/friction injuries  Outcome: Progressing  Goal: Promote/optimize nutrition  Outcome: Progressing     Problem: Respiratory  Goal: Clear secretions with interventions this shift  Outcome: Met  Goal: No signs of respiratory distress (eg. Use of accessory muscles. Peds grunting)  Outcome: Met

## 2024-08-08 NOTE — CONSULTS
"Nutrition Initial Assessment:   Nutrition Assessment    Reason for Assessment: Tube feeding assessment and order    Patient is a 33 y.o. female presenting with complaints of hearing loss, vertigo, ataxia (since 2020), headache, dizziness, and muscle spasms (noted symptoms worsening).   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    8/4 MRI IAC CISS stable  8/5 s/p L retrosig for tumor resection, RF EVD  8/6 CTH resection bed hemorrhage; MRI brain L transverse sigmoid sinus thrombosis, GTR  8/7 s/p extubation    Cortrak in place (KUB read with tip terminating in the gastric antrum) as pt not cleared for diet at this time.       Nutrition History:  Energy Intake: Good > 75 %  Food and Nutrient History: Pt able to nod for yes and shake head for no. Her  was at bedside and provided majority of information. They noted no recent change/decrease in appetite or intake. Mentioned that she eats a variety of foods/does not restrict any food groups. Denied ONS  Vitamin/Herbal Supplement Use: Iron  Food Allergies/Intolerances:  None  GI Symptoms: None  Oral Problems: None       Anthropometrics:  Height: 157.5 cm (5' 2.01\")   Weight: 59.7 kg (131 lb 9.8 oz)   BMI (Calculated): 24.07  IBW/kg (Dietitian Calculated): 50 kg  Percent of IBW: 119 %       Weight History:   Wt Readings from Last 10 Encounters:   08/08/24 59.7 kg (131 lb 9.8 oz)   08/02/24 55.3 kg (122 lb)   08/02/24 55.3 kg (122 lb)   06/04/24 54 kg (119 lb)     Weight Change %:  Significant Weight Loss: No    Nutrition Focused Physical Exam Findings:    Subcutaneous Fat Loss:   Orbital Fat Pads: Severe (dark circles, hollowing and loose skin)  Buccal Fat Pads: Mild-Moderate (flat cheeks, minimal bounce)  Triceps: Mild-Moderate (less than ample fat tissue)  Muscle Wasting:  Temporalis: Severe (hollowed scooping depression)  Pectoralis (Clavicular Region): Mild-Moderate (some protrusion of clavicle)  Deltoid/Trapezius: " Mild-Moderate (slight protrusion of acromion process)  Interosseous: Well nourished (muscle bulges)  Quadriceps: Mild-Moderate (mild depression on inner and outer thigh)  Physical Findings:  Hair: Negative  Nails: Negative    Nutrition Significant Labs:  CBC Trend:   Results from last 7 days   Lab Units 08/08/24  0020 08/06/24  2326 08/06/24  0216 08/04/24  1211   WBC AUTO x10*3/uL 16.1* 16.5* 11.6* 4.7   RBC AUTO x10*6/uL 3.26* 3.31* 3.92* 4.73   HEMOGLOBIN g/dL 9.4* 9.5* 11.3* 13.6   HEMATOCRIT % 27.3* 28.7* 32.5* 41.1   MCV fL 84 87 83 87   PLATELETS AUTO x10*3/uL 165 166 218 253    , BMP Trend:   Results from last 7 days   Lab Units 08/08/24  0734 08/08/24  0020 08/07/24  0552 08/06/24 2326 08/06/24  0601 08/06/24  0216 08/04/24  1211   GLUCOSE mg/dL  --  152*  --  176*  --  182* 129*   CALCIUM mg/dL  --  8.5*  --  7.8*  --  8.1* 9.7   SODIUM mmol/L 142 140  140   < > 146*  146*   < > 144 139   POTASSIUM mmol/L  --  3.8  --  3.3*  --  3.2* 4.4   CO2 mmol/L  --  29  --  23  --  19* 27   CHLORIDE mmol/L  --  105  --  112*  --  109* 105   BUN mg/dL  --  6  --  7  --  5* 9   CREATININE mg/dL  --  0.36*  --  0.35*  --  0.49* 0.57    < > = values in this interval not displayed.   BG POCT trend:   Results from last 7 days   Lab Units 08/08/24  1114 08/08/24  0812 08/07/24  1940 08/07/24  1809 08/07/24  1553   POCT GLUCOSE mg/dL 163* 120* 150* 144* 154*    , Renal Lab Trend:   Results from last 7 days   Lab Units 08/08/24  0734 08/08/24  0020 08/07/24  0552 08/06/24  2326 08/06/24  0601 08/06/24  0216 08/04/24  1211 08/04/24  1211   POTASSIUM mmol/L  --  3.8  --  3.3*  --  3.2*  --  4.4   PHOSPHORUS mg/dL  --  2.1*  --  3.1  --  2.3*  --  3.7   SODIUM mmol/L 142 140  140   < > 146*  146*   < > 144  --  139   MAGNESIUM mg/dL  --  2.31  --  1.65  --  1.51*   < >  --    EGFR mL/min/1.73m*2  --  >90  --  >90  --  >90  --  >90   BUN mg/dL  --  6  --  7  --  5*  --  9   CREATININE mg/dL  --  0.36*  --  0.35*  --  0.49*   --  0.57    < > = values in this interval not displayed.        Nutrition Specific Medications:  Scheduled medications  cefTRIAXone, 2 g, intravenous, q12h  dexAMETHasone, 4 mg, intravenous, q6h  escitalopram, 5 mg, nasogastric tube, Daily  pantoprazole, 40 mg, oral, Daily before breakfast   Or  esomeprazole, 40 mg, nasogastric tube, Daily before breakfast   Or  pantoprazole, 40 mg, intravenous, Daily before breakfast  heparin (porcine), 5,000 Units, subcutaneous, q8h  insulin lispro, 0-5 Units, subcutaneous, TID  lubricating eye drops, 1 drop, Left Eye, q4h  niMODipine, 60 mg, nasogastric tube, q4h ENOCH  ofloxacin, 5 drop, Right Ear, BID  perflutren protein A microsphere, 0.5 mL, intravenous, Once in imaging  polyethylene glycol, 17 g, nasogastric tube, Daily  scopolamine, 1 patch, transdermal, q72h  sennosides-docusate sodium, 2 tablet, nasogastric tube, BID  vancomycin (Vancocin) 1.75 g in sodium chloride 0.9% 250 mL IV, 1,750 mg, intravenous, q12h  white petrolatum-mineral oiL, 1 Application, Left Eye, q4h      Continuous medications  sodium chloride 0.9%, 75 mL/hr, Last Rate: 75 mL/hr (08/08/24 0725)      I/O:   Last BM Date:  (prior to admission); Stool Appearance: Unable to assess (08/06/24 1600)    Dietary Orders (From admission, onward)       Start     Ordered    08/05/24 0001  NPO Diet; Effective midnight  Diet effective midnight         08/04/24 1041                     Estimated Needs:   Total Energy Estimated Needs (kCal): 1550 kCal  Method for Estimating Needs: kcal/kg per wt of 55.3kg  Total Protein Estimated Needs (g): 80 g  Method for Estimating Needs: ~1.5g/kg per wt of 55.3kg  Total Fluid Estimated Needs (mL):  (per team)        Nutrition Diagnosis   Malnutrition Diagnosis  Patient has Malnutrition Diagnosis:  (pt with varying degree of muscle/fat loss; however, uncertain etiology as she reported good appetite and stable weight, additionally no documented comorbidity - unclear if NFPE findings are  nutrition related at this time)    Nutrition Diagnosis  Patient has Nutrition Diagnosis: Yes  Diagnosis Status (1): New  Nutrition Diagnosis 1: Increased nutrient needs  Related to (1): increased metabolic demand  As Evidenced by (1): s/p tumor resection       Nutrition Interventions/Recommendations         Nutrition Prescription:  Individualized Nutrition Prescription Provided for : enteral nutrition to meet ~1550kcals and 80g pro        Nutrition Interventions:   Interventions: Enteral Nutrition  Continue to monitor and replete phos as needed prior to starting TF.   Tube feed recs:   Start Isosource 1.5 @ 10ml/hr, increase by 10mls every 8-10hrs to reach goal of 40ml/hr.   Add 1 packet Pro Stat AWC daily.    Additional Interventions:   Recommend 100mg thiamine daily x 7 days.   Additional water flushes per team.     TF + Pro Stat provides 960mls, 1540kcals, 82g pro, 734mls free H2O       Nutrition Monitoring and Evaluation   Food/Nutrient Related History Monitoring  Monitoring and Evaluation Plan: Enteral and parenteral nutrition intake  Enteral and Parenteral Nutrition Intake: Enteral nutrition intake  Criteria: meet >/=75% of needs; TF tolerance    Body Composition/Growth/Weight History  Monitoring and Evaluation Plan: Weight  Weight: Measured weight  Criteria: Weekly weights    Biochemical Data, Medical Tests and Procedures  Monitoring and Evaluation Plan: Electrolyte/renal panel, Glucose/endocrine profile  Criteria: WNL with TF titration    Time Spent (min): 60 minutes

## 2024-08-08 NOTE — PROGRESS NOTES
Occupational Therapy    Evaluation and Treatment    Patient Name: Denae Nolasco  MRN: [id number]  Today's Date: 8/8/2024  Room: 13/13  Time Calculation  Start Time: 1204  Stop Time: 1235  Time Calculation (min): 31 min    Assessment  IP OT Assessment  Prognosis: Good  Barriers to Discharge:  (medical complexity)  Evaluation/Treatment Tolerance: Patient limited by fatigue  Medical Staff Made Aware: Yes  End of Session Communication: Bedside nurse  End of Session Patient Position: Bed, 3 rail up, Alarm off, not on at start of session  Plan:  Inpatient Plan  Treatment Interventions: ADL retraining, Functional transfer training, Visual perceptual retraining, UE strengthening/ROM, Endurance training, Cognitive reorientation, Patient/family training, Equipment evaluation/education, Neuromuscular reeducation, Fine motor coordination activities, Compensatory technique education  OT Frequency: 4 times per week  OT Discharge Recommendations: High intensity level of continued care  Equipment Recommended upon Discharge:  (TBD)  OT Recommended Transfer Status: Maximum assist  OT - OK to Discharge: Yes  OT Assessment  OT Assessment Results: Decreased ADL status, Decreased upper extremity range of motion, Decreased upper extremity strength, Decreased cognition, Decreased endurance, Visual deficit, Decreased functional mobility, Decreased fine motor control, Decreased gross motor control, Decreased IADLs  Prognosis: Good  Barriers to Discharge:  (medical complexity)  Evaluation/Treatment Tolerance: Patient limited by fatigue  Medical Staff Made Aware: Yes  Strengths: Attitude of self, Support and attitude of living partners    Subjective   Current Problem:  1. Schwannoma  Case Request Operating Room: Craniotomy with Excision Tissue    Case Request Operating Room: Craniotomy with Excision Tissue    Surgical Pathology Exam    Surgical Pathology Exam    CANCELED: Surgical Pathology Exam    CANCELED: Surgical Pathology Exam      2.  Atrial septal defect (HHS-HCC)  Transthoracic Echo (TTE) Complete    Transthoracic Echo (TTE) Complete    CANCELED: Vascular US lower extremity venous duplex bilateral    CANCELED: Vascular US lower extremity venous duplex bilateral      3. Pulmonary embolism, unspecified chronicity, unspecified pulmonary embolism type, unspecified whether acute cor pulmonale present (Multi)  Vascular US lower extremity venous duplex bilateral    Vascular US lower extremity venous duplex bilateral        General:  Reason for Referral: Pt p/w worsening vertigo and ataxia. 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 On CT CAP: R middle lobe calcifications c/w granulomatous disease, dilated CBD, intrahepatic dilatation. 8/5 s/p L retrosig for tumor resection, RF EVD, 8/6 CTH resection bed hemorrhage. 8/6 MRI brain L transverse sigmoid sinus thrombosis, GTR. 8/7 s/p extubation.  Past Medical History Relevant to Rehab: ASD s/p closure in 1997, raynaud, depression  Prior to Session Communication: Bedside nurse  Patient Position Received: Bed, 3 rail up, Alarm off, not on at start of session  Family/Caregiver Present: Yes  Caregiver Feedback: Pt's spouse present and supportive during session  General Comment: Pt drowsy but pleasant and cooperative throughout session   Precautions:  Hearing/Visual Limitations: Hearing WFL, R beating nystagmus, pt wears glasses for distance at baseline  Medical Precautions: Fall precautions, External Ventricular Device (EVD)  Precautions Comment: SBP <140. EVD clamped for mobility.  Vital Signs:  Heart Rate: 94 (post 80)  Heart Rate Source: Monitor  Resp: 11  SpO2: 96 % (post 97)  BP: 120/79 (post 116/90)  MAP (mmHg): 90  Pain:  Pain Assessment  Pain Assessment: 0-10  0-10 (Numeric) Pain Score: 0 - No pain  Lines/Tubes/Drains:  CVC 08/05/24 Triple lumen Right Subclavian (Active)   Number of days: 3       Arterial Line 08/05/24 Right Radial (Active)   Number  of days: 3       Urethral Catheter Double-lumen;Non-latex 16 Fr. (Active)   Number of days: 3       NG/OG/Feeding Tube Left nostril (Active)   Number of days: 0       Intracranial Pressure/Ventriculostomy Ventricular drainage catheter with ICP transducer Right (Active)   Number of days: 2         Objective   Cognition:  Overall Cognitive Status: Within Functional Limits  Orientation Level: Oriented X4  Following Commands:  (Pt followed 90% commands with repetition and increased time)  Cognition Comments: soft voice, difficulty projecting voice. drowsy but able to maintain arousal throughout session with stimulation and cueing.  Insight: Within function limits  Impulsive: Within functional limits  Processing Speed: Delayed        Sears Agitation Sedation Scale  Sears Agitation Sedation Scale (RASS): Drowsy  Home Living:  Type of Home: House  Lives With: Spouse  Home Adaptive Equipment: None  Home Layout: Two level, 1/2 bath on main level, Stairs to alternate level with rails, Bed/bath upstairs, Laundry main level  Alternate Level Stairs-Number of Steps: 12  Home Access: Stairs to enter without rails  Entrance Stairs-Number of Steps: 1 threshold step  Bathroom Shower/Tub: Tub/shower unit  Bathroom Toilet: Standard   Prior Function:  Level of [place]: Independent with ADLs and functional transfers, Independent with homemaking with ambulation  ADL Assistance: Independent  Homemaking Assistance: Independent  Ambulatory Assistance: Independent  Vocational: Full time employment ()  Leisure: Playing Dance Dance Revolution, biking, walking  Hand Dominance: Right  Prior Function Comments: drives (-) falls  IADL History:     ADL:  Eating Assistance: Total  Grooming Assistance: Moderate  Grooming Deficit:  (oral hygiene management)  Bathing Assistance: Maximal  UE Dressing Assistance: Moderate  LE Dressing Assistance: Maximal  LE Dressing Deficit: Don/doff R sock  Toileting Assistance with Device:  Maximal  Activity Tolerance:  Endurance: Tolerates 10 - 20 min exercise with multiple rests  Early Mobility/Exercise Safety Screen: Proceed with mobilization - No exclusion criteria met  Balance:  Dynamic Sitting Balance  Dynamic Sitting-Comments: Min-Mod A  Dynamic Standing Balance  Dynamic Standing-Comments: Max A  Static Sitting Balance  Static Sitting-Level of Assistance:  (CGA-Mod A)  Static Standing Balance  Static Standing-Level of Assistance: Moderate assistance  Bed Mobility/Transfers: Bed Mobility/Transfers: Bed Mobility  Bed Mobility: Yes  Bed Mobility 1  Bed Mobility 1: Supine to sitting, Sitting to supine  Level of Assistance 1: Maximum assistance  Bed Mobility Comments 1: HOB elevated, cues for sequencing  Functional Mobility  Functional Mobility Performed: Yes  Functional Mobility 1  Comments 1: compelted side steps with pt with B arm in arm assist in front of pt with LE blocking, completing with Max A   and Transfers  Transfer: Yes  Transfer 1  Technique 1: Sit to stand, Stand to sit  Transfer Device 1:  (HHA)  Transfer Level of Assistance 1: Maximum assistance  Trials/Comments 1: cues for proper hand placement and LE placement.  IADL's:      Vision: Vision - Basic Assessment  Current Vision: Wears glasses for distance only   and Vision - Complex Assessment  Vision Comments: R beating nystagmus  Sensation:  Light Touch: No apparent deficits  Strength:     Perception:  Inattention/Neglect: Appears intact  Coordination:  Movements are Fluid and Coordinated: No  Upper Body Coordination: Bradykinesia  Finger to Nose: Bradykinesia  Heel to Shin: Bradykinesia   Hand Function:  Hand Function  Gross Grasp: Impaired (L grasp 3+/5, L grasp 3/5)  Coordination: Impaired  Extremities:   RUE   RUE :  (PROM WFL, limited shld flex AROM ~0-30 degrees, R elbow flex 3/4 AROM)  RUE Strength  R Shoulder Flexion: 2/5  R Shoulder Extension: 2+/5  R Shoulder ABduction: 3-/5  R Elbow Flexion: 2-/5  R Elbow Extension: 2+/5  R  Forearm Pronation: 2/5  R Forearm Supination: 2-/5  R Wrist Flexion: 3-/5  R Wrist Extension: 3-/5, LUE   LUE:  (AROM WFL)  LUE Strength  L Shoulder Flexion: 3+/5  L Shoulder Extension: 4-/5  L Shoulder ABduction: 3+/5  L Elbow Flexion: 4/5  L Elbow Extension: 4/5, RLE   RLE :  (R LE AROM WFL, strength >/= 3+/5), and LLE   LLE :  (AROM WFL, strength >/= 3+/5)      Outcome Measures: [facility] Daily Activity  Putting on and taking off regular lower body clothing: A lot  Bathing (including washing, rinsing, drying): A lot  Putting on and taking off regular upper body clothing: A lot  Toileting, which includes using toilet, bedpan or urinal: A lot  Taking care of personal grooming such as brushing teeth: A lot  Eating Meals: Total  Daily Activity - Total Score: 11    Confusion Assessment Method-ICU (CAM-ICU)  Feature 3: Altered Level of Consciousness: Positive   ICU Mobility Screen  Early Mobility/Exercise Safety Screen: Proceed with mobilization - No exclusion criteria met  ICU Mobility Scale: Marching on spot (at bedside),   Sears Agitation Sedation Scale  Sears Agitation Sedation Scale (RASS): Drowsy      Education Documentation  Body Mechanics, taught by Jelly Anderson OT at 8/8/2024  3:01 PM.  Learner: Patient  Readiness: Acceptance  Method: Explanation, Demonstration  Response: Verbalizes Understanding, Demonstrated Understanding    Precautions, taught by Jelly Anderson OT at 8/8/2024  3:01 PM.  Learner: Patient  Readiness: Acceptance  Method: Explanation, Demonstration  Response: Verbalizes Understanding, Demonstrated Understanding    ADL Training, taught by Jelly Anderson OT at 8/8/2024  3:01 PM.  Learner: Patient  Readiness: Acceptance  Method: Explanation, Demonstration  Response: Verbalizes Understanding, Demonstrated Understanding    Education Comments  No comments found.        Goals:   Encounter Problems       Encounter Problems (Active)       ADLs       Patient will perform UB and LB bathing  with SBA using AE as needed.       Start:  08/08/24    Expected End:  08/22/24            Patient with complete upper body dressing with Mod I.       Start:  08/08/24    Expected End:  08/22/24            Patient with complete lower body dressing with SBA using AE and adaptive tech as needed.       Start:  08/08/24    Expected End:  08/22/24            Patient will complete daily grooming tasks with Mod I using AE as needed.       Start:  08/08/24    Expected End:  08/22/24            Patient will complete toileting including hygiene clothing management/hygiene with SBA.       Start:  08/08/24    Expected End:  08/22/24               BALANCE       Pt will tolerate dynamic sitting EOB > 20 min IND while completing ADL and functional tasks.         Start:  08/08/24    Expected End:  08/22/24               COGNITION/SAFETY       Patient will score WFL on standardized cognitive assessment within reasonable time frame       Start:  08/08/24    Expected End:  08/22/24               MOBILITY       Pt will perform functional mobility household distances with SBA using LRAD without LOB and demonstrating good safety awareness.        Start:  08/08/24    Expected End:  08/22/24               TRANSFERS       Pt will perform bed mobility in simulated home environment with SBA.       Start:  08/08/24    Expected End:  08/22/24            Pt will perform functional transfers on various surfaces with SBA using LRAD with good safety awareness.        Start:  08/08/24    Expected End:  08/22/24                   Treatment Completed on Evaluation    Activities of Daily Living:        Pt required [place] assist and cueing to maintain L hand grasp on oral suction yankauer brush to brush teeth with increased time to complete. Required Mod A.                 Therapy/Activity:     Therapeutic Activity  Therapeutic Activity Performed: Yes  Therapeutic Activity 1: Pt sat EOB x 15min with CGA-mod A with [place] and trunk flexion, cues needed  for UE hand placement and maintaining upright, midline position.  Therapeutic Activity 2: Pt completed 2 trials sit <> stand with Max A x 1 with arm in arm assist with LE blocking. pt completed 2 trials side steps at EOB with Max A x 1 with assist for lateral weight-shifting, sequencing, and clearing LE from floor.       08/08/24 at 3:01 PM   Jelly Anderson, OT   Rehab Office: [phone]

## 2024-08-09 ENCOUNTER — ANESTHESIA EVENT (OUTPATIENT)
Dept: OPERATING ROOM | Facility: HOSPITAL | Age: 34
End: 2024-08-09
Payer: COMMERCIAL

## 2024-08-09 ENCOUNTER — APPOINTMENT (OUTPATIENT)
Dept: VASCULAR MEDICINE | Facility: HOSPITAL | Age: 34
DRG: 003 | End: 2024-08-09
Payer: COMMERCIAL

## 2024-08-09 ENCOUNTER — APPOINTMENT (OUTPATIENT)
Dept: RADIOLOGY | Facility: HOSPITAL | Age: 34
DRG: 003 | End: 2024-08-09
Payer: COMMERCIAL

## 2024-08-09 ENCOUNTER — ANESTHESIA (OUTPATIENT)
Dept: OPERATING ROOM | Facility: HOSPITAL | Age: 34
End: 2024-08-09
Payer: COMMERCIAL

## 2024-08-09 LAB
ALBUMIN SERPL BCP-MCNC: 4 G/DL (ref 3.4–5)
ANION GAP SERPL CALC-SCNC: 15 MMOL/L (ref 10–20)
ATRIAL RATE: 127 BPM
BASOPHILS # BLD AUTO: 0.01 X10*3/UL (ref 0–0.1)
BASOPHILS NFR BLD AUTO: 0.1 %
BUN SERPL-MCNC: 13 MG/DL (ref 6–23)
CA-I BLD-SCNC: 1.14 MMOL/L (ref 1.1–1.33)
CALCIUM SERPL-MCNC: 8.8 MG/DL (ref 8.6–10.6)
CHLORIDE SERPL-SCNC: 104 MMOL/L (ref 98–107)
CO2 SERPL-SCNC: 27 MMOL/L (ref 21–32)
CREAT SERPL-MCNC: 0.36 MG/DL (ref 0.5–1.05)
EGFRCR SERPLBLD CKD-EPI 2021: >90 ML/MIN/1.73M*2
EOSINOPHIL # BLD AUTO: 0 X10*3/UL (ref 0–0.7)
EOSINOPHIL NFR BLD AUTO: 0 %
ERYTHROCYTE [DISTWIDTH] IN BLOOD BY AUTOMATED COUNT: 12.4 % (ref 11.5–14.5)
GLUCOSE BLD MANUAL STRIP-MCNC: 116 MG/DL (ref 74–99)
GLUCOSE BLD MANUAL STRIP-MCNC: 118 MG/DL (ref 74–99)
GLUCOSE BLD MANUAL STRIP-MCNC: 119 MG/DL (ref 74–99)
GLUCOSE BLD MANUAL STRIP-MCNC: 124 MG/DL (ref 74–99)
GLUCOSE SERPL-MCNC: 128 MG/DL (ref 74–99)
HCG UR QL IA.RAPID: NEGATIVE
HCT VFR BLD AUTO: 30.9 % (ref 36–46)
HGB BLD-MCNC: 10.7 G/DL (ref 12–16)
IMM GRANULOCYTES # BLD AUTO: 0.08 X10*3/UL (ref 0–0.7)
IMM GRANULOCYTES NFR BLD AUTO: 1 % (ref 0–0.9)
LABORATORY COMMENT REPORT: NORMAL
LYMPHOCYTES # BLD AUTO: 0.42 X10*3/UL (ref 1.2–4.8)
LYMPHOCYTES NFR BLD AUTO: 5.5 %
Lab: NORMAL
MAGNESIUM SERPL-MCNC: 2.29 MG/DL (ref 1.6–2.4)
MCH RBC QN AUTO: 28.4 PG (ref 26–34)
MCHC RBC AUTO-ENTMCNC: 34.6 G/DL (ref 32–36)
MCV RBC AUTO: 82 FL (ref 80–100)
MONOCYTES # BLD AUTO: 0.36 X10*3/UL (ref 0.1–1)
MONOCYTES NFR BLD AUTO: 4.7 %
NEUTROPHILS # BLD AUTO: 6.75 X10*3/UL (ref 1.2–7.7)
NEUTROPHILS NFR BLD AUTO: 88.7 %
NRBC BLD-RTO: 0 /100 WBCS (ref 0–0)
P AXIS: 53 DEGREES
P OFFSET: 195 MS
P ONSET: 145 MS
PATH REPORT.FINAL DX SPEC: NORMAL
PATH REPORT.GROSS SPEC: NORMAL
PATH REPORT.RELEVANT HX SPEC: NORMAL
PATH REPORT.TOTAL CANCER: NORMAL
PHOSPHATE SERPL-MCNC: 2.7 MG/DL (ref 2.5–4.9)
PLATELET # BLD AUTO: 191 X10*3/UL (ref 150–450)
POTASSIUM SERPL-SCNC: 3.6 MMOL/L (ref 3.5–5.3)
PR INTERVAL: 140 MS
Q ONSET: 215 MS
QRS COUNT: 21 BEATS
QRS DURATION: 96 MS
QT INTERVAL: 332 MS
QTC CALCULATION(BAZETT): 482 MS
QTC FREDERICIA: 426 MS
R AXIS: 84 DEGREES
RBC # BLD AUTO: 3.77 X10*6/UL (ref 4–5.2)
SODIUM SERPL-SCNC: 139 MMOL/L (ref 136–145)
SODIUM SERPL-SCNC: 141 MMOL/L (ref 136–145)
SODIUM SERPL-SCNC: 142 MMOL/L (ref 136–145)
T AXIS: 42 DEGREES
T OFFSET: 381 MS
VANCOMYCIN SERPL-MCNC: 30.1 UG/ML (ref 5–20)
VENTRICULAR RATE: 127 BPM
WBC # BLD AUTO: 7.6 X10*3/UL (ref 4.4–11.3)

## 2024-08-09 PROCEDURE — 31600 PLANNED TRACHEOSTOMY: CPT | Performed by: OTOLARYNGOLOGY

## 2024-08-09 PROCEDURE — 82947 ASSAY GLUCOSE BLOOD QUANT: CPT

## 2024-08-09 PROCEDURE — 37799 UNLISTED PX VASCULAR SURGERY: CPT | Performed by: REGISTERED NURSE

## 2024-08-09 PROCEDURE — 2500000004 HC RX 250 GENERAL PHARMACY W/ HCPCS (ALT 636 FOR OP/ED)

## 2024-08-09 PROCEDURE — 84295 ASSAY OF SERUM SODIUM: CPT

## 2024-08-09 PROCEDURE — 83735 ASSAY OF MAGNESIUM: CPT | Performed by: REGISTERED NURSE

## 2024-08-09 PROCEDURE — 93970 EXTREMITY STUDY: CPT | Performed by: STUDENT IN AN ORGANIZED HEALTH CARE EDUCATION/TRAINING PROGRAM

## 2024-08-09 PROCEDURE — 2500000001 HC RX 250 WO HCPCS SELF ADMINISTERED DRUGS (ALT 637 FOR MEDICARE OP)

## 2024-08-09 PROCEDURE — 2500000005 HC RX 250 GENERAL PHARMACY W/O HCPCS: Performed by: STUDENT IN AN ORGANIZED HEALTH CARE EDUCATION/TRAINING PROGRAM

## 2024-08-09 PROCEDURE — 0DH63UZ INSERTION OF FEEDING DEVICE INTO STOMACH, PERCUTANEOUS APPROACH: ICD-10-PCS | Performed by: OTOLARYNGOLOGY

## 2024-08-09 PROCEDURE — 3700000002 HC GENERAL ANESTHESIA TIME - EACH INCREMENTAL 1 MINUTE: Performed by: STUDENT IN AN ORGANIZED HEALTH CARE EDUCATION/TRAINING PROGRAM

## 2024-08-09 PROCEDURE — 2500000001 HC RX 250 WO HCPCS SELF ADMINISTERED DRUGS (ALT 637 FOR MEDICARE OP): Performed by: STUDENT IN AN ORGANIZED HEALTH CARE EDUCATION/TRAINING PROGRAM

## 2024-08-09 PROCEDURE — 2020000001 HC ICU ROOM DAILY

## 2024-08-09 PROCEDURE — 0B113F4 BYPASS TRACHEA TO CUTANEOUS WITH TRACHEOSTOMY DEVICE, PERCUTANEOUS APPROACH: ICD-10-PCS | Performed by: OTOLARYNGOLOGY

## 2024-08-09 PROCEDURE — 2500000005 HC RX 250 GENERAL PHARMACY W/O HCPCS

## 2024-08-09 PROCEDURE — 93970 EXTREMITY STUDY: CPT

## 2024-08-09 PROCEDURE — 3700000001 HC GENERAL ANESTHESIA TIME - INITIAL BASE CHARGE: Performed by: STUDENT IN AN ORGANIZED HEALTH CARE EDUCATION/TRAINING PROGRAM

## 2024-08-09 PROCEDURE — 3600000004 HC OR TIME - INITIAL BASE CHARGE - PROCEDURE LEVEL FOUR: Performed by: STUDENT IN AN ORGANIZED HEALTH CARE EDUCATION/TRAINING PROGRAM

## 2024-08-09 PROCEDURE — 2500000001 HC RX 250 WO HCPCS SELF ADMINISTERED DRUGS (ALT 637 FOR MEDICARE OP): Performed by: REGISTERED NURSE

## 2024-08-09 PROCEDURE — 2500000004 HC RX 250 GENERAL PHARMACY W/ HCPCS (ALT 636 FOR OP/ED): Performed by: REGISTERED NURSE

## 2024-08-09 PROCEDURE — 85025 COMPLETE CBC W/AUTO DIFF WBC: CPT | Performed by: REGISTERED NURSE

## 2024-08-09 PROCEDURE — 3600000009 HC OR TIME - EACH INCREMENTAL 1 MINUTE - PROCEDURE LEVEL FOUR: Performed by: STUDENT IN AN ORGANIZED HEALTH CARE EDUCATION/TRAINING PROGRAM

## 2024-08-09 PROCEDURE — 37799 UNLISTED PX VASCULAR SURGERY: CPT

## 2024-08-09 PROCEDURE — 99291 CRITICAL CARE FIRST HOUR: CPT

## 2024-08-09 PROCEDURE — 43246 EGD PLACE GASTROSTOMY TUBE: CPT | Performed by: OTOLARYNGOLOGY

## 2024-08-09 PROCEDURE — 84100 ASSAY OF PHOSPHORUS: CPT | Performed by: REGISTERED NURSE

## 2024-08-09 PROCEDURE — 80202 ASSAY OF VANCOMYCIN: CPT

## 2024-08-09 PROCEDURE — 82330 ASSAY OF CALCIUM: CPT | Performed by: REGISTERED NURSE

## 2024-08-09 PROCEDURE — 81025 URINE PREGNANCY TEST: CPT

## 2024-08-09 PROCEDURE — 2720000007 HC OR 272 NO HCPCS: Performed by: STUDENT IN AN ORGANIZED HEALTH CARE EDUCATION/TRAINING PROGRAM

## 2024-08-09 RX ORDER — PHENYLEPHRINE HYDROCHLORIDE 10 MG/ML
INJECTION INTRAVENOUS AS NEEDED
Status: DISCONTINUED | OUTPATIENT
Start: 2024-08-09 | End: 2024-08-09

## 2024-08-09 RX ORDER — PROPOFOL 10 MG/ML
INJECTION, EMULSION INTRAVENOUS AS NEEDED
Status: DISCONTINUED | OUTPATIENT
Start: 2024-08-09 | End: 2024-08-09

## 2024-08-09 RX ORDER — ONDANSETRON HYDROCHLORIDE 2 MG/ML
INJECTION, SOLUTION INTRAVENOUS AS NEEDED
Status: DISCONTINUED | OUTPATIENT
Start: 2024-08-09 | End: 2024-08-09

## 2024-08-09 RX ORDER — FENTANYL CITRATE 50 UG/ML
INJECTION, SOLUTION INTRAMUSCULAR; INTRAVENOUS AS NEEDED
Status: DISCONTINUED | OUTPATIENT
Start: 2024-08-09 | End: 2024-08-09

## 2024-08-09 RX ORDER — HYDROMORPHONE HYDROCHLORIDE 1 MG/ML
INJECTION, SOLUTION INTRAMUSCULAR; INTRAVENOUS; SUBCUTANEOUS
Status: COMPLETED
Start: 2024-08-09 | End: 2024-08-09

## 2024-08-09 RX ORDER — CEFAZOLIN 1 G/1
INJECTION, POWDER, FOR SOLUTION INTRAVENOUS AS NEEDED
Status: DISCONTINUED | OUTPATIENT
Start: 2024-08-09 | End: 2024-08-09

## 2024-08-09 RX ORDER — LANOLIN ALCOHOL/MO/W.PET/CERES
100 CREAM (GRAM) TOPICAL DAILY
Status: COMPLETED | OUTPATIENT
Start: 2024-08-10 | End: 2024-08-14

## 2024-08-09 RX ORDER — OXYCODONE HYDROCHLORIDE 5 MG/1
2.5 TABLET ORAL EVERY 4 HOURS PRN
Status: DISCONTINUED | OUTPATIENT
Start: 2024-08-09 | End: 2024-08-14 | Stop reason: HOSPADM

## 2024-08-09 RX ORDER — SODIUM CHLORIDE, SODIUM LACTATE, POTASSIUM CHLORIDE, CALCIUM CHLORIDE 600; 310; 30; 20 MG/100ML; MG/100ML; MG/100ML; MG/100ML
INJECTION, SOLUTION INTRAVENOUS CONTINUOUS PRN
Status: DISCONTINUED | OUTPATIENT
Start: 2024-08-09 | End: 2024-08-09

## 2024-08-09 RX ORDER — WATER 1 ML/ML
IRRIGANT IRRIGATION AS NEEDED
Status: DISCONTINUED | OUTPATIENT
Start: 2024-08-09 | End: 2024-08-09 | Stop reason: HOSPADM

## 2024-08-09 RX ORDER — POLYETHYLENE GLYCOL 3350 17 G/17G
17 POWDER, FOR SOLUTION ORAL DAILY
Status: DISCONTINUED | OUTPATIENT
Start: 2024-08-10 | End: 2024-08-14 | Stop reason: HOSPADM

## 2024-08-09 RX ORDER — ESCITALOPRAM OXALATE 10 MG/1
5 TABLET ORAL DAILY
Status: DISCONTINUED | OUTPATIENT
Start: 2024-08-10 | End: 2024-08-14 | Stop reason: HOSPADM

## 2024-08-09 RX ORDER — LIDOCAINE HYDROCHLORIDE AND EPINEPHRINE 10; 10 MG/ML; UG/ML
INJECTION, SOLUTION INFILTRATION; PERINEURAL AS NEEDED
Status: DISCONTINUED | OUTPATIENT
Start: 2024-08-09 | End: 2024-08-09 | Stop reason: HOSPADM

## 2024-08-09 RX ORDER — POTASSIUM CHLORIDE 1.5 G/1.58G
40 POWDER, FOR SOLUTION ORAL EVERY 6 HOURS PRN
Status: DISCONTINUED | OUTPATIENT
Start: 2024-08-09 | End: 2024-08-14

## 2024-08-09 RX ORDER — SODIUM CHLORIDE 0.9 G/100ML
IRRIGANT IRRIGATION AS NEEDED
Status: DISCONTINUED | OUTPATIENT
Start: 2024-08-09 | End: 2024-08-09 | Stop reason: HOSPADM

## 2024-08-09 RX ORDER — POTASSIUM CHLORIDE 20 MEQ/1
40 TABLET, EXTENDED RELEASE ORAL EVERY 6 HOURS PRN
Status: DISCONTINUED | OUTPATIENT
Start: 2024-08-09 | End: 2024-08-14

## 2024-08-09 RX ORDER — LIDOCAINE HCL/PF 100 MG/5ML
SYRINGE (ML) INTRAVENOUS AS NEEDED
Status: DISCONTINUED | OUTPATIENT
Start: 2024-08-09 | End: 2024-08-09

## 2024-08-09 RX ORDER — ROCURONIUM BROMIDE 10 MG/ML
INJECTION, SOLUTION INTRAVENOUS AS NEEDED
Status: DISCONTINUED | OUTPATIENT
Start: 2024-08-09 | End: 2024-08-09

## 2024-08-09 RX ORDER — HYDROMORPHONE HYDROCHLORIDE 1 MG/ML
0.2 INJECTION, SOLUTION INTRAMUSCULAR; INTRAVENOUS; SUBCUTANEOUS
Status: DISCONTINUED | OUTPATIENT
Start: 2024-08-09 | End: 2024-08-13

## 2024-08-09 RX ORDER — AMOXICILLIN 250 MG
2 CAPSULE ORAL 2 TIMES DAILY
Status: DISCONTINUED | OUTPATIENT
Start: 2024-08-09 | End: 2024-08-14 | Stop reason: HOSPADM

## 2024-08-09 RX ORDER — ESMOLOL HYDROCHLORIDE 10 MG/ML
INJECTION INTRAVENOUS AS NEEDED
Status: DISCONTINUED | OUTPATIENT
Start: 2024-08-09 | End: 2024-08-09

## 2024-08-09 RX ORDER — SODIUM CHLORIDE 9 MG/ML
50 INJECTION, SOLUTION INTRAVENOUS CONTINUOUS
Status: DISCONTINUED | OUTPATIENT
Start: 2024-08-09 | End: 2024-08-14

## 2024-08-09 RX ADMIN — WHITE PETROLATUM 57.7 %-MINERAL OIL 31.9 % EYE OINTMENT 1 APPLICATION: at 00:06

## 2024-08-09 RX ADMIN — OFLOXACIN OTIC 5 DROP: 3 SOLUTION AURICULAR (OTIC) at 20:22

## 2024-08-09 RX ADMIN — CEFTRIAXONE SODIUM 2 G: 2 INJECTION, SOLUTION INTRAVENOUS at 04:08

## 2024-08-09 RX ADMIN — CARBOXYMETHYLCELLULOSE SODIUM 1 DROP: 5 SOLUTION/ DROPS OPHTHALMIC at 00:07

## 2024-08-09 RX ADMIN — OFLOXACIN OTIC 5 DROP: 3 SOLUTION AURICULAR (OTIC) at 08:17

## 2024-08-09 RX ADMIN — SCOPOLAMINE 1 PATCH: 1.5 PATCH, EXTENDED RELEASE TRANSDERMAL at 21:00

## 2024-08-09 RX ADMIN — SENNOSIDES AND DOCUSATE SODIUM 2 TABLET: 50; 8.6 TABLET ORAL at 08:17

## 2024-08-09 RX ADMIN — NIMODIPINE 60 MG: 30 SOLUTION ORAL at 17:06

## 2024-08-09 RX ADMIN — CARBOXYMETHYLCELLULOSE SODIUM 1 DROP: 5 SOLUTION/ DROPS OPHTHALMIC at 20:15

## 2024-08-09 RX ADMIN — WHITE PETROLATUM 57.7 %-MINERAL OIL 31.9 % EYE OINTMENT 1 APPLICATION: at 11:21

## 2024-08-09 RX ADMIN — POTASSIUM CHLORIDE 40 MEQ: 1.5 POWDER, FOR SOLUTION ORAL at 08:16

## 2024-08-09 RX ADMIN — VANCOMYCIN HYDROCHLORIDE 1500 MG: 500 INJECTION, POWDER, LYOPHILIZED, FOR SOLUTION INTRAVENOUS at 10:22

## 2024-08-09 RX ADMIN — CARBOXYMETHYLCELLULOSE SODIUM 1 DROP: 5 SOLUTION/ DROPS OPHTHALMIC at 15:34

## 2024-08-09 RX ADMIN — WHITE PETROLATUM 57.7 %-MINERAL OIL 31.9 % EYE OINTMENT 1 APPLICATION: at 15:34

## 2024-08-09 RX ADMIN — NIMODIPINE 60 MG: 30 SOLUTION ORAL at 05:31

## 2024-08-09 RX ADMIN — NIMODIPINE 60 MG: 30 SOLUTION ORAL at 10:22

## 2024-08-09 RX ADMIN — ESCITALOPRAM 5 MG: 5 TABLET, FILM COATED ORAL at 08:17

## 2024-08-09 RX ADMIN — SODIUM CHLORIDE 50 ML/HR: 9 INJECTION, SOLUTION INTRAVENOUS at 02:53

## 2024-08-09 RX ADMIN — THIAMINE HCL TAB 100 MG 100 MG: 100 TAB at 08:17

## 2024-08-09 RX ADMIN — WHITE PETROLATUM 57.7 %-MINERAL OIL 31.9 % EYE OINTMENT 1 APPLICATION: at 08:16

## 2024-08-09 RX ADMIN — HYDROMORPHONE HYDROCHLORIDE 0.2 MG: 1 INJECTION, SOLUTION INTRAMUSCULAR; INTRAVENOUS; SUBCUTANEOUS at 14:58

## 2024-08-09 RX ADMIN — CARBOXYMETHYLCELLULOSE SODIUM 1 DROP: 5 SOLUTION/ DROPS OPHTHALMIC at 04:08

## 2024-08-09 RX ADMIN — ESOMEPRAZOLE MAGNESIUM 40 MG: 40 FOR SUSPENSION ORAL at 08:17

## 2024-08-09 RX ADMIN — CARBOXYMETHYLCELLULOSE SODIUM 1 DROP: 5 SOLUTION/ DROPS OPHTHALMIC at 08:17

## 2024-08-09 RX ADMIN — DEXAMETHASONE SODIUM PHOSPHATE 4 MG: 4 INJECTION, SOLUTION INTRA-ARTICULAR; INTRALESIONAL; INTRAMUSCULAR; INTRAVENOUS; SOFT TISSUE at 14:56

## 2024-08-09 RX ADMIN — CEFTRIAXONE SODIUM 2 G: 2 INJECTION, SOLUTION INTRAVENOUS at 15:34

## 2024-08-09 RX ADMIN — DEXAMETHASONE SODIUM PHOSPHATE 4 MG: 4 INJECTION, SOLUTION INTRA-ARTICULAR; INTRALESIONAL; INTRAMUSCULAR; INTRAVENOUS; SOFT TISSUE at 08:17

## 2024-08-09 RX ADMIN — Medication 30 PERCENT: at 16:12

## 2024-08-09 RX ADMIN — NIMODIPINE 60 MG: 30 SOLUTION ORAL at 21:00

## 2024-08-09 RX ADMIN — WHITE PETROLATUM 57.7 %-MINERAL OIL 31.9 % EYE OINTMENT 1 APPLICATION: at 04:08

## 2024-08-09 RX ADMIN — WHITE PETROLATUM 57.7 %-MINERAL OIL 31.9 % EYE OINTMENT 1 APPLICATION: at 20:23

## 2024-08-09 RX ADMIN — POLYETHYLENE GLYCOL 3350 17 G: 17 POWDER, FOR SOLUTION ORAL at 08:16

## 2024-08-09 RX ADMIN — DEXAMETHASONE SODIUM PHOSPHATE 4 MG: 4 INJECTION, SOLUTION INTRA-ARTICULAR; INTRALESIONAL; INTRAMUSCULAR; INTRAVENOUS; SOFT TISSUE at 02:52

## 2024-08-09 RX ADMIN — SENNOSIDES AND DOCUSATE SODIUM 2 TABLET: 50; 8.6 TABLET ORAL at 20:22

## 2024-08-09 RX ADMIN — CARBOXYMETHYLCELLULOSE SODIUM 1 DROP: 5 SOLUTION/ DROPS OPHTHALMIC at 11:21

## 2024-08-09 RX ADMIN — NIMODIPINE 60 MG: 30 SOLUTION ORAL at 01:55

## 2024-08-09 RX ADMIN — DEXAMETHASONE SODIUM PHOSPHATE 4 MG: 4 INJECTION, SOLUTION INTRA-ARTICULAR; INTRALESIONAL; INTRAMUSCULAR; INTRAVENOUS; SOFT TISSUE at 20:22

## 2024-08-09 ASSESSMENT — PAIN DESCRIPTION - LOCATION: LOCATION: ABDOMEN

## 2024-08-09 ASSESSMENT — PAIN SCALES - GENERAL
PAINLEVEL_OUTOF10: 3
PAINLEVEL_OUTOF10: 0 - NO PAIN

## 2024-08-09 ASSESSMENT — PAIN - FUNCTIONAL ASSESSMENT
PAIN_FUNCTIONAL_ASSESSMENT: 0-10

## 2024-08-09 NOTE — CARE PLAN
The patient's goals for the shift include saftey    The clinical goals for the shift include Pt will remain safe and free from falls this shift      Problem: Discharge Planning  Goal: Discharge to home or other facility with appropriate resources  Outcome: Progressing     Problem: Chronic Conditions and Co-morbidities  Goal: Patient's chronic conditions and co-morbidity symptoms are monitored and maintained or improved  Outcome: Progressing     Problem: Skin  Goal: Decreased wound size/increased tissue granulation at next dressing change  Outcome: Progressing  Flowsheets (Taken 8/9/2024 0854)  Decreased wound size/increased tissue granulation at next dressing change: Utilize specialty bed per algorithm  Goal: Participates in plan/prevention/treatment measures  Outcome: Progressing  Goal: Prevent/manage excess moisture  Outcome: Progressing  Goal: Prevent/minimize sheer/friction injuries  Outcome: Progressing  Goal: Promote/optimize nutrition  Outcome: Progressing  Goal: Promote skin healing  Outcome: Progressing     Problem: Respiratory  Goal: Minimize anxiety/maximize coping throughout shift  Outcome: Progressing  Goal: Minimal/no exertional discomfort or dyspnea this shift  Outcome: Progressing  Goal: Tolerate mechanical ventilation evidenced by VS/agitation level this shift  Outcome: Progressing  Goal: Tolerate pulmonary toileting this shift  Outcome: Progressing  Goal: Verbalize decreased shortness of breath this shift  Outcome: Progressing  Goal: Wean oxygen to maintain O2 saturation per order/standard this shift  Outcome: Progressing  Goal: Increase self care and/or family involvement in next 24 hours  Outcome: Progressing

## 2024-08-09 NOTE — OP NOTE
Tracheostomy Operative Note     Date: 2024  OR Location: [facility] OR    Name: Denae Nolasco, : 1990, Age: 33 y.o., MRN: [id number], Sex: female    Diagnosis  Pre-op Diagnosis      * Schwannoma [D36.10]     * Acute respiratory failure     * Multiple cranial neuropathies Post-op Diagnosis     * Schwannoma [D36.10]    * Acute respiratory failure    * Multiple cranial neuropathies     Procedures  Creation Tracheostomy - Percutaneous  [id number] - MT TRACHEOSTOMY PLANNED SEPARATE PROCEDURE      Surgeons   Jayleen Jewell MD    Resident/Fellow/Other Assistant:  Radha Brown MD Resident    Procedure Summary  Anesthesia: General  ASA: III  Anesthesia Staff: Anesthesiologist: Mtailde Grace MD  C-AA: MATT Moncada  Estimated Blood Loss: 5mL  Intra-op Medications: * Intraprocedure medication information is unavailable because the case start and end events have not been set *           Anesthesia Record               Intraprocedure I/O Totals       None           Specimen: No specimens collected     Staff:   Circulator: Markel Bryan Person: Nelda Loomis Circulator: Isac      Drains and/or Catheters:   Gastrostomy/Enterostomy PEG-jejunostomy 1 20 Fr. LUQ (Active)   Surrounding Skin Dry;Intact 24 1500   Drain Status Clamped 24 1500   Dressing Status Clean;Dry 24 1500   Dressing Intervention New dressing 24 1500       Urethral Catheter Double-lumen;Non-latex 16 Fr. (Active)   Site Assessment Clean;Skin intact 24 0800   Collection Container Standard drainage bag 24 0800   Securement Method Securing device (Describe) 24 0800   Reason for Continuing Urinary Catheterization accurate hourly measurement of urine volume in a critically ill patient that cannot be assessed by other volumes and urine collection strategies 24 0800   Output (mL) 100 mL 24 1500       Intracranial Pressure/Ventriculostomy Ventricular drainage catheter with ICP transducer Right  (Active)   Drain Status Clamped 08/09/24 1500   Level At external auditory meatus 08/09/24 0000   Site Assessment Clean;Dry 08/09/24 1500   Drainage No drainage 08/08/24 0400   Output (mL) 0 mL 08/07/24 0800   CSF Color Other (Comment) 08/06/24 1900   Dressing Status Other (Comment) 08/08/24 0400   Ventric/ICP Waveform Appropriate 08/09/24 1500   Ventric/ICP Interventions Zeroed and calibrated;Leveled 08/09/24 0700   Site Care Performed 08/09/24 0700       [REMOVED] Closed/Suction Drain Right Scalp Other (Comment) (Removed)       [REMOVED] NG/OG/Feeding Tube Right nostril (Removed)   Tube Status Clamped 08/07/24 0800   Placement Verification Measurements 08/07/24 1600   Distal Tube Measurement 60 cm 08/07/24 0800   Site Assessment Clean;Dry;Intact 08/07/24 1600   Tube Securement Taped to nostril center 08/07/24 0400   Intake (mL) 90 mL 08/07/24 0500   Intake - Flush (mL) 60 mL 08/07/24 1000       [REMOVED] NG/OG/Feeding Tube Left nostril (Removed)   Tube Status Clamped 08/09/24 0700   Placement Verification Measurements 08/09/24 0700   Distal Tube Measurement 78 cm 08/09/24 0700   Site Assessment Clean;Dry;Intact 08/09/24 0700   Tube Securement Nasal bridle 08/09/24 0700   Feeding Tube Flushed With Tap water 08/08/24 2000   Intake (mL) 10 mL 08/08/24 2300   Intake - Flush (mL) 80 mL 08/09/24 0800       Tourniquet Times:         Implants:     Findings: percutaneous tracheostomy with placement of #6 shiley tracheotomy    Indications: Denae Nolasco is an 33 y.o. female who is having surgery for Schwannoma [D36.10]. The patient is s/p left sided vestibular schwannoma resection and was found to have bilateral vocal cord immobility after extubation. The patient also had altered swallowing function.    The patient was seen in the preoperative area. The risks, benefits, complications, treatment options, non-operative alternatives, expected recovery and outcomes were discussed with the patient. The possibilities of  reaction to medication, pulmonary aspiration, injury to surrounding structures, bleeding, recurrent infection, the need for additional procedures, failure to diagnose a condition, and creating a complication requiring transfusion or operation were discussed with the patient. The patient concurred with the proposed plan, giving informed consent.  The site of surgery was properly noted/marked if necessary per policy. The patient has been actively warmed in preoperative area. Preoperative antibiotics have been ordered and given within 1 hours of incision. Venous thrombosis prophylaxis have been ordered including bilateral sequential compression devices    Procedure Details:   After appropriate patient identification, patient was brought down to the operating room and transferred to the operating table in a supine position. After this, general anesthesia was induced and a proper timeout was performed. Patient was then turned 90 degrees towards the ENT team.    The patient was sedated and laid back in supine position. A shoulder roll was placed to hyperextend the neck. Following proper time-out, a bronchoscope was inserted into the patient's endotracheal tube. The anterior neck was prepped and draped in the usual sterile fashion. A small 2 cm vertical incision was made along the patient's anterior neck just inferior to the cricoid. The endotracheal tube was then slowly withdrawn to allow for visualization of the trachea with the bronchoscope. A hemostat was used to palpate the tracheal rings to confirm the appropriate placement. A needle with an overlying catheter was then carefully inserted into the patient's trachea under direct visualization. After adequate positioning was confirmed, the Seldinger technique was used to dilate the puncture site in serial fashion. Upon placement of the dilator, a 6-0 cuffed Shiley tracheostomy tube was inserted into the patient's trachea with minimal resistance. The bronchoscope was  then removed from the patient's endotracheal tube and placed in the tracheostomy to confirm appropriate positioning. The cuff of the tracheostomy tube was then inflated, and the tracheostomy was sutured to the skin using 2-0 silk. The tracheostomy collar was fitted. The patient tolerated the procedure well. There were no complications.      The anterior neck was then cleansed and dried, and the patient was turned back to anesthesia and subsequently back to the NSU in stable condition      Complications:  None; patient tolerated the procedure well.    Disposition: ICU - extubated and stable.  Condition: stable     Additional Details:  N/A    Attending Attestation: I was present for the entirety of the procedure(s).     Jayleen Jewell MD

## 2024-08-09 NOTE — PROGRESS NOTES
"Denae Nolasco is a 33 y.o. female on day 6 of admission presenting with Schwannoma.    Subjective   NAEO    Objective     Physical Exam  Awake, nods to questions, attempts to whisper   OU3R  L HB6  + R corneal, weak L corneal  L 5/5  RUE prox 2 dis 3  LUE prox 3 dis 4-  BLE 5/5    Last Recorded Vitals  Blood pressure 116/74, pulse 76, temperature 37.3 °C (99.1 °F), resp. rate 17, height 1.575 m (5' 2.01\"), weight 59.7 kg (131 lb 9.8 oz), last menstrual period 07/24/2024, SpO2 97%.  Intake/Output last 3 Shifts:  I/O last 3 completed shifts:  In: 4413.2 (73.9 mL/kg) [I.V.:2558.2 (42.9 mL/kg); NG/GT:320; IV Piggyback:1535]  Out: 6870 (115.1 mL/kg) [Urine:6870 (3.2 mL/kg/hr)]  Weight: 59.7 kg     Relevant Results  Lab Results   Component Value Date    WBC 16.1 (H) 08/08/2024    HGB 9.4 (L) 08/08/2024    HCT 27.3 (L) 08/08/2024    MCV 84 08/08/2024     08/08/2024     Lab Results   Component Value Date    GLUCOSE 152 (H) 08/08/2024    CALCIUM 8.5 (L) 08/08/2024     08/08/2024    K 3.8 08/08/2024    CO2 29 08/08/2024     08/08/2024    BUN 6 08/08/2024    CREATININE 0.36 (L) 08/08/2024        This patient currently has cardiac telemetry ordered; if you would like to modify or discontinue the telemetry order, click here to go to the orders activity to modify/discontinue the order.  This patient has a central line   Reason for the central line remaining today? Hemodynamic monitoring      This patient is intubated   Reason for patient to remain intubated today? 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, CT CAP R middle lobe calcifications c/w granulomatous disease, dilated CBD, intrahepatic dilatation " (rec'd MRCP or ERCP), CTH/CT IAC done, TTE EF 57% no wall motion abnormality, no residual interatrial shunt w bubble study, 8/4 MRI IAC CISS stable, vCTH done, 8/5 s/p L retrosig for tumor resection, RF EVD, 8/6 CTH resection bed hemorrhage    8/6 MRI brain L transverse sigmoid sinus thrombosis, GTR  8/7 s/p extubation    Plan:  NSU  RF EVD (clamped, transduce ICPs)  HOB >45  Na goal >140, space out Na checks  Strict IO   Dex taper  ENT recs - R ear injury, ENT scoped 8/8 and found complete b/l VC paralysis, trach today   Early PEG, will consult surg endo in AM   BLE DVT US today   Con't vanc, ceftriaxone (8/10)  SCDs, SQH  PTOT - ok to work with without restriction, please work with every day       Nicole Davila MD

## 2024-08-09 NOTE — PROGRESS NOTES
Vancomycin Dosing by Pharmacy  FOLLOW UP    Denae Nolasco is a 33 y.o. female who Pharmacy is consulted to dose vancomycin for surgical prophylaxis.     Based on the patient's indication and renal status, this patient is being dosed based on a goal vancomycin AUC of 400-600.     Current vancomycin dose: 1750 mg every 12 hours    Estimated vancomycin AUC on current dose: 681 mg/L.hr    Renal function is currently stable.    Estimated Creatinine Clearance: 125 mL/min (A) (by C-G formula based on SCr of 0.36 mg/dL (L)).      Results from last 7 days   Lab Units 08/09/24  0410 08/08/24  0020 08/06/24  2326 08/06/24  0216   CREATININE mg/dL 0.36* 0.36* 0.35* 0.49*   BUN mg/dL 13 6 7 5*   WBC AUTO x10*3/uL 7.6 16.1* 16.5* 11.6*        Visit Vitals  /69   Pulse 80   Temp 37.4 °C (99.3 °F)   Resp 17       Assessment/Plan    AUC is above goal range. Orders placed for new vancomycin regimen of 1500 mg every 12 hours. This dosing regimen is predicted by [employer] to result in the following pharmacokinetic parameters:  AUC24,ss: 586 mg/L.hr  Probability of AUC24 > 400: 100 %  Ctrough,ss: 17 mg/L  Probability of Ctrough,ss > 20: 24 %  Probability of nephrotoxicity (Lodise AQUILES 2009): 13 %  Anticipated stop date is 8/10. No further levels needed at this time.  Will continue to monitor renal function daily while on vancomycin and order serum creatinine at least every 48 hours if not already ordered.  Will follow for continued vancomycin needs, clinical response, and signs/symptoms of toxicity.       Brittanie Smith, TanmayD

## 2024-08-09 NOTE — CARE PLAN
The patient's goals for the shift include safety    The clinical goals for the shift include Pt will remain safe and free from falls this shift    Over the shift, the patient did not make progress toward the following goals. Barriers to progression include safia. Recommendations to address these barriers include safia.

## 2024-08-09 NOTE — ANESTHESIA PREPROCEDURE EVALUATION
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, CT CAP R middle lobe calcifications c/w granulomatous disease, dilated CBD, intrahepatic dilatation (rec'd MRCP or ERCP), CTH/CT IAC done, TTE EF 57% no wall motion abnormality, no residual interatrial shunt w bubble study, 8/4 MRI IAC CISS stable, vCTH done, 8/5 s/p L retrosig for tumor resection, RF EVD, 8/6 CTH resection bed hemorrhage     8/6 MRI brain L transverse sigmoid sinus thrombosis, GTR  8/7 s/p extubation         Past Medical History:   Diagnosis Date   • Depression    • Visual impairment  Schwannoma          Relevant Problems   Anesthesia   (+) PONV (postoperative nausea and vomiting)     Social Connections: Not on file        Chemistry    Lab Results   Component Value Date/Time     08/09/2024 0819    K 3.6 08/09/2024 0410     08/09/2024 0410    CO2 27 08/09/2024 0410    BUN 13 08/09/2024 0410    CREATININE 0.36 (L) 08/09/2024 0410    Lab Results   Component Value Date/Time    CALCIUM 8.8 08/09/2024 0410    ALKPHOS 66 06/06/2024 0752    AST 21 06/06/2024 0752    ALT 18 06/06/2024 0752    BILITOT 0.5 06/06/2024 0752          Lab Results   Component Value Date/Time    WBC 7.6 08/09/2024 0410    HGB 10.7 (L) 08/09/2024 0410    HCT 30.9 (L) 08/09/2024 0410     08/09/2024 0410     Lab Results   Component Value Date/Time    PROTIME 11.9 08/04/2024 1211    INR 1.1 08/04/2024 1211     Encounter Date: 08/03/24   Electrocardiogram, 12-lead PRN ACS symptoms   Result Value    Ventricular Rate 74    Atrial Rate 74    NJ Interval 170    QRS Duration  92    QT Interval 432    QTC Calculation(Bazett) 479    P Axis 71    R Axis 86    T Axis 68    QRS Count 12    Q Onset 226    P Onset 141    P Offset 189    T Offset 442    QTC Fredericia 463    Narrative    Normal sinus rhythm with sinus arrhythmia  Normal ECG  When compared with ECG of 04-AUG-2024 21:20,  Sinus rhythm has replaced Ectopic atrial rhythm  QRS axis Shifted left  Confirmed by Polo Greene ([id number]) on 8/8/2024 2:36:50 PM     No results found for this or any previous visit from the past 1095 days.   Clinical information reviewed:   Tobacco  Allergies  Meds   Med Hx  Surg Hx   Fam Hx  Soc Hx        NPO Detail:  No data recorded     Physical Exam    Airway  Mallampati: III  TM distance: >3 FB  Neck ROM: full     Cardiovascular    Dental - normal exam     Pulmonary    Abdominal        Anesthesia Plan    History of general anesthesia?: yes  History of complications of general anesthesia?: yes    ASA 3     general     intravenous and inhalational induction   Anesthetic plan and risks discussed with patient and spouse.    Plan discussed with CAA.

## 2024-08-09 NOTE — PROGRESS NOTES
Vancomycin Dosing by Pharmacy- Cessation of Therapy    Consult to pharmacy for vancomycin dosing has been discontinued by the prescriber, pharmacy will sign off at this time.    Please call pharmacy if there are further questions or re-enter a consult if vancomycin is resumed.     Brielle Ruiz, PharmD

## 2024-08-09 NOTE — CARE PLAN
The patient's goals for the shift include saftey    The clinical goals for the shift include Pt will remain safe and free from falls this shift      Problem: Discharge Planning  Goal: Discharge to home or other facility with appropriate resources  8/9/2024 0854 by Radha Thomas RN  Outcome: Progressing  8/9/2024 0854 by Radha Thomas RN  Outcome: Progressing     Problem: Chronic Conditions and Co-morbidities  Goal: Patient's chronic conditions and co-morbidity symptoms are monitored and maintained or improved  8/9/2024 0854 by Radha Thomas RN  Outcome: Progressing  8/9/2024 0854 by Radha Thomas RN  Outcome: Progressing     Problem: Skin  Goal: Decreased wound size/increased tissue granulation at next dressing change  8/9/2024 0854 by Radha Thomas RN  Outcome: Progressing  8/9/2024 0854 by Radha Thomas RN  Outcome: Progressing  Flowsheets (Taken 8/9/2024 0854)  Decreased wound size/increased tissue granulation at next dressing change: Utilize specialty bed per algorithm  Goal: Participates in plan/prevention/treatment measures  8/9/2024 0854 by Radha Thomas RN  Outcome: Progressing  8/9/2024 0854 by Radha Thomas RN  Outcome: Progressing  Goal: Prevent/manage excess moisture  8/9/2024 0854 by Radha Thomas RN  Outcome: Progressing  8/9/2024 0854 by Radha Thomas RN  Outcome: Progressing  Goal: Prevent/minimize sheer/friction injuries  8/9/2024 0854 by Radha Thomas RN  Outcome: Progressing  8/9/2024 0854 by Radha Thomas RN  Outcome: Progressing  Goal: Promote/optimize nutrition  8/9/2024 0854 by Radha Thomas RN  Outcome: Progressing  8/9/2024 0854 by Radha Thomas RN  Outcome: Progressing  Goal: Promote skin healing  8/9/2024 0854 by Radha Thomas RN  Outcome: Progressing  8/9/2024 0854 by Radha Thomas RN  Outcome: Progressing     Problem: Respiratory  Goal: Minimize anxiety/maximize coping throughout shift  8/9/2024 0854 by Radha Thomas RN  Outcome: Progressing  8/9/2024 0854 by Radha  EVELIO Thomas  Outcome: Progressing  Goal: Minimal/no exertional discomfort or dyspnea this shift  8/9/2024 0854 by Radha Thomas RN  Outcome: Progressing  8/9/2024 0854 by Radha Thomas RN  Outcome: Progressing  Goal: Tolerate mechanical ventilation evidenced by VS/agitation level this shift  8/9/2024 0854 by Radha Thomas RN  Outcome: Progressing  8/9/2024 0854 by Radha Thomas RN  Outcome: Progressing  Goal: Tolerate pulmonary toileting this shift  8/9/2024 0854 by Radha Thomas RN  Outcome: Progressing  8/9/2024 0854 by Radha Thomas RN  Outcome: Progressing  Goal: Verbalize decreased shortness of breath this shift  8/9/2024 0854 by Radha Thomas RN  Outcome: Progressing  8/9/2024 0854 by Radha Thomas RN  Outcome: Progressing  Goal: Wean oxygen to maintain O2 saturation per order/standard this shift  8/9/2024 0854 by Radha Thomas RN  Outcome: Progressing  8/9/2024 0854 by Radha Thomas RN  Outcome: Progressing  Goal: Increase self care and/or family involvement in next 24 hours  8/9/2024 0854 by Radha Thomas RN  Outcome: Progressing  8/9/2024 0854 by Radha Thomas RN  Outcome: Progressing

## 2024-08-09 NOTE — ANESTHESIA PROCEDURE NOTES
Airway  Date/Time: 8/9/2024 1:29 PM  Urgency: elective    Airway not difficult    Staffing  Performed: MATT   Authorized by: Matilde Grace MD    Performed by: MATT Moncada  Patient location during procedure: OR    Indications and Patient Condition  Indications for airway management: anesthesia  Spontaneous ventilation: present  Sedation level: deep  Preoxygenated: no  Patient position: sniffing  MILS maintained throughout  Mask difficulty assessment: 0 - not attempted  No planned trial extubation    Final Airway Details  Final airway type: endotracheal airway      Successful airway: ETT  Cuffed: yes   Successful intubation technique: video laryngoscopy  Facilitating devices/methods: intubating stylet and cricoid pressure  Endotracheal tube insertion site: oral  Blade: Juan (glide)  Blade size: #3  ETT size (mm): 7.0  Cormack-Lehane Classification: grade I - full view of glottis  Placement verified by: chest auscultation and capnometry   Measured from: gums  ETT to gums (cm): 22  Number of attempts at approach: 1    Additional Comments  RSI performed, cricoid pressure held throughout. BMV not attempted. Atraumatic intubation, dentition in tact

## 2024-08-09 NOTE — OP NOTE
[facility] - Operative Report  Name: Denae Nolasco   MRN: [id number]  Date of Procedure: 08/09/24  Location: [facility]    Attending Surgeon: Placido Martin     Resident/Fellow: Radha Brown    Preoperative Diagnosis:  Dysphagia    Postoperative Diagnosis:  Same    Operative indications:  This patient is status post cranial surgery and has multiple neck cranial nerve paralysis.  She is in need of enteral nutrition.  It was felt that a PEG was indicated.  Procedure:  Gastro esophagoscopy and insertion of feeding tube (PEG)  Operative procedure:  This patient is already under general anesthesia following a tracheotomy by one of my partners.  The patient is properly positioned.  The gastroscope was introduced all the way down to the stomach.  The stomach was inflated.  The abdomen was prepped and draped in usual sterile fashion.  The area to place the PEG was determined by manual pressure and transillumination.  A needle was inserted with negative pressure and no air was obtained until the needle was seen in the stomach.  The tract was injected with Xylocaine 1% to 100,000 epinephrine.  A small cutaneous incision was made.  The trocar and catheter were inserted.  The catheter was retrieved endoscopically with a snare.  The guidewire was then inserted retrieving a snare and pulled into the oral cavity.  The PEG tube was attached to guidewire and pulled into its proper position on the abdominal wall where was secured with the provided bumper.  Light dressing with antibiotic ointment was applied around the PEG site.  The tube was secured to the abdomen.  The gastroscope was then utilized to examine the entirety of the stomach from the gastroesophageal junction to the pylorus was found to be negative.  The PEG site is benign in appearance.  The entire length of the esophagus is also negative.  The procedure was then terminated.  The patient was sent back to the intensive care unit.  This is  dictated on 8/9/2024.    I was present for the entire procedure    Placido Martin MD

## 2024-08-09 NOTE — ANESTHESIA POSTPROCEDURE EVALUATION
Patient: Denae Nolasco    Procedure Summary       Date: 08/09/24 Room / Location: [facility] OR 04 / [facility] [place]    Anesthesia Start: 1247 Anesthesia Stop: 1448    Procedures:       Creation Tracheostomy      Esophagogastroduodenoscopy with Insertion PEG Diagnosis:       Schwannoma      (Schwannoma [D36.10])    Surgeons: Ismael Gale MD Responsible Provider: Matilde Grace MD    Anesthesia Type: general ASA Status: Not recorded            Anesthesia Type: general    Vitals Value Taken Time   /81 08/09/24 1448   Temp 36.5 08/09/24 1448   Pulse 88 08/09/24 1448   Resp 14 08/09/24 1448   SpO2 100 08/09/24 1448       Anesthesia Post Evaluation    Patient location during evaluation: ICU  Patient participation: complete - patient participated  Level of consciousness: awake and alert  Pain management: adequate  Airway patency: patent  Cardiovascular status: acceptable  Respiratory status: acceptable  Hydration status: acceptable  Postoperative Nausea and Vomiting: none        No notable events documented.

## 2024-08-09 NOTE — INTERVAL H&P NOTE
H&P reviewed. The patient was examined and there are no changes to the H&P except for the following    Constitutional: Extubated, breathing room air, appears somnolent  Voice: [id number]; breathy voice with easy tiring  Respiration:  Breathing room air no stridor  Neuro: Patient is unable to move right upper extremity.  Patient's facial nerve is completely out House-Brackman 6/6 on the left side.  Patient notes intact sensation bilaterally.  Cranial nerve XI severely diminished left side.  Tongue deviating to the right.  No mobility of the palate.  Very little soft whisper voice produced.  Bilateral lower extremities mobile with good strength.  Head and Face:  Symmetric facial features, no masses or lesions  Nose: Dobbhoff in the left nare  Oral Cavity/Oropharynx/Lips:  Normal mucous membranes, normal floor of mouth/tongue/OP, no masses or lesions.  Tongue deviation to the right  Neck/Lymph:  No LAD, no thyroid masses  Skin:  Neck skin is without scar or injury

## 2024-08-09 NOTE — PROGRESS NOTES
[facility]  NEUROSCIENCE INTENSIVE CARE UNIT  DAILY PROGRESS NOTE       Patient Name: Denae Nolasco   MRN: [id number]     Admit Date: 8/3/2024     : 1990 AGE: 33 y.o. GENDER: female        Subjective    Denae Nolasco is a 33 y.o. female with a past medical history of raynaud's and depression who presented to the [facility] ED on 8/3/2024 who worsening vertigo and ataxia. 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 Patient admitted to neurosurgery service for operative management.     : s/p L retrosigmoid craniotomy for tumor resection with RF EVD placement  : Patient extubated    Interval Events: NAEON     Objective   VITALS (24H):  Temp:  [36.2 °C (97.2 °F)-37.4 °C (99.3 °F)] 37.4 °C (99.3 °F)  Heart Rate:  [] 80  Resp:  [8-34] 17  BP: (101-130)/(61-90) 112/69  Arterial Line BP 1: (116-148)/(54-88) 131/64  INTAKE/OUTPUT:  Intake/Output Summary (Last 24 hours) at 2024 0809  Last data filed at 2024 0700  Gross per 24 hour   Intake 2245.84 ml   Output 3215 ml   Net -969.16 ml     VENT SETTINGS:        PHYSICAL EXAM:  NEURO:  - L 7mm, R 5mm reactive to light  - EOV, Ox3  - Brisk R corneal, weak L corneal  - L HB6  - LUE 4/5  - RUE prox 2/5, distal 4-/5  - RLE HF4 ow 5  - LLE 5  - RF EVD clamped  CV:  - RRR on telemetry, NSR  - Right radial arterial line in place  RESP:  - Intubated, regular, unlabored  - Vent: AC  :  - Indwelling catheter in place  GI:  - Abdomen NT/ND, soft  SKIN:  - Surgical incision clean/dry/Intact    MEDICATIONS:  Scheduled: PRN: Continuous:   cefTRIAXone, 2 g, intravenous, q12h  dexAMETHasone, 4 mg, intravenous, q6h  escitalopram, 5 mg, nasogastric tube, Daily  pantoprazole, 40 mg, oral, Daily before breakfast   Or  esomeprazole, 40 mg, nasogastric tube, Daily before breakfast   Or  pantoprazole, 40 mg, intravenous, Daily before breakfast  [Held by provider] heparin (porcine), 5,000 Units,  subcutaneous, q8h  insulin lispro, 0-5 Units, subcutaneous, TID  lubricating eye drops, 1 drop, Left Eye, q4h  niMODipine, 60 mg, nasogastric tube, q4h ENOCH  ofloxacin, 5 drop, Right Ear, BID  perflutren protein A microsphere, 0.5 mL, intravenous, Once in imaging  polyethylene glycol, 17 g, nasogastric tube, Daily  scopolamine, 1 patch, transdermal, q72h  sennosides-docusate sodium, 2 tablet, nasogastric tube, BID  thiamine, 100 mg, oral, Daily  vancomycin (Vancocin) 1,500 mg in sodium chloride 0.9% 250 mL IV, 1,500 mg, intravenous, q12h  white petrolatum-mineral oiL, 1 Application, Left Eye, q4h     PRN medications: calcium gluconate, calcium gluconate, dextrose, dextrose, glucagon, glucagon, hydrALAZINE, labetaloL, magnesium sulfate, magnesium sulfate, naloxone, ondansetron, oxyCODONE, oxygen, potassium chloride CR **OR** potassium chloride, potassium chloride CR **OR** potassium chloride, potassium chloride, prochlorperazine **OR** prochlorperazine **OR** prochlorperazine, vancomycin sodium chloride 0.9%, 50 mL/hr, Last Rate: 50 mL/hr (08/09/24 0700)         LAB RESULTS:      IMAGING RESULTS:  XR abdomen 1 view   Final Result   1. Interval removal of an NG tube and placement of Dobbhoff tube,   which coils over the gastric fundus with tip overlying the gastric   antrum.   2. Nonobstructive bowel-gas pattern within the visualized abdomen.        I personally reviewed the image(s)/study and resident interpretation   as stated by Dr. Delilah Rader MD. I agree with the findings as   stated. This study was interpreted at [facility], [place], [place].        MACRO:   None        Signed by: Davy Ball 8/8/2024 9:09 AM   Dictation workstation:   [id number]      XR chest 1 view   Final Result   1.  No evidence of acute cardiopulmonary process.   2. Medical devices as above.                  MACRO:   None        Signed by: Davy Ball 8/7/2024 2:12 PM   Dictation workstation:    [id number]      MR brain w and wo IV contrast   Final Result   1. Postsurgical changes from left retrosigmoid craniectomy for   cerebellopontine angle mass resection. Filling defect within the left   transverse and sigmoid sinuses compatible with dural venous sinus   thrombosis.   2. Persistent similar mass effect within the posterior fossa with   effacement of the 4th ventricle and inferior displacement of the   cerebellar tonsils through the foramen magnum.   3. Right frontal approach ventriculostomy catheter in unchanged   position. Unchanged right frontal subdural and intraparenchymal   hematoma along the catheter tract.        I personally reviewed the images/study and I agree with Patrica Givens DO's (radiology resident) findings as stated. This study   was interpreted at [place], [place].        MACRO:   Patrica Givens discussed the significance and urgency of this   critical finding by telephone with  Nicole Davila on 8/6/2024 at 11:12   pm.  (**-RCF-**) Findings:  See findings.             Signed by: Nikolay Bailey 8/7/2024 7:36 AM   Dictation workstation:   [id number]      CT head wo IV contrast   Final Result   1. Postsurgical change of left retrosigmoid craniectomy with   cranioplasty for left vestibular schwannoma resection with persistent   mass effect within the posterior fossa with effacement of the 4th   ventricle and slight inferior displacement of the cerebellar tonsils   into the foramen magnum.   2. Slight interval increase in a extra-axial fluid collection   immediately deep to the craniotomy defect measuring 6 mm. Otherwise,   stable appearance of postoperative blood products scattered within   the posterior fossa adjacent to the resection cavity as well as an   additional subdural hematoma along the left tentorial leaflet.   3. Right frontal approach ventriculostomy shunt catheter unchanged in   positioning when compared to prior  exam. Persistent overlying   subdural hematoma measuring up to 0.4 cm along the right frontal lobe.             I personally reviewed the images/study, and I agree with the findings   as stated above. This study was interpreted at [place], [place].        MACRO:   None        Signed by: Madi Parham 8/6/2024 10:23 AM   Dictation workstation:   [id number]      XR abdomen 1 view   Final Result   1. Enteric tube tip projects over the expected body of stomach.   2. Nonobstructive nonspecific bowel-gas pattern.             I personally reviewed the images/study and I agree with Dr. Kiran Hernandez findings as stated. This study was interpreted at [place], [place]        Signed by: Scooter Aldrich 8/6/2024 6:45 PM   Dictation workstation:   [id number]      XR chest 1 view   Final Result   1.  No evidence of acute cardiopulmonary process.   2. Similarly positioned medical devices as above.        I personally reviewed the images/study and I agree with Dr. Kiran Hernandez findings as stated. This study was interpreted at [place], [place]        MACRO:   None        Signed by: Scooter Aldrich 8/6/2024 6:44 PM   Dictation workstation:   [id number]      CT head wo IV contrast   Final Result   1. New extensive postoperative changes from left retrosigmoid   craniectomy with cranioplasty for mass resection. There is persistent   mass effect within the posterior fossa with effacement of the 4th   ventricle.   2. Right frontal approach ventriculostomy shunt with catheter tip   terminating in the foramen of Monro. There has been some interval   decrease in the caliber of the left lateral and 3rd ventricles   compared with the previous exam.   3. New small extra-axial hematoma near the right frontal arcelia hole.   There is either artifact versus small subdural hematoma along the   left  cerebral convexity.        I personally reviewed the images/study and I agree with Dr. Kiran Hernandez findings as stated.        MACRO:   None        Signed by: Gi Cohen 8/6/2024 7:03 AM   Dictation workstation:   [id number]      XR chest 1 view   Final Result   1.  No evidence of acute cardiopulmonary process.   2. Medical devices as above.                  MACRO:   None        Signed by: Keshav Treadwell 8/5/2024 3:47 PM   Dictation workstation:   [id number]      CT head wo IV contrast volumetric surgical planning   Final Result   1. Preoperative volumetric CT imaging obtained. Mass within the left   cerebellopontine angle better characterized on same day MRI IAC. Mild   surrounding vasogenic edema.   2. Similar asymmetric effacement of the right basal cisterns and 4th   ventricle with mild upstream ventricular prominence. Mild asymmetry   of the lateral ventricles.        I personally reviewed the image(s)/study and resident interpretation   as stated by Dr. Delilah Rader MD. I agree with the findings as   stated. This study was interpreted at [facility], [place], [place].        MACRO:   None        Signed by: Scooter Vinson 8/5/2024 6:22 AM   Dictation workstation:   [id number]      XR chest 1 view   Final Result   1. No evidence of acute cardiopulmonary process.        Signed by: Cliff Juares 8/4/2024 2:11 PM   Dictation workstation:   [id number]      MR IAC w and wo IV contrast   Final Result   1. There is a heterogeneously enhancing mass within the left   cerebellar pontine angle cistern and internal auditory canal   measuring 3.5 x 2.9 x 3.2 cm. There is mild thickening of the   adjacent tentorium measuring 0.2 cm in thickness. Differential   considerations include a vestibular schwannoma or possibly a   meningioma. There is marked mass effect upon the leticia, brachium   pontis and cerebellum. There is near-complete effacement of the 4th   ventricle. There is  dilatation of the lateral and 3rd ventricles,   similar to the prior exams.        MACRO:   None        Signed by: Suzy Martinez 8/4/2024 9:24 AM   Dictation workstation:   [id number]      Transthoracic Echo (TTE) Complete   Final Result      XR chest 1 view   Final Result   No evidence of acute intrathoracic abnormality.        Signed by: Aneesh Bhatti 8/3/2024 1:08 PM   Dictation workstation:   [id number]      CT internal auditory canals posterior fossa wo IV contrast   Final Result   There is again evidence of a large extra-axial mass centered within   left cerebellopontine angle cistern which is better defined on the   prior MRI with and without gadolinium dated 08/02/2024 when compared   with this noncontrast CT study. There is again evidence of mass   effect upon the adjacent brainstem, left cerebellar peduncle, and   cerebellum. There is hypodensity within the surrounding brain   parenchymal compatible with surrounding brain parenchymal edema.   There is again evidence of near-complete effacement of the 4th   ventricle. No abnormal calcification is noted associated with the   lesion. There is minimal asymmetric widening of the left internal   auditory canal when compared with the right.        There is again evidence of mild ventriculomegaly involving the   lateral ventricles and 3rd ventricle similar when compared with the   prior MRI without significant effacement of surrounding sulci of the   cerebral hemispheres.        Mild nonspecific white matter changes are again noted within cerebral   hemispheres bilaterally. While nonspecific, white matter changes can   be seen with small-vessel ischemic change or demyelinating processes   among others.        MACRO:   None.        Signed by: Kyle Zamora 8/3/2024 11:48 AM   Dictation workstation:   [id number]      CT head wo IV contrast   Final Result   There is again evidence of a large extra-axial mass centered within   left cerebellopontine angle cistern which is  better defined on the   prior MRI with and without gadolinium dated 08/02/2024 when compared   with this noncontrast CT study. There is again evidence of mass   effect upon the adjacent brainstem, left cerebellar peduncle, and   cerebellum. There is hypodensity within the surrounding brain   parenchymal compatible with surrounding brain parenchymal edema.   There is again evidence of near-complete effacement of the 4th   ventricle. No abnormal calcification is noted associated with the   lesion. There is minimal asymmetric widening of the left internal   auditory canal when compared with the right.        There is again evidence of mild ventriculomegaly involving the   lateral ventricles and 3rd ventricle similar when compared with the   prior MRI without significant effacement of surrounding sulci of the   cerebral hemispheres.        Mild nonspecific white matter changes are again noted within cerebral   hemispheres bilaterally. While nonspecific, white matter changes can   be seen with small-vessel ischemic change or demyelinating processes   among others.        MACRO:   None.        Signed by: Kyle Zamora 8/3/2024 11:48 AM   Dictation workstation:   [id number]      CT chest abdomen pelvis w IV contrast   Final Result   1. Dilated common bile duct measuring to 1.1 cm with gradual   transition to normal caliber and associated mild intrahepatic biliary   dilation. No evidence of radiopaque choledocholithiasis, radiopaque   cholelithiasis, pancreatic duct dilation, or abnormal focal   pancreatic enhancement. Findings can be secondary to chronic   periampullary stricture, however neoplasm given patient's history can   not be excluded. Nonemergent MRCP and/or ERCP is recommended for   further assessment.   2. Heterogeneous cervix with hypodense areas, which can be secondary   to nabothian cysts. Nonemergent pelvic ultrasound can be obtained for   further assessment as per clinical discretion.   3. Subpleural nodular  opacity within the right middle lobe with   internal calcifications, as above. Additional calcified granulomas   and calcified right hilar lymph nodes, compatible with remote   granulomatous disease.   4. Additional findings as discussed above.        I personally reviewed the images/study and I agree with the findings   as stated by Goyo Hendricks MD (Radiology Resident).   This study was interpreted at [facility], [place], [place].        MACRO:   None.        Signed by: Griselda Gamez 8/3/2024 12:40 PM   Dictation workstation:   [id number]      Vascular US lower extremity venous duplex bilateral    (Results Pending)         Assessment/Plan      33 year-old female with past medical history of depression and visual impairment who presented to OSH ED on 8/2/24 with complaints of hearing loss, vertigo, ataxia (since 2020), headache, dizziness, and muscle spasms. Patient transferred to [facility] for concern for intracranial mass concerning for schwannoma vs. meningioma seen on outpatient imaging. 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 Patient admitted to NSU s/p L retrosigmoid craniotomy for tumor resection.     NEURO:  #s/p L retrosigmoid craniotomy for tumor resection w RF EVD placement  Assessment:  - See physical exam  - RF EVD clamped per NSGY  - Na 142<-143<-142<-140<-140  - 50mL/hr NS  Plan:  - NSU  - Neuro Checks: Q1H pupil checks  - Keep RF EVD clamped  - Transition from maintenance fluids to tube feeds/flushes  - HOB >45 degrees  - Ophtho consult: recommended against tarsorrhaphy at this time  - Vanc/CTX (till 8/10)  - -140  - q12 Na, Goal Na>140  - Nimodipine 60q4  - Dex 4q6   - Pain: acetaminophen Q4H and oxycodone Q6H  - Nausea: ondansetron PRN  - PT/OT/SLP/nutrition  - Keep net even IOs    CARDIOVASCULAR:  #no active issues  Assessment:  - ECHO: EF 57%    Plan:  - Continue to monitor on  telemetry  - DVT US today after OR  - -140    --> PRN: Labetalol, Hydralazine, and Nicardipine     RESPIRATORY:  #Edematous uvula  #Dysphonia  Assessment:  - bilateral vocal cord paralysis  - SLP-NPO  Plan:  - Trach today  - Continuous pulse oximetry   - O2 PRN to maintain SpO2 > 94%, wean as tolerated    RENAL/:  #no active issues  Assessment:  Results from last 72 hours   Lab Units 24  0410 24  0020   BUN mg/dL 13 6   CREATININE mg/dL 0.36* 0.36*       Plan:  - Monitor with daily RFP  - Maintain velazquez catheter for: critically ill patient who need accurate urinary output measurements    FEN/GI:  #no active issues  Assessment:  - Last BM: PTA  - Has DHT  Plan:  - Monitor and replace electrolytes per protocol  - PEG tube today  - IVF: NS @ 50 mL/hr  - Diet: NPO   - Bowel Regimen: Docusate-Senna BID and Miralax QD    ENDOCRINE:  Assessment:  Results from last 7 days   Lab Units 24  0410 24  1550 24  1114 24  0812 24  0020 24  1940 24  1809 24  1553   POCT GLUCOSE mg/dL  --  143* 163* 120*  --  150* 144* 154*   GLUCOSE mg/dL 128*  --   --   --  152*  --   --   --       Plan:  - Accuchecks & ISS     HEMATOLOGY:  #no active issues  Assessment:  - Baseline Hgb: 12.8  - Baseline Plts: 237  Results from last 7 days   Lab Units 24  0410 24  0020   HEMOGLOBIN g/dL 10.7* 9.4*   HEMATOCRIT % 30.9* 27.3*   PLATELETS AUTO x10*3/uL 191 165     Plan:  - Continue to monitor with daily CBC and Coag panel    INFECTIOUS DISEASE:  #no active issues  Assessment:  Results from last 7 days   Lab Units 24  0410 24  0020   WBC AUTO x10*3/uL 7.6 16.1*    - Temp (24hrs), Av.1 °C (98.7 °F), Min:36.2 °C (97.2 °F), Max:37.4 °C (99.3 °F)     Plan:  - Continue to monitor for s/sx of infection  - Pan culture for temperature > 38.4 C    MUSCULOSKELETAL:  - No acute issues    ENT:  - Right tympanic membrane perforation     - ENT oxacillin drops BID for 7  days    SKIN:  - No acute issues  - Turns and skin care per NSU protocol    ACCESS:  - PIVs  - Right radial arterial line (8/5--)  - R subclavian CVC (8/5--)    PROPHYLAXIS:  - DVT Ppx: SCDs  - GI Ppx: Pantoprazole    RESTRAINTS:  I agree with nursing assessment of the patient's need for restraints to protect the patient from injury and facilitate healing. The patient is unable to cooperate with the plan of care and at risk for disrupting critical therapy (i.e., removing medical devices, lines, tubes and/or dressings).  Please see order for specifics. Restraints can be removed when the patient is able to cooperate with plan of care and allow healing to occur, or the medical devices at risk are discontinued by the medical team.     Kyle Ma MD  Neuroscience Intensive Care       Plan of care to be discussed with neurocritical care attending

## 2024-08-09 NOTE — CONSULTS
Reason for consult: Lagophthalmos OS    Patient is a 32yo F with a POH of longstanding nystagmus, who presented to the ED on 8/2 after an outpatient MRI finding a large left cerebellar pontine angle mass with significant pontine mass effect, now s/p retrosigmoid craniotomy on for tumor resection on 8/5. Ophthalmology was consulted for evaluation and management of incomplete lid closure. Patient's tumor in cerebellopontine angle was resected on 8/5, however postoperative course has been complicated by a resection bed hemorrhage and venous sinus thrombosis. 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     On interview the pt struggles to speak but endorses mild blurred vision, FBS and tearing OS>OD. She reports symptoms have been stable. She denies pain, diplopia, oscillopsia, trauma, foreign substance exposure, photophobia, flashes/floaters, curtaining.    Past Medical History: as above  Family History: reviewed and not pertinent to chief complaint  Medications: please refer to medication reconciliation  Allergies: please refer to patient allergy list    Base Eye Exam       Visual Acuity (Snellen - Linear)         Right Left    Near cc 20/50, phNI 20/80              Tonometry (Tonopen, 11:16 AM)         Right Left    Pressure 14 19              Pupils         Dark Light Shape React APD    Right 8 2.5 Round Brisk None    Left 6 3 Round Brisk None              Visual Fields         Left Right     Full Full              Extraocular Movement         Right Left     Full Full                  Slit Lamp and Fundus Exam       External Exam         Right Left    External Normal 6mm lagophthalmos              Slit Lamp Exam         Right Left    Lids/Lashes Normal, complete lid closure 6mm lagophthalmos    Conjunctiva/Sclera White and quiet White and quiet    Cornea 1-2+ SPK Decreased corneal sensation; 3+ SPK worse inferiorly, no abrasion, no infiltrate, no stromal haze    Anterior Chamber Deep and quiet  Deep and quiet    Iris Round and reactive Round and reactive    Lens Clear Clear                    A&P:    #Left CNVII Palsy  #Left CNV Palsy  #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 Decker - given degree of lagophthalmos and degree of corneal staining, reasonable to place temporary tarsorrhaphy  - Findings discussed with pt and family this evening - they tentatively agree to proceed with temporary tarsorrhaphy tomorrow AM    #Nystagmus  - Per family and  at bedside, nystagmus has been present from birth and is longstanding - they report it has not changed in recent time or worsened since admission  - Exam revealing large amplitude, right-beating nystagmus that dampens on left gaze  - Pt denies oscillopsia  - Given longstanding and asymptomatic nature, will continue to monitor    HTL  PGY3    Note not final until signed by attending physician    Ophthalmology Adult Pager: [phone]  Ophthalmology Peds Pager: [phone]    For adult follow up appts, call [phone]  For pediatric follow up appts, call [phone]

## 2024-08-10 ENCOUNTER — APPOINTMENT (OUTPATIENT)
Dept: RADIOLOGY | Facility: HOSPITAL | Age: 34
DRG: 003 | End: 2024-08-10
Payer: COMMERCIAL

## 2024-08-10 LAB
ALBUMIN SERPL BCP-MCNC: 3.9 G/DL (ref 3.4–5)
ANION GAP SERPL CALC-SCNC: 13 MMOL/L (ref 10–20)
BASOPHILS # BLD AUTO: 0.02 X10*3/UL (ref 0–0.1)
BASOPHILS NFR BLD AUTO: 0.3 %
BUN SERPL-MCNC: 13 MG/DL (ref 6–23)
CA-I BLD-SCNC: 1.16 MMOL/L (ref 1.1–1.33)
CALCIUM SERPL-MCNC: 8.6 MG/DL (ref 8.6–10.6)
CHLORIDE SERPL-SCNC: 102 MMOL/L (ref 98–107)
CO2 SERPL-SCNC: 27 MMOL/L (ref 21–32)
CREAT SERPL-MCNC: 0.33 MG/DL (ref 0.5–1.05)
EGFRCR SERPLBLD CKD-EPI 2021: >90 ML/MIN/1.73M*2
EOSINOPHIL # BLD AUTO: 0 X10*3/UL (ref 0–0.7)
EOSINOPHIL NFR BLD AUTO: 0 %
ERYTHROCYTE [DISTWIDTH] IN BLOOD BY AUTOMATED COUNT: 12.1 % (ref 11.5–14.5)
GLUCOSE BLD MANUAL STRIP-MCNC: 103 MG/DL (ref 74–99)
GLUCOSE BLD MANUAL STRIP-MCNC: 112 MG/DL (ref 74–99)
GLUCOSE BLD MANUAL STRIP-MCNC: 122 MG/DL (ref 74–99)
GLUCOSE BLD MANUAL STRIP-MCNC: 132 MG/DL (ref 74–99)
GLUCOSE SERPL-MCNC: 94 MG/DL (ref 74–99)
HCT VFR BLD AUTO: 32.2 % (ref 36–46)
HGB BLD-MCNC: 11.2 G/DL (ref 12–16)
IMM GRANULOCYTES # BLD AUTO: 0.12 X10*3/UL (ref 0–0.7)
IMM GRANULOCYTES NFR BLD AUTO: 1.7 % (ref 0–0.9)
LYMPHOCYTES # BLD AUTO: 0.8 X10*3/UL (ref 1.2–4.8)
LYMPHOCYTES NFR BLD AUTO: 11.4 %
MAGNESIUM SERPL-MCNC: 2.24 MG/DL (ref 1.6–2.4)
MCH RBC QN AUTO: 28.7 PG (ref 26–34)
MCHC RBC AUTO-ENTMCNC: 34.8 G/DL (ref 32–36)
MCV RBC AUTO: 83 FL (ref 80–100)
MONOCYTES # BLD AUTO: 0.52 X10*3/UL (ref 0.1–1)
MONOCYTES NFR BLD AUTO: 7.4 %
NEUTROPHILS # BLD AUTO: 5.57 X10*3/UL (ref 1.2–7.7)
NEUTROPHILS NFR BLD AUTO: 79.2 %
NRBC BLD-RTO: 0 /100 WBCS (ref 0–0)
PHOSPHATE SERPL-MCNC: 2.6 MG/DL (ref 2.5–4.9)
PLATELET # BLD AUTO: 212 X10*3/UL (ref 150–450)
POTASSIUM SERPL-SCNC: 3.5 MMOL/L (ref 3.5–5.3)
RBC # BLD AUTO: 3.9 X10*6/UL (ref 4–5.2)
SODIUM SERPL-SCNC: 138 MMOL/L (ref 136–145)
WBC # BLD AUTO: 7 X10*3/UL (ref 4.4–11.3)

## 2024-08-10 PROCEDURE — 2500000001 HC RX 250 WO HCPCS SELF ADMINISTERED DRUGS (ALT 637 FOR MEDICARE OP)

## 2024-08-10 PROCEDURE — 82947 ASSAY GLUCOSE BLOOD QUANT: CPT

## 2024-08-10 PROCEDURE — 83735 ASSAY OF MAGNESIUM: CPT | Performed by: REGISTERED NURSE

## 2024-08-10 PROCEDURE — 08QRXZZ REPAIR LEFT LOWER EYELID, EXTERNAL APPROACH: ICD-10-PCS | Performed by: OPHTHALMOLOGY

## 2024-08-10 PROCEDURE — 08QPXZZ REPAIR LEFT UPPER EYELID, EXTERNAL APPROACH: ICD-10-PCS | Performed by: OPHTHALMOLOGY

## 2024-08-10 PROCEDURE — 2500000004 HC RX 250 GENERAL PHARMACY W/ HCPCS (ALT 636 FOR OP/ED)

## 2024-08-10 PROCEDURE — 99291 CRITICAL CARE FIRST HOUR: CPT

## 2024-08-10 PROCEDURE — 2500000005 HC RX 250 GENERAL PHARMACY W/O HCPCS

## 2024-08-10 PROCEDURE — 37799 UNLISTED PX VASCULAR SURGERY: CPT | Performed by: REGISTERED NURSE

## 2024-08-10 PROCEDURE — 85025 COMPLETE CBC W/AUTO DIFF WBC: CPT | Performed by: REGISTERED NURSE

## 2024-08-10 PROCEDURE — 74018 RADEX ABDOMEN 1 VIEW: CPT

## 2024-08-10 PROCEDURE — 51701 INSERT BLADDER CATHETER: CPT

## 2024-08-10 PROCEDURE — 2500000001 HC RX 250 WO HCPCS SELF ADMINISTERED DRUGS (ALT 637 FOR MEDICARE OP): Performed by: STUDENT IN AN ORGANIZED HEALTH CARE EDUCATION/TRAINING PROGRAM

## 2024-08-10 PROCEDURE — 2020000001 HC ICU ROOM DAILY

## 2024-08-10 PROCEDURE — 70450 CT HEAD/BRAIN W/O DYE: CPT

## 2024-08-10 PROCEDURE — 82947 ASSAY GLUCOSE BLOOD QUANT: CPT | Performed by: REGISTERED NURSE

## 2024-08-10 PROCEDURE — 99232 SBSQ HOSP IP/OBS MODERATE 35: CPT

## 2024-08-10 PROCEDURE — 82330 ASSAY OF CALCIUM: CPT | Performed by: REGISTERED NURSE

## 2024-08-10 PROCEDURE — 70450 CT HEAD/BRAIN W/O DYE: CPT | Performed by: RADIOLOGY

## 2024-08-10 PROCEDURE — 74018 RADEX ABDOMEN 1 VIEW: CPT | Performed by: RADIOLOGY

## 2024-08-10 RX ORDER — POTASSIUM CHLORIDE 20 MEQ/1
40 TABLET, EXTENDED RELEASE ORAL ONCE
Status: DISCONTINUED | OUTPATIENT
Start: 2024-08-10 | End: 2024-08-10

## 2024-08-10 RX ORDER — POTASSIUM CHLORIDE 1.5 G/1.58G
40 POWDER, FOR SOLUTION ORAL ONCE
Status: COMPLETED | OUTPATIENT
Start: 2024-08-10 | End: 2024-08-10

## 2024-08-10 RX ORDER — POTASSIUM CHLORIDE 14.9 MG/ML
20 INJECTION INTRAVENOUS ONCE
Status: DISCONTINUED | OUTPATIENT
Start: 2024-08-10 | End: 2024-08-10

## 2024-08-10 RX ADMIN — DEXAMETHASONE SODIUM PHOSPHATE 4 MG: 4 INJECTION, SOLUTION INTRA-ARTICULAR; INTRALESIONAL; INTRAMUSCULAR; INTRAVENOUS; SOFT TISSUE at 08:16

## 2024-08-10 RX ADMIN — OFLOXACIN OTIC 5 DROP: 3 SOLUTION AURICULAR (OTIC) at 08:17

## 2024-08-10 RX ADMIN — WHITE PETROLATUM 57.7 %-MINERAL OIL 31.9 % EYE OINTMENT 1 APPLICATION: at 08:16

## 2024-08-10 RX ADMIN — NIMODIPINE 60 MG: 30 SOLUTION ORAL at 12:16

## 2024-08-10 RX ADMIN — SODIUM CHLORIDE 50 ML/HR: 9 INJECTION, SOLUTION INTRAVENOUS at 15:30

## 2024-08-10 RX ADMIN — CEFTRIAXONE SODIUM 2 G: 2 INJECTION, SOLUTION INTRAVENOUS at 17:07

## 2024-08-10 RX ADMIN — NIMODIPINE 60 MG: 30 SOLUTION ORAL at 20:17

## 2024-08-10 RX ADMIN — ESCITALOPRAM 5 MG: 5 TABLET, FILM COATED ORAL at 08:16

## 2024-08-10 RX ADMIN — HEPARIN SODIUM 5000 UNITS: 5000 INJECTION INTRAVENOUS; SUBCUTANEOUS at 10:56

## 2024-08-10 RX ADMIN — THIAMINE HCL TAB 100 MG 100 MG: 100 TAB at 08:16

## 2024-08-10 RX ADMIN — ESOMEPRAZOLE MAGNESIUM 40 MG: 40 FOR SUSPENSION ORAL at 08:16

## 2024-08-10 RX ADMIN — NIMODIPINE 60 MG: 30 SOLUTION ORAL at 15:30

## 2024-08-10 RX ADMIN — CARBOXYMETHYLCELLULOSE SODIUM 1 DROP: 5 SOLUTION/ DROPS OPHTHALMIC at 00:19

## 2024-08-10 RX ADMIN — SENNOSIDES AND DOCUSATE SODIUM 2 TABLET: 50; 8.6 TABLET ORAL at 20:17

## 2024-08-10 RX ADMIN — POTASSIUM CHLORIDE 40 MEQ: 1.5 POWDER, FOR SOLUTION ORAL at 08:45

## 2024-08-10 RX ADMIN — DEXAMETHASONE SODIUM PHOSPHATE 4 MG: 4 INJECTION, SOLUTION INTRA-ARTICULAR; INTRALESIONAL; INTRAMUSCULAR; INTRAVENOUS; SOFT TISSUE at 17:06

## 2024-08-10 RX ADMIN — WHITE PETROLATUM 57.7 %-MINERAL OIL 31.9 % EYE OINTMENT 1 APPLICATION: at 04:02

## 2024-08-10 RX ADMIN — WHITE PETROLATUM 57.7 %-MINERAL OIL 31.9 % EYE OINTMENT 1 APPLICATION: at 00:18

## 2024-08-10 RX ADMIN — OFLOXACIN OTIC 5 DROP: 3 SOLUTION AURICULAR (OTIC) at 20:24

## 2024-08-10 RX ADMIN — HEPARIN SODIUM 5000 UNITS: 5000 INJECTION INTRAVENOUS; SUBCUTANEOUS at 18:39

## 2024-08-10 RX ADMIN — CARBOXYMETHYLCELLULOSE SODIUM 1 DROP: 5 SOLUTION/ DROPS OPHTHALMIC at 08:16

## 2024-08-10 RX ADMIN — DEXAMETHASONE SODIUM PHOSPHATE 4 MG: 4 INJECTION, SOLUTION INTRA-ARTICULAR; INTRALESIONAL; INTRAMUSCULAR; INTRAVENOUS; SOFT TISSUE at 04:02

## 2024-08-10 RX ADMIN — NIMODIPINE 60 MG: 30 SOLUTION ORAL at 08:17

## 2024-08-10 RX ADMIN — CARBOXYMETHYLCELLULOSE SODIUM 1 DROP: 5 SOLUTION/ DROPS OPHTHALMIC at 04:10

## 2024-08-10 RX ADMIN — HEPARIN SODIUM 5000 UNITS: 5000 INJECTION INTRAVENOUS; SUBCUTANEOUS at 04:01

## 2024-08-10 RX ADMIN — CEFTRIAXONE SODIUM 2 G: 2 INJECTION, SOLUTION INTRAVENOUS at 04:01

## 2024-08-10 ASSESSMENT — COGNITIVE AND FUNCTIONAL STATUS - GENERAL
TOILETING: TOTAL
STANDING UP FROM CHAIR USING ARMS: TOTAL
DRESSING REGULAR UPPER BODY CLOTHING: TOTAL
PERSONAL GROOMING: TOTAL
DAILY ACTIVITIY SCORE: 6
EATING MEALS: TOTAL
DRESSING REGULAR UPPER BODY CLOTHING: TOTAL
HELP NEEDED FOR BATHING: TOTAL
STANDING UP FROM CHAIR USING ARMS: TOTAL
WALKING IN HOSPITAL ROOM: TOTAL
DAILY ACTIVITIY SCORE: 6
HELP NEEDED FOR BATHING: TOTAL
WALKING IN HOSPITAL ROOM: TOTAL
HELP NEEDED FOR BATHING: TOTAL
EATING MEALS: TOTAL
MOVING TO AND FROM BED TO CHAIR: TOTAL
TOILETING: TOTAL
EATING MEALS: TOTAL
MOBILITY SCORE: 6
TURNING FROM BACK TO SIDE WHILE IN FLAT BAD: TOTAL
TOILETING: TOTAL
DRESSING REGULAR LOWER BODY CLOTHING: TOTAL
MOBILITY SCORE: 6
CLIMB 3 TO 5 STEPS WITH RAILING: TOTAL
MOVING TO AND FROM BED TO CHAIR: TOTAL
TURNING FROM BACK TO SIDE WHILE IN FLAT BAD: TOTAL
TURNING FROM BACK TO SIDE WHILE IN FLAT BAD: TOTAL
DAILY ACTIVITIY SCORE: 6
DRESSING REGULAR UPPER BODY CLOTHING: TOTAL
STANDING UP FROM CHAIR USING ARMS: TOTAL
DRESSING REGULAR LOWER BODY CLOTHING: TOTAL
MOVING FROM LYING ON BACK TO SITTING ON SIDE OF FLAT BED WITH BEDRAILS: TOTAL
WALKING IN HOSPITAL ROOM: TOTAL
DRESSING REGULAR LOWER BODY CLOTHING: TOTAL
MOBILITY SCORE: 6
MOVING FROM LYING ON BACK TO SITTING ON SIDE OF FLAT BED WITH BEDRAILS: TOTAL
PERSONAL GROOMING: TOTAL
MOVING FROM LYING ON BACK TO SITTING ON SIDE OF FLAT BED WITH BEDRAILS: TOTAL
PERSONAL GROOMING: TOTAL
MOVING TO AND FROM BED TO CHAIR: TOTAL
CLIMB 3 TO 5 STEPS WITH RAILING: TOTAL
CLIMB 3 TO 5 STEPS WITH RAILING: TOTAL

## 2024-08-10 ASSESSMENT — PAIN - FUNCTIONAL ASSESSMENT
PAIN_FUNCTIONAL_ASSESSMENT: CPOT (CRITICAL CARE PAIN OBSERVATION TOOL)
PAIN_FUNCTIONAL_ASSESSMENT: 0-10
PAIN_FUNCTIONAL_ASSESSMENT: CPOT (CRITICAL CARE PAIN OBSERVATION TOOL)
PAIN_FUNCTIONAL_ASSESSMENT: CPOT (CRITICAL CARE PAIN OBSERVATION TOOL)
PAIN_FUNCTIONAL_ASSESSMENT: 0-10

## 2024-08-10 ASSESSMENT — PAIN SCALES - GENERAL
PAINLEVEL_OUTOF10: 0 - NO PAIN

## 2024-08-10 NOTE — PROGRESS NOTES
[facility]  NEUROSCIENCE INTENSIVE CARE UNIT  DAILY PROGRESS NOTE       Patient Name: Denae Nolasco   MRN: [id number]     Admit Date: 8/3/2024     : 1990 AGE: 33 y.o. GENDER: female        Subjective    Denae Nolasco is a 33 y.o. female with a past medical history of raynaud's and depression who presented to the [facility] ED on 8/3/2024 who worsening vertigo and ataxia. 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 Patient admitted to neurosurgery service for operative management.     : s/p L retrosigmoid craniotomy for tumor resection with RF EVD placement  : Patient extubated  : Trach and PEG placement    Interval Events: NAEON     Objective   VITALS (24H):  Temp:  [36.2 °C (97.2 °F)-36.9 °C (98.4 °F)] 36.2 °C (97.2 °F)  Heart Rate:  [70-99] 91  Resp:  [8-] 13  BP: (109-143)/() 123/87  Arterial Line BP 1: ()/(64-99) 154/89  INTAKE/OUTPUT:  Intake/Output Summary (Last 24 hours) at 8/10/2024 0635  Last data filed at 8/10/2024 0500  Gross per 24 hour   Intake 1290.84 ml   Output 2140 ml   Net -849.16 ml     VENT SETTINGS:        PHYSICAL EXAM:  NEURO:  - On trach  - L 7mm, R 5mm reactive to light  - EOV, Ox3  - Brisk R corneal, weak L corneal  - L HB6  - LUE 4/5  - RUE prox 2/5, distal 4-/5  - RLE HF4 ow 5  - LLE 5  - RF EVD clamped  CV:  - RRR on telemetry, NSR  - Right radial arterial line in place  RESP:  - Intubated, regular, unlabored  - Vent: AC  :  - Indwelling catheter in place  GI:  - Abdomen NT/ND, soft  SKIN:  - Surgical incision clean/dry/Intact    MEDICATIONS:  Scheduled: PRN: Continuous:   cefTRIAXone, 2 g, intravenous, q12h  dexAMETHasone, 4 mg, intravenous, q6h  escitalopram, 5 mg, g-tube, Daily  pantoprazole, 40 mg, oral, Daily before breakfast   Or  esomeprazole, 40 mg, nasogastric tube, Daily before breakfast   Or  pantoprazole, 40 mg, intravenous, Daily before breakfast  heparin (porcine),  5,000 Units, subcutaneous, q8h  insulin lispro, 0-5 Units, subcutaneous, TID  lubricating eye drops, 1 drop, Left Eye, q4h  niMODipine, 60 mg, nasogastric tube, q4h ENOCH  ofloxacin, 5 drop, Right Ear, BID  perflutren protein A microsphere, 0.5 mL, intravenous, Once in imaging  polyethylene glycol, 17 g, g-tube, Daily  scopolamine, 1 patch, transdermal, q72h  sennosides-docusate sodium, 2 tablet, g-tube, BID  thiamine, 100 mg, g-tube, Daily  white petrolatum-mineral oiL, 1 Application, Left Eye, q4h     PRN medications: calcium gluconate, calcium gluconate, dextrose, dextrose, glucagon, glucagon, hydrALAZINE, HYDROmorphone, labetaloL, magnesium sulfate, magnesium sulfate, naloxone, ondansetron, oxyCODONE, oxygen, potassium chloride CR **OR** potassium chloride, potassium chloride CR **OR** potassium chloride, potassium chloride, prochlorperazine **OR** prochlorperazine **OR** prochlorperazine sodium chloride 0.9%, 50 mL/hr, Last Rate: 50 mL/hr (08/09/24 2220)         LAB RESULTS:      IMAGING RESULTS:  XR abdomen 1 view         Vascular US lower extremity venous duplex bilateral         XR abdomen 1 view   Final Result   1. Interval removal of an NG tube and placement of Dobbhoff tube,   which coils over the gastric fundus with tip overlying the gastric   antrum.   2. Nonobstructive bowel-gas pattern within the visualized abdomen.        I personally reviewed the image(s)/study and resident interpretation   as stated by Dr. Delilah Rader MD. I agree with the findings as   stated. This study was interpreted at [facility], [place], [place].        MACRO:   None        Signed by: Davy Ball 8/8/2024 9:09 AM   Dictation workstation:   [id number]      XR chest 1 view   Final Result   1.  No evidence of acute cardiopulmonary process.   2. Medical devices as above.                  MACRO:   None        Signed by: Davy Ball 8/7/2024 2:12 PM   Dictation workstation:   [id number]       MR brain w and wo IV contrast   Final Result   1. Postsurgical changes from left retrosigmoid craniectomy for   cerebellopontine angle mass resection. Filling defect within the left   transverse and sigmoid sinuses compatible with dural venous sinus   thrombosis.   2. Persistent similar mass effect within the posterior fossa with   effacement of the 4th ventricle and inferior displacement of the   cerebellar tonsils through the foramen magnum.   3. Right frontal approach ventriculostomy catheter in unchanged   position. Unchanged right frontal subdural and intraparenchymal   hematoma along the catheter tract.        I personally reviewed the images/study and I agree with Patrica Givens DO's (radiology resident) findings as stated. This study   was interpreted at [place], [place].        MACRO:   Patrica Givens discussed the significance and urgency of this   critical finding by telephone with  Nicole Davila on 8/6/2024 at 11:12   pm.  (**-RCF-**) Findings:  See findings.             Signed by: Nikolay Bailey 8/7/2024 7:36 AM   Dictation workstation:   [id number]      CT head wo IV contrast   Final Result   1. Postsurgical change of left retrosigmoid craniectomy with   cranioplasty for left vestibular schwannoma resection with persistent   mass effect within the posterior fossa with effacement of the 4th   ventricle and slight inferior displacement of the cerebellar tonsils   into the foramen magnum.   2. Slight interval increase in a extra-axial fluid collection   immediately deep to the craniotomy defect measuring 6 mm. Otherwise,   stable appearance of postoperative blood products scattered within   the posterior fossa adjacent to the resection cavity as well as an   additional subdural hematoma along the left tentorial leaflet.   3. Right frontal approach ventriculostomy shunt catheter unchanged in   positioning when compared to prior exam. Persistent  overlying   subdural hematoma measuring up to 0.4 cm along the right frontal lobe.             I personally reviewed the images/study, and I agree with the findings   as stated above. This study was interpreted at [place], [place].        MACRO:   None        Signed by: Madi Parham 8/6/2024 10:23 AM   Dictation workstation:   [id number]      XR abdomen 1 view   Final Result   1. Enteric tube tip projects over the expected body of stomach.   2. Nonobstructive nonspecific bowel-gas pattern.             I personally reviewed the images/study and I agree with Dr. Kiran Hernandez findings as stated. This study was interpreted at [place], [place]        Signed by: Scooter Aldrich 8/6/2024 6:45 PM   Dictation workstation:   [id number]      XR chest 1 view   Final Result   1.  No evidence of acute cardiopulmonary process.   2. Similarly positioned medical devices as above.        I personally reviewed the images/study and I agree with Dr. Kiran Hernandez findings as stated. This study was interpreted at [place], [place]        MACRO:   None        Signed by: Scooter Aldrich 8/6/2024 6:44 PM   Dictation workstation:   [id number]      CT head wo IV contrast   Final Result   1. New extensive postoperative changes from left retrosigmoid   craniectomy with cranioplasty for mass resection. There is persistent   mass effect within the posterior fossa with effacement of the 4th   ventricle.   2. Right frontal approach ventriculostomy shunt with catheter tip   terminating in the foramen of Monro. There has been some interval   decrease in the caliber of the left lateral and 3rd ventricles   compared with the previous exam.   3. New small extra-axial hematoma near the right frontal arcelia hole.   There is either artifact versus small subdural hematoma along the   left cerebral convexity.         I personally reviewed the images/study and I agree with Dr. Kiran Hernandez findings as stated.        MACRO:   None        Signed by: Gi Cohen 8/6/2024 7:03 AM   Dictation workstation:   [id number]      XR chest 1 view   Final Result   1.  No evidence of acute cardiopulmonary process.   2. Medical devices as above.                  MACRO:   None        Signed by: Keshav Treadwell 8/5/2024 3:47 PM   Dictation workstation:   [id number]      CT head wo IV contrast volumetric surgical planning   Final Result   1. Preoperative volumetric CT imaging obtained. Mass within the left   cerebellopontine angle better characterized on same day MRI IAC. Mild   surrounding vasogenic edema.   2. Similar asymmetric effacement of the right basal cisterns and 4th   ventricle with mild upstream ventricular prominence. Mild asymmetry   of the lateral ventricles.        I personally reviewed the image(s)/study and resident interpretation   as stated by Dr. Delilah Rader MD. I agree with the findings as   stated. This study was interpreted at [facility], [place], [place].        MACRO:   None        Signed by: Scooter Vinson 8/5/2024 6:22 AM   Dictation workstation:   [id number]      XR chest 1 view   Final Result   1. No evidence of acute cardiopulmonary process.        Signed by: Cliff Juares 8/4/2024 2:11 PM   Dictation workstation:   [id number]      MR IAC w and wo IV contrast   Final Result   1. There is a heterogeneously enhancing mass within the left   cerebellar pontine angle cistern and internal auditory canal   measuring 3.5 x 2.9 x 3.2 cm. There is mild thickening of the   adjacent tentorium measuring 0.2 cm in thickness. Differential   considerations include a vestibular schwannoma or possibly a   meningioma. There is marked mass effect upon the leticia, brachium   pontis and cerebellum. There is near-complete effacement of the 4th   ventricle. There is dilatation of the  lateral and 3rd ventricles,   similar to the prior exams.        MACRO:   None        Signed by: Suzy Martinez 8/4/2024 9:24 AM   Dictation workstation:   [id number]      Transthoracic Echo (TTE) Complete   Final Result      XR chest 1 view   Final Result   No evidence of acute intrathoracic abnormality.        Signed by: Aneesh Bhatti 8/3/2024 1:08 PM   Dictation workstation:   [id number]      CT internal auditory canals posterior fossa wo IV contrast   Final Result   There is again evidence of a large extra-axial mass centered within   left cerebellopontine angle cistern which is better defined on the   prior MRI with and without gadolinium dated 08/02/2024 when compared   with this noncontrast CT study. There is again evidence of mass   effect upon the adjacent brainstem, left cerebellar peduncle, and   cerebellum. There is hypodensity within the surrounding brain   parenchymal compatible with surrounding brain parenchymal edema.   There is again evidence of near-complete effacement of the 4th   ventricle. No abnormal calcification is noted associated with the   lesion. There is minimal asymmetric widening of the left internal   auditory canal when compared with the right.        There is again evidence of mild ventriculomegaly involving the   lateral ventricles and 3rd ventricle similar when compared with the   prior MRI without significant effacement of surrounding sulci of the   cerebral hemispheres.        Mild nonspecific white matter changes are again noted within cerebral   hemispheres bilaterally. While nonspecific, white matter changes can   be seen with small-vessel ischemic change or demyelinating processes   among others.        MACRO:   None.        Signed by: Kyle Zamora 8/3/2024 11:48 AM   Dictation workstation:   [id number]      CT head wo IV contrast   Final Result   There is again evidence of a large extra-axial mass centered within   left cerebellopontine angle cistern which is better defined on  the   prior MRI with and without gadolinium dated 08/02/2024 when compared   with this noncontrast CT study. There is again evidence of mass   effect upon the adjacent brainstem, left cerebellar peduncle, and   cerebellum. There is hypodensity within the surrounding brain   parenchymal compatible with surrounding brain parenchymal edema.   There is again evidence of near-complete effacement of the 4th   ventricle. No abnormal calcification is noted associated with the   lesion. There is minimal asymmetric widening of the left internal   auditory canal when compared with the right.        There is again evidence of mild ventriculomegaly involving the   lateral ventricles and 3rd ventricle similar when compared with the   prior MRI without significant effacement of surrounding sulci of the   cerebral hemispheres.        Mild nonspecific white matter changes are again noted within cerebral   hemispheres bilaterally. While nonspecific, white matter changes can   be seen with small-vessel ischemic change or demyelinating processes   among others.        MACRO:   None.        Signed by: Kyle Zamora 8/3/2024 11:48 AM   Dictation workstation:   [id number]      CT chest abdomen pelvis w IV contrast   Final Result   1. Dilated common bile duct measuring to 1.1 cm with gradual   transition to normal caliber and associated mild intrahepatic biliary   dilation. No evidence of radiopaque choledocholithiasis, radiopaque   cholelithiasis, pancreatic duct dilation, or abnormal focal   pancreatic enhancement. Findings can be secondary to chronic   periampullary stricture, however neoplasm given patient's history can   not be excluded. Nonemergent MRCP and/or ERCP is recommended for   further assessment.   2. Heterogeneous cervix with hypodense areas, which can be secondary   to nabothian cysts. Nonemergent pelvic ultrasound can be obtained for   further assessment as per clinical discretion.   3. Subpleural nodular opacity within  the right middle lobe with   internal calcifications, as above. Additional calcified granulomas   and calcified right hilar lymph nodes, compatible with remote   granulomatous disease.   4. Additional findings as discussed above.        I personally reviewed the images/study and I agree with the findings   as stated by Goyo Hendricks MD (Radiology Resident).   This study was interpreted at [facility], [place], [place].        MACRO:   None.        Signed by: Griselda Gamez 8/3/2024 12:40 PM   Dictation workstation:   [id number]      CT head wo IV contrast    (Results Pending)         Assessment/Plan      33 year-old female with past medical history of depression and visual impairment who presented to OSH ED on 8/2/24 with complaints of hearing loss, vertigo, ataxia (since 2020), headache, dizziness, and muscle spasms. Patient transferred to [facility] for concern for intracranial mass concerning for schwannoma vs. meningioma seen on outpatient imaging. 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 Patient admitted to NSU s/p L retrosigmoid craniotomy for tumor resection.     NEURO:  #s/p L retrosigmoid craniotomy for tumor resection w RF EVD placement  Assessment:  - See physical exam  - RF EVD clamped per NSGY  - Na 139<-141<-142<-143<-142<-140<-140  - 50mL/hr NS  Plan:  - NSU  - Neuro Checks: Q1H pupil checks  - Keep RF EVD clamped  - Transition from maintenance fluids to tube feeds/flushes  - HOB >45 degrees  - Ophtho consult: recommended tarsorrhaphy today  - Vanc/CTX (terminate today)  - -140  - q12 Na, Goal Na>140  - Nimodipine 60q4  - Dex 4q6   - Pain: acetaminophen Q4H and oxycodone Q6H  - Nausea: ondansetron PRN  - PT/OT/SLP/nutrition  - Keep net even IOs    CARDIOVASCULAR:  #no active issues  Assessment:  - ECHO: EF 57%    Plan:  - Continue to monitor on telemetry  - DVT preliminary neg, follow  up final read  - -140    --> PRN: Labetalol, Hydralazine, and Nicardipine     RESPIRATORY:  #Edematous uvula  #Dysphonia  Assessment:  - bilateral vocal cord paralysis  - Trached  - SLP-NPO  Plan:  - Continuous pulse oximetry   - O2 PRN to maintain SpO2 > 94%, wean as tolerated    RENAL/:  #no active issues  Assessment:  Results from last 72 hours   Lab Units 08/10/24  04124  0410   BUN mg/dL 13 13   CREATININE mg/dL 0.33* 0.36*       Plan:  - Monitor with daily RFP  - Maintain velazquez catheter for: critically ill patient who need accurate urinary output measurements    FEN/GI:  #no active issues  Assessment:  - Last BM: PTA  - PEG  Plan:  - Monitor and replace electrolytes per protocol  - IVF: NS @ 50 mL/hr  - Diet: NPO   - Bowel Regimen: Docusate-Senna BID and Miralax QD    ENDOCRINE:  Assessment:  Results from last 7 days   Lab Units 08/10/24  0410 24  2055 24  1555 24  1131 24  0810 24  0410 24  1550 24  1114   POCT GLUCOSE mg/dL  --  118* 119* 124* 116*  --  143* 163*   GLUCOSE mg/dL 94  --   --   --   --  128*  --   --       Plan:  - Accuchecks & ISS     HEMATOLOGY:  #no active issues  Assessment:  - Baseline Hgb: 12.8  - Baseline Plts: 237  Results from last 7 days   Lab Units 08/10/24  0410 08/09/24  0410   HEMOGLOBIN g/dL 11.2* 10.7*   HEMATOCRIT % 32.2* 30.9*   PLATELETS AUTO x10*3/uL 212 191     Plan:  - Continue to monitor with daily CBC and Coag panel    INFECTIOUS DISEASE:  #no active issues  Assessment:  Results from last 7 days   Lab Units 08/10/24  04124  0410   WBC AUTO x10*3/uL 7.0 7.6    - Temp (24hrs), Av.5 °C (97.7 °F), Min:36.2 °C (97.2 °F), Max:36.9 °C (98.4 °F)     Plan:  - Continue to monitor for s/sx of infection  - Pan culture for temperature > 38.4 C    MUSCULOSKELETAL:  - No acute issues    ENT:  - Right tympanic membrane perforation     - ENT oxacillin drops BID for 7 days    SKIN:  - No acute issues  - Turns and skin  care per NSU protocol    ACCESS:  - PIVs  - Right radial arterial line (8/5--)  - R subclavian CVC (8/5--)    PROPHYLAXIS:  - DVT Ppx: SCDs  - GI Ppx: Pantoprazole    RESTRAINTS:  I agree with nursing assessment of the patient's need for restraints to protect the patient from injury and facilitate healing. The patient is unable to cooperate with the plan of care and at risk for disrupting critical therapy (i.e., removing medical devices, lines, tubes and/or dressings).  Please see order for specifics. Restraints can be removed when the patient is able to cooperate with plan of care and allow healing to occur, or the medical devices at risk are discontinued by the medical team.     Kyle Ma MD  Neuroscience Intensive Care       Plan of care to be discussed with neurocritical care attending

## 2024-08-10 NOTE — PROGRESS NOTES
"Denae Nolasco is a 33 y.o. female on day 7 of admission presenting with Schwannoma.    Subjective   NAEO    Objective     Physical Exam  Awake, nods to questions, attempts to whisper   OU3R  L HB6  + R corneal, weak L corneal  L 5/5  RUE prox 2 dis 3  LUE prox 3 dis 4-  BLE 5/5    Last Recorded Vitals  Blood pressure (!) 128/92, pulse 84, temperature 36.2 °C (97.2 °F), temperature source Temporal, resp. rate 25, height 1.575 m (5' 2.01\"), weight 56 kg (123 lb 7.3 oz), last menstrual period 07/24/2024, SpO2 100%.  Intake/Output last 3 Shifts:  I/O last 3 completed shifts:  In: 3705.8 (62.1 mL/kg) [I.V.:1600.8 (26.8 mL/kg); NG/GT:640; IV Piggyback:1465]  Out: 4375 (73.3 mL/kg) [Urine:4375 (2 mL/kg/hr)]  Weight: 59.7 kg     Relevant Results  Lab Results   Component Value Date    WBC 7.0 08/10/2024    HGB 11.2 (L) 08/10/2024    HCT 32.2 (L) 08/10/2024    MCV 83 08/10/2024     08/10/2024     Lab Results   Component Value Date    GLUCOSE 94 08/10/2024    CALCIUM 8.6 08/10/2024     08/10/2024    K 3.5 08/10/2024    CO2 27 08/10/2024     08/10/2024    BUN 13 08/10/2024    CREATININE 0.33 (L) 08/10/2024        This patient currently has cardiac telemetry ordered; if you would like to modify or discontinue the telemetry order, click here to go to the orders activity to modify/discontinue the order.  This patient has a central line   Reason for the central line remaining today? Hemodynamic monitoring      This patient is intubated   Reason for patient to remain intubated today? 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, CT CAP R middle lobe calcifications c/w granulomatous disease, dilated CBD, " intrahepatic dilatation (rec'd MRCP or ERCP), CTH/CT IAC done, TTE EF 57% no wall motion abnormality, no residual interatrial shunt w bubble study, 8/4 MRI IAC CISS stable, vCTH done, 8/5 s/p L retrosig for tumor resection, RF EVD, 8/6 CTH resection bed hemorrhage    8/6 MRI brain L transverse sigmoid sinus thrombosis, GTR  8/7 s/p extubation  8/9 s/p trach/PEG, BLE DVT US neg, 8/10 CTH stable    Plan:  NSU  RF EVD (clamped, transduce ICPs, will discuss possible dc today)  HOB >45  Na goal >140, space out Na checks  Strict IO   Dex taper  ENT recs - R ear injury, ENT scoped 8/8 and found complete b/l VC paralysis  BLE DVT US today   Con't vanc, ceftriaxone (8/10)  SCDs, SQH  PTOT - ok to work with without restriction, please work with every day       Get Kim MD

## 2024-08-10 NOTE — PROGRESS NOTES
Social Work Note: PT/OT recommended “high intensity” Acute Rehab for this patient. Team advised SW to meet with spouse. SW met with patient's spouse Munir Nolasco ([phone]) and friends on this date. Spouse is amenable to patient receiving Acute rehab. SW provided patient's spouse/friends with an overview of the meaning of Acute Rehab and what to expect. Patient's spouse reported [facility] is closest to their place of residence. SW sent a referral to [facility] on this date. SW will continue to follow this patient.   Mckenzie Burton MSW/LSW ([id number])

## 2024-08-10 NOTE — PROGRESS NOTES
Subjective:  No acute events overnight.   No trach issues    Objective:  Vitals reviewed  General: Alert, oriented, no acute distress  Resp: Breathing comfortably via trach  Head: Atraumatic, normocephalic  Oral Cavity: MMM  Ears: external ears normal   Nose: external nose normal  Neck: tracheostomy site healing well, no active hemorrhage, trach collar adjusted  Abd: PEG tube dressing in place removed, bumper spinning    Assessment:  33F s/p percutaneous tracheostomy and PEG by Dr. Jewell and Mario on 8/9. Currently doing well.     Plan:  - Continue standard trach care  - ENT to cut sutures 8/14  - Ok to use PEG  - Okay to restart DVT PPx and AC if medically indicated   - Please page with any questions or concerns     ENT  [id number]

## 2024-08-10 NOTE — CARE PLAN
The patient's goals for the shift include safety    The clinical goals for the shift include pt. will remaion safe from falls throughout shift.    Over the shift, the patient did not make progress toward the following goals. Barriers to progression include limited mobility. Recommendations to address these barriers include safia.

## 2024-08-10 NOTE — OP NOTE
Craniotomy with Excision Tissue (L) Operative Note     Date: 2024 - 2024  OR Location: [facility] OR    Name: Denae Nolasco YOB: 1990, Age: 33 y.o., MRN: [id number], Sex: female    Diagnosis  Pre-op Diagnosis      * Schwannoma [D36.10] Post-op Diagnosis     * Schwannoma [D36.10]     Procedures  Craniotomy with Excision Tissue  91129 - MA CRNEC JOVANNY INFRATTL/POSTFOSSA CRBLOPNT ANGLE JOVANNY    Craniotomy with Excision Tissue  00805 - MA CRNEC JOVANNY INFRATTL/POSTFOSSA CRBLOPNT ANGLE JOVANNY  Use of intraoperative miscroscope  Placement of R frontal EVD, with use of image guidance/neuronavigation  Neuromonitoring with SSEP/MEP/YOUSUF, 7th, 5th and lower cranial nerve monitoring    Surgeons      * Renetta Amanda - Primary    Resident/Fellow/Other Assistant:  Surgeons and Role:     * Fernando Altamirano MD - Resident - Assisting     * Enrico Romero MD - Fellow    Procedure Summary  Anesthesia: General  ASA: III  Anesthesia Staff: Anesthesiologist: Maritza Galarza DO; Hi Webb DO  C-AA: MATT Hanna; MATT Rodriguez; MATT Becker  Anesthesia Resident: Saad Segura MD  Estimated Blood Loss: 132mL  Intra-op Medications:   Administrations occurring from 0715 to 1405 on 24:   Medication Name Total Dose   lidocaine-epinephrine PF (Xylocaine W/EPI) 1 %-1:200,000 injection 16 mL   thrombin (recombinant) (Recothrom) topical solution 30,000 Units   thrombin (Human)-fibrinogen-aprotinin-calcium (Tisseel) 10 mL 10 mL   sodium chloride 0.9 % irrigation solution 3,000 mL   sodium chloride 23.4 % injection 120 mEq 60 mL   acetaminophen (Tylenol) tablet 650 mg Cannot be calculated   escitalopram (Lexapro) tablet 5 mg Cannot be calculated   niMODipine (Nymalize) liquid 60 mg 120 mg   polyethylene glycol (Glycolax, Miralax) packet 17 g Cannot be calculated   sennosides-docusate sodium (Becky-Colace) 8.6-50 mg per tablet 2 tablet Cannot be calculated              Anesthesia Record                Intraprocedure I/O Totals       None           Specimen:   ID Type Source Tests Collected by Time   1 : LEFT CPA TUMOR Tissue BRAIN BIOPSY SURGICAL PATHOLOGY EXAM Renetta Amanda MD 8/5/2024 1414   2 : LEFT CPA TUMOR Tissue BRAIN RESECTION SURGICAL PATHOLOGY EXAM (Canceled) Renetta Amanda MD 8/6/2024 0114        Staff:   Circulator: Judith  Circulator: Aileen  Scrub Person: Shabana  Scrub Person: Zak Loomis Scrub: Regis Loomis Circulator: Radha  Relief Circulator: Magali  Relief Scrub: Giovanna  Relief Circulator: Aga  Circulator: Britne  Relief Scrub: Xiao         Drains and/or Catheters:   Gastrostomy/Enterostomy PEG-jejunostomy 1 20 Fr. LUQ (Active)   Surrounding Skin Dry;Intact 08/10/24 0800   Drain Status Irrigated 08/10/24 0800   Drainage Appearance None 08/10/24 0800   Site Description Healing 08/10/24 0800   Dressing Status Clean;Dry;Occlusive 08/10/24 0800   Dressing Intervention Dressing reinforced 08/10/24 0800   Dressing Type Split gauze 08/10/24 0800   Intake - Flush (mL) 200 mL 08/10/24 0800       Urethral Catheter Double-lumen;Non-latex 16 Fr. (Active)   Site Assessment Clean;Skin intact 08/10/24 0800   Collection Container Standard drainage bag 08/10/24 0800   Securement Method Securing device (Describe) 08/10/24 0800   Reason for Continuing Urinary Catheterization accurate hourly measurement of urine volume in a critically ill patient that cannot be assessed by other volumes and urine collection strategies 08/10/24 0800   Output (mL) 175 mL 08/10/24 1100       Intracranial Pressure/Ventriculostomy Ventricular drainage catheter with ICP transducer Right (Active)   Drain Status Clamped 08/10/24 1100   Level At external auditory meatus;0 cm 08/10/24 1100   Site Assessment Clean;Dry 08/10/24 1100   Drainage No drainage 08/10/24 1100   Output (mL) 0 mL 08/10/24 1100   CSF Color Other (Comment) 08/10/24 1100   Dressing Status Dry;Clean 08/10/24 1100   Ventric/ICP  Waveform Appropriate 08/10/24 1100   Ventric/ICP Interventions Zeroed and calibrated;Leveled;Connections checked and tightened 08/10/24 0800   Site Care Performed 08/10/24 0900   Dressing Intervention Other (Comment) 08/09/24 2000       [REMOVED] Closed/Suction Drain Right Scalp Other (Comment) (Removed)       [REMOVED] NG/OG/Feeding Tube Right nostril (Removed)   Tube Status Clamped 08/07/24 0800   Placement Verification Measurements 08/07/24 1600   Distal Tube Measurement 60 cm 08/07/24 0800   Site Assessment Clean;Dry;Intact 08/07/24 1600   Tube Securement Taped to nostril center 08/07/24 0400   Intake (mL) 90 mL 08/07/24 0500   Intake - Flush (mL) 60 mL 08/07/24 1000       [REMOVED] NG/OG/Feeding Tube Left nostril (Removed)   Tube Status Clamped 08/09/24 0700   Placement Verification Measurements 08/09/24 0700   Distal Tube Measurement 78 cm 08/09/24 0700   Site Assessment Clean;Dry;Intact 08/09/24 0700   Tube Securement Nasal bridle 08/09/24 0700   Feeding Tube Flushed With Tap water 08/08/24 2000   Intake (mL) 10 mL 08/08/24 2300   Intake - Flush (mL) 80 mL 08/09/24 0800       Tourniquet Times:         Implants:  Implants       Type Name Action Serial No.      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 female who is having surgery for Schwannoma [D36.10].     The patient was seen in the preoperative area. The risks, benefits, complications, treatment options, non-operative alternatives, expected recovery and  outcomes were discussed with the patient. The possibilities of reaction to medication, pulmonary aspiration, injury to surrounding structures, bleeding, recurrent infection, the need for additional procedures, failure to diagnose a condition, and creating a complication requiring transfusion or operation were discussed with the patient. The patient concurred with the proposed plan, giving informed consent.  The site of surgery was properly noted/marked if necessary per policy. The patient has been actively warmed in preoperative area. Preoperative antibiotics have been ordered and given within 1 hours of incision. Venous thrombosis prophylaxis have been ordered including bilateral sequential compression devices    Procedure Details: Following the induction of satisfactory general endotracheal anesthesia, the patient was initially placed supine and the right frontal region was shaved and prepped in the usual sterile fashion after registration to the neuronavigation system with tracer technique and confirmation of landmarks.  Lidocaine with epinephrine was injected at Kocher's point approximately 10 cm posterior to the nasion and 3 cm lateral to midline.  A stab incision was made with a blade down to the calvarium and a twist drill bur hole was created to expose the dura which was then cleared of bone chips, and opened with an 11 blade.  A antibiotic coated ventricular catheter was then inserted orthogonal to the skull and CSF was encountered on the first pass at approximately 5 cm and the catheter inserted to a depth approximately 6 cm from the inner edge of the bone.  The ventricular catheter was then tunneled posteriorly and secured with a silk tie.  The stab incision was then closed with Monocryl.    After completion of the EVD placement, the patient was kept in supine position with a shoulder bump under the left shoulder and the head turned towards the right and 3 pin Allen fixation to expose the left  retrosigmoid and suboccipital area.  Care was taken to ensure that there was at least 1 fingerbreadth between the jaw and the sternum to avoid hyper rotation of the neck.  The cuff on the ET tube was also deflated and reinflated to minimal required pressure to prevent leakage following hip positioning.  Of note baseline SSEP and MEP leads have been placed and were found to be overall stable except for a mild reduction in the left upper extremity median which was felt not to be indicative of a true decrement, but related to artifact.  Electrode needles were inserted by the neuromonitoring team for monitoring of orbicularis oris, nasalis, and oculi, and a master needle.  Plugs were inserted by the neuromonitoring team into the left and right ear for monitoring of brainstem auditory evoked potentials.  Of note initially no monitorable BAERs  were obtainable from the left which was expected, but also not from the right and it was noted some small amount  bleeding from e right external ear canal which was felt to be due  to a possible excoriation of the ear canal.  After clearing of the blood from the ear, a new earplug was placed, and right sided BAERS were able to be obtained.    Of the left retrosigmoid region was shaved prepped and draped in the usual sterile fashion and the linear incision approximately 1 fingerbreadth behind the left pinna was infiltrated with half percent lidocaine with epinephrine.  Incision was made with a skin knife down to the calvarium and carried with Bovie electrocautery through the suboccipital muscles to expose the underlying asterion and traveled laterally to expose the mastoid edge, and inferiorly until the foramen magnum was palpable.  Retractors were placed and a  was utilized to create a bur hole just medial and inferior to the asterion, and b1 Midas Delta but was then utilized to elevate a retrosigmoid suboccipital bone flap.  Direct visualization the edge of the transverse  sinus was stripped and an additional strip of bone was removed with the B1 mitis lianne pit with a footplate.  Laterally dissection of the sigmoid sinus was performed and a small amount of bleeding was encountered at the edge of an emissary vein.  Gelfoam was placed over this area for tamponade and a small amount of Tisseel.  The edge of the bone was further removed with Leksell and Kerrison rongeurs such as the edges of both the sigmoid and transverse sinus were InView.  EVD CSF drainage was used intermittently to obtain additional brain relaxation and the dura was then opened in a stellate fashion.  Despite administration of 23% hypertonic saline and some drainage from the EVD the cerebellum was relatively full and therefore attention was turned towards opening the cisterna magna with a Koi blade and microscissors to relieve further CSF.  This allowed enough cerebellar relaxation to be able to view just lateral to the cerebellum.  With dynamic gentle retraction the edge of the tumor was visualized.  At this point using a neurostimulator at settings of 2 5 mA stimulation was performed on the posterior capsule.  No stimulation was evidence, and therefore a bipolar was utilized to create an opening into the posterior capsule of the tumor and begin the debulking process.  Small pieces of tumor were then removed piecemeal and sent for pathology, which returned consistent with schwannoma.    At this point having started the initial debulking, but now to perform additional dissection around the capsule, intraoperative microscope was brought into view and the remainder of the surgery was performed intradurally with the use of intraoperative microscopic magnification, use of microsurgical instruments and technique.    Initially dissection proceeded at the inferior pole of the tumor which was displacing against the lower cranial nerves but was able to be dissected free of these nerves maintaining a layer of arachnoid to  ensure no injury.  The tumor was then sequentially debulked at the inferior aspect after stimulation around all elements of the capsule that were entered and with some decompression of the tumor was able to be elevated fully from the lower cranial nerves in this location.  Attention was then turned towards the superior pole of the tumor and similarly neurostimulation did not identify any facial activation.  The tumor was dissected away from the 5th nerve maintaining again the arachnoid boundaries.  Attention was then turned towards the medial aspect of the tumor where it was densely adherent to the cerebellum and cerebellar peduncle interface.  Using meticulous careful blunt and sharp dissection the tumor capsule was dissected free from the cerebellar edge and sequentially debulking was performed as the tumor capsule was then able to be folded and then dissected further incrementally until the edge of the brainstem was apparent.  Intermittent and periods of continuous neurostimulation wer performed in an effort to identify the takeoff of the 7th nerve.  Even at higher settings up to 5 mA no stimulation was evident at this stage at the brainstem.  Dissection then continued and ultimately due to the consistency of the tumor which was relatively soft, but also vascular, intermittent sonopet aspiration debulking internally was performed, and hemostasis required.  Due to the very large size of the tumor the progression of this dissection especially along the brainstem to ensure no violation of brainstem structures took several hours and required additional significant meticulous microscopic technique. Ultimately the superior aspect of the tumor was further folded away from the origin of the trigeminal nerve and as dissection proceeded a nerve structure along the brainstem was identified consistent with the 7th nerve takeoff, but of note no stimulation was able to be obtained.  Despite this lack of simulation the nerve  structure was preserved, and the attention was turned towards the lateral aspect of the tumor where further debulking was performed and with gentle traction the tumor extending into a widened porous acoustic was able to be delivered and upon doing so anteriorly a nerve structure was evident which stimulated positively for  nasalis, and masseter.  Despite the masseter stimulation, it was evident that this nerve was in the location of the 7th nerve and therefore was carefully preserved as further dissection now proceeded from lateral towards medial and then sequentially going back and forth from medial to lateral the 7th nerve structure identified at the brainstem was followed out laterally until a very thin film of tumor remained  and this was further thinned down carefully with low settings on sonopet to ensure that the integrity of the nerve was intact despite the lack of proximal stimulation.  After this had been performed the tumor had been fully dissected free from the brainstem the 7th nerve complex, the lower cranial nerves, and the 5th nerve superiorly as well as now demonstrating visualization of deep vasculature including the vertebral and edge of the basilar, with perforators that were carefully preserved, as well as visualization of the 6th nerve which was carefully preserved.    At this point attention was turned towards hemostasis and closure.  Given the relative fullness of the cerebellum a small degree of retraction contusion was evident at the very edge of the cerebellum which was initially coagulated and hemostasis obtained with Gelfoam.  However as attention turned towards final closure bleeding was encountered from superior cerebellar vein draining up into the tentorium spontaneously and initially tamponaded with Gelfoam, but over the next 5 to 10 minutes the cerebellum continued to become jones and an intraoperative ultrasound demonstrated that there appeared to be a region of ddeper   intracerebellar hematoma.  There was concern at this time for venous congestion, and the systemic central venous pressure was noted to be elevated in the mid to high teens.  Additional Lasix mannitol and attention to hyperventilation were continued but it was apparent that resection of the cerebellar hematoma in the lateral aspect of the cerebellum would be necessary to ensure no dangerous brainstem compression.  Therefore attention was turned towards cautery of the cerebellar surface and resection of the lateral aspect of the cerebellum.  Some hematoma was encountered at the depth of the cerebellum.  Venous oozing was apparent, but as maneuvers to lower the systemic central venous pressure were performed the brain relaxation improved and hemostasis was able to be obtained diligently.  Small pieces of Gelfoam and then Surgicel were used to line the calero of the cerebellar restriction.  With these maneuvers the brain was now again relaxed and no compressive lesions evident.      The subdural space was irrigated with warm saline.  The dura was closed then in a watertight fashion with a Dura-Guard dural replacement substitute using 4-0 Nurolon sutures.  Tisseel was used to line the suture line and piece of compressed thrombin-soaked Gelfoam was placed over this and sealed with the seal.  The edges of the mastoid were some air cells have been encounter were bone wax additionally, and the bony defect was covered with a Krystina Medpor titanium mesh affixed with 5 mm titanium screws and secure fashion.  Copious antibiotic irrigation was performed.  The suboccipital muscles were subjected to meticulous hemostasis and closed with interrupted 0 Vicryl sutures, and the galea and subcutaneous tissue were closed with buried 2-0 Vicryl sutures and 3-0 nylon suture in the skin.    A Xeroform and Telfa dressing were applied and affixed with Tegaderms.    The patient was then removed from  [facility], and due to the lengthy  anesthesia determination was made to keep the patient intubated and plan to obtain an head CT to ensure no untoward intracranial issues.  At this point the patient was transferred intubated to the scanner and then to the ICU.  Head CT did not show any evidence of hematoma or brain swelling..          Disposition: ICU - intubated and hemodynamically stable.  Condition: stable         Additional Details: as noted    Attending Attestation: I, Dr Renetta Amanda was available throughout and was present for and performed all critical portions.    Renetta Amanda  Phone Number: [phone]

## 2024-08-11 VITALS
HEART RATE: 130 BPM | HEIGHT: 62 IN | WEIGHT: 117.95 LBS | DIASTOLIC BLOOD PRESSURE: 108 MMHG | BODY MASS INDEX: 21.7 KG/M2 | RESPIRATION RATE: 23 BRPM | SYSTOLIC BLOOD PRESSURE: 146 MMHG | TEMPERATURE: 98.1 F | OXYGEN SATURATION: 100 %

## 2024-08-11 LAB
ALBUMIN SERPL BCP-MCNC: 4.2 G/DL (ref 3.4–5)
ANION GAP SERPL CALC-SCNC: 17 MMOL/L (ref 10–20)
BASOPHILS # BLD AUTO: 0.07 X10*3/UL (ref 0–0.1)
BASOPHILS NFR BLD AUTO: 0.7 %
BUN SERPL-MCNC: 14 MG/DL (ref 6–23)
CA-I BLD-SCNC: 1.14 MMOL/L (ref 1.1–1.33)
CALCIUM SERPL-MCNC: 8.7 MG/DL (ref 8.6–10.6)
CHLORIDE SERPL-SCNC: 101 MMOL/L (ref 98–107)
CO2 SERPL-SCNC: 24 MMOL/L (ref 21–32)
CREAT SERPL-MCNC: 0.28 MG/DL (ref 0.5–1.05)
EGFRCR SERPLBLD CKD-EPI 2021: >90 ML/MIN/1.73M*2
EOSINOPHIL # BLD AUTO: 0.01 X10*3/UL (ref 0–0.7)
EOSINOPHIL NFR BLD AUTO: 0.1 %
ERYTHROCYTE [DISTWIDTH] IN BLOOD BY AUTOMATED COUNT: 11.9 % (ref 11.5–14.5)
GLUCOSE BLD MANUAL STRIP-MCNC: 96 MG/DL (ref 74–99)
GLUCOSE SERPL-MCNC: 102 MG/DL (ref 74–99)
HCT VFR BLD AUTO: 34.1 % (ref 36–46)
HGB BLD-MCNC: 12.3 G/DL (ref 12–16)
IMM GRANULOCYTES # BLD AUTO: 0.29 X10*3/UL (ref 0–0.7)
IMM GRANULOCYTES NFR BLD AUTO: 2.8 % (ref 0–0.9)
LYMPHOCYTES # BLD AUTO: 0.78 X10*3/UL (ref 1.2–4.8)
LYMPHOCYTES NFR BLD AUTO: 7.6 %
MAGNESIUM SERPL-MCNC: 2.11 MG/DL (ref 1.6–2.4)
MCH RBC QN AUTO: 28.6 PG (ref 26–34)
MCHC RBC AUTO-ENTMCNC: 36.1 G/DL (ref 32–36)
MCV RBC AUTO: 79 FL (ref 80–100)
MONOCYTES # BLD AUTO: 0.76 X10*3/UL (ref 0.1–1)
MONOCYTES NFR BLD AUTO: 7.4 %
NEUTROPHILS # BLD AUTO: 8.34 X10*3/UL (ref 1.2–7.7)
NEUTROPHILS NFR BLD AUTO: 81.4 %
NRBC BLD-RTO: 0 /100 WBCS (ref 0–0)
PHOSPHATE SERPL-MCNC: 3.1 MG/DL (ref 2.5–4.9)
PLATELET # BLD AUTO: 249 X10*3/UL (ref 150–450)
POTASSIUM SERPL-SCNC: 3.7 MMOL/L (ref 3.5–5.3)
RBC # BLD AUTO: 4.3 X10*6/UL (ref 4–5.2)
SODIUM SERPL-SCNC: 138 MMOL/L (ref 136–145)
WBC # BLD AUTO: 10.3 X10*3/UL (ref 4.4–11.3)

## 2024-08-11 PROCEDURE — 2500000004 HC RX 250 GENERAL PHARMACY W/ HCPCS (ALT 636 FOR OP/ED)

## 2024-08-11 PROCEDURE — 85025 COMPLETE CBC W/AUTO DIFF WBC: CPT | Performed by: REGISTERED NURSE

## 2024-08-11 PROCEDURE — 80069 RENAL FUNCTION PANEL: CPT | Performed by: REGISTERED NURSE

## 2024-08-11 PROCEDURE — 2500000001 HC RX 250 WO HCPCS SELF ADMINISTERED DRUGS (ALT 637 FOR MEDICARE OP)

## 2024-08-11 PROCEDURE — 37799 UNLISTED PX VASCULAR SURGERY: CPT | Performed by: REGISTERED NURSE

## 2024-08-11 PROCEDURE — 82330 ASSAY OF CALCIUM: CPT | Performed by: REGISTERED NURSE

## 2024-08-11 PROCEDURE — 2500000005 HC RX 250 GENERAL PHARMACY W/O HCPCS

## 2024-08-11 PROCEDURE — 82947 ASSAY GLUCOSE BLOOD QUANT: CPT

## 2024-08-11 PROCEDURE — 83735 ASSAY OF MAGNESIUM: CPT | Performed by: REGISTERED NURSE

## 2024-08-11 PROCEDURE — 2060000001 HC INTERMEDIATE ICU ROOM DAILY

## 2024-08-11 RX ORDER — LABETALOL HYDROCHLORIDE 5 MG/ML
5 INJECTION, SOLUTION INTRAVENOUS EVERY 10 MIN PRN
Status: DISCONTINUED | OUTPATIENT
Start: 2024-08-11 | End: 2024-08-14 | Stop reason: HOSPADM

## 2024-08-11 RX ORDER — HYDRALAZINE HYDROCHLORIDE 20 MG/ML
10 INJECTION INTRAMUSCULAR; INTRAVENOUS EVERY 30 MIN PRN
Status: DISCONTINUED | OUTPATIENT
Start: 2024-08-11 | End: 2024-08-14 | Stop reason: HOSPADM

## 2024-08-11 RX ADMIN — DEXAMETHASONE SODIUM PHOSPHATE 4 MG: 4 INJECTION, SOLUTION INTRA-ARTICULAR; INTRALESIONAL; INTRAMUSCULAR; INTRAVENOUS; SOFT TISSUE at 16:56

## 2024-08-11 RX ADMIN — OFLOXACIN OTIC 5 DROP: 3 SOLUTION AURICULAR (OTIC) at 10:30

## 2024-08-11 RX ADMIN — HEPARIN SODIUM 5000 UNITS: 5000 INJECTION INTRAVENOUS; SUBCUTANEOUS at 21:42

## 2024-08-11 RX ADMIN — SENNOSIDES AND DOCUSATE SODIUM 2 TABLET: 50; 8.6 TABLET ORAL at 20:31

## 2024-08-11 RX ADMIN — DEXAMETHASONE SODIUM PHOSPHATE 4 MG: 4 INJECTION, SOLUTION INTRA-ARTICULAR; INTRALESIONAL; INTRAMUSCULAR; INTRAVENOUS; SOFT TISSUE at 10:29

## 2024-08-11 RX ADMIN — ESCITALOPRAM 5 MG: 5 TABLET, FILM COATED ORAL at 10:29

## 2024-08-11 RX ADMIN — HEPARIN SODIUM 5000 UNITS: 5000 INJECTION INTRAVENOUS; SUBCUTANEOUS at 04:19

## 2024-08-11 RX ADMIN — DEXAMETHASONE SODIUM PHOSPHATE 4 MG: 4 INJECTION, SOLUTION INTRA-ARTICULAR; INTRALESIONAL; INTRAMUSCULAR; INTRAVENOUS; SOFT TISSUE at 00:23

## 2024-08-11 RX ADMIN — HEPARIN SODIUM 5000 UNITS: 5000 INJECTION INTRAVENOUS; SUBCUTANEOUS at 12:32

## 2024-08-11 RX ADMIN — POTASSIUM CHLORIDE 40 MEQ: 1.5 POWDER, FOR SOLUTION ORAL at 04:19

## 2024-08-11 RX ADMIN — NIMODIPINE 60 MG: 30 SOLUTION ORAL at 08:56

## 2024-08-11 RX ADMIN — HYDROMORPHONE HYDROCHLORIDE 0.2 MG: 1 INJECTION, SOLUTION INTRAMUSCULAR; INTRAVENOUS; SUBCUTANEOUS at 21:39

## 2024-08-11 RX ADMIN — NIMODIPINE 60 MG: 30 SOLUTION ORAL at 12:32

## 2024-08-11 RX ADMIN — ESOMEPRAZOLE MAGNESIUM 40 MG: 40 FOR SUSPENSION ORAL at 10:29

## 2024-08-11 RX ADMIN — THIAMINE HCL TAB 100 MG 100 MG: 100 TAB at 10:29

## 2024-08-11 RX ADMIN — NIMODIPINE 60 MG: 30 SOLUTION ORAL at 16:00

## 2024-08-11 RX ADMIN — NIMODIPINE 60 MG: 30 SOLUTION ORAL at 00:23

## 2024-08-11 RX ADMIN — CARBOXYMETHYLCELLULOSE SODIUM 1 DROP: 5 SOLUTION/ DROPS OPHTHALMIC at 08:56

## 2024-08-11 RX ADMIN — OFLOXACIN OTIC 5 DROP: 3 SOLUTION AURICULAR (OTIC) at 20:31

## 2024-08-11 RX ADMIN — NIMODIPINE 60 MG: 30 SOLUTION ORAL at 04:18

## 2024-08-11 RX ADMIN — NIMODIPINE 60 MG: 30 SOLUTION ORAL at 20:31

## 2024-08-11 ASSESSMENT — PAIN - FUNCTIONAL ASSESSMENT
PAIN_FUNCTIONAL_ASSESSMENT: 0-10

## 2024-08-11 ASSESSMENT — PAIN SCALES - GENERAL
PAINLEVEL_OUTOF10: 0 - NO PAIN

## 2024-08-11 NOTE — PROGRESS NOTES
"Denae Nolasco is a 33 y.o. female on day 8 of admission presenting with Schwannoma.    Subjective   NAEO    Objective     Physical Exam  Awake, mouths answers to orientation questions, Ox3  trach'd  L HB6  + R corneal, L eyelid w/ stitches  L 5/5  RUE prox 2 dis 4  LUE prox 4- dis 4-  BLE 5/5    Last Recorded Vitals  Blood pressure 118/84, pulse 106, temperature 35.9 °C (96.6 °F), temperature source Temporal, resp. rate 16, height 1.575 m (5' 2.01\"), weight 53.5 kg (117 lb 15.1 oz), last menstrual period 07/24/2024, SpO2 100%.  Intake/Output last 3 Shifts:  I/O last 3 completed shifts:  In: 2684.2 (50.2 mL/kg) [I.V.:1344.2 (25.1 mL/kg); NG/GT:1190; IV Piggyback:150]  Out: 3355 (62.7 mL/kg) [Urine:3355 (1.7 mL/kg/hr)]  Weight: 53.5 kg     Relevant Results  Lab Results   Component Value Date    WBC 10.3 08/11/2024    HGB 12.3 08/11/2024    HCT 34.1 (L) 08/11/2024    MCV 79 (L) 08/11/2024     08/11/2024     Lab Results   Component Value Date    GLUCOSE 102 (H) 08/11/2024    CALCIUM 8.7 08/11/2024     08/11/2024    K 3.7 08/11/2024    CO2 24 08/11/2024     08/11/2024    BUN 14 08/11/2024    CREATININE 0.28 (L) 08/11/2024        This patient currently has cardiac telemetry ordered; if you would like to modify or discontinue the telemetry order, click here to go to the orders activity to modify/discontinue the order.  This patient has a central line   Reason for the central line remaining today? Hemodynamic monitoring      This patient is intubated   Reason for patient to remain intubated today? 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, CT CAP R middle lobe calcifications c/w granulomatous disease, " dilated CBD, intrahepatic dilatation (rec'd MRCP or ERCP), CTH/CT IAC done, TTE EF 57% no wall motion abnormality, no residual interatrial shunt w bubble study, 8/4 MRI IAC CISS stable, vCTH done, 8/5 s/p L retrosig for tumor resection, RF EVD, 8/6 CTH resection bed hemorrhage    8/6 MRI brain L transverse sigmoid sinus thrombosis, GTR  8/7 s/p extubation  8/9 s/p trach/PEG, BLE DVT US neg, 8/10 CTH stable  8/10 EVD dc'd, completed abx    Plan:  ODILIA  HOB >45  Na goal normonatremia  SBP goal relaxed to 100-160  Void trial  Dex taper, 4q8  ENT recs - R ear injury, ENT scoped 8/8 and found complete b/l VC paralysis   SCDs, SQH  PTOT - rehab; pending acceptance to bonita Pleitez MD

## 2024-08-11 NOTE — CARE PLAN
Problem: Discharge Planning  Goal: Discharge to home or other facility with appropriate resources  Outcome: Progressing     Problem: Chronic Conditions and Co-morbidities  Goal: Patient's chronic conditions and co-morbidity symptoms are monitored and maintained or improved  Outcome: Progressing     Problem: Skin  Goal: Decreased wound size/increased tissue granulation at next dressing change  Outcome: Progressing  Goal: Participates in plan/prevention/treatment measures  Outcome: Progressing  Goal: Prevent/manage excess moisture  Outcome: Progressing  Goal: Prevent/minimize sheer/friction injuries  Outcome: Progressing  Goal: Promote/optimize nutrition  Outcome: Progressing  Goal: Promote skin healing  Outcome: Progressing     Problem: Respiratory  Goal: Minimize anxiety/maximize coping throughout shift  Outcome: Progressing  Goal: Minimal/no exertional discomfort or dyspnea this shift  Outcome: Progressing  Goal: Wean oxygen to maintain O2 saturation per order/standard this shift  Outcome: Progressing  Goal: Increase self care and/or family involvement in next 24 hours  Outcome: Progressing

## 2024-08-12 ENCOUNTER — APPOINTMENT (OUTPATIENT)
Dept: RADIOLOGY | Facility: HOSPITAL | Age: 34
DRG: 003 | End: 2024-08-12
Payer: COMMERCIAL

## 2024-08-12 LAB
ALBUMIN SERPL BCP-MCNC: 4 G/DL (ref 3.4–5)
ANION GAP SERPL CALC-SCNC: 14 MMOL/L (ref 10–20)
BASOPHILS # BLD AUTO: 0.06 X10*3/UL (ref 0–0.1)
BASOPHILS NFR BLD AUTO: 0.6 %
BUN SERPL-MCNC: 12 MG/DL (ref 6–23)
CA-I BLD-SCNC: 1.14 MMOL/L (ref 1.1–1.33)
CALCIUM SERPL-MCNC: 8.8 MG/DL (ref 8.6–10.6)
CHLORIDE SERPL-SCNC: 103 MMOL/L (ref 98–107)
CO2 SERPL-SCNC: 26 MMOL/L (ref 21–32)
CREAT SERPL-MCNC: 0.23 MG/DL (ref 0.5–1.05)
EGFRCR SERPLBLD CKD-EPI 2021: >90 ML/MIN/1.73M*2
EOSINOPHIL # BLD AUTO: 0 X10*3/UL (ref 0–0.7)
EOSINOPHIL NFR BLD AUTO: 0 %
ERYTHROCYTE [DISTWIDTH] IN BLOOD BY AUTOMATED COUNT: 12.4 % (ref 11.5–14.5)
GLUCOSE BLD MANUAL STRIP-MCNC: 121 MG/DL (ref 74–99)
GLUCOSE BLD MANUAL STRIP-MCNC: 131 MG/DL (ref 74–99)
GLUCOSE SERPL-MCNC: 129 MG/DL (ref 74–99)
HCT VFR BLD AUTO: 34.9 % (ref 36–46)
HGB BLD-MCNC: 12.2 G/DL (ref 12–16)
IMM GRANULOCYTES # BLD AUTO: 0.31 X10*3/UL (ref 0–0.7)
IMM GRANULOCYTES NFR BLD AUTO: 3.2 % (ref 0–0.9)
LYMPHOCYTES # BLD AUTO: 0.52 X10*3/UL (ref 1.2–4.8)
LYMPHOCYTES NFR BLD AUTO: 5.4 %
MAGNESIUM SERPL-MCNC: 2.22 MG/DL (ref 1.6–2.4)
MCH RBC QN AUTO: 28.5 PG (ref 26–34)
MCHC RBC AUTO-ENTMCNC: 35 G/DL (ref 32–36)
MCV RBC AUTO: 82 FL (ref 80–100)
MONOCYTES # BLD AUTO: 0.74 X10*3/UL (ref 0.1–1)
MONOCYTES NFR BLD AUTO: 7.7 %
NEUTROPHILS # BLD AUTO: 7.98 X10*3/UL (ref 1.2–7.7)
NEUTROPHILS NFR BLD AUTO: 83.1 %
NRBC BLD-RTO: 0 /100 WBCS (ref 0–0)
PHOSPHATE SERPL-MCNC: 3.1 MG/DL (ref 2.5–4.9)
PLATELET # BLD AUTO: 237 X10*3/UL (ref 150–450)
POTASSIUM SERPL-SCNC: 4.3 MMOL/L (ref 3.5–5.3)
RBC # BLD AUTO: 4.28 X10*6/UL (ref 4–5.2)
SODIUM SERPL-SCNC: 139 MMOL/L (ref 136–145)
WBC # BLD AUTO: 9.6 X10*3/UL (ref 4.4–11.3)

## 2024-08-12 PROCEDURE — 2500000004 HC RX 250 GENERAL PHARMACY W/ HCPCS (ALT 636 FOR OP/ED): Performed by: STUDENT IN AN ORGANIZED HEALTH CARE EDUCATION/TRAINING PROGRAM

## 2024-08-12 PROCEDURE — 85025 COMPLETE CBC W/AUTO DIFF WBC: CPT | Performed by: REGISTERED NURSE

## 2024-08-12 PROCEDURE — 2500000001 HC RX 250 WO HCPCS SELF ADMINISTERED DRUGS (ALT 637 FOR MEDICARE OP)

## 2024-08-12 PROCEDURE — 2500000004 HC RX 250 GENERAL PHARMACY W/ HCPCS (ALT 636 FOR OP/ED)

## 2024-08-12 PROCEDURE — 37799 UNLISTED PX VASCULAR SURGERY: CPT | Performed by: REGISTERED NURSE

## 2024-08-12 PROCEDURE — 97530 THERAPEUTIC ACTIVITIES: CPT | Mod: GO

## 2024-08-12 PROCEDURE — 70450 CT HEAD/BRAIN W/O DYE: CPT

## 2024-08-12 PROCEDURE — 97530 THERAPEUTIC ACTIVITIES: CPT | Mod: GP

## 2024-08-12 PROCEDURE — 83735 ASSAY OF MAGNESIUM: CPT | Performed by: REGISTERED NURSE

## 2024-08-12 PROCEDURE — 97116 GAIT TRAINING THERAPY: CPT | Mod: GP

## 2024-08-12 PROCEDURE — 2060000001 HC INTERMEDIATE ICU ROOM DAILY

## 2024-08-12 PROCEDURE — 70450 CT HEAD/BRAIN W/O DYE: CPT | Performed by: RADIOLOGY

## 2024-08-12 PROCEDURE — 82947 ASSAY GLUCOSE BLOOD QUANT: CPT

## 2024-08-12 PROCEDURE — 97535 SELF CARE MNGMENT TRAINING: CPT | Mod: GO

## 2024-08-12 PROCEDURE — 80069 RENAL FUNCTION PANEL: CPT | Performed by: REGISTERED NURSE

## 2024-08-12 PROCEDURE — 82330 ASSAY OF CALCIUM: CPT | Performed by: REGISTERED NURSE

## 2024-08-12 RX ORDER — ERYTHROMYCIN 5 MG/G
1 OINTMENT OPHTHALMIC 2 TIMES DAILY
Status: DISCONTINUED | OUTPATIENT
Start: 2024-08-12 | End: 2024-08-14 | Stop reason: HOSPADM

## 2024-08-12 RX ADMIN — ESOMEPRAZOLE MAGNESIUM 40 MG: 40 FOR SUSPENSION ORAL at 08:47

## 2024-08-12 RX ADMIN — NIMODIPINE 60 MG: 30 SOLUTION ORAL at 16:27

## 2024-08-12 RX ADMIN — DEXAMETHASONE SODIUM PHOSPHATE 4 MG: 4 INJECTION, SOLUTION INTRA-ARTICULAR; INTRALESIONAL; INTRAMUSCULAR; INTRAVENOUS; SOFT TISSUE at 00:00

## 2024-08-12 RX ADMIN — NIMODIPINE 60 MG: 30 SOLUTION ORAL at 08:48

## 2024-08-12 RX ADMIN — SENNOSIDES AND DOCUSATE SODIUM 2 TABLET: 50; 8.6 TABLET ORAL at 08:47

## 2024-08-12 RX ADMIN — OFLOXACIN OTIC 5 DROP: 3 SOLUTION AURICULAR (OTIC) at 08:47

## 2024-08-12 RX ADMIN — ESCITALOPRAM 5 MG: 5 TABLET, FILM COATED ORAL at 08:47

## 2024-08-12 RX ADMIN — SCOPOLAMINE 1 PATCH: 1.5 PATCH, EXTENDED RELEASE TRANSDERMAL at 20:40

## 2024-08-12 RX ADMIN — NIMODIPINE 60 MG: 30 SOLUTION ORAL at 00:00

## 2024-08-12 RX ADMIN — DEXAMETHASONE SODIUM PHOSPHATE 3.2 MG: 4 INJECTION, SOLUTION INTRA-ARTICULAR; INTRALESIONAL; INTRAMUSCULAR; INTRAVENOUS; SOFT TISSUE at 08:47

## 2024-08-12 RX ADMIN — SODIUM CHLORIDE 50 ML/HR: 9 INJECTION, SOLUTION INTRAVENOUS at 09:44

## 2024-08-12 RX ADMIN — HEPARIN SODIUM 5000 UNITS: 5000 INJECTION INTRAVENOUS; SUBCUTANEOUS at 03:44

## 2024-08-12 RX ADMIN — SODIUM CHLORIDE 50 ML/HR: 9 INJECTION, SOLUTION INTRAVENOUS at 00:00

## 2024-08-12 RX ADMIN — SENNOSIDES AND DOCUSATE SODIUM 2 TABLET: 50; 8.6 TABLET ORAL at 20:39

## 2024-08-12 RX ADMIN — NIMODIPINE 60 MG: 30 SOLUTION ORAL at 04:07

## 2024-08-12 RX ADMIN — THIAMINE HCL TAB 100 MG 100 MG: 100 TAB at 08:47

## 2024-08-12 RX ADMIN — DEXAMETHASONE SODIUM PHOSPHATE 3.2 MG: 4 INJECTION, SOLUTION INTRA-ARTICULAR; INTRALESIONAL; INTRAMUSCULAR; INTRAVENOUS; SOFT TISSUE at 16:48

## 2024-08-12 RX ADMIN — HEPARIN SODIUM 5000 UNITS: 5000 INJECTION INTRAVENOUS; SUBCUTANEOUS at 20:30

## 2024-08-12 RX ADMIN — NIMODIPINE 60 MG: 30 SOLUTION ORAL at 20:39

## 2024-08-12 RX ADMIN — POLYETHYLENE GLYCOL 3350 17 G: 17 POWDER, FOR SOLUTION ORAL at 08:48

## 2024-08-12 RX ADMIN — NIMODIPINE 60 MG: 30 SOLUTION ORAL at 12:16

## 2024-08-12 RX ADMIN — HEPARIN SODIUM 5000 UNITS: 5000 INJECTION INTRAVENOUS; SUBCUTANEOUS at 11:36

## 2024-08-12 ASSESSMENT — PAIN - FUNCTIONAL ASSESSMENT
PAIN_FUNCTIONAL_ASSESSMENT: 0-10

## 2024-08-12 ASSESSMENT — COGNITIVE AND FUNCTIONAL STATUS - GENERAL
PERSONAL GROOMING: A LITTLE
DRESSING REGULAR LOWER BODY CLOTHING: A LOT
TURNING FROM BACK TO SIDE WHILE IN FLAT BAD: A LITTLE
DRESSING REGULAR UPPER BODY CLOTHING: A LOT
TOILETING: A LOT
DAILY ACTIVITIY SCORE: 12
MOVING TO AND FROM BED TO CHAIR: A LOT
HELP NEEDED FOR BATHING: A LOT
MOBILITY SCORE: 14
CLIMB 3 TO 5 STEPS WITH RAILING: TOTAL
WALKING IN HOSPITAL ROOM: A LOT
STANDING UP FROM CHAIR USING ARMS: A LITTLE
MOVING FROM LYING ON BACK TO SITTING ON SIDE OF FLAT BED WITH BEDRAILS: A LITTLE
EATING MEALS: TOTAL

## 2024-08-12 ASSESSMENT — PAIN SCALES - GENERAL
PAINLEVEL_OUTOF10: 0 - NO PAIN

## 2024-08-12 ASSESSMENT — ACTIVITIES OF DAILY LIVING (ADL): HOME_MANAGEMENT_TIME_ENTRY: 14

## 2024-08-12 NOTE — PROGRESS NOTES
Social Work Discharge Planning note:    -Patient discussed during interdisciplinary rounds.   -Team members present: NP, TCC, and SW  -Plan per medical team: Pt is medically ready for discharge to acute rehab placement.   -Payer: MMO   -Status: Inpatient  -Discharge disposition: [facility] and [facility] rehabs were unable to accept due to the new Trach. The third choice, [facility], is willing to accept and will initiate insurance precert.   -Anticipated Date of Discharge:  8/13/24    SOLEDAD [place], LSW

## 2024-08-12 NOTE — PROGRESS NOTES
"Denae Nolasco is a 33 y.o. female on day 9 of admission presenting with Schwannoma.    Subjective   NAEO    Objective     Physical Exam  Awake, mouths answers to orientation questions, Ox3  trach'd  L HB6  + R corneal, L eyelid w/ stitches  L 5/5  RUE prox 2 dis 4  LUE prox 4- dis 4-  BLE 5/5    Last Recorded Vitals  Blood pressure 125/86, pulse 86, temperature 36.7 °C (98.1 °F), temperature source Temporal, resp. rate 15, height 1.575 m (5' 2.01\"), weight 53.5 kg (117 lb 15.1 oz), last menstrual period 07/24/2024, SpO2 100%.  Intake/Output last 3 Shifts:  I/O last 3 completed shifts:  In: 3547.5 (66.3 mL/kg) [I.V.:1727.5 (32.3 mL/kg); NG/GT:1720; IV Piggyback:100]  Out: 3200 (59.8 mL/kg) [Urine:3200 (1.7 mL/kg/hr)]  Weight: 53.5 kg     Relevant Results  Lab Results   Component Value Date    WBC 10.3 08/11/2024    HGB 12.3 08/11/2024    HCT 34.1 (L) 08/11/2024    MCV 79 (L) 08/11/2024     08/11/2024     Lab Results   Component Value Date    GLUCOSE 102 (H) 08/11/2024    CALCIUM 8.7 08/11/2024     08/11/2024    K 3.7 08/11/2024    CO2 24 08/11/2024     08/11/2024    BUN 14 08/11/2024    CREATININE 0.28 (L) 08/11/2024        This patient currently has cardiac telemetry ordered; if you would like to modify or discontinue the telemetry order, click here to go to the orders activity to modify/discontinue the order.  This patient has a central line   Reason for the central line remaining today? Hemodynamic monitoring      This patient is intubated   Reason for patient to remain intubated today? 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, CT CAP R middle lobe calcifications c/w granulomatous disease, " dilated CBD, intrahepatic dilatation (rec'd MRCP or ERCP), CTH/CT IAC done, TTE EF 57% no wall motion abnormality, no residual interatrial shunt w bubble study, 8/4 MRI IAC CISS stable, vCTH done, 8/5 s/p L retrosig for tumor resection, RF EVD, 8/6 CTH resection bed hemorrhage    8/6 MRI brain L transverse sigmoid sinus thrombosis, GTR  8/7 s/p extubation  8/9 s/p trach/PEG, BLE DVT US neg, 8/10 CTH stable  8/10 EVD dc'd, completed abx    Plan:  ODILIA-poss downgrade this AM  HOB >45  Na goal normonatremia  SBP goal relaxed to 100-160  Void trial  Dex taper, 4q8  ENT recs - R ear injury, ENT scoped 8/8 and 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

## 2024-08-12 NOTE — PROGRESS NOTES
Physical Therapy    Physical Therapy Treatment    Patient Name: Denae Nolasco  MRN: [id number]  Today's Date: 8/12/2024  Time Calculation  Start Time: 0937  Stop Time: 1016  Time Calculation (min): 39 min       Assessment/Plan   PT Assessment  Rehab Prognosis: Excellent  Evaluation/Treatment Tolerance: Patient tolerated treatment well  End of Session Communication: Bedside nurse  End of Session Patient Position: Bed, 3 rail up, Alarm off, not on at start of session     PT Plan  Treatment/Interventions: Bed mobility, Transfer training, Gait training, Stair training, Balance training, Neuromuscular re-education, Strengthening, Endurance training, Therapeutic exercise, Therapeutic activity, Home exercise program, Positioning, Postural re-education  PT Plan: Ongoing PT  PT Frequency: 5 times per week  PT Discharge Recommendations: High intensity level of continued care  Equipment Recommended upon Discharge:  (TBD)  PT Recommended Transfer Status: Assist x1, Assistive device  PT - OK to Discharge: Yes (When medically ready)      General Visit Information:   PT  Visit  PT Received On: 08/12/24  Response to Previous Treatment: Patient with no complaints from previous session.  Reason for Referral: Pt p/w worsening vertigo and ataxia. 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 On CT CAP: R middle lobe calcifications c/w granulomatous disease, dilated CBD, intrahepatic dilatation. 8/5 s/p L retrosig for tumor resection, RF EVD, 8/6 CTH resection bed hemorrhage. 8/6 MRI brain L transverse sigmoid sinus thrombosis, GTR. 8/7 s/p extubation. 8/9 s/p trach/PEG, BLE DVT US neg, 8/10 CTH stable. 8/10 EVD dc'd, completed antibiotics.  Past Medical History Relevant to Rehab: ASD s/p closure in 1997, raynaud, depression  Co-Treatment:  (n/a)  Prior to Session Communication: Bedside nurse  Patient Position Received: Bed, 3 rail up, Alarm off, not on at start of  session  Family/Caregiver Present: Yes  Caregiver Feedback: Pt's spouse present and supportive  General Comment: Pleasant and cooperative. Motivated to participate in PT services.     Subjective   Precautions:  Precautions  Hearing/Visual Limitations: Hearing WFL, R beating nystagmus, pt wears glasses for distance at baseline  Medical Precautions: Fall precautions, External Ventricular Device (EVD)  Precautions Comment: -160    Vital Signs:  Vital Signs  Heart Rate: 102 (145 max during gait training, 101 post)  SpO2: 100 % (100% thoughout)  BP: 116/66 (115/70 (83) during, 124/91 (99) post)  MAP (mmHg): 80  BP Method: Automatic    Objective   Pain:  Pain Assessment  Pain Assessment: 0-10  0-10 (Numeric) Pain Score: 0 - No pain  Cognition:  Cognition  Overall Cognitive Status: Within Functional Limits  Orientation Level: Oriented X4  Insight: Within function limits  Impulsive: Within functional limits  Processing Speed: Within funtional limits    Lines/Tubes/Drains:  Surgical Airway Cuffed 7.5 (Active)   Number of days: 2       Urethral Catheter 16 Fr. (Active)   Number of days: 0       Gastrostomy/Enterostomy PEG-jejunostomy 1 20 Fr. LUQ (Active)   Number of days: 2       Continuous Medications/Drips:  sodium chloride 0.9%, 50 mL/hr, Last Rate: 50 mL/hr (08/12/24 0944)        Oxygen: 25% FiO2 via trach collar     Postural Control:   Postural Control  Postural Control: Within Functional Limits  Head Control: WFL  Trunk Control: WFL sitting, L listing/lateral lean standing  Righting Reactions: Intact  Protective Responses: Intact  Posture Comment: Rounded shoulders    Balance:   Static Sitting Balance  Static Sitting-Balance Support: Bilateral upper extremity supported, Feet supported  Static Sitting-Comment/Number of Minutes: CGA progressing to close sup  Dynamic Sitting Balance  Dynamic Sitting-Balance Support: Bilateral upper extremity supported, Feet supported  Dynamic Sitting-Comments: CGA    Static  Standing Balance  Static Standing-Balance Support: Bilateral upper extremity supported  Static Standing-Level of Assistance: Minimum assistance (x1)  Static Standing-Comment/Number of Minutes: 1 min  Dynamic Standing Balance  Dynamic Standing-Balance Support: Bilateral upper extremity supported  Dynamic Standing-Comments: MOD A x1    PT Treatments:       Therapeutic Activity  Therapeutic Activity Performed: Yes  Therapeutic Activity 1: Progressive upright positioning with skilled vitals monitoring to promote increased tolerance of out of bed mobility.         Bed Mobility  Bed Mobility: Yes  Bed Mobility 1  Bed Mobility 1: Supine to sitting, Sitting to supine  Level of Assistance 1: Minimum assistance (x1)  Bed Mobility Comments 1: HOB elevated slightly.  Bed Mobility 2  Bed Mobility  2: Long sit  Level of Assistance 2: Minimum assistance (x1)  Bed Mobility Comments 2: HOB elevated slightly.    Ambulation/Gait Training  Ambulation/Gait Training Performed: Yes  Ambulation/Gait Training 1  Surface 1: Level tile  Device 1: Rolling walker  Assistance 1: Moderate assistance (x1)  Quality of Gait 1: Narrow base of support, Diminished heel strike, Decreased step length, Shuffling gait, Antalgic, Ataxic, Forward flexed posture, Soft knee(s) (L lateral lean/listing. Verbal cues and assist for walker mgmt. Improved ability to maintain R grasp on walker today compared to previous PT session. Cues for increased B step length, proper posture, and correcting L lateral lean/listing.)  Comments/Distance (ft) 1: 3 ft laterally + 15 ft in room    Transfers  Transfer: Yes  Transfer 1  Transfer From 1: Bed to  Transfer to 1: Stand  Technique 1: Sit to stand, Stand to sit  Transfer Device 1: Walker  Transfer Level of Assistance 1: Minimum assistance (x1)  Trials/Comments 1: Performs 3x; cues for proper hand placement and LE placement.    Stairs  Stairs: No              Outcome Measures:  [facility] Basic Mobility  Turning from your back to  your side while in a flat bed without using bedrails: A little  Moving from lying on your back to sitting on the side of a flat bed without using bedrails: A little  Moving to and from bed to chair (including a wheelchair): A lot  Standing up from a chair using your arms (e.g. wheelchair or bedside chair): A little  To walk in hospital room: A lot  Climbing 3-5 steps with railing: Total  Basic Mobility - Total Score: 14                   FSS-ICU  Ambulation: Walks <50 feet with any assistance x1 or walks any distance with assistance x2 people  Rolling: Minimal assistance (performs 75% or more of task)  Sitting: Supervision or set-up only  Transfer Sit-to-Stand: Minimal assistance (performs 75% or more of task)  Transfer Supine-to-Sit: Minimal assistance (performs 75% or more of task)  Total Score: 18    ICU Mobility Screen  Early Mobility/Exercise Safety Screen: Proceed with mobilization - No exclusion criteria met  ICU Mobility Scale: Walking with assistance of 1 person                   Education:  Education Documentation  Precautions, taught by Norma Lennon PT at 8/12/2024 10:19 AM.  Learner: Significant Other, Patient  Readiness: Acceptance  Method: Explanation  Response: Verbalizes Understanding    Body Mechanics, taught by Norma Lennon PT at 8/12/2024 10:19 AM.  Learner: Significant Other, Patient  Readiness: Acceptance  Method: Explanation  Response: Verbalizes Understanding    Mobility Training, taught by Norma Lennon PT at 8/12/2024 10:19 AM.  Learner: Significant Other, Patient  Readiness: Acceptance  Method: Explanation  Response: Verbalizes Understanding    Education Comments  No comments found.             Encounter Problems       Encounter Problems (Active)       PT Problem       Patient will score >/= 24/28 points on the Tinetti to indicate low risk of falling.  (Progressing)       Start:  08/08/24    Expected End:  08/22/24            Patient will ambulate at least 50  ft. with  </= CGA and LRD to improve tolerance of community distances.    (Progressing)       Start:  08/08/24    Expected End:  08/22/24            Patient will perform sit to stand and stand to sit transfers with </= close sup and LRD to increase functional strength.  (Progressing)       Start:  08/08/24    Expected End:  08/22/24            BLE 5/5 (Progressing)       Start:  08/08/24    Expected End:  08/22/24            Patient will ascend and descend >/= 1 step with LRD and </= CGA to facilitate safe navigation of step to enter home.    (Progressing)       Start:  08/08/24    Expected End:  08/22/24               Pain - Adult                08/12/24 at 10:29 AM   Norma Lennon, PT   Rehab Office: [phone]

## 2024-08-12 NOTE — PROGRESS NOTES
Occupational Therapy    Occupational Therapy Treatment    Name: Denae Nolasco  MRN: [id number]  : 1990  Date: 24  Room:       Time Calculation  Start Time: 1145  Stop Time: 1229  Time Calculation (min): 44 min    Assessment:  Evaluation/Treatment Tolerance: Patient limited by fatigue  Medical Staff Made Aware: Yes  End of Session Communication: Bedside nurse  End of Session Patient Position: Bed, 3 rail up, Alarm off, not on at start of session  Plan:  Treatment Interventions: ADL retraining, Functional transfer training, Visual perceptual retraining, UE strengthening/ROM, Endurance training, Cognitive reorientation, Patient/family training, Equipment evaluation/education, Neuromuscular reeducation, Fine motor coordination activities, Compensatory technique education  OT Frequency: 4 times per week  OT Discharge Recommendations: High intensity level of continued care  Equipment Recommended upon Discharge:  (TBD)  OT Recommended Transfer Status: Maximum assist  OT - OK to Discharge: Yes    Subjective   General:  OT Last Visit  OT Received On: 24  Reason for Referral: Pt p/w worsening vertigo and ataxia. 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 On CT CAP: R middle lobe calcifications c/w granulomatous disease, dilated CBD, intrahepatic dilatation.  s/p L retrosig for tumor resection, RF EVD,  CTH resection bed hemorrhage.  MRI brain L transverse sigmoid sinus thrombosis, GTR.  s/p extubation.  s/p trach/PEG, BLE DVT US neg, 8/10 CTH stable. 8/10 EVD dc'd, completed antibiotics.  Past Medical History Relevant to Rehab: ASD s/p closure in , raynaud, depression  Prior to Session Communication: Bedside nurse  Patient Position Received: Bed, 3 rail up, Alarm off, not on at start of session  Family/Caregiver Present: Yes  Caregiver Feedback: spouse present and supportive during session.  General Comment: Pt pleasant and  cooperative for therapy. Pt expressed wanting to work on typing/ways to communicate using her hands. Reviewed writing strategies and communication applications with patient and her .   Precautions:  Hearing/Visual Limitations: Hearing WFL, R beating nystagmus, pt wears glasses for distance at baseline. L eye stitches  Medical Precautions: Fall precautions  Precautions Comment: SBP < 160, HOB > 45 degrees  Vitals:  Vital Signs  Heart Rate: 101  Resp: 17  SpO2: 100 %  BP: 118/85 (130/88)  MAP (mmHg): 96  Lines/Tubes/Drains:  Surgical Airway Cuffed 7.5 (Active)   Number of days: 3       Urethral Catheter 16 Fr. (Active)   Number of days: 0       Gastrostomy/Enterostomy PEG-jejunostomy 1 20 Fr. LUQ (Active)   Number of days: 3       Cognition:  Overall Cognitive Status: Within Functional Limits  Orientation Level: Oriented X4  Following Commands:  (Pt followed all commands WFL)  Insight: Within function limits  Impulsive: Within functional limits  Processing Speed: Within funtional limits    Pain Assessment:  Pain Assessment  Pain Assessment: 0-10  0-10 (Numeric) Pain Score: 0 - No pain     Objective   Activities of Daily Living:        Grooming  Grooming Level of Assistance: Minimum assistance  Grooming Where Assessed: Edge of bed  Grooming Comments: Pt brushed teethw with Yankauer brush with CGA and set-up using L UE with increased time/effort. Pt required Min A for thoroughness.                             Functional Standing Tolerance:     Bed Mobility/Transfers:   Bed Mobility  Bed Mobility: Yes  Bed Mobility 1  Bed Mobility 1: Supine to sitting  Level of Assistance 1: Minimum assistance  Bed Mobility Comments 1: HOB elevated, cues for UE/LE placement and sequencing. Assist needed for trunk  Bed Mobility 2  Bed Mobility  2: Sitting to supine  Level of Assistance 2: Moderate assistance  Bed Mobility Comments 2: HOB flat, cues for sequencing and UE/LE placement, required assist with B  LE.    Transfers  Transfer: Yes  Transfer 1  Technique 1: Sit to stand, Stand to sit  Transfer Device 1:  (HHA)  Transfer Level of Assistance 1: Minimum assistance  Trials/Comments 1: cues for UE/LE placement and sequencing, completed 2 trials.                      Balance:  Dynamic Sitting Balance  Dynamic Sitting-Comments: CGA  Dynamic Standing Balance  Dynamic Standing-Comments: Mod A  Static Sitting Balance  Static Sitting-Level of Assistance: Contact guard  Static Standing Balance  Static Standing-Level of Assistance: Minimum assistance, Moderate assistance  Communication:     Splinting:     Therapy/Activity:   Therapeutic Exercise  Therapeutic Exercise Performed: Yes  Therapeutic Exercise Activity 1: Completed scapular mobilization, R shld flex/ext, and abd/add, 1s/10r. completed 1s/10r elbow flex/ext in gravity-elim plane. Educated pt's spouse on exercises to work on with R UE. Cues provided for form/technique.  Therapeutic Activity  Therapeutic Activity Performed: Yes  Therapeutic Activity 1: Pt sat EOB x 20 min with SBA-CGA with occasional lean to L side but able to correct with cueing. Pt completed side steps at EOB with Mod A x 1 with HHA with cues for sequencing.  Therapeutic Activity 2: Pt expressed wanting to work on typing and being able to communicate. Pt is R hand dominant. Trialed having pt write with R UE with tray table in front of pt at EOB using wide marker and support under R UE with cues for large printed letter with pt able to complete writing with 50-70% accuracy and legibility. Educated pt and her spouse of communication apps to use on their phone or Ipad for patient to be able to communicate with applications that have preset phrases and needs and ability to customize. Provided demonstration and showed these applications to patient and spouse who expressed understanding. Pt too fatigued to attempt typing on attachable keyboard that spouse brought in this date. Educated on exercises to  work on with R UE to improve strength, coordination, and manipulation to improve writing with dominant R UE.           Outcome Measures:  [facility] Daily Activity  Putting on and taking off regular lower body clothing: A lot  Bathing (including washing, rinsing, drying): A lot  Putting on and taking off regular upper body clothing: A lot  Toileting, which includes using toilet, bedpan or urinal: A lot  Taking care of personal grooming such as brushing teeth: A little  Eating Meals: Total  Daily Activity - Total Score: 12  ICU Mobility Screen  Early Mobility/Exercise Safety Screen: Proceed with mobilization - No exclusion criteria met  ICU Mobility Scale: Marching on spot (at bedside)     Education Documentation  Body Mechanics, taught by Jelly Anderson, OT at 8/12/2024  2:47 PM.  Learner: Patient  Readiness: Eager  Method: Explanation, Demonstration  Response: Verbalizes Understanding, Demonstrated Understanding    Precautions, taught by Jelly Anderson OT at 8/12/2024  2:47 PM.  Learner: Patient  Readiness: Eager  Method: Explanation, Demonstration  Response: Verbalizes Understanding, Demonstrated Understanding    ADL Training, taught by Jelly Anderson OT at 8/12/2024  2:47 PM.  Learner: Patient  Readiness: Eager  Method: Explanation, Demonstration  Response: Verbalizes Understanding, Demonstrated Understanding    Education Comments  No comments found.        Goals:  Encounter Problems       Encounter Problems (Active)       ADLs       Patient will perform UB and LB bathing with SBA using AE as needed. (Progressing)       Start:  08/08/24    Expected End:  08/22/24            Patient with complete upper body dressing with Mod I. (Progressing)       Start:  08/08/24    Expected End:  08/22/24            Patient with complete lower body dressing with SBA using AE and adaptive tech as needed. (Progressing)       Start:  08/08/24    Expected End:  08/22/24            Patient will complete daily grooming tasks  with Mod I using AE as needed. (Progressing)       Start:  08/08/24    Expected End:  08/22/24            Patient will complete toileting including hygiene clothing management/hygiene with SBA. (Progressing)       Start:  08/08/24    Expected End:  08/22/24               BALANCE       Pt will tolerate dynamic sitting EOB > 20 min IND while completing ADL and functional tasks.   (Progressing)       Start:  08/08/24    Expected End:  08/22/24               COGNITION/SAFETY       Patient will score WFL on standardized cognitive assessment within reasonable time frame (Progressing)       Start:  08/08/24    Expected End:  08/22/24               MOBILITY       Pt will perform functional mobility household distances with SBA using LRAD without LOB and demonstrating good safety awareness.  (Progressing)       Start:  08/08/24    Expected End:  08/22/24               TRANSFERS       Pt will perform bed mobility in simulated home environment with SBA. (Progressing)       Start:  08/08/24    Expected End:  08/22/24            Pt will perform functional transfers on various surfaces with SBA using LRAD with good safety awareness.  (Progressing)       Start:  08/08/24    Expected End:  08/22/24 08/12/24 at 2:48 PM   Jelly Anderson, OT   [phone]

## 2024-08-12 NOTE — CARE PLAN
Problem: Discharge Planning  Goal: Discharge to home or other facility with appropriate resources  Outcome: Progressing     Problem: Chronic Conditions and Co-morbidities  Goal: Patient's chronic conditions and co-morbidity symptoms are monitored and maintained or improved  Outcome: Progressing     Problem: Skin  Goal: Decreased wound size/increased tissue granulation at next dressing change  Outcome: Progressing  Goal: Participates in plan/prevention/treatment measures  Outcome: Progressing  Goal: Prevent/manage excess moisture  Outcome: Progressing  Goal: Prevent/minimize sheer/friction injuries  Outcome: Progressing  Goal: Promote/optimize nutrition  Outcome: Progressing  Goal: Promote skin healing  Outcome: Progressing     Problem: Respiratory  Goal: Minimize anxiety/maximize coping throughout shift  Outcome: Progressing  Goal: Minimal/no exertional discomfort or dyspnea this shift  Outcome: Progressing  Goal: Tolerate mechanical ventilation evidenced by VS/agitation level this shift  Outcome: Progressing  Goal: Tolerate pulmonary toileting this shift  Outcome: Progressing  Goal: Verbalize decreased shortness of breath this shift  Outcome: Progressing  Goal: Wean oxygen to maintain O2 saturation per order/standard this shift  Outcome: Progressing  Goal: Increase self care and/or family involvement in next 24 hours  Outcome: Progressing

## 2024-08-13 DIAGNOSIS — Z98.890 S/P CRANIOTOMY: Primary | ICD-10-CM

## 2024-08-13 LAB
ALBUMIN SERPL BCP-MCNC: 3.8 G/DL (ref 3.4–5)
ANION GAP SERPL CALC-SCNC: 12 MMOL/L (ref 10–20)
BASOPHILS # BLD AUTO: 0.04 X10*3/UL (ref 0–0.1)
BASOPHILS NFR BLD AUTO: 0.5 %
BUN SERPL-MCNC: 13 MG/DL (ref 6–23)
CA-I BLD-SCNC: 1.14 MMOL/L (ref 1.1–1.33)
CALCIUM SERPL-MCNC: 8.7 MG/DL (ref 8.6–10.6)
CHLORIDE SERPL-SCNC: 103 MMOL/L (ref 98–107)
CO2 SERPL-SCNC: 30 MMOL/L (ref 21–32)
CREAT SERPL-MCNC: 0.36 MG/DL (ref 0.5–1.05)
EGFRCR SERPLBLD CKD-EPI 2021: >90 ML/MIN/1.73M*2
EOSINOPHIL # BLD AUTO: 0.02 X10*3/UL (ref 0–0.7)
EOSINOPHIL NFR BLD AUTO: 0.2 %
ERYTHROCYTE [DISTWIDTH] IN BLOOD BY AUTOMATED COUNT: 12.9 % (ref 11.5–14.5)
GLUCOSE BLD MANUAL STRIP-MCNC: 105 MG/DL (ref 74–99)
GLUCOSE BLD MANUAL STRIP-MCNC: 127 MG/DL (ref 74–99)
GLUCOSE BLD MANUAL STRIP-MCNC: 139 MG/DL (ref 74–99)
GLUCOSE BLD MANUAL STRIP-MCNC: 143 MG/DL (ref 74–99)
GLUCOSE SERPL-MCNC: 116 MG/DL (ref 74–99)
HCT VFR BLD AUTO: 35.2 % (ref 36–46)
HGB BLD-MCNC: 11.8 G/DL (ref 12–16)
IMM GRANULOCYTES # BLD AUTO: 0.32 X10*3/UL (ref 0–0.7)
IMM GRANULOCYTES NFR BLD AUTO: 3.9 % (ref 0–0.9)
LYMPHOCYTES # BLD AUTO: 1.18 X10*3/UL (ref 1.2–4.8)
LYMPHOCYTES NFR BLD AUTO: 14.3 %
MAGNESIUM SERPL-MCNC: 2.08 MG/DL (ref 1.6–2.4)
MCH RBC QN AUTO: 28.5 PG (ref 26–34)
MCHC RBC AUTO-ENTMCNC: 33.5 G/DL (ref 32–36)
MCV RBC AUTO: 85 FL (ref 80–100)
MONOCYTES # BLD AUTO: 0.95 X10*3/UL (ref 0.1–1)
MONOCYTES NFR BLD AUTO: 11.5 %
NEUTROPHILS # BLD AUTO: 5.72 X10*3/UL (ref 1.2–7.7)
NEUTROPHILS NFR BLD AUTO: 69.6 %
NRBC BLD-RTO: 0 /100 WBCS (ref 0–0)
PHOSPHATE SERPL-MCNC: 3 MG/DL (ref 2.5–4.9)
PLATELET # BLD AUTO: 288 X10*3/UL (ref 150–450)
POTASSIUM SERPL-SCNC: 3.7 MMOL/L (ref 3.5–5.3)
RBC # BLD AUTO: 4.14 X10*6/UL (ref 4–5.2)
SODIUM SERPL-SCNC: 141 MMOL/L (ref 136–145)
WBC # BLD AUTO: 8.2 X10*3/UL (ref 4.4–11.3)

## 2024-08-13 PROCEDURE — 85025 COMPLETE CBC W/AUTO DIFF WBC: CPT | Performed by: REGISTERED NURSE

## 2024-08-13 PROCEDURE — 82947 ASSAY GLUCOSE BLOOD QUANT: CPT

## 2024-08-13 PROCEDURE — 2500000005 HC RX 250 GENERAL PHARMACY W/O HCPCS

## 2024-08-13 PROCEDURE — 2500000001 HC RX 250 WO HCPCS SELF ADMINISTERED DRUGS (ALT 637 FOR MEDICARE OP)

## 2024-08-13 PROCEDURE — 83735 ASSAY OF MAGNESIUM: CPT | Performed by: REGISTERED NURSE

## 2024-08-13 PROCEDURE — 82330 ASSAY OF CALCIUM: CPT | Performed by: REGISTERED NURSE

## 2024-08-13 PROCEDURE — 2500000004 HC RX 250 GENERAL PHARMACY W/ HCPCS (ALT 636 FOR OP/ED): Performed by: STUDENT IN AN ORGANIZED HEALTH CARE EDUCATION/TRAINING PROGRAM

## 2024-08-13 PROCEDURE — 1200000002 HC GENERAL ROOM WITH TELEMETRY DAILY

## 2024-08-13 PROCEDURE — 2500000001 HC RX 250 WO HCPCS SELF ADMINISTERED DRUGS (ALT 637 FOR MEDICARE OP): Performed by: NURSE PRACTITIONER

## 2024-08-13 PROCEDURE — 84100 ASSAY OF PHOSPHORUS: CPT | Performed by: REGISTERED NURSE

## 2024-08-13 PROCEDURE — 2500000004 HC RX 250 GENERAL PHARMACY W/ HCPCS (ALT 636 FOR OP/ED)

## 2024-08-13 PROCEDURE — 36415 COLL VENOUS BLD VENIPUNCTURE: CPT | Performed by: REGISTERED NURSE

## 2024-08-13 RX ORDER — PANTOPRAZOLE SODIUM 40 MG/1
40 TABLET, DELAYED RELEASE ORAL
Start: 2024-08-13 | End: 2024-08-14

## 2024-08-13 RX ORDER — HEPARIN SODIUM 5000 [USP'U]/ML
5000 INJECTION, SOLUTION INTRAVENOUS; SUBCUTANEOUS EVERY 8 HOURS
Start: 2024-08-13

## 2024-08-13 RX ORDER — OXYCODONE HYDROCHLORIDE 5 MG/1
2.5 TABLET ORAL EVERY 6 HOURS PRN
Start: 2024-08-13 | End: 2024-08-14

## 2024-08-13 RX ORDER — ERYTHROMYCIN 5 MG/G
OINTMENT OPHTHALMIC 2 TIMES DAILY
Start: 2024-08-13

## 2024-08-13 RX ORDER — OFLOXACIN 3 MG/ML
5 SOLUTION AURICULAR (OTIC) 2 TIMES DAILY
Start: 2024-08-13 | End: 2024-08-13 | Stop reason: HOSPADM

## 2024-08-13 RX ORDER — POLYETHYLENE GLYCOL 3350 17 G/17G
17 POWDER, FOR SOLUTION ORAL DAILY
Start: 2024-08-13

## 2024-08-13 RX ORDER — INSULIN LISPRO 100 [IU]/ML
0-5 INJECTION, SOLUTION INTRAVENOUS; SUBCUTANEOUS
Start: 2024-08-13 | End: 2024-08-14

## 2024-08-13 RX ORDER — ESCITALOPRAM OXALATE 5 MG/1
5 TABLET ORAL DAILY
Start: 2024-08-13

## 2024-08-13 RX ORDER — AMOXICILLIN 250 MG
2 CAPSULE ORAL 2 TIMES DAILY
Start: 2024-08-13

## 2024-08-13 RX ORDER — NYSTATIN 100000 [USP'U]/G
1 POWDER TOPICAL 2 TIMES DAILY
Status: DISCONTINUED | OUTPATIENT
Start: 2024-08-13 | End: 2024-08-14 | Stop reason: HOSPADM

## 2024-08-13 RX ADMIN — DEXAMETHASONE SODIUM PHOSPHATE 3.2 MG: 4 INJECTION, SOLUTION INTRA-ARTICULAR; INTRALESIONAL; INTRAMUSCULAR; INTRAVENOUS; SOFT TISSUE at 10:29

## 2024-08-13 RX ADMIN — HYDROMORPHONE HYDROCHLORIDE 0.2 MG: 1 INJECTION, SOLUTION INTRAMUSCULAR; INTRAVENOUS; SUBCUTANEOUS at 02:15

## 2024-08-13 RX ADMIN — SENNOSIDES AND DOCUSATE SODIUM 2 TABLET: 50; 8.6 TABLET ORAL at 10:29

## 2024-08-13 RX ADMIN — POLYETHYLENE GLYCOL 3350 17 G: 17 POWDER, FOR SOLUTION ORAL at 10:30

## 2024-08-13 RX ADMIN — CARBOXYMETHYLCELLULOSE SODIUM 1 DROP: 5 SOLUTION/ DROPS OPHTHALMIC at 20:29

## 2024-08-13 RX ADMIN — NIMODIPINE 60 MG: 30 SOLUTION ORAL at 10:28

## 2024-08-13 RX ADMIN — DEXAMETHASONE SODIUM PHOSPHATE 3.2 MG: 4 INJECTION, SOLUTION INTRA-ARTICULAR; INTRALESIONAL; INTRAMUSCULAR; INTRAVENOUS; SOFT TISSUE at 01:00

## 2024-08-13 RX ADMIN — HEPARIN SODIUM 5000 UNITS: 5000 INJECTION INTRAVENOUS; SUBCUTANEOUS at 20:29

## 2024-08-13 RX ADMIN — THIAMINE HCL TAB 100 MG 100 MG: 100 TAB at 10:29

## 2024-08-13 RX ADMIN — NIMODIPINE 60 MG: 30 SOLUTION ORAL at 20:29

## 2024-08-13 RX ADMIN — NIMODIPINE 60 MG: 30 SOLUTION ORAL at 01:00

## 2024-08-13 RX ADMIN — WHITE PETROLATUM 57.7 %-MINERAL OIL 31.9 % EYE OINTMENT 1 APPLICATION: at 16:59

## 2024-08-13 RX ADMIN — ERYTHROMYCIN 1 CM: 5 OINTMENT OPHTHALMIC at 20:34

## 2024-08-13 RX ADMIN — ESCITALOPRAM 5 MG: 5 TABLET, FILM COATED ORAL at 10:30

## 2024-08-13 RX ADMIN — Medication 28 PERCENT: at 07:36

## 2024-08-13 RX ADMIN — DEXAMETHASONE SODIUM PHOSPHATE 3.2 MG: 4 INJECTION, SOLUTION INTRA-ARTICULAR; INTRALESIONAL; INTRAMUSCULAR; INTRAVENOUS; SOFT TISSUE at 17:08

## 2024-08-13 RX ADMIN — Medication 6 L/MIN: at 15:03

## 2024-08-13 RX ADMIN — WHITE PETROLATUM 57.7 %-MINERAL OIL 31.9 % EYE OINTMENT 1 APPLICATION: at 20:29

## 2024-08-13 RX ADMIN — NIMODIPINE 60 MG: 30 SOLUTION ORAL at 04:44

## 2024-08-13 RX ADMIN — HEPARIN SODIUM 5000 UNITS: 5000 INJECTION INTRAVENOUS; SUBCUTANEOUS at 04:44

## 2024-08-13 RX ADMIN — CARBOXYMETHYLCELLULOSE SODIUM 1 DROP: 5 SOLUTION/ DROPS OPHTHALMIC at 17:00

## 2024-08-13 RX ADMIN — WHITE PETROLATUM 57.7 %-MINERAL OIL 31.9 % EYE OINTMENT 1 APPLICATION: at 14:00

## 2024-08-13 RX ADMIN — SENNOSIDES AND DOCUSATE SODIUM 2 TABLET: 50; 8.6 TABLET ORAL at 20:34

## 2024-08-13 RX ADMIN — SODIUM CHLORIDE 50 ML/HR: 9 INJECTION, SOLUTION INTRAVENOUS at 08:00

## 2024-08-13 RX ADMIN — NIMODIPINE 60 MG: 30 SOLUTION ORAL at 16:59

## 2024-08-13 RX ADMIN — HEPARIN SODIUM 5000 UNITS: 5000 INJECTION INTRAVENOUS; SUBCUTANEOUS at 10:30

## 2024-08-13 RX ADMIN — CARBOXYMETHYLCELLULOSE SODIUM 1 DROP: 5 SOLUTION/ DROPS OPHTHALMIC at 14:00

## 2024-08-13 RX ADMIN — CARBOXYMETHYLCELLULOSE SODIUM 1 DROP: 5 SOLUTION/ DROPS OPHTHALMIC at 10:26

## 2024-08-13 RX ADMIN — Medication 6 L/MIN: at 20:00

## 2024-08-13 RX ADMIN — ERYTHROMYCIN 1 CM: 5 OINTMENT OPHTHALMIC at 10:25

## 2024-08-13 RX ADMIN — ESOMEPRAZOLE MAGNESIUM 40 MG: 40 FOR SUSPENSION ORAL at 10:29

## 2024-08-13 RX ADMIN — NYSTATIN 1 APPLICATION: 100000 POWDER TOPICAL at 20:28

## 2024-08-13 RX ADMIN — WHITE PETROLATUM 57.7 %-MINERAL OIL 31.9 % EYE OINTMENT 1 APPLICATION: at 10:26

## 2024-08-13 ASSESSMENT — PAIN - FUNCTIONAL ASSESSMENT
PAIN_FUNCTIONAL_ASSESSMENT: 0-10
PAIN_FUNCTIONAL_ASSESSMENT: CPOT (CRITICAL CARE PAIN OBSERVATION TOOL)
PAIN_FUNCTIONAL_ASSESSMENT: 0-10
PAIN_FUNCTIONAL_ASSESSMENT: CPOT (CRITICAL CARE PAIN OBSERVATION TOOL)
PAIN_FUNCTIONAL_ASSESSMENT: CPOT (CRITICAL CARE PAIN OBSERVATION TOOL)

## 2024-08-13 ASSESSMENT — PAIN DESCRIPTION - DESCRIPTORS: DESCRIPTORS: SORE

## 2024-08-13 ASSESSMENT — PAIN SCALES - GENERAL
PAINLEVEL_OUTOF10: 0 - NO PAIN
PAINLEVEL_OUTOF10: 5 - MODERATE PAIN
PAINLEVEL_OUTOF10: 0 - NO PAIN
PAINLEVEL_OUTOF10: 0 - NO PAIN

## 2024-08-13 NOTE — PROGRESS NOTES
"Denae Nolasco is a 33 y.o. female on day 10 of admission presenting with Schwannoma.    Subjective   NAEO    Objective     Physical Exam  Awake, mouths answers to orientation questions, Ox3  trach'd  L HB6  + R corneal, L eyelid w/ stitches  L 5/5  RUE prox 2 dis 4  LUE prox 4- dis 4-  BLE 5/5    Last Recorded Vitals  Blood pressure 105/62, pulse 101, temperature 36.3 °C (97.3 °F), resp. rate 15, height 1.575 m (5' 2.01\"), weight 53.5 kg (117 lb 15.1 oz), last menstrual period 07/24/2024, SpO2 100%.  Intake/Output last 3 Shifts:  I/O last 3 completed shifts:  In: 4760 (89 mL/kg) [I.V.:1800 (33.6 mL/kg); NG/GT:2960]  Out: 2970 (55.5 mL/kg) [Urine:2970 (1.5 mL/kg/hr)]  Weight: 53.5 kg     Relevant Results  Lab Results   Component Value Date    WBC 8.2 08/13/2024    HGB 11.8 (L) 08/13/2024    HCT 35.2 (L) 08/13/2024    MCV 85 08/13/2024     08/13/2024     Lab Results   Component Value Date    GLUCOSE 116 (H) 08/13/2024    CALCIUM 8.7 08/13/2024     08/13/2024    K 3.7 08/13/2024    CO2 30 08/13/2024     08/13/2024    BUN 13 08/13/2024    CREATININE 0.36 (L) 08/13/2024       Assessment/Plan   Principal Problem:    Schwannoma  Active Problems:    PONV (postoperative nausea and vomiting)    32 y/o F w/ h/o ASD s/p closure in 1997, raynaud, depression p/w worsening vertigo, ataxia since 202, 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, CT CAP R middle lobe calcifications c/w granulomatous disease, dilated CBD, intrahepatic dilatation (rec'd MRCP or ERCP), CTH/CT IAC done, TTE EF 57% no wall motion abnormality, no residual interatrial shunt w bubble study, 8/4 MRI IAC CISS stable, vCTH done, 8/5 s/p L retrosig for tumor resection, RF EVD, 8/6 CTH resection bed hemorrhage    8/6 MRI brain L transverse sigmoid sinus thrombosis, GTR  8/7 s/p extubation  8/9 s/p trach/PEG, BLE DVT US neg, 8/10 CTH stable  8/10 EVD dc'd, completed abx    Plan:  tele  HOB >45  Na goal " normonatremia  SBP goal relaxed to 100-160  Void trial  Dex taper, 3q8, then 2q8 x2 days, 1q8 for 2 dails, 1BID x2 days then off  Will require ENT and ophtho follow ups  SCDs, SQH  PTOT - rehab (precert pending)  Medically ready for discharge    Amanda Portillo MD

## 2024-08-13 NOTE — PROGRESS NOTES
Social Work Discharge Planning note:    -Patient discussed during interdisciplinary rounds.   -Team members present: NP, PA, and SW  -Plan per medical team: Pt is medically ready for discharge to acute rehab placement.   -Payer: MMO  -Status: Inpatient  -Discharge disposition: [facility] received insurance precert. Pt and her , Cristóbal were updated and agreeable with transfer. Transport times are running too late for [facility] to be willing to accept tonight so Pt is set to go tomorrow, with a 12:00 pm transport  time. Going to [address] [place] [address]. RN report will be to [phone]. Medical team updated. s:   -Anticipated Date of Discharge:  8/14/24    SOLEDAD [place], LSW

## 2024-08-14 VITALS
OXYGEN SATURATION: 97 % | BODY MASS INDEX: 21.7 KG/M2 | HEART RATE: 112 BPM | WEIGHT: 117.95 LBS | RESPIRATION RATE: 19 BRPM | SYSTOLIC BLOOD PRESSURE: 116 MMHG | DIASTOLIC BLOOD PRESSURE: 81 MMHG | HEIGHT: 62 IN | TEMPERATURE: 97.7 F

## 2024-08-14 PROCEDURE — 2500000004 HC RX 250 GENERAL PHARMACY W/ HCPCS (ALT 636 FOR OP/ED): Performed by: PHYSICIAN ASSISTANT

## 2024-08-14 PROCEDURE — 2500000005 HC RX 250 GENERAL PHARMACY W/O HCPCS

## 2024-08-14 PROCEDURE — 99232 SBSQ HOSP IP/OBS MODERATE 35: CPT | Performed by: STUDENT IN AN ORGANIZED HEALTH CARE EDUCATION/TRAINING PROGRAM

## 2024-08-14 PROCEDURE — 99239 HOSP IP/OBS DSCHRG MGMT >30: CPT | Performed by: PHYSICIAN ASSISTANT

## 2024-08-14 PROCEDURE — 2500000004 HC RX 250 GENERAL PHARMACY W/ HCPCS (ALT 636 FOR OP/ED)

## 2024-08-14 PROCEDURE — 2500000001 HC RX 250 WO HCPCS SELF ADMINISTERED DRUGS (ALT 637 FOR MEDICARE OP)

## 2024-08-14 PROCEDURE — 2500000004 HC RX 250 GENERAL PHARMACY W/ HCPCS (ALT 636 FOR OP/ED): Performed by: STUDENT IN AN ORGANIZED HEALTH CARE EDUCATION/TRAINING PROGRAM

## 2024-08-14 PROCEDURE — 82947 ASSAY GLUCOSE BLOOD QUANT: CPT

## 2024-08-14 RX ORDER — NYSTATIN 100000 [USP'U]/G
1 POWDER TOPICAL 2 TIMES DAILY
Start: 2024-08-14

## 2024-08-14 RX ORDER — DEXAMETHASONE 2 MG/1
TABLET ORAL
Start: 2024-08-14 | End: 2024-08-20

## 2024-08-14 RX ORDER — DEXAMETHASONE 2 MG/1
2 TABLET ORAL EVERY 8 HOURS
Status: DISCONTINUED | OUTPATIENT
Start: 2024-08-14 | End: 2024-08-14

## 2024-08-14 RX ORDER — DEXAMETHASONE 2 MG/1
2 TABLET ORAL EVERY 8 HOURS
Status: DISCONTINUED | OUTPATIENT
Start: 2024-08-14 | End: 2024-08-14 | Stop reason: HOSPADM

## 2024-08-14 RX ORDER — PANTOPRAZOLE SODIUM 40 MG/1
40 TABLET, DELAYED RELEASE ORAL
Start: 2024-08-14

## 2024-08-14 RX ORDER — INSULIN LISPRO 100 [IU]/ML
0-5 INJECTION, SOLUTION INTRAVENOUS; SUBCUTANEOUS
Start: 2024-08-14

## 2024-08-14 RX ORDER — OXYCODONE HYDROCHLORIDE 5 MG/1
2.5 TABLET ORAL EVERY 6 HOURS PRN
Start: 2024-08-14

## 2024-08-14 RX ADMIN — DEXAMETHASONE SODIUM PHOSPHATE 3.2 MG: 4 INJECTION, SOLUTION INTRA-ARTICULAR; INTRALESIONAL; INTRAMUSCULAR; INTRAVENOUS; SOFT TISSUE at 01:00

## 2024-08-14 RX ADMIN — THIAMINE HCL TAB 100 MG 100 MG: 100 TAB at 08:20

## 2024-08-14 RX ADMIN — HEPARIN SODIUM 5000 UNITS: 5000 INJECTION INTRAVENOUS; SUBCUTANEOUS at 03:00

## 2024-08-14 RX ADMIN — CARBOXYMETHYLCELLULOSE SODIUM 1 DROP: 5 SOLUTION/ DROPS OPHTHALMIC at 00:15

## 2024-08-14 RX ADMIN — NYSTATIN 1 APPLICATION: 100000 POWDER TOPICAL at 08:44

## 2024-08-14 RX ADMIN — Medication 6 L/MIN: at 07:12

## 2024-08-14 RX ADMIN — ESOMEPRAZOLE MAGNESIUM 40 MG: 40 FOR SUSPENSION ORAL at 08:20

## 2024-08-14 RX ADMIN — DEXAMETHASONE 2 MG: 2 TABLET ORAL at 08:20

## 2024-08-14 RX ADMIN — ESCITALOPRAM 5 MG: 5 TABLET, FILM COATED ORAL at 08:20

## 2024-08-14 ASSESSMENT — COGNITIVE AND FUNCTIONAL STATUS - GENERAL
TOILETING: A LOT
TURNING FROM BACK TO SIDE WHILE IN FLAT BAD: A LITTLE
DRESSING REGULAR LOWER BODY CLOTHING: A LOT
MOBILITY SCORE: 14
MOVING FROM LYING ON BACK TO SITTING ON SIDE OF FLAT BED WITH BEDRAILS: A LITTLE
MOBILITY SCORE: 13
MOVING TO AND FROM BED TO CHAIR: A LOT
EATING MEALS: TOTAL
MOVING FROM LYING ON BACK TO SITTING ON SIDE OF FLAT BED WITH BEDRAILS: A LITTLE
TOILETING: A LOT
WALKING IN HOSPITAL ROOM: A LOT
STANDING UP FROM CHAIR USING ARMS: A LOT
MOVING TO AND FROM BED TO CHAIR: A LOT
HELP NEEDED FOR BATHING: A LOT
DAILY ACTIVITIY SCORE: 12
DRESSING REGULAR LOWER BODY CLOTHING: A LOT
DRESSING REGULAR UPPER BODY CLOTHING: A LOT
EATING MEALS: TOTAL
HELP NEEDED FOR BATHING: A LOT
WALKING IN HOSPITAL ROOM: A LOT
DRESSING REGULAR UPPER BODY CLOTHING: A LOT
STANDING UP FROM CHAIR USING ARMS: A LITTLE
PERSONAL GROOMING: A LITTLE
PERSONAL GROOMING: A LITTLE
CLIMB 3 TO 5 STEPS WITH RAILING: TOTAL
CLIMB 3 TO 5 STEPS WITH RAILING: TOTAL
DAILY ACTIVITIY SCORE: 12
TURNING FROM BACK TO SIDE WHILE IN FLAT BAD: A LITTLE

## 2024-08-14 ASSESSMENT — PAIN - FUNCTIONAL ASSESSMENT: PAIN_FUNCTIONAL_ASSESSMENT: 0-10

## 2024-08-14 ASSESSMENT — PAIN SCALES - GENERAL: PAINLEVEL_OUTOF10: 0 - NO PAIN

## 2024-08-14 NOTE — CARE PLAN
The patient's goals for the shift include saftey    The clinical goals for the shift include Pt will remain free from falls/injury throughout shift. Pt will remain HDS      Problem: Discharge Planning  Goal: Discharge to home or other facility with appropriate resources  Outcome: Progressing     Problem: Chronic Conditions and Co-morbidities  Goal: Patient's chronic conditions and co-morbidity symptoms are monitored and maintained or improved  Outcome: Progressing     Problem: Skin  Goal: Prevent/manage excess moisture  Outcome: Progressing  Goal: Prevent/minimize sheer/friction injuries  Outcome: Progressing  Goal: Promote/optimize nutrition  Outcome: Progressing

## 2024-08-14 NOTE — PROGRESS NOTES
"Denae Nolasco is a 33 y.o. female on day 11 of admission presenting with Schwannoma.    Subjective   NAEO    Objective     Physical Exam  Awake, mouths answers to orientation questions, Ox3  trach'd  L HB6  + R corneal, L eyelid w/ stitches  L 5/5  RUE prox 2 dis 4  LUE prox 4- dis 4-  BLE 5/5    Last Recorded Vitals  Blood pressure 116/81, pulse (!) 112, temperature 36.5 °C (97.7 °F), temperature source Temporal, resp. rate 19, height 1.575 m (5' 2.01\"), weight 53.5 kg (117 lb 15.1 oz), last menstrual period 07/24/2024, SpO2 97%.  Intake/Output last 3 Shifts:  I/O last 3 completed shifts:  In: 3810 (71.2 mL/kg) [I.V.:1350 (25.2 mL/kg); NG/GT:2460]  Out: 2855 (53.4 mL/kg) [Urine:2855 (1.5 mL/kg/hr)]  Weight: 53.5 kg     Relevant Results  Lab Results   Component Value Date    WBC 8.2 08/13/2024    HGB 11.8 (L) 08/13/2024    HCT 35.2 (L) 08/13/2024    MCV 85 08/13/2024     08/13/2024     Lab Results   Component Value Date    GLUCOSE 116 (H) 08/13/2024    CALCIUM 8.7 08/13/2024     08/13/2024    K 3.7 08/13/2024    CO2 30 08/13/2024     08/13/2024    BUN 13 08/13/2024    CREATININE 0.36 (L) 08/13/2024       Assessment/Plan   Principal Problem:    Schwannoma  Active Problems:    PONV (postoperative nausea and vomiting)    34 y/o F w/ h/o ASD s/p closure in 1997, raynaud, depression p/w worsening vertigo, ataxia since 202, 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, CT CAP R middle lobe calcifications c/w granulomatous disease, dilated CBD, intrahepatic dilatation (rec'd MRCP or ERCP), CTH/CT IAC done, TTE EF 57% no wall motion abnormality, no residual interatrial shunt w bubble study, 8/4 MRI IAC CISS stable, vCTH done, 8/5 s/p L retrosig for tumor resection, RF EVD, 8/6 CTH resection bed hemorrhage    8/6 MRI brain L transverse sigmoid sinus thrombosis, GTR  8/7 s/p extubation  8/9 s/p trach/PEG, BLE DVT US neg, 8/10 CTH stable  8/10 EVD dc'd, completed " abx    Plan:  tele  HOB >45  Na goal normonatremia  SBP goal relaxed to 100-160  Void trial  Dex taper, 3q8, then 2q8 x2 days, 1q8 for 2 dails, 1BID x2 days then off  ENT and ophtho follow ups  SCDs, SQH  PTOT - rehab (precert pending)  Medically ready for discharge    Nicole Davila MD

## 2024-08-14 NOTE — NURSING NOTE
Report called to EVELIO Fong on [facility] Room:4052  Bed:4052-A  Patient transferred in stable condition, personal belongings with person. Direct patient hand off to EVELIO Fong.

## 2024-08-14 NOTE — SIGNIFICANT EVENT
Procedure Note: Trach Change  The patient's name and personal identifier's were verified. Nursing was present for the tracheostomy tube change. The patient was placed in the supine position. The old #6 cuffed shiley tracheostomy tube was removed and immediately replaced with a #6 uncuffed shiley tracheostomy tube. Confirmation of placement was achieved with positive return of tracheal secretions. The patient was returned to an inclined position and tolerated the position well. There were no complications.    Recommendations:  - Routine trach care   - Please order trach supplies for home going   - Please call back if patient has been off vent and not in need of positive pressure therapy for 24-48 hours and we can change to cuffless trach before discharge if deemed appropriate by primary team   - Please page with any further questions or concerns     Radha Brown MD - PGY2  Otolaryngology - Head & Neck Surgery  [facility]    ENT Consult pager: [phone]  ENT Peds pager: [phone]  ENT Head & Neck Surgery Phone: [phone]  ENT subspecialty team: Devorah individual resident who wrote today's note  ENT Outpatient scheduling number: [phone]  Please Page if Urgent

## 2024-08-14 NOTE — CARE PLAN
The patient's goals for the shift include managing secretions    The clinical goals for the shift include pt will manage secretions effectively      Problem: Discharge Planning  Goal: Discharge to home or other facility with appropriate resources  Outcome: Progressing     Problem: Chronic Conditions and Co-morbidities  Goal: Patient's chronic conditions and co-morbidity symptoms are monitored and maintained or improved  Outcome: Progressing     Problem: Skin  Goal: Decreased wound size/increased tissue granulation at next dressing change  Outcome: Progressing  Goal: Participates in plan/prevention/treatment measures  Outcome: Progressing  Goal: Prevent/manage excess moisture  Outcome: Progressing  Goal: Prevent/minimize sheer/friction injuries  Outcome: Progressing  Goal: Promote/optimize nutrition  Outcome: Progressing  Goal: Promote skin healing  Outcome: Progressing     Problem: Respiratory  Goal: Minimize anxiety/maximize coping throughout shift  Outcome: Progressing  Goal: Minimal/no exertional discomfort or dyspnea this shift  Outcome: Progressing

## 2024-08-14 NOTE — NURSING NOTE
[facility] Pt belongings gathered and given to family at bedside. PIV's removed. Pt transferred to stretcher safely. O2 verified.

## 2024-08-14 NOTE — DISCHARGE SUMMARY
Discharge Diagnosis  Schwannoma    Test Results Pending At Discharge  Pending Labs       No current pending labs.          Hospital Course  Denae Nolasco is a 33 y.o. female with a past medical history of raynaud's and depression who presented to the [facility] ED on 8/3/2024 with worsening vertigo and ataxia. 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 CT CAP with R middle lobe calcifications consistent wthi granulomatous disease, dilated CBD, intrahepatic dilatation (rec'd MRCP or ERCP). TTE EF 57% with no motion abnormality, no residual interatrial shunt with bubble study. 8/4 MRI IAC CISS stable. Patient admitted to neurosurgery service for operative management.     8/5 OR for L retrosigmoid craniotomy for tumor resection with RF EVD placement. CTH with resection bed hemorrhage.   8/6 MRI brain L transverse sigmoid sinus thrombosis, GTR.   8/7 Extubated   8/8 ENT scope with bilateral VC paralysis, L CB VII, XII paralysis.   8/9 Trach/PEG. BLE US negative.   8/10 CTH stable. EVD dc'd. ABX course complete. Tarsorrhapy completed per ophthalmology.   8/12 CTH stable. Downgrade to ODILIA. Holcomb replaced for acute retention.   8/14 ENT removed sutures from trach site. Patient accepted to acute rehab.     PT/OT evaluated patient and recommended placement at acute rehab after discharge.     Patient discharged with detailed instructions and scheduled outpatient follow up.       Pertinent Physical Exam At Time of Discharge  Constitutional: Awake, mouths answers to orientation questions, Ox3.   Eyes: L HB6. + R corneal, L eyelid w/ stitches.   ENMT: Moist mucous membranes, no apparent injuries or lesions.  Head/Neck: Neck supple, no JVD. NC/AT.   Cardiovascular: Normal rate and regular rhythm. 2+ equal pulses of the distal extremities.  Respiratory/Thorax: Trach in place.   Gastrointestinal: PEG in place.   Extremities: L 5/5. RUE prox 2 dis 4. LUE prox 4- dis 4-. BLE  5/5.   Neurological: A&Ox3.   Psychological: Appropriate mood and behavior.   Skin: Incision C/D/I.     Home Medications     Medication List      START taking these medications     dexAMETHasone 2 mg tablet; Commonly known as: Decadron; 1 tablet (2 mg)   by g-tube route every 8 hours for 2 days, THEN 1 tablet (2 mg) every 12   hours for 2 days, THEN 0.5 tablets (1 mg) every 12 hours for 2 days. Stop   taking medication after completion of 1mg every 12 hours for 2 days   (8/19/2024).; Start taking on: August 14, 2024   erythromycin 5 mg/gram (0.5 %) ophthalmic ointment; Commonly known as:   Romycin; Apply to left eye 2 times a day. Apply Amount per Dose: 0.5 inch   (~1 cm) per dose.   heparin (porcine) 5,000 unit/mL injection; Inject 1 mL (5,000 Units)   under the skin every 8 hours.   insulin lispro 100 unit/mL injection; Commonly known as: HumaLOG; Inject   0-5 Units under the skin 3 times daily (morning, midday, late afternoon).   For use while receiving steroids.  Take as directed per insulin   instructions.   lubricating eye drops ophthalmic solution; Administer 1 drop into the   left eye every 4 hours.   nystatin 100,000 unit/gram powder; Commonly known as: Mycostatin; Apply   1 Application topically 2 times a day.   oxyCODONE 5 mg immediate release tablet; Commonly known as: Roxicodone;   0.5 tablets (2.5 mg) by g-tube route every 6 hours if needed for severe   pain (7 - 10).   pantoprazole 40 mg EC tablet; Commonly known as: ProtoNix; Take 1 tablet   (40 mg) by mouth once daily in the morning. Take before meals. Do not   crush, chew, or split.   polyethylene glycol 17 gram packet; Commonly known as: Glycolax,   Miralax; 17 g by g-tube route once daily.   sennosides-docusate sodium 8.6-50 mg tablet; Commonly known as:   Becky-Colace; 2 tablets by g-tube route 2 times a day.   white petrolatum-mineral oiL 94-3 % ophthalmic ointment; Commonly known   as: Tears Naturale PM; Apply 1 Application to left eye every 4  hours.     CHANGE how you take these medications     escitalopram 5 mg tablet; Commonly known as: Lexapro; 1 tablet (5 mg) by   g-tube route once daily.; What changed: how to take this     CONTINUE taking these medications     IRON ORAL       Outpatient Follow-Up  Future Appointments   Date Time Provider Department Center   8/26/2024  2:45 PM BLU Chandler-CNP [id number] [facility]   9/18/2024  1:30 PM Nini Newman, DIANA, CCC-A [id number] [facility]   9/18/2024  2:20 PM Promise Amaya APRN-CNP [id number] [facility]   9/24/2024 11:00 AM NICOLA CT 1 WESCT [facility]   9/26/2024 11:15 AM Renetta Amanda MD [id number] [facility]       Deidre Cardona PA-C    Total face to face time spent with patient/family of 30 minutes, with >50% of the time spent discussing plan of care/management, counseling/educating on disease processes, explaining results of diagnostic testing.

## 2024-08-14 NOTE — PROGRESS NOTES
ENT DAILY PROGRESS NOTE  Name: Denae Nolasco  MRN: [id number]  : 1990    Identification Statement  The patient is a 33 y.o. female with past medical history significant for but not limited to raynaud's and depression who initially presented to [facility] ER on 8/3/24 with vertigo and ataxia. Patient underwent L retrosigmoid craniotomy for tumor resection with RF EVD placement for L vestibular schwannoma with neurosurgery on . ENT evaluated patient post-operatively for voice changes and scope findings demonstrated bilateral vocal cord immobility in the paramedian position. Patient underwent tracheostomy with placement of 6-0 cuffed shiley trach tube with Dr. Jewell on 24.    Subjective  Patient seen and examined this am. Trach sutures cut, velcro ties appropriately adjusted. No acute events overnight. Denies issues with tracheostomy. She is currently on trach collar 5L.     Objective  Temp:  [36.1 °C (97 °F)-36.6 °C (97.9 °F)] 36.5 °C (97.7 °F)  Heart Rate:  [] 112  Resp:  [11-20] 19  BP: (104-141)/() 116/81  FiO2 (%):  [28 %] 28 %  Gen: Alert, sitting up in bed with  at bedside, no acute distress   Resp: Breathing comfortably on trach collar 5L, no stridor  Head and Face: left scalp healing wound, L eye completely closed  Oral Cavity: MMM  Ears: Normal external ears   Nose: External nose midline   Neck: 6-0 cuffed shiley tracheostomy tube present with balloon deflated with trach sutures cut, velcro tie appropriately adjusted. No bleeding or oozing of blood surrounding tracheostomy. No crepitus. No obvious skin breakdown surrounding trach. Scab near left clavicle.      Intake/Output Summary (Last 24 hours) at 2024 1059  Last data filed at 2024 0325  Gross per 24 hour   Intake 1570 ml   Output 1300 ml   Net 270 ml       Labs  Results for orders placed or performed during the hospital encounter of 24 (from the past 24 hour(s))   POCT GLUCOSE   Result Value Ref Range     POCT Glucose 139 (H) 74 - 99 mg/dL   POCT GLUCOSE   Result Value Ref Range    POCT Glucose 127 (H) 74 - 99 mg/dL   POCT GLUCOSE   Result Value Ref Range    POCT Glucose 143 (H) 74 - 99 mg/dL       Assessment  Patient is a 33 year old female who underwent L retrosigmoid craniotomy for tumor resection with RF EVD placement for L vestibular schwannoma with neurosurgery on 8/5. ENT evaluated patient post-operatively for voice changes and scope findings demonstrated bilateral vocal cord immobility in the paramedian position. Patient underwent tracheostomy with placement of 6-0 cuffed shiley trach tube with Dr. Jewell on 8/9/24. Trach sutures cut today. No issues reported with trach.      Plan:  -Continue routine trach care per nursing  -Please call back if patient has been off vent and not in need of positive pressure therapy for 24-48 hours and we can change to cuffless trach before discharge  -Please call with any questions regarding ordering homegoing trach supplies prior to discharge  -Patient scheduled for tracheostomy follow-up on 11/15/24; also has otology appointment on 9/18/24  - please contact ENT with questions or concerns    Sangita Lewis PA-C  Otolaryngology- Head & Neck Surgery    ENT Consult pager: [phone]  Please page if urgent

## 2024-08-19 ENCOUNTER — APPOINTMENT (OUTPATIENT)
Dept: SURGERY | Facility: CLINIC | Age: 34
End: 2024-08-19
Payer: COMMERCIAL

## 2024-08-20 ENCOUNTER — APPOINTMENT (OUTPATIENT)
Dept: AUDIOLOGY | Facility: CLINIC | Age: 34
End: 2024-08-20
Payer: COMMERCIAL

## 2024-08-20 ENCOUNTER — APPOINTMENT (OUTPATIENT)
Dept: OTOLARYNGOLOGY | Facility: CLINIC | Age: 34
End: 2024-08-20
Payer: COMMERCIAL

## 2024-08-21 ENCOUNTER — OFFICE VISIT (OUTPATIENT)
Dept: OTOLARYNGOLOGY | Facility: HOSPITAL | Age: 34
End: 2024-08-21
Payer: COMMERCIAL

## 2024-08-21 VITALS — HEIGHT: 62 IN | BODY MASS INDEX: 21.57 KG/M2 | TEMPERATURE: 96.6 F

## 2024-08-21 DIAGNOSIS — L03.90 CELLULITIS OF SKIN: ICD-10-CM

## 2024-08-21 DIAGNOSIS — D36.10 SCHWANNOMA: ICD-10-CM

## 2024-08-21 DIAGNOSIS — Z43.0 ATTENTION TO TRACHEOSTOMY (MULTI): ICD-10-CM

## 2024-08-21 DIAGNOSIS — G51.0 FACIAL PARALYSIS: ICD-10-CM

## 2024-08-21 DIAGNOSIS — R49.1 APHONIA: ICD-10-CM

## 2024-08-21 DIAGNOSIS — J38.02 BILATERAL VOCAL FOLD PARALYSIS: Primary | ICD-10-CM

## 2024-08-21 DIAGNOSIS — R13.10 DYSPHAGIA, UNSPECIFIED TYPE: ICD-10-CM

## 2024-08-21 PROCEDURE — 99215 OFFICE O/P EST HI 40 MIN: CPT | Performed by: OTOLARYNGOLOGY

## 2024-08-21 PROCEDURE — 31575 DIAGNOSTIC LARYNGOSCOPY: CPT | Performed by: OTOLARYNGOLOGY

## 2024-08-21 PROCEDURE — 31615 TRCHEOBRNCHSC EST TRACHS INC: CPT | Performed by: OTOLARYNGOLOGY

## 2024-08-21 NOTE — PATIENT INSTRUCTIONS
Please order and start Keflex for peristomal cellulitis. Ok to do speech and swallow therapy. Continue trach care/changes. Ok for a speaking valve and/or cap trials. Please do not de cannulate. Schedule a follow up visit in 2 weeks for a possible vocal cord injection. [phone]

## 2024-08-21 NOTE — PROGRESS NOTES
Patient: Denae Nolasco   MRN: [id number] YOB: 1990   Sex: female Age: 33 y.o.  Date of Service: 2024       ASSESSMENT AND PLAN  I discussed the findings with Denae Nolasco and have recommended the followin. 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 Laryngoscopy today with bilateral vocal folds in lateral paramedian position. Tolerates finger occlusion. We discussed option for anterior VF injection to attempt to achieve ome voicing. This may narrow her airway which may lead to prolonged trach requirement. She expressed understanding and is interested in proceeding.  - Ok for PMV trials  - Plan to return after neck cellulitis resolved for bilateral vocal fold injection to attempt to improve voicing. Please do not decannulate prior to injection trial  - Alternate forms of communication in interim during recovery    2. Dysphagia, suspect high vagal injury with multilevel swallowing deficits. High aspiration risk  - Meticulous oral care TID  - Start SLP swallow therapy, Stokes Free Water Protocol  - Continue alternate forms of nutrition    3. Peristomal cellulitis of neck  - Start Keflex TID      CHIEF COMPLAINT  Chief Complaint   Patient presents with    Follow-up     Re-evaluate trach       HISTORY OF PRESENT ILLNESS  Denae Nolasco is a 33 y.o. female referred for evaluation of bilateral vocal fold paralysis and tracheotomy care.  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 Currently admitted to [facility] on 2024 due to ongoing functional deficits.     She has a #6 cuffless Shiley in place. She denies difficulty breathing. She has not trialed a speaking valve or capping. She is getting all her nutrition through PEG. Her voice is still very  "weak.      ADDITIONAL HISTORY  Past Medical History  She has a past medical history of Depression and Visual impairment. Surgical History  She has no past surgical history on file.   Social History  She reports that she has never smoked. She has never used smokeless tobacco. She reports that she does not currently use drugs after having used the following drugs: Marijuana. Frequency: 1.00 time per week. She reports that she does not drink alcohol. Allergies  Patient has no known allergies.     Family History  Family History   Problem Relation Name Age of Onset    Blood clot Mother Delmis Mantilla-  of clot 2016      labor Sister Megan         REVIEW OF SYSTEMS  All 10 systems were reviewed and negative except for above.      PHYSICAL EXAM  ENT Physical Exam   GENERAL: Thin young woman, aphonic, response to commands  RESPIRATORY: Breathing quietly, no stridor with finger occlusion  HEAD: Normocephalic atraumatic  FACE: Asymmetric left HB 6/6 no masses or lesions  EYES:  Left eye s/p tarsorrhaphy   EARS:  Pinnae normal.   NOSE:  No anterior lesions, masses or polyps.  ORAL CAVITY/OROPHARYNX:  Buccal mucosa is dry with, crusting along palate, tongue deviated and palate elevates asymmetrically.  NECK:  Soft. #6 cuffless Shiley in place, thickened mucoid drainage on drain sponge, mild peristomal cellulitis.      Last Recorded Vitals  Temperature 35.9 °C (96.6 °F), height 1.575 m (5' 2\"), last menstrual period 2024.    RESULTS    Patient Reported Outcome Measures  N/A    Laboratory, Radiology, and Pathology  I personally reviewed the following results, with the following interpretation:   WBC 24 13.8      PROCEDURES  Laryngoscopy with tracheoscopy/bronchoscopy    Date/Time: 2024 9:07 PM    Performed by: Smiley Craft MD  Authorized by: Smiley Craft MD        Patient failed a mirror exam due to limitations of equipment and the need for laryngoscopy to assess laryngeal anatomy and " function    PREOPERATIVE DIAGNOSIS: aphonia, tracheotomy status    POSTOPERATIVE DIAGNOSIS: Same    PROCEDURE: Transnasal videolaryngoscopy    ANESTHESIA:  Topical    COMPLICATIONS:  None    SPECIMENS:  None    PROCEDURE IN DETAIL:  The patient was brought into the endoscopy suite, placed in the upright position.  The nasal cavity was topically decongested anesthetized.  The distal chip video laryngoscope was passed through the nasal cavity.  The nasal cavity and nasopharynx were within normal limits except noted below. The following findings on laryngoscopy were noted:     Tongue Base: no masses or lesions   Vocal Fold Mobility               Right VF: immobile in lateral paramedian position               Left VF: immobile in lateral paramedian position   TVF Appearance               Edema/Erythema: none               Lesions/vibratory margin irregularities: atrophy   Muscle Tension Patterns:  n/a    The patient tolerated the procedure well.      After the diagnostic laryngoscopy, the flexible endoscope was passed through the trach tube to visualize the lower airways with the following findings:         Trachea and lower airways: patent distal to trach tube down to level of segmental takeoffs    ----------------------------------------------------------------------  Smiley Craft MD, MAEd    [facility]  Department of Otolaryngology - Head and Neck Surgery  [facility]    The total time I spent in care of this patient today (excluding time spent on other billable services) is as follows:    Time Spent  Prep time on day of patient encounter: 10 minutes  Time spent directly with patient, family or caregiver: 30 minutes  Additional Time Spent on Patient Care Activities: 10 minutes  Documentation Time: 10 minutes  Other Time Spent: 0 minutes  Total: 60 minutes

## 2024-08-22 LAB — GLUCOSE BLD MANUAL STRIP-MCNC: 112 MG/DL (ref 74–99)

## 2024-08-23 NOTE — PROGRESS NOTES
[facility]  Neurosurgery    History of Present Illness    Denae Nolasco is a 33 y.o. female with a past medical history of raynaud's and depression who presented to the [facility] ED on 8/3/2024 with worsening vertigo and ataxia. 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 CT CAP with R middle lobe calcifications consistent wthi granulomatous disease, dilated CBD, intrahepatic dilatation (rec'd MRCP or ERCP). TTE EF 57% with no motion abnormality, no residual interatrial shunt with bubble study.  MRI IAC CISS stable. Patient admitted to neurosurgery service for operative management. On  patient underwent a L retrosigmoid craniotomy for tumor resection with RF EVD placement. CTH with resection bed hemorrhage.  MRI brain showed a L transverse sigmoid sinus thrombosis, GTR.  ENT scope with bilateral VC paralysis, L CB VII, XII paralysis. Patient received a trach and PEG on  and Tarsorrhapy completed per ophthalmology on 8/10. Currently admitted to [facility] on 2024 due to ongoing functional deficits. With a #6 cuffless Shiley in place. She presents in clinic today              Objective      Vitals:   LMP 2024         Physical Exam:            Relevant Results:            Assessment & Plan      Diagnosis:  {There are no diagnoses linked to this encounter. (Refresh or delete this SmartLink)}        Provider Impression:         Medical History     Past Medical History:   Diagnosis Date    Depression     Visual impairment      No past surgical history on file.  Social History     Tobacco Use    Smoking status: Never    Smokeless tobacco: Never   Vaping Use    Vaping status: Never Used   Substance Use Topics    Alcohol use: Never    Drug use: Not Currently     Frequency: 1.0 times per week     Types: Marijuana     Comment: Vape     Family History   Problem Relation Name Age of Onset    Blood clot Mother Delmis Mantilla-  of clot        labor Sister Megan      No Known Allergies  Current Outpatient Medications   Medication Instructions    dexAMETHasone (Decadron) 2 mg tablet 1 tablet (2 mg) by g-tube route every 8 hours for 2 days, THEN 1 tablet (2 mg) every 12 hours for 2 days, THEN 0.5 tablets (1 mg) every 12 hours for 2 days. Stop taking medication after completion of 1mg every 12 hours for 2 days (2024).    erythromycin (Romycin) 5 mg/gram (0.5 %) ophthalmic ointment Left Eye, 2 times daily, Apply Amount per Dose: 0.5 inch (~1 cm) per dose.    escitalopram (LEXAPRO) 5 mg, g-tube, Daily    ferrous sulfate (IRON ORAL) 1 tablet, oral, Daily    heparin (porcine) 5,000 Units, subcutaneous, Every 8 hours    insulin lispro (HUMALOG) 0-5 Units, subcutaneous, 3 times daily (morning, midday, late afternoon), For use while receiving steroids. <BR>Take as directed per insulin instructions.    lubricating eye drops ophthalmic solution 1 drop, Left Eye, Every 4 hours    nystatin (Mycostatin) 100,000 unit/gram powder 1 Application, Topical, 2 times daily    oxyCODONE (ROXICODONE) 2.5 mg, g-tube, Every 6 hours PRN    pantoprazole (PROTONIX) 40 mg, oral, Daily before breakfast, Do not crush, chew, or split.    polyethylene glycol (GLYCOLAX, MIRALAX) 17 g, g-tube, Daily    sennosides-docusate sodium (Becky-Colace) 8.6-50 mg tablet 2 tablets, g-tube, 2 times daily    white petrolatum-mineral oiL (Tears Naturale PM) 94-3 % ophthalmic ointment 1 Application, Left Eye, Every 4 hours

## 2024-08-26 ENCOUNTER — APPOINTMENT (OUTPATIENT)
Dept: NEUROSURGERY | Facility: HOSPITAL | Age: 34
End: 2024-08-26
Payer: COMMERCIAL

## 2024-08-26 ENCOUNTER — APPOINTMENT (OUTPATIENT)
Dept: RHEUMATOLOGY | Facility: CLINIC | Age: 34
End: 2024-08-26
Payer: COMMERCIAL

## 2024-08-28 ENCOUNTER — HOME HEALTH ADMISSION (OUTPATIENT)
Dept: HOME HEALTH SERVICES | Facility: HOME HEALTH | Age: 34
End: 2024-08-28
Payer: COMMERCIAL

## 2024-08-29 ENCOUNTER — DOCUMENTATION (OUTPATIENT)
Dept: HOME HEALTH SERVICES | Facility: HOME HEALTH | Age: 34
End: 2024-08-29
Payer: COMMERCIAL

## 2024-08-29 NOTE — HH CARE COORDINATION
Home Care received a Referral for Nursing, Physical Therapy, Occupational Therapy, and Speech Language Pathology. We have processed the referral for a Start of Care on 8/31/2024.     If you have any questions or concerns, please feel free to contact us at [phone]. Follow the prompts, enter your five digit zip code, and you will be directed to your care team on E[facility] 1.

## 2024-08-31 ENCOUNTER — HOME CARE VISIT (OUTPATIENT)
Dept: HOME HEALTH SERVICES | Facility: HOME HEALTH | Age: 34
End: 2024-08-31
Payer: COMMERCIAL

## 2024-08-31 VITALS
TEMPERATURE: 96.8 F | RESPIRATION RATE: 18 BRPM | SYSTOLIC BLOOD PRESSURE: 100 MMHG | DIASTOLIC BLOOD PRESSURE: 60 MMHG | HEART RATE: 100 BPM | OXYGEN SATURATION: 99 %

## 2024-08-31 PROCEDURE — G0299 HHS/HOSPICE OF RN EA 15 MIN: HCPCS

## 2024-08-31 ASSESSMENT — ACTIVITIES OF DAILY LIVING (ADL)
AMBULATION ASSISTANCE: STAND BY ASSIST
CURRENT_FUNCTION: STAND BY ASSIST
OASIS_M1830: 03
ENTERING_EXITING_HOME: MODERATE ASSIST

## 2024-08-31 ASSESSMENT — ENCOUNTER SYMPTOMS
BLURRED VISION: 1
STOOL FREQUENCY: DAILY
PAIN: 1
EYE PAIN: 1
DIARRHEA: 1
PAIN LOCATION: THROAT
HIGHEST PAIN SEVERITY IN PAST 24 HOURS: 4/10

## 2024-09-01 ENCOUNTER — CLINICAL SUPPORT (OUTPATIENT)
Dept: EMERGENCY MEDICINE | Facility: HOSPITAL | Age: 34
End: 2024-09-01
Payer: COMMERCIAL

## 2024-09-01 ENCOUNTER — HOSPITAL ENCOUNTER (EMERGENCY)
Facility: HOSPITAL | Age: 34
Discharge: HOME | End: 2024-09-01
Attending: STUDENT IN AN ORGANIZED HEALTH CARE EDUCATION/TRAINING PROGRAM
Payer: COMMERCIAL

## 2024-09-01 VITALS
TEMPERATURE: 96.8 F | WEIGHT: 111 LBS | HEART RATE: 109 BPM | RESPIRATION RATE: 17 BRPM | BODY MASS INDEX: 20.43 KG/M2 | OXYGEN SATURATION: 97 % | SYSTOLIC BLOOD PRESSURE: 127 MMHG | HEIGHT: 62 IN | DIASTOLIC BLOOD PRESSURE: 81 MMHG

## 2024-09-01 DIAGNOSIS — Z48.02 VISIT FOR SUTURE REMOVAL: ICD-10-CM

## 2024-09-01 DIAGNOSIS — Z98.890 HISTORY OF TARSORRHAPHY: Primary | ICD-10-CM

## 2024-09-01 LAB
ATRIAL RATE: 126 BPM
P AXIS: 65 DEGREES
P OFFSET: 194 MS
P ONSET: 152 MS
PR INTERVAL: 144 MS
Q ONSET: 224 MS
QRS COUNT: 21 BEATS
QRS DURATION: 82 MS
QT INTERVAL: 314 MS
QTC CALCULATION(BAZETT): 454 MS
QTC FREDERICIA: 402 MS
R AXIS: 93 DEGREES
T AXIS: 55 DEGREES
T OFFSET: 381 MS
VENTRICULAR RATE: 126 BPM

## 2024-09-01 PROCEDURE — 2500000005 HC RX 250 GENERAL PHARMACY W/O HCPCS

## 2024-09-01 PROCEDURE — 2500000001 HC RX 250 WO HCPCS SELF ADMINISTERED DRUGS (ALT 637 FOR MEDICARE OP)

## 2024-09-01 PROCEDURE — 93005 ELECTROCARDIOGRAM TRACING: CPT

## 2024-09-01 PROCEDURE — 99284 EMERGENCY DEPT VISIT MOD MDM: CPT | Performed by: STUDENT IN AN ORGANIZED HEALTH CARE EDUCATION/TRAINING PROGRAM

## 2024-09-01 PROCEDURE — 99284 EMERGENCY DEPT VISIT MOD MDM: CPT

## 2024-09-01 RX ORDER — ERYTHROMYCIN 5 MG/G
OINTMENT OPHTHALMIC EVERY 4 HOURS
Qty: 3.5 G | Refills: 0 | Status: SHIPPED | OUTPATIENT
Start: 2024-09-01 | End: 2024-09-04

## 2024-09-01 RX ORDER — ERYTHROMYCIN 5 MG/G
1 OINTMENT OPHTHALMIC ONCE
Status: COMPLETED | OUTPATIENT
Start: 2024-09-01 | End: 2024-09-01

## 2024-09-01 ASSESSMENT — LIFESTYLE VARIABLES
HAVE YOU EVER FELT YOU SHOULD CUT DOWN ON YOUR DRINKING: NO
HAVE PEOPLE ANNOYED YOU BY CRITICIZING YOUR DRINKING: NO
TOTAL SCORE: 0
EVER FELT BAD OR GUILTY ABOUT YOUR DRINKING: NO
EVER HAD A DRINK FIRST THING IN THE MORNING TO STEADY YOUR NERVES TO GET RID OF A HANGOVER: NO

## 2024-09-01 ASSESSMENT — PAIN - FUNCTIONAL ASSESSMENT: PAIN_FUNCTIONAL_ASSESSMENT: 0-10

## 2024-09-01 ASSESSMENT — COLUMBIA-SUICIDE SEVERITY RATING SCALE - C-SSRS
6. HAVE YOU EVER DONE ANYTHING, STARTED TO DO ANYTHING, OR PREPARED TO DO ANYTHING TO END YOUR LIFE?: NO
1. IN THE PAST MONTH, HAVE YOU WISHED YOU WERE DEAD OR WISHED YOU COULD GO TO SLEEP AND NOT WAKE UP?: NO
2. HAVE YOU ACTUALLY HAD ANY THOUGHTS OF KILLING YOURSELF?: NO

## 2024-09-01 ASSESSMENT — PAIN SCALES - GENERAL: PAINLEVEL_OUTOF10: 4

## 2024-09-01 NOTE — ED PROVIDER NOTES
Emergency Department Provider Note        History of Present Illness     History provided by: Patient and Parent  Limitations to History: None  External Records Reviewed with Brief Summary: None    HPI:  Denae Nolasco is a 33 y.o. 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  Patient was supposed to have the sutures removed this upcoming Wednesday, but 1 fell out today prematurely.  Patient is reporting pain in eyelid, not pain of her actual eye.      Patient denied any other symptoms including fevers, chills, chest pain, shortness of breath.  Parents also complaining that there seems to be a fluid collection in her tracheostomy tube and they are unsure how to remove it.    Physical Exam   Triage vitals:  T 36 °C (96.8 °F)  HR (!) 128  /74  RR 18  O2 99 % None (Room air)    General: Awake, alert, in no acute distress  Eyes: Left eye closed with sutures in place.  No redness or swelling of the periorbital region.  HENT: Normo-cephalic, atraumatic.  Tracheostomy tube in place.  CV: Regular rate, regular rhythm. Radial pulses 2+ bilaterally  Resp: Breathing non-labored, speaking in full sentences.  Clear to auscultation bilaterally  GI: Soft, non-distended, non-tender. No rebound or guarding.  MSK/Extremities: No gross bony deformities. Moving all extremities  Skin: Warm. Appropriate color  Neuro: Alert. Oriented. Face symmetric. Speech is fluent.  Gross strength and sensation intact in b/l UE and LEs  Psych: Appropriate mood and affect    Medical Decision Making & ED Course   Medical Decision Makin y.o. female with past medical history as noted above, presenting to the ED with premature suture removal.  Vital signs are stable on arrival. ED RT  to bedside for trach care. On physical exam, patient does have a loose suture from surgical bed from left eye. Parents and patient are indicating they were told to come here to be seen by Ophthalmology.    Ophthalmology consulted for further evaluation and removal of the sutures. Patient signed out to oncoming team in stable condition.   ----    Differential diagnoses considered include but are not limited to: Removal of Tarsorhaphy sutures     Social Determinants of Health which Significantly Impact Care: None identified       Independent Result Review and Interpretation: Relevant laboratory and radiographic results were reviewed and independently interpreted by myself.  As necessary, they are commented on in the ED Course.    Chronic conditions affecting the patient's care: As documented above in [facility]    The patient was discussed with the following consultants/services:  Ophthalmology    Care Considerations: As documented above in [facility]    ED Course:  Diagnoses as of 09/05/24 0938   History of tarsorrhaphy   Visit for suture removal     Disposition   Patient was signed out to Dr. Montez at 1900  pending completion of their work-up.  Please see the next provider's transition of care note for the remainder of the patient's care.     Procedures   Procedures    This was a shared visit with an ED attending.  The patient was seen and discussed with the ED attending    Jayleen Johnson DO  Emergency Medicine     Clark Sapp MD  09/05/24 0978

## 2024-09-01 NOTE — PROGRESS NOTES
Emergency Department Transition of Care Note       Signout   I received Denae Nolasco in signout from Dr. Alex AGUILAR.  Please see the ED Provider Note for all HPI, PE and MDM up to the time of signout at 1900.  This is in addition to the primary record.    In brief Denae Nolasco is an 33 y.o. 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  Presenting to the ED after one of her sutures prematurely fell out.  Patient was supposed to have her sutures taken out this coming Wednesday.  She noted pain to the eyelid on arrival here, but no pain to the eye itself.    At the time of signout we were awaiting:  Ophtho recommendations regarding the eye care and suture removal.    ED Course & Medical Decision Making   Medical Decision Making:  Under my care, ophthalmology did come down and see the patient, completing exam and removing sutures of the left eye.    Patient and family were requesting discharge paperwork to return home.  I did reach out to Optho regarding the recommendations for patient's treatment.  They would like the patient to be given artificial tears to that left eye every 4 hours as well as erythromycin ointment applied every 4 hours, with recommendation to keep the eye shut and taped.  They will see the patient on an outpatient basis as previously scheduled later this week.  I did give the patient a dose of the erythromycin ointment and artificial tears while here in the department, as it is late and pharmacies may not be open until the morning.  Prescription sent to their preferred pharmacy.  All questions answered.  Patient was discharged home in stable condition.    ED Course:  Diagnoses as of 09/01/24 2047   History of tarsorrhaphy   Visit for suture removal       Disposition   As a result of the work-up, the patient was discharged home.  she was informed of her diagnosis and instructed to come back with any concerns or worsening of condition.  she and was  agreeable to the plan as discussed above.  she was given the opportunity to ask questions.  All of the patient's questions were answered.    Procedures   Procedures    Patient seen and discussed with ED attending physician.    Sindi Montez, DO  Emergency Medicine

## 2024-09-01 NOTE — ED TRIAGE NOTES
Pt.  At [facility] in August for schwannoma resection. 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  Tarsorhaphy performed to close eyelid to protect eye from ulcerations, done by ophthalmology. Appt on Wednesday to have sutures removed but at least one suture has popped open. Sent in for removal.

## 2024-09-02 ENCOUNTER — HOME CARE VISIT (OUTPATIENT)
Dept: HOME HEALTH SERVICES | Facility: HOME HEALTH | Age: 34
End: 2024-09-02
Payer: COMMERCIAL

## 2024-09-02 VITALS — DIASTOLIC BLOOD PRESSURE: 77 MMHG | HEART RATE: 125 BPM | SYSTOLIC BLOOD PRESSURE: 117 MMHG | OXYGEN SATURATION: 98 %

## 2024-09-02 PROCEDURE — G0152 HHCP-SERV OF OT,EA 15 MIN: HCPCS

## 2024-09-02 ASSESSMENT — PAIN SCALES - PAIN ASSESSMENT IN ADVANCED DEMENTIA (PAINAD)
NEGVOCALIZATION: 0
BODYLANGUAGE: 0
BREATHING: 0
BODYLANGUAGE: 0 - RELAXED.
FACIALEXPRESSION: 0 - SMILING OR INEXPRESSIVE.
CONSOLABILITY: 0 - NO NEED TO CONSOLE.
TOTALSCORE: 0
NEGVOCALIZATION: 0 - NONE.
FACIALEXPRESSION: 0
CONSOLABILITY: 0

## 2024-09-02 ASSESSMENT — ACTIVITIES OF DAILY LIVING (ADL)
PHYSICAL TRANSFERS ASSESSED: 1
AMBULATION ASSISTANCE: CONTACT GUARD ASSIST
BATHING_CURRENT_FUNCTION: MODERATE ASSIST
AMBULATION ASSISTANCE: 1
DRESSING_UB_CURRENT_FUNCTION: MAXIMUM ASSIST
DRESSING_LB_CURRENT_FUNCTION: STAND BY ASSIST
BATHING ASSESSED: 1
CURRENT_FUNCTION: CONTACT GUARD ASSIST

## 2024-09-02 ASSESSMENT — ENCOUNTER SYMPTOMS
LOWEST PAIN SEVERITY IN PAST 24 HOURS: 0/10
DENIES PAIN: 1
PERSON REPORTING PAIN: PATIENT
SUBJECTIVE PAIN PROGRESSION: UNCHANGED
PAIN SEVERITY GOAL: 0/10
HIGHEST PAIN SEVERITY IN PAST 24 HOURS: 0/10

## 2024-09-02 NOTE — DISCHARGE INSTRUCTIONS
You were seen in the emergency department for a complication related to your prior tarsorrhaphy (sutures of the eyelid). You were seen by ophthalmology while here in the department who determined your sutures could be removed today. You have a follow up appointment with them later this week after the holiday weekend. Please follow up with them at your scheduled appointment this week.     You will be given a prescription of artifical tears and erythromycin ointment. Start with artificial tears, and continue applying to the eye every 4 hours. 2 hours after starting the artificial tears, you should apply the erythromycin ointment, and continue applyin every 4 hours. Opthalmology recommends taping the eyelid shut.

## 2024-09-02 NOTE — CONSULTS
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 Ophthalmology was consulted for evaluation and management of incomplete lid closure. Patient's tumor in cerebellopontine angle was resected on 8/5, however postoperative course has been complicated by a resection bed hemorrhage and venous sinus thrombosis. 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 Due to lagophthalmos and decrease corneal sensation, temporary tarsorrhaphy was completed medially and laterally OS on 8/10/24.     Today 9/1/24, patient presents due to left eye lid pain and feeling that the sutures were becoming loose. Patient reports some blurriness OS, although she has been receiving ointment OS as well as artificial tears. No other flashes or floaters noted. No abnormal discharge noted.       ROS: All other systems have been reviewed and are negative.    PMHx: please refer to admission HPI  Medications: please refer to medication reconciliation  Allergies: please refer to patient allergy list  Past Ocular History: as per above HPI  Family History: reviewed and noncontributory to chief ophthalmic complaint  Orientation: Alert and oriented x3, appropriate mood and behavior    Examination:     Base Eye Exam       Visual Acuity (Snellen - Linear)         Right Left    Near sc 20/20 20/30              Tonometry (Tonopen, 10:15 PM)         Right Left    Pressure 10 10              Pupils         Pupils Dark Light Shape React APD    Right PERRL, No APD 4 2 Round Brisk None    Left PERRL, No APD 4 2 Round Brisk None              Visual Fields         Left Right     Full               Extraocular Movement         Right Left     Full, Ortho Full, Ortho              Dilation       Both eyes: 2.5%  phenylephrine, 1% tropicamide @ 10:15 PM                  Additional Tests       Color         Right Left    Ishihara 12/12 12/12                  Slit Lamp and Fundus Exam       External Exam         Right Left    External Normal Normal              Slit Lamp Exam         Right Left    Lids/Lashes Normal Medial and lateral temporary tarsorhaphy present. 5-0 prolene sutures loose anterior to the foam bolsters. Central upper and lower lids with lagophthalmos and exposure of the cornea. Mucous around the lashes with some superior lashes with corneal touch. Upon tarsorhaphy removal, 7mm of lag measured.    Conjunctiva/Sclera White and quiet White and quiet    Cornea Clear 3+ SPK diffusely    Anterior Chamber Deep and quiet Deep and quiet    Iris Round and reactive Round and reactive    Lens Clear Clear    Anterior Vitreous Normal Normal              Fundus Exam         Right Left    Disc Normal Normal    C/D Ratio 0.3 0.3    Macula Normal Normal    Vessels Normal Normal    Periphery Normal Normal                    Assessment and Plan:  # Dehiscence of OS medial and lateral temporary tarsorrhaphy   # Exposure keratopathy  - Medial and lateral temporary tarsorrhaphy with loosening sutures. Parents noted that previously, the OS lids were closed, however, now, there is incomplete closure centrally. Exam shows exposure of the cornea centrally along with lashes with corneal touch.   - Medial and lateral temporary tarsorrhaphy were removed at the slit lamp.   - Lash debris and mucous was cleaned with sterile solution and 4x4  - Reassuring that vision is even better than prior exams.  - 7mm lag noted OS with 3+ SPK with exposure  - Recommend PFAT and erythromycin ointment E0ssidl each, alternating every 2 hours.   - Recommend taping the lid closed (tape provided to the family). Lid was taped and showed good closure, especially centrally.   - Patient has follow up with Dr. Mosley on 9/4.    Discussed with Dr. Amadeo Torres,  PGY-4.     Michael Bassett MD, PhD  Ophthalmology PGY-2      Ophthalmology Adult Pager - [phone]  Ophthalmology Pediatrics Pager - [phone]    For adult follow-up appointments, call: [phone]  For pediatric follow-up appointments, call: [phone]      NOTE: This note is not finalized until attending reviews and signs.

## 2024-09-03 ENCOUNTER — HOME CARE VISIT (OUTPATIENT)
Dept: HOME HEALTH SERVICES | Facility: HOME HEALTH | Age: 34
End: 2024-09-03
Payer: COMMERCIAL

## 2024-09-03 ENCOUNTER — TELEPHONE (OUTPATIENT)
Dept: PRIMARY CARE | Facility: CLINIC | Age: 34
End: 2024-09-03
Payer: COMMERCIAL

## 2024-09-03 PROCEDURE — G0270 MNT SUBS TX FOR CHANGE DX: HCPCS

## 2024-09-03 SDOH — HEALTH STABILITY: MENTAL HEALTH

## 2024-09-03 NOTE — TELEPHONE ENCOUNTER
Patient requesting a refill on ondansetron 04 mg going to [facility] on [address]. She would like a partial refill as she will be out before her scheduled refill on Thursday    Please Advise: [phone]

## 2024-09-04 ENCOUNTER — OFFICE VISIT (OUTPATIENT)
Dept: OPHTHALMOLOGY | Facility: CLINIC | Age: 34
End: 2024-09-04
Payer: COMMERCIAL

## 2024-09-04 ENCOUNTER — OFFICE VISIT (OUTPATIENT)
Dept: OTOLARYNGOLOGY | Facility: HOSPITAL | Age: 34
End: 2024-09-04
Payer: COMMERCIAL

## 2024-09-04 ENCOUNTER — APPOINTMENT (OUTPATIENT)
Dept: OPHTHALMOLOGY | Facility: CLINIC | Age: 34
End: 2024-09-04
Payer: COMMERCIAL

## 2024-09-04 VITALS — HEIGHT: 62 IN | WEIGHT: 112.4 LBS | TEMPERATURE: 97.7 F | BODY MASS INDEX: 20.68 KG/M2

## 2024-09-04 DIAGNOSIS — J38.02 BILATERAL VOCAL FOLD PARALYSIS: ICD-10-CM

## 2024-09-04 DIAGNOSIS — Z43.0 ATTENTION TO TRACHEOSTOMY (MULTI): ICD-10-CM

## 2024-09-04 DIAGNOSIS — G51.0 FACIAL PARALYSIS: ICD-10-CM

## 2024-09-04 DIAGNOSIS — R49.1 APHONIA: ICD-10-CM

## 2024-09-04 DIAGNOSIS — H57.02 ANISOCORIA: ICD-10-CM

## 2024-09-04 DIAGNOSIS — T85.848A PAIN AROUND PERCUTANEOUS ENDOSCOPIC GASTROSTOMY (PEG) TUBE SITE, INITIAL ENCOUNTER: ICD-10-CM

## 2024-09-04 DIAGNOSIS — R13.10 DYSPHAGIA, UNSPECIFIED TYPE: Primary | ICD-10-CM

## 2024-09-04 DIAGNOSIS — H18.9 EXPOSURE KERATOPATHY: Primary | ICD-10-CM

## 2024-09-04 DIAGNOSIS — H02.23B PARALYTIC LAGOPHTHALMOS OF UPPER AND LOWER EYELID OF LEFT EYE: ICD-10-CM

## 2024-09-04 PROBLEM — H02.239 PARALYTIC LAGOPHTHALMOS: Status: ACTIVE | Noted: 2024-09-04

## 2024-09-04 PROCEDURE — 31615 TRCHEOBRNCHSC EST TRACHS INC: CPT | Performed by: OTOLARYNGOLOGY

## 2024-09-04 PROCEDURE — 1036F TOBACCO NON-USER: CPT | Performed by: OTOLARYNGOLOGY

## 2024-09-04 PROCEDURE — 99215 OFFICE O/P EST HI 40 MIN: CPT | Performed by: OTOLARYNGOLOGY

## 2024-09-04 PROCEDURE — 99213 OFFICE O/P EST LOW 20 MIN: CPT | Performed by: OPHTHALMOLOGY

## 2024-09-04 PROCEDURE — 99203 OFFICE O/P NEW LOW 30 MIN: CPT | Performed by: OPHTHALMOLOGY

## 2024-09-04 PROCEDURE — 31575 DIAGNOSTIC LARYNGOSCOPY: CPT | Performed by: OTOLARYNGOLOGY

## 2024-09-04 PROCEDURE — 3008F BODY MASS INDEX DOCD: CPT | Performed by: OTOLARYNGOLOGY

## 2024-09-04 RX ORDER — ERYTHROMYCIN 5 MG/G
OINTMENT OPHTHALMIC 4 TIMES DAILY
Start: 2024-09-04 | End: 2024-09-11

## 2024-09-04 RX ORDER — SODIUM CHLORIDE 0.9 G/100ML
IRRIGANT IRRIGATION
COMMUNITY
Start: 2024-08-27

## 2024-09-04 RX ORDER — LANSOPRAZOLE 30 MG/1
30 TABLET, ORALLY DISINTEGRATING, DELAYED RELEASE ORAL
COMMUNITY
Start: 2024-08-30 | End: 2024-09-29

## 2024-09-04 ASSESSMENT — PATIENT HEALTH QUESTIONNAIRE - PHQ9
1. LITTLE INTEREST OR PLEASURE IN DOING THINGS: NOT AT ALL
1. LITTLE INTEREST OR PLEASURE IN DOING THINGS: NOT AT ALL
2. FEELING DOWN, DEPRESSED OR HOPELESS: NOT AT ALL
2. FEELING DOWN, DEPRESSED OR HOPELESS: NOT AT ALL
SUM OF ALL RESPONSES TO PHQ9 QUESTIONS 1 & 2: 0
SUM OF ALL RESPONSES TO PHQ9 QUESTIONS 1 AND 2: 0

## 2024-09-04 ASSESSMENT — SLIT LAMP EXAM - LIDS: COMMENTS: NORMAL

## 2024-09-04 ASSESSMENT — VISUAL ACUITY
OD_SC: J16
OS_SC: 20/400
OD_SC: 20/50
METHOD: SNELLEN - LINEAR

## 2024-09-04 ASSESSMENT — ENCOUNTER SYMPTOMS
GASTROINTESTINAL NEGATIVE: 0
HEMATOLOGIC/LYMPHATIC NEGATIVE: 0
NEUROLOGICAL NEGATIVE: 0
PSYCHIATRIC NEGATIVE: 0
CARDIOVASCULAR NEGATIVE: 0
MUSCULOSKELETAL NEGATIVE: 0
CONSTITUTIONAL NEGATIVE: 0
EYES NEGATIVE: 0
RESPIRATORY NEGATIVE: 0
ENDOCRINE NEGATIVE: 0
ALLERGIC/IMMUNOLOGIC NEGATIVE: 0

## 2024-09-04 ASSESSMENT — EXTERNAL EXAM - LEFT EYE: OS_EXAM: NORMAL

## 2024-09-04 ASSESSMENT — EXTERNAL EXAM - RIGHT EYE: OD_EXAM: NORMAL

## 2024-09-04 ASSESSMENT — PAIN SCALES - GENERAL: PAINLEVEL: 0-NO PAIN

## 2024-09-04 NOTE — ASSESSMENT & PLAN NOTE
Secondary to cranial nerve (CN) VII injury consistent with recent CNS surgery for cerebellar pontine angle lesion, noted to have compression of 4th ventricle as well. Suspect central paralysis and not peripheral. Unclear on prognosis for recovery but if cornea remains uncompromised OK to hold on surgical lid correction options.

## 2024-09-04 NOTE — PROGRESS NOTES
Assessment/Plan   Problem List Items Addressed This Visit       Paralytic lagophthalmos     Secondary to cranial nerve (CN) VII injury consistent with recent CNS surgery for cerebellar pontine angle lesion, noted to have compression of 4th ventricle as well. Suspect central paralysis and not peripheral. Unclear on prognosis for recovery but if cornea remains uncompromised OK to hold on surgical lid correction options.          Exposure keratopathy - Primary     Exposure seems improved from prior. Advised since surface seems well protected with ointment and tears could trial going without taping lid shut as didn't tolerate well. Has been using kinetic tape around lids instead. Will plan to continue erythromycin ointment 4 times daily including before bedtime. OK to use PFAT every hour as needed. Will recheck for exposure in 1 week or sooner PRN.          Relevant Medications    erythromycin (Romycin) 5 mg/gram (0.5 %) ophthalmic ointment    lubricating eye drops ophthalmic solution    Anisocoria     No prior anisocoria noted with left eye (OS) more dilated. Seems like could be consistent with cranial nerve (CN) III injury or mid brain compression considering recent CNS tumor and resection. Seems less likely is new issue unrelated to prior surgery. Will recheck pupils at upcoming visit.             Provided reassurance regarding above diagnoses and care received in the office visit today. Discussed outcomes and options along with the importance of treatment compliance. Understands the importance of any follow up visits. Patient instructed to call/communicate with our office if any new issues, questions, or concerns.     Will plan to see back in 1 week surface check or sooner PRN        
pt with diet related questions

## 2024-09-04 NOTE — PATIENT INSTRUCTIONS
Thank you so much for choosing me to provide your care today!    If you were dilated your vision may remain blurry   or light sensitive for several hours.    The nature of eye and vision problems can require frequent follow up, please make every effort to adhere to any future appointments.    If you have any issues, questions, or concerns,   please do not hesitate to reach out.    If you receive a survey in regards to your care today, please mention any exceptional care my office staff and/or technicians provided.    You can reach our office at this number:    [phone]    Please consider signing up for and utilizing [employer]!  This is the best way to directly reach me or other  providers

## 2024-09-04 NOTE — ASSESSMENT & PLAN NOTE
Exposure seems improved from prior. Advised since surface seems well protected with ointment and tears could trial going without taping lid shut as didn't tolerate well. Has been using kinetic tape around lids instead. Will plan to continue erythromycin ointment 4 times daily including before bedtime. OK to use PFAT every hour as needed. Will recheck for exposure in 1 week or sooner PRN.

## 2024-09-04 NOTE — PROGRESS NOTES
Patient: Denae Nolasco   MRN: [id number] YOB: 1990   Sex: female Age: 33 y.o.  Date of Service: 2024       ASSESSMENT AND PLAN  I discussed the findings with Denae Nolasco and have recommended the followin. 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 Laryngoscopy today with bilateral vocal folds with severe atrophy in lateral paramedian position though there is more return of motion. Tolerates finger occlusion. We discussed option for anterior VF injection to attempt to achieve some voicing. This may narrow her airway but given she has a tracheotomy in place, we could more readily manage any issues. She would like to consider  - Follow-up in 4-6 weeks for repeat evaluation    2. Dysphagia, suspect high vagal injury with multilevel swallowing deficits. High aspiration risk, tracheotomy in place. I recommend that she keep the tracheotomy in place for now given her high risk for aspiration  - Meticulous oral care TID  - Start SLP swallow therapy, Stokes Free Water Protocol  - Continue alternate forms of nutrition    3. Peristomal cellulitis of neck, improved after Keflex  - Continue to monitor    4. Pain at PEG site, no obvious swelling or erythema  - Referral place to GI      CHIEF COMPLAINT  Chief Complaint   Patient presents with    Follow-up     Trach review; scratch inside right ear possible dry blood.       HISTORY OF PRESENT ILLNESS  Denae Nolasco is a 33 y.o. female referred for evaluation of bilateral vocal fold paralysis and tracheotomy care.  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 Previously admitted to [facility] on 2024 due to ongoing functional deficits, now home since 24.    24  They are working with a PMV valve and feel  "like her voice is a little stronger. She is home since last Friday. They are working on getting home SLP therapy    24  She has a #6 cuffless Shiley in place. She denies difficulty breathing. She has not trialed a speaking valve or capping. She is getting all her nutrition through PEG. Her voice is still very weak.      ADDITIONAL HISTORY  Past Medical History  She has a past medical history of Depression and Visual impairment. Surgical History  She has no past surgical history on file.   Social History  She reports that she has never smoked. She has never used smokeless tobacco. She reports that she does not currently use drugs after having used the following drugs: Marijuana. Frequency: 1.00 time per week. She reports that she does not drink alcohol. Allergies  Patient has no known allergies.     Family History  Family History   Problem Relation Name Age of Onset    Blood clot Mother Delmis Mantilla-  of clot 2016      labor Sister Megan         REVIEW OF SYSTEMS  All 10 systems were reviewed and negative except for above.      PHYSICAL EXAM  ENT Physical Exam   GENERAL: Thin young woman, aphonic, response to commands  RESPIRATORY: Breathing quietly, no stridor with finger occlusion  HEAD: Normocephalic atraumatic  FACE: Asymmetric left HB 6/6 no masses or lesions  EYES:  Left eye s/p tarsorrhaphy   EARS:  Pinnae normal.   NOSE:  No anterior lesions, masses or polyps.  ORAL CAVITY/OROPHARYNX:  Buccal mucosa is dry with, crusting along palate, tongue deviated and palate elevates asymmetrically.  NECK:  Soft. #6 cuffless Shiley in place, thickened mucoid drainage on drain sponge, mild peristomal cellulitis improved     Last Recorded Vitals  Temperature 36.5 °C (97.7 °F), height 1.575 m (5' 2\"), weight 51 kg (112 lb 6.4 oz).    RESULTS    Patient Reported Outcome Measures  N/A    Laboratory, Radiology, and Pathology  I personally reviewed the following results, with the following interpretation: "   WBC 8/17/24 13.8      PROCEDURES  Laryngoscopy with tracheoscopy/bronchoscopy    Date/Time: 9/4/2024 8:09 AM    Performed by: Smiley Craft MD  Authorized by: Smiley Craft MD      Patient failed a mirror exam due to limitations of equipment and the need for laryngoscopy to assess laryngeal anatomy and function    PREOPERATIVE DIAGNOSIS: aphonia, tracheotomy status    POSTOPERATIVE DIAGNOSIS: Same    PROCEDURE: Transnasal videolaryngoscopy    ANESTHESIA:  Topical    COMPLICATIONS:  None    SPECIMENS:  None    PROCEDURE IN DETAIL:  The patient was brought into the endoscopy suite, placed in the upright position.  The nasal cavity was topically decongested anesthetized.  The distal chip video laryngoscope was passed through the nasal cavity.  The nasal cavity and nasopharynx were within normal limits except noted below. The following findings on laryngoscopy were noted:     Tongue Base: no masses or lesions   Vocal Fold Mobility               Right VF: near immobile in lateral paramedian position               Left VF: hypomobile in lateral paramedian position   TVF Appearance               Edema/Erythema: none               Lesions/vibratory margin irregularities: atrophy   Muscle Tension Patterns:  n/a    The patient tolerated the procedure well.      After the diagnostic laryngoscopy, the flexible endoscope was passed through the trach tube to visualize the lower airways with the following findings:         Trachea and lower airways: patent distal to trach tube down to level of segmental takeoffs    ----------------------------------------------------------------------  Smiley Craft MD, MAEd    [facility]  Department of Otolaryngology - Head and Neck Surgery  [facility]    The total time I spent in care of this patient today (excluding time spent on other billable services) is as follows:    Time Spent  Prep time on day of patient  encounter: 10 minutes  Time spent directly with patient, family or caregiver: 20 minutes  Additional Time Spent on Patient Care Activities: 5 minutes  Documentation Time: 10 minutes  Other Time Spent: 0 minutes  Total: 45 minutes

## 2024-09-04 NOTE — ASSESSMENT & PLAN NOTE
No prior anisocoria noted with left eye (OS) more dilated. Seems like could be consistent with cranial nerve (CN) III injury or mid brain compression considering recent CNS tumor and resection. Seems less likely is new issue unrelated to prior surgery. Will recheck pupils at upcoming visit.

## 2024-09-05 ENCOUNTER — HOME CARE VISIT (OUTPATIENT)
Dept: HOME HEALTH SERVICES | Facility: HOME HEALTH | Age: 34
End: 2024-09-05
Payer: COMMERCIAL

## 2024-09-05 PROCEDURE — G0158 HHC OT ASSISTANT EA 15: HCPCS | Mod: CO

## 2024-09-06 ENCOUNTER — HOME CARE VISIT (OUTPATIENT)
Dept: HOME HEALTH SERVICES | Facility: HOME HEALTH | Age: 34
End: 2024-09-06
Payer: COMMERCIAL

## 2024-09-06 VITALS — DIASTOLIC BLOOD PRESSURE: 67 MMHG | SYSTOLIC BLOOD PRESSURE: 118 MMHG

## 2024-09-06 ASSESSMENT — ENCOUNTER SYMPTOMS
PAIN SEVERITY GOAL: 0/10
DENIES PAIN: 1
HIGHEST PAIN SEVERITY IN PAST 24 HOURS: 0/10
LOWEST PAIN SEVERITY IN PAST 24 HOURS: 0/10
SUBJECTIVE PAIN PROGRESSION: UNCHANGED

## 2024-09-06 NOTE — PROGRESS NOTES
"[facility]  Neurosurgery    History of Present Illness      Denae Nolasco is a 33-year-old right-handed female with a PMH significant for ASD s/p closure 1997, Raynaud's, and depression who presented to [facility] with worsening vertigo and ataxia on 08/03/24. 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 TTE EF 57% and negative bubble study. Patient was taken to the OR on 08/05/24 for left retrosigmoid craniotomy for tumor resection with EVD placement. Postop CTH with resection bed hemorrhage. MRI for GTR and L transverse sigmoid sinus thrombosis. Hospital course was significant for ENT consultation for scope with bilateral vocal cord paralysis, L CB VII, XII paralysis.  R tympanic membrane rupture (not requiring surgery). She is now s/p trach / peg 08/09/24. Holcomb was placed for urinary retention. 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 DVT US negative. Patient was able to be discharged to acute rehab on 08/14.  Final surgical pathology for schwannoma.     She continues to follow with ophthalmology and continues to use ointment with taping lid shut. Has scheduled follow up with optho and ENT.     Discharged from rehab and is now at home working with PT/OT/ST through [facility]. Continues to take all nutrition by PEG. Using rolaltor for ambulation and is steady on her feet. Intermittent headaches taking tylenol as needed with good relief. Continues to follow with ophthalmology wears L eye patch. Denies any double vision or blurred. No recent falls. Intermittent nausea taking zofran.               Objective      Vitals:   /74   Pulse (!) 115   Resp 18   Ht 1.575 m (5' 2\")   Wt 52.2 kg (115 lb)   BMI 21.03 kg/m²         Physical Exam:    A&Ox3   Trach in place with speaking valve   Left eye patch in place; EOMI right " eye   GILBERT;  BLE 5/5; KARTHIK 5/5; RUE  strength 5/5, right arm drift not able to hold AG   Left sided HB6   Ambulating with rollator - steady, normal gait   Incision C/D/I with proximal scabbing     Relevant Results:    No new imaging for review           Assessment & Plan      Diagnosis:  Denae was seen today for follow-up.  Diagnoses and all orders for this visit:  Impaired skin integrity associated with surgical incision  -     bacitracin 500 unit/gram ointment in packet; Apply 1 INCH Daily. Do not exceed more than 7 days.          Provider Impression:     Patient is a 33-year-old female presenting for postoperative evaluation and wound check post rehab discharge. She is s/p left retrosigmoid craniotomy for tumor resection 24. MRI for GTR. Final surgical pathology schwannoma. Following with ENT and ophthalmology as she is s/p trach / peg and tarsorrhaphy.     Incision with proximal scabbing otherwise healing well without erythema or active drainage. Will order bacitracin to be applied once daily x 7 days to scabbing after showering.     - Continue following with ENT and ophthalmology   - Once completed home PT/OT/ST can reach out to office if outpatient orders are needed   - CTH prior to next visit   - RTC for 6 week POV with Dr. Amanda       Plan discussed with patient and family who were in agreement. All questions answered.             Medical History     Past Medical History:   Diagnosis Date    Depression     Visual impairment      No past surgical history on file.  Social History     Tobacco Use    Smoking status: Never    Smokeless tobacco: Never   Vaping Use    Vaping status: Never Used   Substance Use Topics    Alcohol use: Never    Drug use: Not Currently     Frequency: 1.0 times per week     Types: Marijuana     Family History   Problem Relation Name Age of Onset    Blood clot Mother Delmis Mantilla-  of clot 2016      labor Sister Megan      No Known Allergies  Current  Outpatient Medications   Medication Instructions    acetaminophen (TYLENOL) 325 mg, g-tube, Every 4 hours PRN    bacitracin 500 unit/gram ointment in packet Apply 1 INCH Daily. Do not exceed more than 7 days.    chlorhexidine (Peridex) 0.12 % solution 15 mL, Mouth/Throat, 3 times daily    dexAMETHasone (Decadron) 2 mg tablet 1 tablet (2 mg) by g-tube route every 8 hours for 2 days, THEN 1 tablet (2 mg) every 12 hours for 2 days, THEN 0.5 tablets (1 mg) every 12 hours for 2 days. Stop taking medication after completion of 1mg every 12 hours for 2 days (8/19/2024).    dextran 70-hypromellose, PF, (Bion Tears) 0.1-0.3 % ophthalmic solution 1 drop, 3 times daily PRN    erythromycin (Romycin) 5 mg/gram (0.5 %) ophthalmic ointment Left Eye, 4 times daily, Apply Amount per Dose: 0.5 inch (~1 cm) per dose.    escitalopram (LEXAPRO) 5 mg, g-tube, Daily    ferrous sulfate (IRON ORAL) 1 tablet, oral, Daily    heparin (porcine) 5,000 Units, subcutaneous, Every 8 hours    insulin lispro (HUMALOG) 0-5 Units, subcutaneous, 3 times daily (morning, midday, late afternoon), For use while receiving steroids. <BR>Take as directed per insulin instructions.    lansoprazole (PREVACID SOLUTAB) 30 mg    lubricating eye drops ophthalmic solution 1 drop, Left Eye, Every 1 hour PRN    nystatin (Mycostatin) 100,000 unit/gram powder 1 Application, Topical, 2 times daily    ondansetron ODT (ZOFRAN-ODT) 4 mg, g-tube, Every 6 hours PRN    oxyCODONE (ROXICODONE) 2.5 mg, g-tube, Every 6 hours PRN    pantoprazole (PROTONIX) 40 mg, oral, Daily before breakfast, Do not crush, chew, or split.    polyethylene glycol (GLYCOLAX, MIRALAX) 17 g, g-tube, Daily    sennosides-docusate sodium (Becky-Colace) 8.6-50 mg tablet 2 tablets, g-tube, 2 times daily    sodium chloride 0.9 % irrigation solution For suctioning and cleaning inner [facility]  Neurosurgery    History of Present Illness                        Objective   "    Vitals:   /74   Pulse (!) 115   Resp 18   Ht 1.575 m (5' 2\")   Wt 52.2 kg (115 lb)   BMI 21.03 kg/m²         Physical Exam:            Relevant Results:            Assessment & Plan      Diagnosis:  Denae was seen today for follow-up.  Diagnoses and all orders for this visit:  Impaired skin integrity associated with surgical incision  -     bacitracin 500 unit/gram ointment in packet; Apply 1 INCH Daily. Do not exceed more than 7 days.          Provider Impression:         Medical History     Past Medical History:   Diagnosis Date    Depression     Visual impairment      No past surgical history on file.  Social History     Tobacco Use    Smoking status: Never    Smokeless tobacco: Never   Vaping Use    Vaping status: Never Used   Substance Use Topics    Alcohol use: Never    Drug use: Not Currently     Frequency: 1.0 times per week     Types: Marijuana     Family History   Problem Relation Name Age of Onset    Blood clot Mother Delmis Mantilla-  of clot 2016      labor Sister Megan      No Known Allergies  Current Outpatient Medications   Medication Instructions    acetaminophen (TYLENOL) 325 mg, g-tube, Every 4 hours PRN    bacitracin 500 unit/gram ointment in packet Apply 1 INCH Daily. Do not exceed more than 7 days.    chlorhexidine (Peridex) 0.12 % solution 15 mL, Mouth/Throat, 3 times daily    dexAMETHasone (Decadron) 2 mg tablet 1 tablet (2 mg) by g-tube route every 8 hours for 2 days, THEN 1 tablet (2 mg) every 12 hours for 2 days, THEN 0.5 tablets (1 mg) every 12 hours for 2 days. Stop taking medication after completion of 1mg every 12 hours for 2 days (2024).    dextran 70-hypromellose, PF, (Bion Tears) 0.1-0.3 % ophthalmic solution 1 drop, 3 times daily PRN    erythromycin (Romycin) 5 mg/gram (0.5 %) ophthalmic ointment Left Eye, 4 times daily, Apply Amount per Dose: 0.5 inch (~1 cm) per dose.    escitalopram (LEXAPRO) 5 mg, g-tube, Daily    ferrous sulfate (IRON ORAL) 1 " tablet, oral, Daily    heparin (porcine) 5,000 Units, subcutaneous, Every 8 hours    insulin lispro (HUMALOG) 0-5 Units, subcutaneous, 3 times daily (morning, midday, late afternoon), For use while receiving steroids. <BR>Take as directed per insulin instructions.    lansoprazole (PREVACID SOLUTAB) 30 mg    lubricating eye drops ophthalmic solution 1 drop, Left Eye, Every 1 hour PRN    nystatin (Mycostatin) 100,000 unit/gram powder 1 Application, Topical, 2 times daily    ondansetron ODT (ZOFRAN-ODT) 4 mg, g-tube, Every 6 hours PRN    oxyCODONE (ROXICODONE) 2.5 mg, g-tube, Every 6 hours PRN    pantoprazole (PROTONIX) 40 mg, oral, Daily before breakfast, Do not crush, chew, or split.    polyethylene glycol (GLYCOLAX, MIRALAX) 17 g, g-tube, Daily    sennosides-docusate sodium (Becky-Colace) 8.6-50 mg tablet 2 tablets, g-tube, 2 times daily    sodium chloride 0.9 % irrigation solution For suctioning and cleaning inner cannula

## 2024-09-07 ENCOUNTER — HOME CARE VISIT (OUTPATIENT)
Dept: HOME HEALTH SERVICES | Facility: HOME HEALTH | Age: 34
End: 2024-09-07
Payer: COMMERCIAL

## 2024-09-07 VITALS
SYSTOLIC BLOOD PRESSURE: 98 MMHG | OXYGEN SATURATION: 97 % | DIASTOLIC BLOOD PRESSURE: 70 MMHG | TEMPERATURE: 97.3 F | HEART RATE: 108 BPM

## 2024-09-07 VITALS
TEMPERATURE: 97.9 F | RESPIRATION RATE: 18 BRPM | OXYGEN SATURATION: 98 % | HEART RATE: 108 BPM | DIASTOLIC BLOOD PRESSURE: 58 MMHG | SYSTOLIC BLOOD PRESSURE: 99 MMHG

## 2024-09-07 PROCEDURE — G0300 HHS/HOSPICE OF LPN EA 15 MIN: HCPCS

## 2024-09-07 PROCEDURE — G0151 HHCP-SERV OF PT,EA 15 MIN: HCPCS

## 2024-09-07 SDOH — HEALTH STABILITY: PHYSICAL HEALTH: PHYSICAL EXERCISE: 10

## 2024-09-07 SDOH — HEALTH STABILITY: PHYSICAL HEALTH: EXERCISE TYPE: WRITTEN

## 2024-09-07 ASSESSMENT — GAIT ASSESSMENTS
WALKING STANCE: 0 - HEELS APART
INITIATION OF GAIT IMMEDIATELY AFTER GO: 0 - ANY HESITANCY OR MULTIPLE ATTEMPTS TO START
TRUNK: 1 - NO SWAY BUT FLEXION OF KNEES OR BACK OR SPREADS ARMS WHILE WALKING
TRUNK SCORE: 1
GAIT SCORE: 6
BALANCE AND GAIT SCORE: 16
STEP SYMMETRY: 0 - RIGHT AND LEFT STEP LENGTH NOT EQUAL
PATH SCORE: 1
STEP CONTINUITY: 0 - STOPPING OR DISCONTINUITY BETWEEN STEPS
PATH: 1 - MILD/MODERATE DEVIATION OR USES WALKING AID

## 2024-09-07 ASSESSMENT — BALANCE ASSESSMENTS
BALANCE SCORE: 10
IMMEDIATE STANDING BALANCE FIRST 5 SECONDS: 1 - STEADY BUT USES WALKER OR OTHER SUPPORT
STANDING BALANCE: 1 - STEADY BUT WIDE STANCE AND USES CANE OR OTHER SUPPORT
ARISING SCORE: 1
SITTING BALANCE: 1 - STEADY, SAFE
ARISES: 1 - ABLE, USES ARMS TO HELP
SITTING DOWN: 1 - USES ARMS OR NOT SMOOTH MOTION
NUDGED SCORE: 1
NUDGED: 1 - STAGGERS, GRABS, CATCHES SELF
TURNING 360 DEGREES STEPS: 1 - CONTINUOUS STEPS
EYES CLOSED AT MAXIMUM POSITION NUDGED: 1 - STEADY
ATTEMPTS TO ARISE: 1 - ABLE, REQUIRES MORE THAN ONE ATTEMPT

## 2024-09-07 ASSESSMENT — ACTIVITIES OF DAILY LIVING (ADL)
AMBULATION ASSISTANCE ON FLAT SURFACES: 1
AMBULATION ASSISTANCE: STAND BY ASSIST
PHYSICAL TRANSFERS ASSESSED: 1
AMBULATION ASSISTANCE: 1
CURRENT_FUNCTION: STAND BY ASSIST
AMBULATION_DISTANCE/DURATION_TOLERATED: 50'

## 2024-09-07 ASSESSMENT — ENCOUNTER SYMPTOMS
MUSCLE WEAKNESS: 1
STOOL FREQUENCY: DAILY
ARTHRALGIAS: 1
DENIES PAIN: 1
DENIES PAIN: 1
BLURRED VISION: 1

## 2024-09-09 ENCOUNTER — APPOINTMENT (OUTPATIENT)
Dept: NEUROSURGERY | Facility: HOSPITAL | Age: 34
End: 2024-09-09
Payer: COMMERCIAL

## 2024-09-09 ENCOUNTER — OFFICE VISIT (OUTPATIENT)
Dept: NEUROSURGERY | Facility: HOSPITAL | Age: 34
End: 2024-09-09
Payer: COMMERCIAL

## 2024-09-09 VITALS
WEIGHT: 115 LBS | RESPIRATION RATE: 18 BRPM | SYSTOLIC BLOOD PRESSURE: 101 MMHG | HEART RATE: 115 BPM | BODY MASS INDEX: 21.16 KG/M2 | HEIGHT: 62 IN | DIASTOLIC BLOOD PRESSURE: 74 MMHG

## 2024-09-09 DIAGNOSIS — R23.9 IMPAIRED SKIN INTEGRITY ASSOCIATED WITH SURGICAL INCISION: Primary | ICD-10-CM

## 2024-09-09 DIAGNOSIS — T81.89XA IMPAIRED SKIN INTEGRITY ASSOCIATED WITH SURGICAL INCISION: Primary | ICD-10-CM

## 2024-09-09 PROCEDURE — 99211 OFF/OP EST MAY X REQ PHY/QHP: CPT | Performed by: NURSE PRACTITIONER

## 2024-09-09 PROCEDURE — 3008F BODY MASS INDEX DOCD: CPT | Performed by: NURSE PRACTITIONER

## 2024-09-09 RX ORDER — BACITRACIN ZINC 500 UNIT/G
OINTMENT IN PACKET (EA) TOPICAL
Qty: 1 EACH | Refills: 0 | Status: SHIPPED | OUTPATIENT
Start: 2024-09-09

## 2024-09-10 ENCOUNTER — HOME CARE VISIT (OUTPATIENT)
Dept: HOME HEALTH SERVICES | Facility: HOME HEALTH | Age: 34
End: 2024-09-10
Payer: COMMERCIAL

## 2024-09-10 PROCEDURE — G0158 HHC OT ASSISTANT EA 15: HCPCS | Mod: CO

## 2024-09-11 ENCOUNTER — HOME CARE VISIT (OUTPATIENT)
Dept: HOME HEALTH SERVICES | Facility: HOME HEALTH | Age: 34
End: 2024-09-11
Payer: COMMERCIAL

## 2024-09-11 ENCOUNTER — OFFICE VISIT (OUTPATIENT)
Dept: GASTROENTEROLOGY | Facility: HOSPITAL | Age: 34
End: 2024-09-11
Payer: COMMERCIAL

## 2024-09-11 VITALS
DIASTOLIC BLOOD PRESSURE: 72 MMHG | HEART RATE: 106 BPM | WEIGHT: 114.2 LBS | SYSTOLIC BLOOD PRESSURE: 107 MMHG | TEMPERATURE: 97.1 F | BODY MASS INDEX: 20.89 KG/M2 | OXYGEN SATURATION: 96 %

## 2024-09-11 VITALS
TEMPERATURE: 97.9 F | DIASTOLIC BLOOD PRESSURE: 62 MMHG | OXYGEN SATURATION: 98 % | RESPIRATION RATE: 20 BRPM | SYSTOLIC BLOOD PRESSURE: 100 MMHG | HEART RATE: 106 BPM

## 2024-09-11 VITALS — SYSTOLIC BLOOD PRESSURE: 114 MMHG | DIASTOLIC BLOOD PRESSURE: 61 MMHG

## 2024-09-11 DIAGNOSIS — R10.33 PERIUMBILICAL ABDOMINAL PAIN: Primary | ICD-10-CM

## 2024-09-11 DIAGNOSIS — T85.848A PAIN AROUND PERCUTANEOUS ENDOSCOPIC GASTROSTOMY (PEG) TUBE SITE, INITIAL ENCOUNTER: ICD-10-CM

## 2024-09-11 PROCEDURE — G0300 HHS/HOSPICE OF LPN EA 15 MIN: HCPCS

## 2024-09-11 RX ORDER — ONDANSETRON 4 MG/1
4 TABLET, ORALLY DISINTEGRATING ORAL EVERY 6 HOURS PRN
Qty: 9 TABLET | Refills: 2 | Status: SHIPPED | OUTPATIENT
Start: 2024-09-11

## 2024-09-11 SDOH — ECONOMIC STABILITY: FOOD INSECURITY: WITHIN THE PAST 12 MONTHS, YOU WORRIED THAT YOUR FOOD WOULD RUN OUT BEFORE YOU GOT MONEY TO BUY MORE.: NEVER TRUE

## 2024-09-11 SDOH — ECONOMIC STABILITY: FOOD INSECURITY: WITHIN THE PAST 12 MONTHS, THE FOOD YOU BOUGHT JUST DIDN'T LAST AND YOU DIDN'T HAVE MONEY TO GET MORE.: NEVER TRUE

## 2024-09-11 ASSESSMENT — ENCOUNTER SYMPTOMS
COUGH CHARACTERISTICS: MOIST
COUGH: 1
PAIN: 1
HIGHEST PAIN SEVERITY IN PAST 24 HOURS: 5/10
BLURRED VISION: 1
PAIN SEVERITY GOAL: 0/10
PAIN LOCATION: ABDOMEN
SUBJECTIVE PAIN PROGRESSION: WAXING AND WANING
COUGH CHARACTERISTICS: WEAK
PERSON REPORTING PAIN: PATIENT
SPUTUM COLOR: CLEAR
SPUTUM PRODUCTION: 1
PAIN: 1
LAST BOWEL MOVEMENT: 67094
LOWEST PAIN SEVERITY IN PAST 24 HOURS: 5/10
SPUTUM CONSISTENCY: THIN

## 2024-09-11 ASSESSMENT — ACTIVITIES OF DAILY LIVING (ADL)
PHYSICAL TRANSFERS ASSESSED: 1
CURRENT_FUNCTION: INDEPENDENT
AMBULATION ASSISTANCE: 1
AMBULATION ASSISTANCE: INDEPENDENT

## 2024-09-11 ASSESSMENT — LIFESTYLE VARIABLES
HOW OFTEN DO YOU HAVE SIX OR MORE DRINKS ON ONE OCCASION: NEVER
AUDIT-C TOTAL SCORE: 0
HOW OFTEN DO YOU HAVE A DRINK CONTAINING ALCOHOL: NEVER
HOW MANY STANDARD DRINKS CONTAINING ALCOHOL DO YOU HAVE ON A TYPICAL DAY: PATIENT DOES NOT DRINK
SKIP TO QUESTIONS 9-10: 1

## 2024-09-11 ASSESSMENT — PATIENT HEALTH QUESTIONNAIRE - PHQ9
2. FEELING DOWN, DEPRESSED OR HOPELESS: NOT AT ALL
SUM OF ALL RESPONSES TO PHQ9 QUESTIONS 1 & 2: 0
1. LITTLE INTEREST OR PLEASURE IN DOING THINGS: NOT AT ALL

## 2024-09-11 ASSESSMENT — PAIN SCALES - GENERAL: PAINLEVEL: 2

## 2024-09-11 NOTE — PROGRESS NOTES
Gastroenterology Clinic Consult Note    Reason For Consult  Pain at PEG site     History Of Present Illness  Denae Nolasco is a 33 y.o. 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 Craft) for pain at the PEG site.     Pt had PEG creation and tracheostomy 2024 with ENT (Dr. Placido Martin, Dr. Radha Brown) with indication of needing enteral nutrition due to neck cranial nerve paralysis post cranial surgery.     Pain started about 10 day ago. Locates around the PEG. Hurts when stands up or walks.   Gets better once she lies down, within few minutes.   Had vomiting with bolus feeding. Resolved with continuous feeding. Getting TF 9am/12pm/4~5pm. No issues with using PEG; no resistance on administering meds or TF. Gained 4 lbs (110-->114 lb) since PEG placement.    Past Medical History  She has a past medical history of Depression and Visual impairment.    Surgical History  She has no past surgical history on file.     Social History  She reports that she has never smoked. She has never used smokeless tobacco. She reports that she does not currently use drugs after having used the following drugs: Marijuana. Frequency: 1.00 time per week. She reports that she does not drink alcohol.    Family History  Family History   Problem Relation Name Age of Onset    Blood clot Mother Delmis Mantilla-  of clot 2016      labor Sister Megan         Allergies  Patient has no known allergies.    Home Medications  (Not in a hospital admission)      Review of Systems  12 point ROS otherwise negative, unless indicated above      Physical Exam  General:  no acute distress  HEENT: PERRLA, EOM intact, no scleral icterus, moist MM. S/p tracheostomy   Respiratory: CTA  bilaterally, normal work of breathing  Cardiovascular: RRR, no murmurs/rubs/gallops  Abdomen: Soft, nontender, nondistended, bowel sounds present. PEG in place. Mild granulation. No erythema/fluctuance. No resistance with flushing water. External bumper at 4.5cm.  Grimaces face on rotating external bumper.    Extremities: no edema, no asterixis  Neuro: alert and oriented, CNII-XII grossly intact, moves all 4 extremities with no focal deficits     Last Recorded Vitals  There were no vitals taken for this visit.      Relevant Results    Current Outpatient Medications:     acetaminophen (Tylenol) 325 mg tablet, 1 tablet (325 mg) by g-tube route every 4 hours if needed for mild pain (1 - 3) (crush pill)., Disp: , Rfl:     bacitracin 500 unit/gram ointment in packet, Apply 1 INCH Daily. Do not exceed more than 7 days., Disp: 1 each, Rfl: 0    chlorhexidine (Peridex) 0.12 % solution, Use 15 mL in the mouth or throat 3 times a day., Disp: , Rfl:     dexAMETHasone (Decadron) 2 mg tablet, 1 tablet (2 mg) by g-tube route every 8 hours for 2 days, THEN 1 tablet (2 mg) every 12 hours for 2 days, THEN 0.5 tablets (1 mg) every 12 hours for 2 days. Stop taking medication after completion of 1mg every 12 hours for 2 days (8/19/2024)., Disp: , Rfl:     dextran 70-hypromellose, PF, (Bion Tears) 0.1-0.3 % ophthalmic solution, 1 drop 3 times a day as needed for dry eyes., Disp: , Rfl:     erythromycin (Romycin) 5 mg/gram (0.5 %) ophthalmic ointment, Apply to left eye 4 times a day for 7 days. Apply Amount per Dose: 0.5 inch (~1 cm) per dose., Disp: , Rfl:     escitalopram (Lexapro) 5 mg tablet, 1 tablet (5 mg) by g-tube route once daily., Disp: , Rfl:     ferrous sulfate (IRON ORAL), Take 1 tablet by mouth once daily., Disp: , Rfl:     heparin sodium,porcine (heparin, porcine,) 5,000 unit/mL injection, Inject 1 mL (5,000 Units) under the skin every 8 hours., Disp: , Rfl:     insulin lispro (HumaLOG) 100 unit/mL injection, Inject 0-5  Units under the skin 3 times daily (morning, midday, late afternoon). For use while receiving steroids.  Take as directed per insulin instructions., Disp: , Rfl:     lansoprazole (Prevacid SoluTab) 30 mg disintegrating tablet, 1 tablet (30 mg)., Disp: , Rfl:     lubricating eye drops ophthalmic solution, Administer 1 drop into the left eye every 1 hour if needed for dry eyes., Disp: , Rfl:     nystatin (Mycostatin) 100,000 unit/gram powder, Apply 1 Application topically 2 times a day., Disp: , Rfl:     ondansetron ODT (Zofran-ODT) 4 mg disintegrating tablet, 1 tablet (4 mg) by g-tube route every 6 hours if needed for nausea or vomiting., Disp: , Rfl:     oxyCODONE (Roxicodone) 5 mg immediate release tablet, 0.5 tablets (2.5 mg) by g-tube route every 6 hours if needed for severe pain (7 - 10)., Disp: , Rfl:     pantoprazole (ProtoNix) 40 mg EC tablet, Take 1 tablet (40 mg) by mouth once daily in the morning. Take before meals. Do not crush, chew, or split., Disp: , Rfl:     polyethylene glycol (Glycolax, Miralax) 17 gram packet, 17 g by g-tube route once daily., Disp: , Rfl:     sennosides-docusate sodium (Becky-Colace) 8.6-50 mg tablet, 2 tablets by g-tube route 2 times a day., Disp: , Rfl:     sodium chloride 0.9 % irrigation solution, For suctioning and cleaning inner cannula, Disp: , Rfl:      Lab Results   Component Value Date    WBC 8.2 08/13/2024    HGB 11.8 (L) 08/13/2024    HCT 35.2 (L) 08/13/2024    MCV 85 08/13/2024     08/13/2024     Lab Results   Component Value Date    GLUCOSE 116 (H) 08/13/2024    CALCIUM 8.7 08/13/2024     08/13/2024    K 3.7 08/13/2024    CO2 30 08/13/2024     08/13/2024    BUN 13 08/13/2024    CREATININE 0.36 (L) 08/13/2024     Lab Results   Component Value Date    ALT 18 06/06/2024    AST 21 06/06/2024    ALKPHOS 66 06/06/2024    BILITOT 0.5 06/06/2024          ASSESSMENT/PLAN:    Denae Nolasco is a 33 y.o. female with a past medical history of  L vestibular  schwannoma with 4th ventricle effacement and brainstem compression s/p craniotomy for tumor resection c/b resection bed hemorrhage, L transverse sigmoid sinus thrombosis, bilateral vocal fold paralysis with aphonia, dysphagia referred by ENT (Dr. Craft) for pain at the PEG site.     #Pain at PEG site  No signs of infection on physical exam. Grimaces face on rotating external bumper. Had <1 cm space between skin and external bumper. Suspect external traction of PEG causing pain, probably related with weight gain. External bumper loosened from 4.5cm to 4.0cm. If continued to have pain despite external bumper adjustment, will plan for endoscopic evaluation.    -External bumper loosened from 4.5cm to 4.0cm  -Start PPI daily  -EGD if continued to have pain for more than 1 week     I spent 45 minutes in the professional and overall care of this patient.    Maria E Warner MD

## 2024-09-12 ENCOUNTER — HOME CARE VISIT (OUTPATIENT)
Dept: HOME HEALTH SERVICES | Facility: HOME HEALTH | Age: 34
End: 2024-09-12
Payer: COMMERCIAL

## 2024-09-12 ENCOUNTER — TELEPHONE (OUTPATIENT)
Dept: PRIMARY CARE | Facility: CLINIC | Age: 34
End: 2024-09-12

## 2024-09-12 ENCOUNTER — OFFICE VISIT (OUTPATIENT)
Dept: PRIMARY CARE | Facility: CLINIC | Age: 34
End: 2024-09-12
Payer: COMMERCIAL

## 2024-09-12 ENCOUNTER — APPOINTMENT (OUTPATIENT)
Dept: OPHTHALMOLOGY | Facility: CLINIC | Age: 34
End: 2024-09-12
Payer: COMMERCIAL

## 2024-09-12 VITALS — HEART RATE: 110 BPM | OXYGEN SATURATION: 97 % | SYSTOLIC BLOOD PRESSURE: 108 MMHG | DIASTOLIC BLOOD PRESSURE: 71 MMHG

## 2024-09-12 VITALS
OXYGEN SATURATION: 98 % | DIASTOLIC BLOOD PRESSURE: 70 MMHG | BODY MASS INDEX: 21.03 KG/M2 | TEMPERATURE: 98.3 F | WEIGHT: 115 LBS | HEART RATE: 98 BPM | SYSTOLIC BLOOD PRESSURE: 98 MMHG

## 2024-09-12 DIAGNOSIS — H18.9 EXPOSURE KERATOPATHY: ICD-10-CM

## 2024-09-12 DIAGNOSIS — K21.9 GASTROESOPHAGEAL REFLUX DISEASE, UNSPECIFIED WHETHER ESOPHAGITIS PRESENT: ICD-10-CM

## 2024-09-12 DIAGNOSIS — H02.23B PARALYTIC LAGOPHTHALMOS OF UPPER AND LOWER EYELID OF LEFT EYE: Primary | ICD-10-CM

## 2024-09-12 DIAGNOSIS — H57.02 ANISOCORIA: ICD-10-CM

## 2024-09-12 DIAGNOSIS — K94.23 PEG TUBE MALFUNCTION (MULTI): ICD-10-CM

## 2024-09-12 DIAGNOSIS — Z98.890 HX OF CRANIOTOMY: ICD-10-CM

## 2024-09-12 DIAGNOSIS — Z79.899 USE OF PROTON PUMP INHIBITOR THERAPY: ICD-10-CM

## 2024-09-12 DIAGNOSIS — D36.10 SCHWANNOMA: Primary | ICD-10-CM

## 2024-09-12 DIAGNOSIS — T85.848A PAIN AROUND PERCUTANEOUS ENDOSCOPIC GASTROSTOMY (PEG) TUBE SITE, INITIAL ENCOUNTER: ICD-10-CM

## 2024-09-12 PROCEDURE — 99212 OFFICE O/P EST SF 10 MIN: CPT | Performed by: OPHTHALMOLOGY

## 2024-09-12 PROCEDURE — G0153 HHCP-SVS OF S/L PATH,EA 15MN: HCPCS

## 2024-09-12 PROCEDURE — G0321 HH/HSPC RD PHONE VISIT: HCPCS

## 2024-09-12 PROCEDURE — 99214 OFFICE O/P EST MOD 30 MIN: CPT

## 2024-09-12 RX ORDER — LANSOPRAZOLE 30 MG/1
30 TABLET, ORALLY DISINTEGRATING, DELAYED RELEASE ORAL
Qty: 90 TABLET | Refills: 1 | Status: SHIPPED | OUTPATIENT
Start: 2024-09-12 | End: 2025-03-11

## 2024-09-12 ASSESSMENT — PAIN SCALES - GENERAL
PAINLEVEL: 4
PAINLEVEL: 0-NO PAIN

## 2024-09-12 ASSESSMENT — ENCOUNTER SYMPTOMS
NEUROLOGICAL NEGATIVE: 1
PSYCHIATRIC NEGATIVE: 0
PERSON REPORTING PAIN: PATIENT
EYES NEGATIVE: 0
PAIN LOCATION - PAIN FREQUENCY: WITH ACTIVITY
CONSTITUTIONAL NEGATIVE: 0
MUSCULOSKELETAL NEGATIVE: 0
RESPIRATORY NEGATIVE: 0
ENDOCRINE NEGATIVE: 0
PAIN LOCATION - PAIN SEVERITY: 4/10
PAIN LOCATION - PAIN QUALITY: ACHE
PAIN: 1
HEMATOLOGIC/LYMPHATIC NEGATIVE: 0
ALLERGIC/IMMUNOLOGIC NEGATIVE: 0
PAIN LOCATION - RELIEVING FACTORS: REST
GASTROINTESTINAL NEGATIVE: 0
PAIN LOCATION: ABDOMEN
CARDIOVASCULAR NEGATIVE: 0

## 2024-09-12 ASSESSMENT — VISUAL ACUITY
OS_PH_CC: 20/70
OS_PH_CC+: -1
OD_CC: 20/40
OD_CC+: -1
OS_CC: 20/400
CORRECTION_TYPE: GLASSES
METHOD: SNELLEN - LINEAR

## 2024-09-12 ASSESSMENT — REFRACTION_WEARINGRX
OS_SPHERE: -0.25
OD_AXIS: 092
OD_CYLINDER: -2.50
OS_CYLINDER: -3.00
OD_SPHERE: -0.75
OS_AXIS: 053

## 2024-09-12 ASSESSMENT — SLIT LAMP EXAM - LIDS: COMMENTS: NORMAL

## 2024-09-12 ASSESSMENT — PATIENT HEALTH QUESTIONNAIRE - PHQ9
SUM OF ALL RESPONSES TO PHQ9 QUESTIONS 1 AND 2: 0
1. LITTLE INTEREST OR PLEASURE IN DOING THINGS: NOT AT ALL
2. FEELING DOWN, DEPRESSED OR HOPELESS: NOT AT ALL

## 2024-09-12 ASSESSMENT — EXTERNAL EXAM - RIGHT EYE: OD_EXAM: NORMAL

## 2024-09-12 ASSESSMENT — EXTERNAL EXAM - LEFT EYE: OS_EXAM: NORMAL

## 2024-09-12 NOTE — ASSESSMENT & PLAN NOTE
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 Advised will continue to monitor and can refer to  oculoplastics eval in future if no significant improvement in cranial nerve (CN) VII function. Suspect damage peripheral as loss of forehead crease on left side.

## 2024-09-12 NOTE — TELEPHONE ENCOUNTER
Patient called back with a medication alternative that her insurance covers. Lansoprazole 30 mg as a disintegrating tablet. Send to [facility] in [place]

## 2024-09-12 NOTE — PATIENT INSTRUCTIONS
Thank you so much for choosing me to provide your care today!    If you were dilated your vision may remain blurry   or light sensitive for several hours.    The nature of eye and vision problems can require frequent follow up, please make every effort to adhere to any future appointments.    If you have any issues, questions, or concerns,   please do not hesitate to reach out.    If you receive a survey in regards to your care today, please mention any exceptional care my office staff and/or technicians provided.    You can reach our office at this number:    [phone]    Please consider signing up for and utilizing [employer]!  This is the best way to directly reach me or other  providers

## 2024-09-12 NOTE — ASSESSMENT & PLAN NOTE
Left eye (OS) remains larger pupil. Still ? Cranial nerve (CN) III vs. Post surgical. Will monitor with serial exam.

## 2024-09-12 NOTE — PROGRESS NOTES
"Patient: Denae Nolasco  : 1990  PCP: Alondra Alegre, APRN-CNP  MRN: [id number]  Program: No programs to display       Denae Nolasco is a 34 y.o. female presenting today with her  and close friend, who have been integral in her care and rehabilitation, for follow-up after being discharged from [facility] on 2024.  The main problem requiring admission was left vestibular schwannoma with 4th ventricle effacement and brainstem compression s/p craniotomy for tumor resection c/b resection bed hemorrhage, left transverse sigmoid sinus thrombosis. The discharge summary and/or Transitional Care Management documentation was reviewed. Medication reconciliation was performed as indicated via the \"Russell as Reviewed\" timestamp.     Hospital Course   Denae Nolasco is a 33 year old right hand dominant female with PMHx of ASD s/p closure in , raynaud's disease, depression who presented to OSH ED on  with worsening vertigo, ataxia, headache, dizziness, and muscle spasms. Pt was transferred to [facility] on 8/3 and MRI showed L vestibular schwannoma w/ 4th ventricular effacement and brainstem compression. NSGY took her to OR  for L retrosig craniotomy for tumor resection and RF EVD placement c/b resection bed hemorrhage. CTH  revealed resection bed hemorrhage and MRI brain L transverse sigmoid sinus thrombosis, GTR. Extubated . PEG/trach placed , BLE DVT US negative. On 8/10 rCTH stable and EVD clamped by NSGY, completed abx course as well.     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     Rehab Course:  Pt was admitted to acute rehab on 2024. She participated in an intense rehabilitation program which included 3 " hours/day, 5 days/week of therapies.  She progressed well in therapies which included physical therapy, occupational therapy, and speech/language therapy. Pain was controlled with PO medications as needed. Medically, course was complicated by difficulty with capping trials/PMV. Notable events and changes during stay include: attempting to facilitate outpatient follow up at . At discharge, bladder status is continent, and bowel status is continent.  Weight bearing status is as tolerated.  Patient is being discharged to Home in improved condition with instructions to follow up as above.  Therapies to be continued as outpatient.    She is following closely with neurology, repeat head CT scheduled for 9/24/2024 and follow up office visit with Dr. Amanda.  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   Home health care services have been arranged through [facility]. She is receiving nursing, PT/OT/ST through home health.  DME supplies and medication are available.           BP 98/70 (BP Location: Left arm, Patient Position: Sitting)   Pulse 98   Temp 36.8 °C (98.3 °F) (Temporal)   Wt 52.2 kg (115 lb)   SpO2 98%   BMI 21.03 kg/m²     Physical Exam  Vitals and nursing note reviewed.   Constitutional:       General: She is awake. She is not in acute distress.  HENT:      Head: Normocephalic.      Jaw: There is normal jaw occlusion.        Nose: Nose normal.      Mouth/Throat:      Lips: Pink.      Mouth: Mucous membranes are dry.      Pharynx: Oropharynx is clear. Uvula midline.   Eyes:      Comments: Right eye: Right lid normal, vision grossly intact, sclera clear, pupil reactive  Left eye covered by patch.  Seeing opthalmology later this afternoon.    Neck:      Vascular: No carotid bruit or JVD.      Trachea: Tracheostomy present.   Cardiovascular:      Rate and Rhythm: Normal rate and regular  rhythm.      Heart sounds: Normal heart sounds, S1 normal and S2 normal.   Pulmonary:      Effort: Pulmonary effort is normal.      Breath sounds: Normal breath sounds and air entry. No decreased breath sounds, wheezing, rhonchi or rales.   Abdominal:      General: Abdomen is flat. Bowel sounds are normal. There is no distension.      Palpations: Abdomen is soft. There is no hepatomegaly or splenomegaly.      Tenderness: There is no abdominal tenderness.       Musculoskeletal:      Comments: Left sided weakness, upper and lower extremities.  Walking with wheeled walker with slow, steady gait.    Skin:     General: Skin is warm and dry.   Neurological:      Mental Status: She is alert.   Psychiatric:         Behavior: Behavior is cooperative.           Assessment/Plan   Assessment & Plan  Schwannoma  Continue with home health care , PT/OT/ST for strengthening following recent craniotomy.  Keep all scheduled appointments with neurology as scheduled.        Hx of craniotomy         Exposure keratopathy  Continue follow up with opthalmology as scheduled       Use of proton pump inhibitor therapy    Orders:    omeprazole (PriLOSEC) 2 mg/mL oral suspension - Compounded - Outpatient; 10 mL (20 mg) by g-tube route once daily. **SHAKE WELL**

## 2024-09-12 NOTE — PROCEDURES
Given degree of lagophthalmos and decreased corneal sensation OS discovered on exam yesterday, recommendation was made for temporary tarsorrhaphy, NSGY team amenable. Procedure was discussed with family and patient this AM, who were also amenable. Informed consent was obtained.    Procedure Note, Left Sided Temporary Tarsorrhaphy    DATE OF PROCEDURE: 8/10/2024    PROCEDURE PERFORMED: Left Sided Temporary Tarsorrhaphy    ANESTHESIA: Local with 2% Lidocaine with Epinephrine    COMPLICATIONS  None     ESTIMATED BLOOD LOSS   Minimal     PROCEDURE:   Informed consent was obtained. Patient identity confirmed using 2 identifiers. Procedure site was marked. A time out was performed. The left eye and periocular region was irrigated using sterile saline and a single drop of tetracaine was instilled. The periocular region was prepped using betadine swab. A drop of Moxifloxacin was instilled.     Anesthesia was performed using 2% Lidocaine with Epinephrine injected to upper and lower eyelid using 27 gauge needle.     A 5-0 Prolene suture was then passed through foam suture packaging to serve as a superior bolster. The suture was then passed through upper eyelid far laterally, entering ~3 mm superior to the margin and exiting at roughly the meibomian gland orifices. The suture was then passed through far lateral lower eyelid, entering roughly the meibomian gland orifices and exiting ~3 mm inferior to the margin. The suture was then passed twice through foam suture packaging material to serve as inferior bolster. The suture was then passed through lower eyelid roughly 3 mm medially from initial site, entering ~3 mm inferior to the margin and exiting at roughly the meibomian gland orifices. Suture was then passed through superior eyelid roughly 3 mm from initial site, entering at roughly the meibomian gland orifices and exiting ~3 mm superior to the margin. The suture was passed through bolster superiorly a final time and then  tied with sufficient tension to ensure closure.     An identical procedure was then performed along the medial left eyelid.     The patient tolerated the procedure well and eyelid was noted to be closed. Erythromycin ointment was placed along eyelid and at procedure sites.     Recommendations:  - Erythromycin ointment along eyelid margin and tarsorrhaphy sites twice a day  - Ophthalmology will follow       no

## 2024-09-12 NOTE — PROGRESS NOTES
Assessment/Plan   Problem List Items Addressed This Visit       Paralytic lagophthalmos - Primary     Stable lagophthalmos but no obvious new exposure. Advised will continue to monitor and can refer to  oculoplastics eval in future if no significant improvement in cranial nerve (CN) VII function. Suspect damage peripheral as loss of forehead crease on left side.          Exposure keratopathy     Well covered with frequent lubrication. Will continue at current level and begin process of lengthening follow up time. To call if any worsening symptoms. Can refill ointment as needed.          Anisocoria     Left eye (OS) remains larger pupil. Still ? Cranial nerve (CN) III vs. Post surgical. Will monitor with serial exam.             Provided reassurance regarding above diagnoses and care received in the office visit today. Discussed outcomes and options along with the importance of treatment compliance. Understands the importance of any follow up visits. Patient instructed to call/communicate with our office if any new issues, questions, or concerns.     Will plan to see back in 2 weeks surface check or sooner PRN

## 2024-09-12 NOTE — ASSESSMENT & PLAN NOTE
Well covered with frequent lubrication. Will continue at current level and begin process of lengthening follow up time. To call if any worsening symptoms. Can refill ointment as needed.

## 2024-09-13 ENCOUNTER — HOME CARE VISIT (OUTPATIENT)
Dept: HOME HEALTH SERVICES | Facility: HOME HEALTH | Age: 34
End: 2024-09-13
Payer: COMMERCIAL

## 2024-09-13 PROCEDURE — G0158 HHC OT ASSISTANT EA 15: HCPCS | Mod: CO

## 2024-09-15 VITALS
RESPIRATION RATE: 18 BRPM | OXYGEN SATURATION: 99 % | SYSTOLIC BLOOD PRESSURE: 97 MMHG | HEART RATE: 110 BPM | DIASTOLIC BLOOD PRESSURE: 72 MMHG

## 2024-09-16 ENCOUNTER — HOME CARE VISIT (OUTPATIENT)
Dept: HOME HEALTH SERVICES | Facility: HOME HEALTH | Age: 34
End: 2024-09-16
Payer: COMMERCIAL

## 2024-09-16 VITALS — DIASTOLIC BLOOD PRESSURE: 70 MMHG | SYSTOLIC BLOOD PRESSURE: 101 MMHG | OXYGEN SATURATION: 97 % | HEART RATE: 91 BPM

## 2024-09-16 PROCEDURE — G0153 HHCP-SVS OF S/L PATH,EA 15MN: HCPCS

## 2024-09-16 ASSESSMENT — ENCOUNTER SYMPTOMS
PAIN LOCATION - PAIN FREQUENCY: WITH ACTIVITY
PAIN: 1
PERSON REPORTING PAIN: PATIENT
PAIN LOCATION - RELIEVING FACTORS: REST
PAIN LOCATION: ABDOMEN
PAIN LOCATION - PAIN SEVERITY: 3/10
PAIN LOCATION - PAIN QUALITY: SORENESS
PAIN LOCATION - EXACERBATING FACTORS: AMBULATION/MOVEMENT

## 2024-09-18 ENCOUNTER — HOME CARE VISIT (OUTPATIENT)
Dept: HOME HEALTH SERVICES | Facility: HOME HEALTH | Age: 34
End: 2024-09-18
Payer: COMMERCIAL

## 2024-09-18 ENCOUNTER — APPOINTMENT (OUTPATIENT)
Dept: OTOLARYNGOLOGY | Facility: CLINIC | Age: 34
End: 2024-09-18
Payer: COMMERCIAL

## 2024-09-18 ENCOUNTER — APPOINTMENT (OUTPATIENT)
Dept: AUDIOLOGY | Facility: CLINIC | Age: 34
End: 2024-09-18
Payer: COMMERCIAL

## 2024-09-18 VITALS
HEART RATE: 84 BPM | OXYGEN SATURATION: 96 % | DIASTOLIC BLOOD PRESSURE: 76 MMHG | SYSTOLIC BLOOD PRESSURE: 104 MMHG | TEMPERATURE: 98.2 F | RESPIRATION RATE: 20 BRPM

## 2024-09-18 PROCEDURE — G0158 HHC OT ASSISTANT EA 15: HCPCS | Mod: CO

## 2024-09-18 PROCEDURE — G0299 HHS/HOSPICE OF RN EA 15 MIN: HCPCS

## 2024-09-18 ASSESSMENT — ENCOUNTER SYMPTOMS
BOWEL PATTERN NORMAL: 1
SPUTUM PRODUCTION: 1
LOSS OF VISUAL FIELD: 1
DEPRESSION: 0
MUSCLE WEAKNESS: 1
PERSON REPORTING PAIN: PATIENT
DENIES PAIN: 1
SPUTUM AMOUNT: SCANT
DRY SKIN: 1
SPUTUM COLOR: GREEN
SPUTUM CONSISTENCY: THICK
LAST BOWEL MOVEMENT: 67100
OCCASIONAL FEELINGS OF UNSTEADINESS: 1
COUGH: 1
FATIGUE: 1
LOSS OF SENSATION IN FEET: 0
STOOL FREQUENCY: DAILY

## 2024-09-18 ASSESSMENT — PAIN SCALES - PAIN ASSESSMENT IN ADVANCED DEMENTIA (PAINAD)
NEGVOCALIZATION: 0
FACIALEXPRESSION: 0 - SMILING OR INEXPRESSIVE.
NEGVOCALIZATION: 0 - NONE.
TOTALSCORE: 0
CONSOLABILITY: 0
BODYLANGUAGE: 0
BREATHING: 0
FACIALEXPRESSION: 0
BODYLANGUAGE: 0 - RELAXED.
CONSOLABILITY: 0 - NO NEED TO CONSOLE.

## 2024-09-18 ASSESSMENT — ACTIVITIES OF DAILY LIVING (ADL)
AMBULATION ASSISTANCE: ONE PERSON
CURRENT_FUNCTION: ONE PERSON

## 2024-09-19 ENCOUNTER — HOME CARE VISIT (OUTPATIENT)
Dept: HOME HEALTH SERVICES | Facility: HOME HEALTH | Age: 34
End: 2024-09-19
Payer: COMMERCIAL

## 2024-09-19 VITALS — OXYGEN SATURATION: 99 % | HEART RATE: 87 BPM | SYSTOLIC BLOOD PRESSURE: 107 MMHG | DIASTOLIC BLOOD PRESSURE: 76 MMHG

## 2024-09-19 PROCEDURE — G0153 HHCP-SVS OF S/L PATH,EA 15MN: HCPCS

## 2024-09-19 ASSESSMENT — ENCOUNTER SYMPTOMS
PERSON REPORTING PAIN: PATIENT
DENIES PAIN: 1

## 2024-09-19 NOTE — ASSESSMENT & PLAN NOTE
Continue with home health care , PT/OT/ST for strengthening following recent craniotomy.  Keep all scheduled appointments with neurology as scheduled.

## 2024-09-20 ENCOUNTER — HOME CARE VISIT (OUTPATIENT)
Dept: HOME HEALTH SERVICES | Facility: HOME HEALTH | Age: 34
End: 2024-09-20
Payer: COMMERCIAL

## 2024-09-20 VITALS
DIASTOLIC BLOOD PRESSURE: 76 MMHG | HEART RATE: 121 BPM | TEMPERATURE: 97.7 F | SYSTOLIC BLOOD PRESSURE: 114 MMHG | RESPIRATION RATE: 20 BRPM | OXYGEN SATURATION: 99 %

## 2024-09-20 VITALS
OXYGEN SATURATION: 97 % | SYSTOLIC BLOOD PRESSURE: 104 MMHG | RESPIRATION RATE: 18 BRPM | DIASTOLIC BLOOD PRESSURE: 64 MMHG | HEART RATE: 110 BPM

## 2024-09-20 PROCEDURE — G0158 HHC OT ASSISTANT EA 15: HCPCS | Mod: CO

## 2024-09-20 PROCEDURE — G0151 HHCP-SERV OF PT,EA 15 MIN: HCPCS

## 2024-09-20 SDOH — HEALTH STABILITY: PHYSICAL HEALTH
EXERCISE COMMENTS: PT'S HR 121 AT REST, 130'S TO 140'S WITH STANDING EXERCISES 10 REPS, 2 AT A TIME WITH REST PERIODS TO GET HR BACK INTO THE 120S.

## 2024-09-20 SDOH — HEALTH STABILITY: PHYSICAL HEALTH: EXERCISE TYPE: WRITTEN

## 2024-09-20 SDOH — HEALTH STABILITY: PHYSICAL HEALTH: PHYSICAL EXERCISE: 10

## 2024-09-20 ASSESSMENT — ENCOUNTER SYMPTOMS
DENIES PAIN: 1
MUSCLE WEAKNESS: 1

## 2024-09-20 ASSESSMENT — ACTIVITIES OF DAILY LIVING (ADL)
PHYSICAL TRANSFERS ASSESSED: 1
AMBULATION_DISTANCE/DURATION_TOLERATED: 75'
AMBULATION ASSISTANCE ON FLAT SURFACES: 1
CURRENT_FUNCTION: STAND BY ASSIST
AMBULATION ASSISTANCE: STAND BY ASSIST
AMBULATION ASSISTANCE: 1

## 2024-09-21 VITALS
TEMPERATURE: 96.5 F | RESPIRATION RATE: 15 BRPM | SYSTOLIC BLOOD PRESSURE: 110 MMHG | DIASTOLIC BLOOD PRESSURE: 63 MMHG | OXYGEN SATURATION: 97 %

## 2024-09-23 ENCOUNTER — HOME CARE VISIT (OUTPATIENT)
Dept: HOME HEALTH SERVICES | Facility: HOME HEALTH | Age: 34
End: 2024-09-23
Payer: COMMERCIAL

## 2024-09-23 VITALS — DIASTOLIC BLOOD PRESSURE: 61 MMHG | OXYGEN SATURATION: 98 % | SYSTOLIC BLOOD PRESSURE: 96 MMHG | HEART RATE: 80 BPM

## 2024-09-23 VITALS
RESPIRATION RATE: 18 BRPM | OXYGEN SATURATION: 96 % | HEART RATE: 100 BPM | TEMPERATURE: 97.9 F | DIASTOLIC BLOOD PRESSURE: 70 MMHG | SYSTOLIC BLOOD PRESSURE: 114 MMHG

## 2024-09-23 PROCEDURE — G0151 HHCP-SERV OF PT,EA 15 MIN: HCPCS

## 2024-09-23 PROCEDURE — G0153 HHCP-SVS OF S/L PATH,EA 15MN: HCPCS

## 2024-09-23 SDOH — HEALTH STABILITY: PHYSICAL HEALTH: PHYSICAL EXERCISE: 15

## 2024-09-23 SDOH — HEALTH STABILITY: PHYSICAL HEALTH: EXERCISE TYPE: WRITTEN

## 2024-09-23 ASSESSMENT — ACTIVITIES OF DAILY LIVING (ADL)
AMBULATION ASSISTANCE: 1
AMBULATION ASSISTANCE: STAND BY ASSIST
AMBULATION_DISTANCE/DURATION_TOLERATED: 100'
AMBULATION ASSISTANCE ON FLAT SURFACES: 1
PHYSICAL TRANSFERS ASSESSED: 1
CURRENT_FUNCTION: STAND BY ASSIST

## 2024-09-23 ASSESSMENT — ENCOUNTER SYMPTOMS
MUSCLE WEAKNESS: 1
PERSON REPORTING PAIN: PATIENT
ARTHRALGIAS: 1
DENIES PAIN: 1
DENIES PAIN: 1

## 2024-09-24 ENCOUNTER — HOSPITAL ENCOUNTER (OUTPATIENT)
Dept: RADIOLOGY | Facility: HOSPITAL | Age: 34
Discharge: HOME | End: 2024-09-24
Payer: COMMERCIAL

## 2024-09-24 ENCOUNTER — HOME CARE VISIT (OUTPATIENT)
Dept: HOME HEALTH SERVICES | Facility: HOME HEALTH | Age: 34
End: 2024-09-24
Payer: COMMERCIAL

## 2024-09-24 DIAGNOSIS — Z98.890 S/P CRANIOTOMY: ICD-10-CM

## 2024-09-24 PROCEDURE — 70450 CT HEAD/BRAIN W/O DYE: CPT | Performed by: RADIOLOGY

## 2024-09-24 PROCEDURE — 70450 CT HEAD/BRAIN W/O DYE: CPT

## 2024-09-24 PROCEDURE — G0321 HH/HSPC RD PHONE VISIT: HCPCS

## 2024-09-25 ENCOUNTER — HOME CARE VISIT (OUTPATIENT)
Dept: HOME HEALTH SERVICES | Facility: HOME HEALTH | Age: 34
End: 2024-09-25
Payer: COMMERCIAL

## 2024-09-25 VITALS
TEMPERATURE: 97.6 F | RESPIRATION RATE: 18 BRPM | HEART RATE: 98 BPM | DIASTOLIC BLOOD PRESSURE: 64 MMHG | SYSTOLIC BLOOD PRESSURE: 118 MMHG | OXYGEN SATURATION: 99 %

## 2024-09-25 PROCEDURE — G0152 HHCP-SERV OF OT,EA 15 MIN: HCPCS

## 2024-09-25 ASSESSMENT — PAIN SCALES - PAIN ASSESSMENT IN ADVANCED DEMENTIA (PAINAD)
NEGVOCALIZATION: 0
FACIALEXPRESSION: 0
TOTALSCORE: 0
BODYLANGUAGE: 0 - RELAXED.
FACIALEXPRESSION: 0 - SMILING OR INEXPRESSIVE.
BODYLANGUAGE: 0
BREATHING: 0
CONSOLABILITY: 0 - NO NEED TO CONSOLE.
NEGVOCALIZATION: 0 - NONE.
CONSOLABILITY: 0

## 2024-09-25 ASSESSMENT — ENCOUNTER SYMPTOMS
PERSON REPORTING PAIN: PATIENT
DENIES PAIN: 1

## 2024-09-25 ASSESSMENT — ACTIVITIES OF DAILY LIVING (ADL)
DRESSING_UB_CURRENT_FUNCTION: MINIMUM ASSIST
BATHING_CURRENT_FUNCTION: MINIMUM ASSIST
BATHING ASSESSED: 1
GROOMING_WITHIN_DEFINED_LIMITS: 1
DRESSING_LB_CURRENT_FUNCTION: MINIMUM ASSIST

## 2024-09-26 ENCOUNTER — OFFICE VISIT (OUTPATIENT)
Dept: NEUROSURGERY | Facility: CLINIC | Age: 34
End: 2024-09-26
Payer: COMMERCIAL

## 2024-09-26 VITALS
WEIGHT: 117 LBS | RESPIRATION RATE: 16 BRPM | HEIGHT: 62 IN | HEART RATE: 107 BPM | DIASTOLIC BLOOD PRESSURE: 63 MMHG | BODY MASS INDEX: 21.53 KG/M2 | SYSTOLIC BLOOD PRESSURE: 94 MMHG

## 2024-09-26 DIAGNOSIS — D36.10 SCHWANNOMA: Primary | ICD-10-CM

## 2024-09-26 PROCEDURE — 3008F BODY MASS INDEX DOCD: CPT | Performed by: NEUROLOGICAL SURGERY

## 2024-09-26 PROCEDURE — 1036F TOBACCO NON-USER: CPT | Performed by: NEUROLOGICAL SURGERY

## 2024-09-26 PROCEDURE — 99211 OFF/OP EST MAY X REQ PHY/QHP: CPT | Performed by: NEUROLOGICAL SURGERY

## 2024-09-26 ASSESSMENT — PAIN SCALES - GENERAL: PAINLEVEL: 0-NO PAIN

## 2024-09-26 NOTE — PROGRESS NOTES
[place]  Neurosurgery    History of Present Illness      Denae Nolasco is a 33-year-old right-handed female with a PMH significant for ASD s/p closure 1997, Raynaud's, and depression who presented to [facility] with worsening vertigo and ataxia on 08/03/24. 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 TTE EF 57% and negative bubble study. Patient was taken to the OR on 08/05/24 for left retrosigmoid craniotomy for tumor resection with EVD placement. MRI for GTR and showed L transverse sigmoid sinus thrombosis.     Hospital course was significant for ENT consultation for scope with bilateral vocal cord paralysis, L VII, XII paralysis.  R tympanic membrane rupture post YOUSUF monitor placement (not requiring surgery). She is now s/p trach / peg 08/09/24. Holcomb was placed for urinary retention. 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 DVT US negative. Patient was able to be discharged to acute rehab on 08/14.  Final surgical pathology for schwannoma.     Patient was seen for follow up at which time she was discharged from rehab now continuing with home PT/OT/ST.  She was continuing to take all nutrition by PEG. Using rolaltor for lengthy ambulation and is steady on her feet. Intermittent headaches taking tylenol as needed with good relief. Continues to follow with ophthalmology. Following with Dr. Miller in ophthalmology for anisocoria, paralytic lagophthalmos, and exposed keratopathy with plans for continued follow up and close monitoring. Recently seen by GI for ongoing PEG discomfort and vomiting with bolus feeds. Bumper was adjusted and she was started on PPI daily, no further discomfort. Patient has scheduled follow up with ENT. She presents to clinic for POV.      She was discharged from rehab at the end of August  "and she is recovering well. She still uses her Trach and PEG, she is being followed by ENT with some improvement. She is able to vocalize with passymuir valve She is able to ambulate comfortably wihtout assitance.         Objective      Vitals:   BP 94/63   Pulse 107   Resp 16   Ht 1.575 m (5' 2\")   Wt 53.1 kg (117 lb)   BMI 21.40 kg/m²         Physical Exam:    Awake, alert, oriented times 3.   EOM intact, no diplopia  Nystagmus  Mild decreased R jaw numbness  HB 6 left facial palsy  Facial sensation intact in V1-3 bilaterally  Left sided hearing loss  Right tongue deviation  R uvula deviation  Symmetric shoulder elevation  Symmetric head turning  Hypophonia    RUE with 2/5 right deltoid weakness, otherwise good strength  5/5 in the RLE.  5/5 in LUE/LLE  No sensory deficits    Relevant Results:      CTH 09/24/24:         MRI 08/06/24:           MRI 08/04/24:           Assessment & Plan      Diagnosis:  Diagnoses and all orders for this visit:  Schwannoma      Provider Impression:     Mrs Nolasco is recovering well from her surgery. She still has her Trach and PEG in place and is due to see ENT for vocal cord evaluation and possible vocal fold injection. She also needs to see Ophthalmology oculoplastics for gold weight given her HB6 facial palsy.  She needs to continue seeing Speech/swallow but will use PEG until she's cleared for oral intake. We will also monitor her right deltoid weakness and will discuss EMG if it persists. I will see her in 2-3 months to monitor her progress, and with MRI brain with and without contrast in 6 months postoperatively.    Medical History     Past Medical History:   Diagnosis Date    Depression     Visual impairment      No past surgical history on file.  Social History     Tobacco Use    Smoking status: Never    Smokeless tobacco: Never   Vaping Use    Vaping status: Never Used   Substance Use Topics    Alcohol use: Never    Drug use: Not Currently     Types: Marijuana     " Comment: tried it once or twice     Family History   Problem Relation Name Age of Onset    Blood clot Mother Delmis Mantilla-  of clot 2016      labor Sister Megan      No Known Allergies  Current Outpatient Medications   Medication Instructions    acetaminophen (TYLENOL) 325 mg, g-tube, Every 4 hours PRN    bacitracin 500 unit/gram ointment in packet Apply 1 INCH Daily. Do not exceed more than 7 days.    chlorhexidine (Peridex) 0.12 % solution 15 mL, Mouth/Throat, 3 times daily    dextran 70-hypromellose, PF, (Bion Tears) 0.1-0.3 % ophthalmic solution 1 drop, 3 times daily PRN    erythromycin base (ERYTHROMYCIN OPHT) 0.5 %, Left Eye, Every 4 hours    escitalopram (LEXAPRO) 5 mg, g-tube, Daily    ferrous sulfate (IRON ORAL) 1 tablet, oral, Daily    lansoprazole (PREVACID SOLUTAB) 30 mg, oral, Daily before breakfast    lubricating eye drops ophthalmic solution 1 drop, Left Eye, Every 1 hour PRN    omeprazole (PriLOSEC) 2 mg/mL oral suspension - Compounded - Outpatient 20 mg, g-tube, Daily, **SHAKE WELL**    ondansetron ODT (ZOFRAN-ODT) 4 mg, g-tube, Every 6 hours PRN    sodium chloride 0.9 % irrigation solution For suctioning and cleaning inner cannula

## 2024-09-27 ENCOUNTER — TELEPHONE (OUTPATIENT)
Dept: PRIMARY CARE | Facility: CLINIC | Age: 34
End: 2024-09-27
Payer: COMMERCIAL

## 2024-09-27 ENCOUNTER — HOME CARE VISIT (OUTPATIENT)
Dept: HOME HEALTH SERVICES | Facility: HOME HEALTH | Age: 34
End: 2024-09-27
Payer: COMMERCIAL

## 2024-09-27 VITALS — SYSTOLIC BLOOD PRESSURE: 96 MMHG | HEART RATE: 80 BPM | OXYGEN SATURATION: 98 % | DIASTOLIC BLOOD PRESSURE: 63 MMHG

## 2024-09-27 DIAGNOSIS — R13.10 DYSPHAGIA, UNSPECIFIED TYPE: Primary | ICD-10-CM

## 2024-09-27 PROCEDURE — G0153 HHCP-SVS OF S/L PATH,EA 15MN: HCPCS

## 2024-09-27 PROCEDURE — G0158 HHC OT ASSISTANT EA 15: HCPCS | Mod: CO

## 2024-09-27 ASSESSMENT — ENCOUNTER SYMPTOMS
PERSON REPORTING PAIN: PATIENT
DENIES PAIN: 1

## 2024-09-27 NOTE — TELEPHONE ENCOUNTER
Sindy from  Home care called to see if you can put in an order for modified barium swallow study.  This will be a repeat study.  She had one on 8-22-24 and they want to see where she is now before her appointment with ENT on October 9.  Please call [phone] if any questions

## 2024-09-30 ENCOUNTER — HOME CARE VISIT (OUTPATIENT)
Dept: HOME HEALTH SERVICES | Facility: HOME HEALTH | Age: 34
End: 2024-09-30
Payer: COMMERCIAL

## 2024-09-30 VITALS — HEART RATE: 76 BPM | DIASTOLIC BLOOD PRESSURE: 61 MMHG | OXYGEN SATURATION: 98 % | SYSTOLIC BLOOD PRESSURE: 96 MMHG

## 2024-09-30 PROCEDURE — G0153 HHCP-SVS OF S/L PATH,EA 15MN: HCPCS

## 2024-09-30 ASSESSMENT — ENCOUNTER SYMPTOMS
DENIES PAIN: 1
PERSON REPORTING PAIN: PATIENT

## 2024-10-01 ENCOUNTER — HOME CARE VISIT (OUTPATIENT)
Dept: HOME HEALTH SERVICES | Facility: HOME HEALTH | Age: 34
End: 2024-10-01
Payer: COMMERCIAL

## 2024-10-01 ENCOUNTER — APPOINTMENT (OUTPATIENT)
Dept: OPHTHALMOLOGY | Facility: CLINIC | Age: 34
End: 2024-10-01
Payer: COMMERCIAL

## 2024-10-01 DIAGNOSIS — H18.9 EXPOSURE KERATOPATHY: Primary | ICD-10-CM

## 2024-10-01 DIAGNOSIS — H02.23B PARALYTIC LAGOPHTHALMOS OF UPPER AND LOWER EYELID OF LEFT EYE: ICD-10-CM

## 2024-10-01 PROCEDURE — 99212 OFFICE O/P EST SF 10 MIN: CPT | Performed by: OPHTHALMOLOGY

## 2024-10-01 PROCEDURE — G0158 HHC OT ASSISTANT EA 15: HCPCS | Mod: CO

## 2024-10-01 RX ORDER — ERYTHROMYCIN 5 MG/G
OINTMENT OPHTHALMIC 4 TIMES DAILY
Qty: 3.5 G | Refills: 3 | Status: SHIPPED | OUTPATIENT
Start: 2024-10-01

## 2024-10-01 ASSESSMENT — VISUAL ACUITY
OS_CC: 20/400
OD_CC: 20/40
METHOD: SNELLEN - LINEAR
CORRECTION_TYPE: GLASSES

## 2024-10-01 ASSESSMENT — ENCOUNTER SYMPTOMS
CARDIOVASCULAR NEGATIVE: 0
ALLERGIC/IMMUNOLOGIC NEGATIVE: 0
ENDOCRINE NEGATIVE: 0
EYES NEGATIVE: 0
HEMATOLOGIC/LYMPHATIC NEGATIVE: 0
RESPIRATORY NEGATIVE: 0
PSYCHIATRIC NEGATIVE: 0
GASTROINTESTINAL NEGATIVE: 0
NEUROLOGICAL NEGATIVE: 1
CONSTITUTIONAL NEGATIVE: 0
MUSCULOSKELETAL NEGATIVE: 0

## 2024-10-01 ASSESSMENT — PAIN SCALES - GENERAL: PAINLEVEL: 0-NO PAIN

## 2024-10-01 ASSESSMENT — PATIENT HEALTH QUESTIONNAIRE - PHQ9
1. LITTLE INTEREST OR PLEASURE IN DOING THINGS: NOT AT ALL
2. FEELING DOWN, DEPRESSED OR HOPELESS: NOT AT ALL
SUM OF ALL RESPONSES TO PHQ9 QUESTIONS 1 AND 2: 0

## 2024-10-01 ASSESSMENT — SLIT LAMP EXAM - LIDS: COMMENTS: NORMAL

## 2024-10-01 ASSESSMENT — EXTERNAL EXAM - RIGHT EYE: OD_EXAM: NORMAL

## 2024-10-01 ASSESSMENT — EXTERNAL EXAM - LEFT EYE: OS_EXAM: NORMAL

## 2024-10-01 NOTE — ASSESSMENT & PLAN NOTE
No new signs of exposure today and controlling ocular surface well with erythromycin ointment. Will continue to monitor surface closely with serial exam.

## 2024-10-01 NOTE — PROGRESS NOTES
Assessment/Plan   Problem List Items Addressed This Visit       Paralytic lagophthalmos     Showing some improvement in upper lid closure today compared to previous and bell's phenomenon improved. Will hold on referral to oculoplastics for time being as exposure well controlled.          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 Will continue to monitor surface closely with serial exam.          Relevant Medications    erythromycin (Romycin) 5 mg/gram (0.5 %) ophthalmic ointment       Provided reassurance regarding above diagnoses and care received in the office visit today. Discussed outcomes and options along with the importance of treatment compliance. Understands the importance of any follow up visits. Patient instructed to call/communicate with our office if any new issues, questions, or concerns.     Will plan to see back in 1 month surface check or sooner PRN

## 2024-10-01 NOTE — ASSESSMENT & PLAN NOTE
Showing some improvement in upper lid closure today compared to previous and bell's phenomenon improved. Will hold on referral to oculoplastics for time being as exposure well controlled.

## 2024-10-01 NOTE — PATIENT INSTRUCTIONS
Thank you so much for choosing me to provide your care today!    If you were dilated your vision may remain blurry   or light sensitive for several hours.    The nature of eye and vision problems can require frequent follow up, please make every effort to adhere to any future appointments.    If you have any issues, questions, or concerns,   please do not hesitate to reach out.    If you receive a survey in regards to your care today, please mention any exceptional care my office staff and/or technicians provided.    You can reach our office at this number:    [phone]    Please consider signing up for and utilizing [employer]!  This is the best way to directly reach me or other  providers

## 2024-10-02 ENCOUNTER — HOSPITAL ENCOUNTER (OUTPATIENT)
Dept: RADIOLOGY | Facility: HOSPITAL | Age: 34
Discharge: HOME | End: 2024-10-02
Payer: COMMERCIAL

## 2024-10-02 DIAGNOSIS — R13.10 DYSPHAGIA, UNSPECIFIED TYPE: ICD-10-CM

## 2024-10-02 PROCEDURE — 92611 MOTION FLUOROSCOPY/SWALLOW: CPT | Mod: GN

## 2024-10-02 PROCEDURE — 74230 X-RAY XM SWLNG FUNCJ C+: CPT

## 2024-10-02 PROCEDURE — 2500000005 HC RX 250 GENERAL PHARMACY W/O HCPCS

## 2024-10-02 NOTE — PROCEDURES
Speech-Language Pathology    Outpatient Modified Barium Swallow Study    Patient Name: Denae Nolasco  MRN: [id number]  : 1990  Today's Date: 10/02/24     Time Calculation  Start Time: 1315  Stop Time: 1340  Time Calculation (min): 25 min       Modified Barium Swallow Study completed. Informed verbal consent obtained prior to completion of exam. Trials of thin, nectar/mildly thick liquid, puree were given.     Modified Barium Swallow Study completed. Informed verbal consent obtained prior to completion of exam. The study was completed per protocol with various liquid barium consistencies and pudding. A 1.9 cm or .75 inch (outer diameter) ring was placed on the chin in the lateral view and on the lateral, left side of the neck in the a-p view in order to complete objective measurements during swallowing. The anatomic structures and function of the oropharynx, larynx, hypopharynx and cervical esophagus in the lateral view were evaluated.    SLP: JAME Warren   Contact info: [place]; phone: [phone] Option 2    Reason for Referral: assess pharyngeal function for possible PO diet trial  Patient Hx per chart:  past medical history of depression and visual impairment who presented to OS ED on 24 with complaints of hearing loss, vertigo, ataxia (since ), headache, dizziness, and muscle spasms. Patient transferred to [facility] for concern for intracranial mass concerning for schwannoma vs. meningioma seen on outpatient imaging. 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 Patient admitted to [facility] s/p L retrosigmoid craniotomy for tumor resection.   Has trach with [place] speaking valve in place and PEG  #s/p L retrosignmoid craniotomy for tumor resection with RF EVD placement    Respiratory Status: Room air  Current diet:  NPO with PEG    Pain:  Pain Scale: 0-10  Ratin    DIET RECOMMENDATIONS:   NPO, continued use of PEG  for main source of nutrition/hydration/medications  2.   Continue with PO trials of thin liquids (1/2 tsp) with Home Health SLP    STRATEGIES:  - Small, single sips 1/2 tsp  - Effortful swallow  - Upright for all PO intake      SLP PLAN:  Skilled SLP Services: Skilled SLP intervention for dysphagia is warranted.  SLP Frequency: 2x per week  Duration: 90 days  Treatment/Interventions:   - Oropharyngeal exercises  - Bolus trials  - Compensatory strategy training  - Patient/caregiver education    Discussed POC: Patient  Discussed Risks/Benefits: Yes  Patient/Caregiver Agreeable: Yes    Short term goals established 10/02/24:   - Patient will tolerate  PO trials without overt s/s of aspiration, further difficulty, or concern for aspiration-related complications in 90% of observed therapeutic trials.  - Patient will implement safe swallowing strategies to reduce risk of aspiration and other s/sx of dysphagia in 90% of trials given caregiver assistance/cueing as needed.  - Patient will complete recommended swallowing exercises masko, shaker etc for at least 20-30 repetitions during treatment session given minimal-moderate cues.  - Patient/Family will verbalize/demonstrate comprehension of dysphagia education, strategies, recommendations and POC.  - Patient will complete home exercise program daily (per patient report).      Long term goals 10/02/24:   Patient will resume/maintain oral intake in order to promote optimal oropharyngeal swallow function and reduce risk for dehydration, malnutrition and weight loss.       Education Provided: Results and recommendations per MBSS, with video review; recommendations and POC at this time. Verbal understanding and agreement given on all accounts.     Treatment Provided Today: No - patient current with Home health services     Repeat Study: Per MD or treating SLP       Mechanics of the Swallow Summary:  ORAL PHASE:  Lip Closure - No labial escape/anterior loss of bolus   Tongue  Control During Bolus Hold - Escape to lateral buccal cavity and/or floor of mouth   Bolus prep/mastication - Slow prolonged mastication with complete re-collection necessary   Bolus transport/lingual motion - Repetitive/disorganized tongue motion (tongue pumping)   Oral residue - Majority of bolus remaining with puree, collection with liquids    PHARYNGEAL PHASE:  Initiation of pharyngeal swallow - Bolus head at pit of pyriforms   Soft palate elevation - No bolus between soft palate/pharyngeal wall   Laryngeal elevation - No superior movement of thyroid cartilage   Anterior hyoid excursion - No anterior movement   Epiglottic movement - No inversion   Laryngeal vestibule closure - None - wide column of air / contrast in laryngeal vestibule   Pharyngeal stripping wave - Present, however, diminished   Pharyngeal contraction (A/P view) - Not tested       Pharyngoesophageal segment opening - Partial distension/partial duration with partial obstruction of flow of bolus   Tongue base retraction - No appreciable posterior motion of tongue base   Pharyngeal residue - Majority of contrast within or on the pharyngeal structures     ESOPHAGEAL PHASE:  Esophageal clearance - Esophageal retention       SLP Impressions with Severity Rating:   Pt presents with severe oropharyngeal dysphagia upon completion of modified barium swallow study this date. Swallowing physiology is detailed above. Impairments most impacting swallowing safety and efficiency include absent: tongue base retraction, anterior hyoid excursion, superior laryngeal elevation, no epiglottic inversion, paralyzed vocal cords in the abducted position. Patient demonstrated overt laryngeal penetration with nectar thick liquids to the vocal cords with overt effort to eject. Overt aspiration with thin liquids was visualized during study without ejection despite attempt. Further noted majority of the bolus in the valleculae. Head turn L,R and chin tuck did not reduce  residue or penetration/aspiration.    Lateral scan of the esophagus completed due to severity of dysphagia.    Strategies attempted- Effortful swallow, multiple swallows, head turn right and left, chin tuck and liquid chaser did not clear pharyngeal residue with liquids or solids.    OUTCOME MEASURES:  Functional Oral Intake Scale  Functional Oral Intake Scale: Level 1        nothing by mouth       Eating Assessment Tool (EAT-10)   0=No problem, 1=Mild problem, 2=Mild to moderate problem, 3=Moderate problem, 4=Severe problem    EAT 10  My swallowing problem has caused me to lose weight.: 0  Swallowing liquids takes extra effort.: 1  Swallowing is painful: 0  Swallowing is stressful: 1  Note this patient could complete 4/10 questions due to having a PEG for nutrition.    A total score of 3 or above may indicate difficulty with swallowing safely and/or efficiently      Rosenbek's Penetration Aspiration Scale  Thin Liquids: 7. OVERT ASPIRATION, material is not cleared - contrast passes glottis, visible residue, W/pt response  Nectar Thick Liquids: 5. DEEP PENETRATION with HIGH ASPIRATION risk - contrast contacts vocal cords, visible residue  Puree: 1. NO ASPIRATION & NO PENETRATION - no aspiration, contrast does not enter airway

## 2024-10-03 ENCOUNTER — HOME CARE VISIT (OUTPATIENT)
Dept: HOME HEALTH SERVICES | Facility: HOME HEALTH | Age: 34
End: 2024-10-03
Payer: COMMERCIAL

## 2024-10-03 VITALS
OXYGEN SATURATION: 98 % | SYSTOLIC BLOOD PRESSURE: 90 MMHG | TEMPERATURE: 97.6 F | RESPIRATION RATE: 18 BRPM | HEART RATE: 94 BPM | DIASTOLIC BLOOD PRESSURE: 50 MMHG

## 2024-10-03 DIAGNOSIS — D36.10 SCHWANNOMA: Primary | ICD-10-CM

## 2024-10-03 DIAGNOSIS — R27.0 ATAXIA: ICD-10-CM

## 2024-10-03 DIAGNOSIS — Z98.890 HX OF CRANIOTOMY: ICD-10-CM

## 2024-10-03 PROCEDURE — G0157 HHC PT ASSISTANT EA 15: HCPCS | Mod: CQ

## 2024-10-03 PROCEDURE — G0153 HHCP-SVS OF S/L PATH,EA 15MN: HCPCS

## 2024-10-03 PROCEDURE — G0158 HHC OT ASSISTANT EA 15: HCPCS | Mod: CO

## 2024-10-03 ASSESSMENT — ENCOUNTER SYMPTOMS
DENIES PAIN: 1
DENIES PAIN: 1
PERSON REPORTING PAIN: PATIENT
PERSON REPORTING PAIN: PATIENT

## 2024-10-03 NOTE — CASE COMMUNICATION
Patient requesting d/c for homecare and referral for outpatient PT , OT and SP . She will go to [facility] . Thank you inadvance

## 2024-10-04 ENCOUNTER — HOME CARE VISIT (OUTPATIENT)
Dept: HOME HEALTH SERVICES | Facility: HOME HEALTH | Age: 34
End: 2024-10-04
Payer: COMMERCIAL

## 2024-10-04 VITALS
SYSTOLIC BLOOD PRESSURE: 90 MMHG | TEMPERATURE: 98.8 F | HEART RATE: 107 BPM | DIASTOLIC BLOOD PRESSURE: 70 MMHG | OXYGEN SATURATION: 98 %

## 2024-10-04 PROCEDURE — G0151 HHCP-SERV OF PT,EA 15 MIN: HCPCS

## 2024-10-04 ASSESSMENT — GAIT ASSESSMENTS
INITIATION OF GAIT IMMEDIATELY AFTER GO: 1 - NO HESITANCY
TRUNK SCORE: 2
BALANCE AND GAIT SCORE: 26
GAIT SCORE: 11
TRUNK: 2 - NO SWAY, NO FLEXION, NO USE OF ARMS, NO WALKING AID
PATH: 1 - MILD/MODERATE DEVIATION OR USES WALKING AID
PATH SCORE: 1
WALKING STANCE: 1 - HEELS ALMOST TOUCHING WHILE WALKING
STEP CONTINUITY: 1 - STEPS APPEAR CONTINUOUS
STEP SYMMETRY: 1 - RIGHT AND LEFT STEP LENGTH APPEAR EQUAL

## 2024-10-04 ASSESSMENT — BALANCE ASSESSMENTS
NUDGED: 1 - STAGGERS, GRABS, CATCHES SELF
TURNING 360 DEGREES STEPS: 1 - CONTINUOUS STEPS
ATTEMPTS TO ARISE: 2 - ABLE TO RISE, ONE ATTEMPT
NUDGED SCORE: 1
SITTING DOWN: 2 - SAFE, SMOOTH MOTION
SITTING BALANCE: 1 - STEADY, SAFE
STANDING BALANCE: 2 - NARROW STANCE WITHOUT SUPPORT
ARISING SCORE: 2
EYES CLOSED AT MAXIMUM POSITION NUDGED: 1 - STEADY
IMMEDIATE STANDING BALANCE FIRST 5 SECONDS: 2 - STEADY WITHOUT WALKER OR OTHER SUPPORT
ARISES: 2 - ABLE WITHOUT USING ARMS
BALANCE SCORE: 15

## 2024-10-04 ASSESSMENT — ENCOUNTER SYMPTOMS
PERSON REPORTING PAIN: PATIENT
DENIES PAIN: 1

## 2024-10-06 VITALS
DIASTOLIC BLOOD PRESSURE: 62 MMHG | SYSTOLIC BLOOD PRESSURE: 118 MMHG | OXYGEN SATURATION: 99 % | TEMPERATURE: 96.3 F | RESPIRATION RATE: 17 BRPM

## 2024-10-06 ASSESSMENT — ACTIVITIES OF DAILY LIVING (ADL)
CURRENT_FUNCTION: INDEPENDENT
PHYSICAL TRANSFERS ASSESSED: 1
AMBULATION ASSISTANCE: INDEPENDENT
AMBULATION ASSISTANCE: 1

## 2024-10-07 ENCOUNTER — HOME CARE VISIT (OUTPATIENT)
Dept: HOME HEALTH SERVICES | Facility: HOME HEALTH | Age: 34
End: 2024-10-07
Payer: COMMERCIAL

## 2024-10-07 VITALS — OXYGEN SATURATION: 97 % | HEART RATE: 81 BPM | SYSTOLIC BLOOD PRESSURE: 97 MMHG | DIASTOLIC BLOOD PRESSURE: 64 MMHG

## 2024-10-07 PROCEDURE — G0270 MNT SUBS TX FOR CHANGE DX: HCPCS

## 2024-10-07 PROCEDURE — G0153 HHCP-SVS OF S/L PATH,EA 15MN: HCPCS

## 2024-10-07 ASSESSMENT — ENCOUNTER SYMPTOMS
DENIES PAIN: 1
PERSON REPORTING PAIN: PATIENT

## 2024-10-08 ENCOUNTER — HOME CARE VISIT (OUTPATIENT)
Dept: HOME HEALTH SERVICES | Facility: HOME HEALTH | Age: 34
End: 2024-10-08
Payer: COMMERCIAL

## 2024-10-08 ENCOUNTER — TELEPHONE (OUTPATIENT)
Dept: PRIMARY CARE | Facility: CLINIC | Age: 34
End: 2024-10-08
Payer: COMMERCIAL

## 2024-10-08 ENCOUNTER — APPOINTMENT (OUTPATIENT)
Dept: GASTROENTEROLOGY | Facility: HOSPITAL | Age: 34
End: 2024-10-08
Payer: COMMERCIAL

## 2024-10-08 DIAGNOSIS — Z98.890 HX OF CRANIOTOMY: ICD-10-CM

## 2024-10-08 DIAGNOSIS — R13.10 DYSPHAGIA, UNSPECIFIED TYPE: Primary | ICD-10-CM

## 2024-10-08 DIAGNOSIS — D36.10 SCHWANNOMA: ICD-10-CM

## 2024-10-08 PROCEDURE — G0158 HHC OT ASSISTANT EA 15: HCPCS | Mod: CO

## 2024-10-08 NOTE — PROGRESS NOTES
Patient: Denae Nolasco   MRN: [id number] YOB: 1990   Sex: female Age: 34 y.o.  Date of Service: 10/9/2024       ASSESSMENT AND PLAN  I discussed the findings with Denae Nolasco and have recommended the followin. 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 Voice is improving. Laryngoscopy today with bilateral vocal folds with severe atrophy and there is now more movement left > right. She is getting touch hourglass closure Tolerates finger occlusion. Capped today.  - Follow-up 2 weeks to consider decannulation    2. Dysphagia, suspect high vagal injury with multilevel swallowing deficits. High aspiration risk, tracheotomy in place. I recommend that she keep the tracheotomy in place for now given her high risk for aspiration  - Meticulous oral care TID  - Start SLP swallow therapy, Stokes Free Water Protocol  - Continue alternate forms of nutrition    3. Peristomal cellulitis of neck, improved after Keflex  - Continue to monitor    4. Pain at PEG site, no obvious swelling or erythema  - Referral place to GI      CHIEF COMPLAINT  Chief Complaint   Patient presents with    Follow-up       HISTORY OF PRESENT ILLNESS  Denae Nolasco is a 34 y.o. female referred for evaluation of bilateral vocal fold paralysis and tracheotomy care.  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 Previously admitted to [facility] on 2024 due to ongoing functional deficits, now home since 24.    10/9/24  She reports her voice is improving. She is wearing her PMV about 7-8 hours throughout the day, then she will get into a coughing fit and takes the valve off.     24  They are working with a PMV valve and feel like her voice is a little stronger. She is home since last  "Friday. They are working on getting home SLP therapy    24  She has a #6 cuffless Shiley in place. She denies difficulty breathing. She has not trialed a speaking valve or capping. She is getting all her nutrition through PEG. Her voice is still very weak.      ADDITIONAL HISTORY  Past Medical History  She has a past medical history of Depression and Visual impairment. Surgical History  She has no past surgical history on file.   Social History  She reports that she has never smoked. She has never used smokeless tobacco. She reports that she does not currently use drugs after having used the following drugs: Marijuana. She reports that she does not drink alcohol. Allergies  Patient has no known allergies.     Family History  Family History   Problem Relation Name Age of Onset    Blood clot Mother Delmis Mantilla-  of clot 2016      labor Sister Megan         REVIEW OF SYSTEMS  All 10 systems were reviewed and negative except for above.      PHYSICAL EXAM  ENT Physical Exam   GENERAL: Thin young woman, aphonic, response to commands  RESPIRATORY: Breathing quietly, no stridor or difficulty breathing with finger occlusion  HEAD: Normocephalic atraumatic  FACE: Asymmetric left HB 6/6 no masses or lesions  EYES:  Left eye s/p tarsorrhaphy   EARS:  Pinnae normal.   NOSE:  No anterior lesions, masses or polyps.  ORAL CAVITY/OROPHARYNX:  Buccal mucosa is dry with, crusting along palate, tongue deviated and palate elevates asymmetrically.  NECK:  Soft. #6 cuffless Shiley in place, thickened mucoid drainage on drain sponge, mild granulation, no erythema     Last Recorded Vitals  Temperature 36.5 °C (97.7 °F), height 1.575 m (5' 2\"), weight 53.6 kg (118 lb 3.2 oz).    RESULTS    Patient Reported Outcome Measures  N/A    Laboratory, Radiology, and Pathology  I personally reviewed the following results, with the following interpretation:   WBC 24 13.8    MBS 10/2/24  Impaired epiglottic motion, aspiration " during the swallow    PROCEDURES    Flexible Fiberoptic Laryngoscopy with Stroboscopy    Patient failed a mirror exam due to limitations of equipment and the need for stroboscopy to assess glottic vibration and closure.     PREOPERATIVE DIAGNOSIS: Dysphonia    POSTOPERATIVE DIAGNOSIS: Same    PROCEDURE:  Strobovideolaryngoscopy    ANESTHESIA:  Topical    COMPLICATIONS:  None    SPECIMENS:  None    PROCEDURE IN DETAIL: The patient was seated in an upright position.  The nasal cavity was topically decongested and anesthetized.  The stroboscopic microphone was held to the neck at the level of the larynx.  The distal chip video laryngoscope was passed through the nasal cavity.  The nasal cavity and nasopharynx were within normal limits except noted below.  The following findings on stroboscopy were noted:      Tongue Base: no masses or lesions   Vocal Fold Mobility               Right VF: mobile               Left VF: mobile   TVF Appearance               Edema/Erythema: none               Lesions/vibratory margin irregularities: atrophy   Glottic Closure Pattern: hourglass    Vibration:               Phase: symmetric    Periodicity: regular                Amplitude: normal                Waveform: normal     Muscle Tension Patterns:  mild AP   Other Findings: pooled secretions throughout    The patient tolerated the procedure well.         ----------------------------------------------------------------------  Smiley Craft MD, MAEd    [facility]  Department of Otolaryngology - Head and Neck Surgery  [facility]    The total time I spent in care of this patient today (excluding time spent on other billable services) is as follows:    Time Spent  Prep time on day of patient encounter: 10 minutes  Time spent directly with patient, family or caregiver: 20 minutes  Additional Time Spent on Patient Care Activities: 5 minutes  Documentation Time:  10 minutes  Other Time Spent: 0 minutes  Total: 45 minutes

## 2024-10-09 ENCOUNTER — OFFICE VISIT (OUTPATIENT)
Dept: OTOLARYNGOLOGY | Facility: HOSPITAL | Age: 34
End: 2024-10-09
Payer: COMMERCIAL

## 2024-10-09 VITALS — TEMPERATURE: 97.7 F | HEIGHT: 62 IN | WEIGHT: 118.2 LBS | BODY MASS INDEX: 21.75 KG/M2

## 2024-10-09 DIAGNOSIS — J38.02 BILATERAL VOCAL FOLD PARALYSIS: ICD-10-CM

## 2024-10-09 DIAGNOSIS — Z43.0 ATTENTION TO TRACHEOSTOMY (MULTI): ICD-10-CM

## 2024-10-09 DIAGNOSIS — R13.10 DYSPHAGIA, UNSPECIFIED TYPE: ICD-10-CM

## 2024-10-09 DIAGNOSIS — D36.10 SCHWANNOMA: ICD-10-CM

## 2024-10-09 DIAGNOSIS — R49.1 APHONIA: Primary | ICD-10-CM

## 2024-10-09 DIAGNOSIS — G51.0 FACIAL PARALYSIS: ICD-10-CM

## 2024-10-09 PROCEDURE — 31579 LARYNGOSCOPY TELESCOPIC: CPT | Performed by: OTOLARYNGOLOGY

## 2024-10-09 PROCEDURE — 99215 OFFICE O/P EST HI 40 MIN: CPT | Performed by: OTOLARYNGOLOGY

## 2024-10-09 PROCEDURE — 3008F BODY MASS INDEX DOCD: CPT | Performed by: OTOLARYNGOLOGY

## 2024-10-09 ASSESSMENT — PATIENT HEALTH QUESTIONNAIRE - PHQ9
SUM OF ALL RESPONSES TO PHQ9 QUESTIONS 1 AND 2: 0
2. FEELING DOWN, DEPRESSED OR HOPELESS: NOT AT ALL
1. LITTLE INTEREST OR PLEASURE IN DOING THINGS: NOT AT ALL

## 2024-10-09 ASSESSMENT — PAIN SCALES - GENERAL: PAINLEVEL: 0-NO PAIN

## 2024-10-10 ENCOUNTER — HOME CARE VISIT (OUTPATIENT)
Dept: HOME HEALTH SERVICES | Facility: HOME HEALTH | Age: 34
End: 2024-10-10
Payer: COMMERCIAL

## 2024-10-10 VITALS — DIASTOLIC BLOOD PRESSURE: 63 MMHG | SYSTOLIC BLOOD PRESSURE: 95 MMHG | HEART RATE: 92 BPM | OXYGEN SATURATION: 97 %

## 2024-10-10 VITALS — SYSTOLIC BLOOD PRESSURE: 95 MMHG | RESPIRATION RATE: 18 BRPM | DIASTOLIC BLOOD PRESSURE: 66 MMHG

## 2024-10-10 PROCEDURE — G0153 HHCP-SVS OF S/L PATH,EA 15MN: HCPCS

## 2024-10-10 PROCEDURE — G0158 HHC OT ASSISTANT EA 15: HCPCS | Mod: CO

## 2024-10-10 ASSESSMENT — ENCOUNTER SYMPTOMS
PERSON REPORTING PAIN: PATIENT
DENIES PAIN: 1

## 2024-10-14 ENCOUNTER — HOME CARE VISIT (OUTPATIENT)
Dept: HOME HEALTH SERVICES | Facility: HOME HEALTH | Age: 34
End: 2024-10-14
Payer: COMMERCIAL

## 2024-10-14 VITALS — HEART RATE: 84 BPM | SYSTOLIC BLOOD PRESSURE: 112 MMHG | DIASTOLIC BLOOD PRESSURE: 67 MMHG | OXYGEN SATURATION: 96 %

## 2024-10-14 PROCEDURE — G0153 HHCP-SVS OF S/L PATH,EA 15MN: HCPCS

## 2024-10-14 PROCEDURE — G0158 HHC OT ASSISTANT EA 15: HCPCS | Mod: CO

## 2024-10-14 ASSESSMENT — ENCOUNTER SYMPTOMS
DENIES PAIN: 1
PERSON REPORTING PAIN: PATIENT

## 2024-10-15 VITALS
SYSTOLIC BLOOD PRESSURE: 116 MMHG | RESPIRATION RATE: 19 BRPM | OXYGEN SATURATION: 99 % | DIASTOLIC BLOOD PRESSURE: 65 MMHG

## 2024-10-15 ASSESSMENT — ACTIVITIES OF DAILY LIVING (ADL)
DRESSING_UB_CURRENT_FUNCTION: INDEPENDENT
DRESSING_LB_CURRENT_FUNCTION: INDEPENDENT
AMBULATION ASSISTANCE: INDEPENDENT
TOILETING: 1
CURRENT_FUNCTION: INDEPENDENT
PHYSICAL TRANSFERS ASSESSED: 1
TOILETING: INDEPENDENT
AMBULATION ASSISTANCE: 1

## 2024-10-17 ENCOUNTER — HOME CARE VISIT (OUTPATIENT)
Dept: HOME HEALTH SERVICES | Facility: HOME HEALTH | Age: 34
End: 2024-10-17
Payer: COMMERCIAL

## 2024-10-17 VITALS — HEART RATE: 86 BPM | DIASTOLIC BLOOD PRESSURE: 74 MMHG | OXYGEN SATURATION: 97 % | SYSTOLIC BLOOD PRESSURE: 105 MMHG

## 2024-10-17 PROCEDURE — G0153 HHCP-SVS OF S/L PATH,EA 15MN: HCPCS

## 2024-10-17 PROCEDURE — G0158 HHC OT ASSISTANT EA 15: HCPCS | Mod: CO

## 2024-10-17 ASSESSMENT — ENCOUNTER SYMPTOMS
PERSON REPORTING PAIN: PATIENT
DENIES PAIN: 1

## 2024-10-19 VITALS — DIASTOLIC BLOOD PRESSURE: 60 MMHG | OXYGEN SATURATION: 99 % | SYSTOLIC BLOOD PRESSURE: 112 MMHG

## 2024-10-21 ENCOUNTER — HOME CARE VISIT (OUTPATIENT)
Dept: HOME HEALTH SERVICES | Facility: HOME HEALTH | Age: 34
End: 2024-10-21
Payer: COMMERCIAL

## 2024-10-21 PROCEDURE — G0152 HHCP-SERV OF OT,EA 15 MIN: HCPCS

## 2024-10-21 ASSESSMENT — PAIN SCALES - PAIN ASSESSMENT IN ADVANCED DEMENTIA (PAINAD)
NEGVOCALIZATION: 0 - NONE.
FACIALEXPRESSION: 0
FACIALEXPRESSION: 0 - SMILING OR INEXPRESSIVE.
NEGVOCALIZATION: 0
TOTALSCORE: 0
CONSOLABILITY: 0 - NO NEED TO CONSOLE.
CONSOLABILITY: 0
BODYLANGUAGE: 0 - RELAXED.
BODYLANGUAGE: 0
BREATHING: 0

## 2024-10-21 ASSESSMENT — ACTIVITIES OF DAILY LIVING (ADL)
HOME_HEALTH_OASIS: 00
OASIS_M1830: 00

## 2024-10-21 ASSESSMENT — ENCOUNTER SYMPTOMS
DENIES PAIN: 1
PERSON REPORTING PAIN: PATIENT

## 2024-10-23 ENCOUNTER — OFFICE VISIT (OUTPATIENT)
Dept: OTOLARYNGOLOGY | Facility: HOSPITAL | Age: 34
End: 2024-10-23
Payer: COMMERCIAL

## 2024-10-23 ENCOUNTER — EVALUATION (OUTPATIENT)
Dept: PHYSICAL THERAPY | Facility: CLINIC | Age: 34
End: 2024-10-23
Payer: COMMERCIAL

## 2024-10-23 VITALS — WEIGHT: 116.3 LBS | HEIGHT: 62 IN | BODY MASS INDEX: 21.4 KG/M2

## 2024-10-23 DIAGNOSIS — R49.1 APHONIA: ICD-10-CM

## 2024-10-23 DIAGNOSIS — Z98.890 HX OF CRANIOTOMY: ICD-10-CM

## 2024-10-23 DIAGNOSIS — R26.81 UNSTEADINESS ON FEET: ICD-10-CM

## 2024-10-23 DIAGNOSIS — J38.02 BILATERAL VOCAL FOLD PARALYSIS: ICD-10-CM

## 2024-10-23 DIAGNOSIS — R27.0 ATAXIA: Primary | ICD-10-CM

## 2024-10-23 DIAGNOSIS — D36.10 SCHWANNOMA: ICD-10-CM

## 2024-10-23 DIAGNOSIS — R13.10 DYSPHAGIA, UNSPECIFIED TYPE: ICD-10-CM

## 2024-10-23 DIAGNOSIS — G51.0 FACIAL PARALYSIS: ICD-10-CM

## 2024-10-23 DIAGNOSIS — Z43.0 ATTENTION TO TRACHEOSTOMY (MULTI): Primary | ICD-10-CM

## 2024-10-23 PROCEDURE — 31575 DIAGNOSTIC LARYNGOSCOPY: CPT | Performed by: OTOLARYNGOLOGY

## 2024-10-23 PROCEDURE — 99215 OFFICE O/P EST HI 40 MIN: CPT | Performed by: OTOLARYNGOLOGY

## 2024-10-23 PROCEDURE — 97163 PT EVAL HIGH COMPLEX 45 MIN: CPT | Mod: GP | Performed by: PHYSICAL THERAPIST

## 2024-10-23 PROCEDURE — 1036F TOBACCO NON-USER: CPT | Performed by: OTOLARYNGOLOGY

## 2024-10-23 PROCEDURE — 3008F BODY MASS INDEX DOCD: CPT | Performed by: OTOLARYNGOLOGY

## 2024-10-23 SDOH — ECONOMIC STABILITY: GENERAL: QUALITY OF LIFE: FAIR

## 2024-10-23 ASSESSMENT — ENCOUNTER SYMPTOMS
PAIN SCALE AT LOWEST: 0
PAIN SCALE AT HIGHEST: 0
PAIN SCALE: 0

## 2024-10-23 ASSESSMENT — PAIN SCALES - GENERAL: PAINLEVEL_OUTOF10: 0-NO PAIN

## 2024-10-23 ASSESSMENT — ACTIVITIES OF DAILY LIVING (ADL)
AMBULATION_WITH_ASSISTIVE_DEVICE: INDEPENDENT
POOR_BALANCE: 1

## 2024-10-23 NOTE — PROGRESS NOTES
Physical Therapy Evaluation and Treatment     Patient Name: Denae oNlasco  MRN: [id number]  Encounter date: 10/23/2024  Time Calculation  Start Time: 1716  Stop Time: 1756  Time Calculation (min): 40 min  PT Evaluation Time Entry  PT Evaluation (Complex) Time Entry: 40  High complexity due to patient's clinical presentation being unstable, with evolving and unpredictable characteristics, with comorbidities/complexities to include L vestibular schwannoma with craniotomy; ataxia; R UE weakness; L facial paralysis, all of which may negatively impact rehab tolerance and progression.     Visit # 1 of 20  Visits/Dates Authorized: NO AUTH / $6100 DEDUCT - MET / $7500 OOP - MET / 100% COVERAGE thru 12/31/24 / On 1/1/25, 60% coins needs reverified / 20V - 0 USED   Insurance Type: Payor: MEDICAL [employer] [facility] / Plan: [employer]ICAL [employer] / Product Type: *No Product type* /     Current Problem:   Problem List Items Addressed This Visit             ICD-10-CM    Schwannoma D36.10    Relevant Orders    Follow Up In Physical Therapy     Other Visit Diagnoses         Codes    Ataxia    -  Primary R27.0    Relevant Orders    Follow Up In Physical Therapy    Hx of craniotomy     Z98.890    Relevant Orders    Follow Up In Physical Therapy    Unsteadiness on feet     R26.81    Relevant Orders    Follow Up In Physical Therapy          Precautions:  Precautions  Precautions Comment: Falls       Subjective    Subjective Evaluation    History of Present Illness  Mechanism of injury: Pt presents to therapy following craniotomy due to L vestibular schwannoma on 8/5/24. Pt was in the hospital for a while, went to acute rehab, home health PT reports everything is going well. She has return to prior level of walking without any problem, does do 2 mile walk outside in park without any issue other than endurance. Pt and family report her balance is better now than prior to surgery but she is still stumbling. She use to bike but she hasn't  gotten back to it due to peg tube. She reports that she will only get dizziness when she turns her head. She has lost motion of R arm during surgery and is still having problems with it, this is her main focus.     Quality of life: fair    Pain  Current pain ratin  At best pain ratin  At worst pain ratin    Social Support  Lives in: multiple-level home (Railing B on stairs)  Lives with: spouse    Hand dominance: right    Treatments  Discharged from (in last 30 days): inpatient hospitalization, skilled nursing facility and home health care  Patient Goals  Patient goals for therapy: increased strength, improved balance, return to sport/leisure activities, increased motion and return to work  Patient goal: Bike and hike without problem         Objective      Objective     Cervical/Thoracic Screen   Cervical range of motion within normal limits with the following exceptions: Will perform cervical screen at later date due to trach removal earlier this date     Active Range of Motion     Right Shoulder   Flexion: 75 (scapular elevation and protraction compensation) degrees   Extension: WFL  Abduction: 68 degrees   External rotation 0°: 35 degrees   Internal rotation 0°: WFL    Scapular Mobility     Right Shoulder   Scapular Mobility with Shoulder to 90° FF   Scapular winging: moderate  Scapular elevation: moderate    Strength/Myotome Testing     Right Shoulder     Planes of Motion   Flexion: 3-   Abduction: 3-   External rotation at 0°: 3-   Internal rotation at 0°: 3+     Ambulation     Ambulation: Level Surfaces   Ambulation with assistive device: independent    Ambulation: Stairs   Ascend stairs: independent  Pattern: reciprocal  Railings: two rails  Descend stairs: independent  Pattern: reciprocal  Railings: two rails    Additional Stairs Ambulation Details  Ataxic movement with reduced concentric and eccentric control     Observational Gait   Decreased walking speed and stride length.     Additional  Observational Gait Details  Intermittent ataxic movements     Functional Assessment     Comments  Balance:  Romberg - firm surface EC/EO WNL; foam surface EO/EC WNL  Tandem - scissoring with reduced control requiring min A  SLS - unable to perform R LE; L LE grossly WFL     Neuro Oculomotor:  Range of Motion: Normal  Smooth Pursuit: Abnormal (noted nysatgmus no symptom report)  Horizontal Saccades: Abnormal (saccadic movement with L; no symptoms)  Vertical Saccades: Normal  Eye Alignment : Normal  Neuro Vestibular:  Horizontal VOR: Positive (R nystagmus due to L hypofunction)  Vertical VOR: Positive (nystagmus however no symptoms)    Outcome Measures:  Other Measures  Activities - Specific Balance Confidence Scale: 40%  Dynamic Gait Index (DGI): 8/12 (imbalance with head nods/turns)     Treatments:  Therapeutic Activity  Therapeutic Activity Performed: Yes  Therapeutic Activity 1: Educated on anatomy in relationt o findigns and surgery within therapy limits    HEP / Access Codes: Will provide after first treatment session     Assessment   Assessment & Plan     Assessment  Impairments: abnormal coordination, abnormal gait, abnormal muscle firing, abnormal muscle tone, abnormal or restricted ROM, activity intolerance, impaired balance, impaired physical strength and lacks appropriate home exercise program  Assessment details: Pt is a 34 y.o female presenting to therapy s/p craniotomy due to L vestibular schwannoma. Pt demonstrated good gait mechanics however slight ataxia especially during higher level stair negotiation and balance tasks with overshooting. Pt demonstrated continued abnormal findings of neuro-ocular and vestibular testing however this is not outside normal findings following recent surgery. Despite minimal symptom reproduction there was noted deficits with higher level balance and vestibular tasks. Also significant limitation with R GH mobility demonstrated reduction throughout ROM and strength with  poor postural and scapular control. Pt would benefit from extensive skilled physical therapy in order to promote reduce fall risk, normalize gait and stair negotiation as well as achieve full R GH mobility for daily tasks, return to work and prevent further functional decline.   Barriers to therapy: chronic deficit due to recent surgery  Prognosis: good    Goals  Bike and hike without problem    Plan  Therapy options: will be seen for skilled physical therapy services  Planned therapy interventions: abdominal trunk stabilization, manual therapy, ADL retraining, motor coordination training, balance/weight-bearing training, bed mobility training, neuromuscular re-education, muscle pump exercises, body mechanics training, postural training, fine motor coordination training, soft tissue mobilization, flexibility, functional ROM exercises, strengthening, stretching, gait training, home exercise program, therapeutic activities, joint mobilization and transfer training  Frequency: 2x week  Duration in visits: 20  Duration in weeks: 10  Treatment plan discussed with: patient and family         Goals:   Active       LTG       Pt will be 100% IND with HEP in 10 weeks in order to maintain progress with therapy.         Start:  10/23/24    Expected End:  12/31/24            Pt will improve DGI score to 24/24 in 10 weeks in order to reduce fall risk.        Start:  10/23/24    Expected End:  12/31/24            Pt will demonstrated improve AROM of R GH as follows: flexion 150, abduction 150, ER functional C7, IR functional T8 in 10 weeks to improve mobility required for dressing, bathing, cooking and cleaning.        Start:  10/23/24    Expected End:  12/31/24            Pt will improve R GH strength to 5/5 in 10 weeks in order to improve strength required to lift objects at home including groceries and to improve cleaning tasks.         Start:  10/23/24    Expected End:  12/31/24            Pt will demonstrate subjective  improvement of ADLs and recreational activities through improved score of 100 on ABC in 10 weeks for return to prior level of function and work tasks without fall risk.        Start:  10/23/24    Expected End:  12/31/24               STG       Pt will be 50% IND with HEP in 5 weeks in order to progress with therapy.        Start:  10/23/24    Expected End:  12/02/24            Pt will improve DGI score to 12/12 in 5 weeks in order to reduce fall risk.        Start:  10/23/24    Expected End:  12/02/24            Pt will be able to perform ascending reciprocal pattern of 12 steps with use of 1 rail in 5 weeks for home and community ambulation needs.         Start:  10/23/24    Expected End:  12/02/24            Pt will be able to ambulate in outdoor environment without imbalance in 5 weeks for recreational activities.       Start:  10/23/24    Expected End:  12/02/24            Pt will demonstrate subjective improvement of ADLs and recreational activities through improved score of 55 on ABC in 5 weeks for reduce fall risk.        Start:  10/23/24    Expected End:  12/02/24

## 2024-10-28 ENCOUNTER — EVALUATION (OUTPATIENT)
Dept: SPEECH THERAPY | Facility: HOSPITAL | Age: 34
End: 2024-10-28
Payer: COMMERCIAL

## 2024-10-28 DIAGNOSIS — R13.12 OROPHARYNGEAL DYSPHAGIA: ICD-10-CM

## 2024-10-28 DIAGNOSIS — R13.10 DYSPHAGIA, UNSPECIFIED TYPE: ICD-10-CM

## 2024-10-28 DIAGNOSIS — R49.0 DYSPHONIA: Primary | ICD-10-CM

## 2024-10-28 DIAGNOSIS — Z98.890 HX OF CRANIOTOMY: ICD-10-CM

## 2024-10-28 DIAGNOSIS — D36.10 SCHWANNOMA: ICD-10-CM

## 2024-10-28 PROCEDURE — 92610 EVALUATE SWALLOWING FUNCTION: CPT | Mod: GN

## 2024-10-28 PROCEDURE — 92524 BEHAVRAL QUALIT ANALYS VOICE: CPT | Mod: GN

## 2024-10-28 ASSESSMENT — ENCOUNTER SYMPTOMS
DEPRESSION: 0
OCCASIONAL FEELINGS OF UNSTEADINESS: 0
LOSS OF SENSATION IN FEET: 0

## 2024-10-29 ENCOUNTER — APPOINTMENT (OUTPATIENT)
Dept: SPEECH THERAPY | Facility: HOSPITAL | Age: 34
End: 2024-10-29
Payer: COMMERCIAL

## 2024-11-06 ENCOUNTER — APPOINTMENT (OUTPATIENT)
Dept: OPHTHALMOLOGY | Facility: CLINIC | Age: 34
End: 2024-11-06
Payer: COMMERCIAL

## 2024-11-06 ENCOUNTER — TREATMENT (OUTPATIENT)
Dept: PHYSICAL THERAPY | Facility: CLINIC | Age: 34
End: 2024-11-06
Payer: COMMERCIAL

## 2024-11-06 DIAGNOSIS — H18.9 EXPOSURE KERATOPATHY: ICD-10-CM

## 2024-11-06 DIAGNOSIS — H02.23B PARALYTIC LAGOPHTHALMOS OF UPPER AND LOWER EYELID OF LEFT EYE: Primary | ICD-10-CM

## 2024-11-06 DIAGNOSIS — Z98.890 HX OF CRANIOTOMY: ICD-10-CM

## 2024-11-06 DIAGNOSIS — D36.10 SCHWANNOMA: ICD-10-CM

## 2024-11-06 DIAGNOSIS — R26.81 UNSTEADINESS ON FEET: ICD-10-CM

## 2024-11-06 DIAGNOSIS — R27.0 ATAXIA: ICD-10-CM

## 2024-11-06 PROCEDURE — 99212 OFFICE O/P EST SF 10 MIN: CPT | Performed by: OPHTHALMOLOGY

## 2024-11-06 PROCEDURE — 97110 THERAPEUTIC EXERCISES: CPT | Mod: GP,CQ

## 2024-11-06 ASSESSMENT — ENCOUNTER SYMPTOMS
EYES NEGATIVE: 0
RESPIRATORY NEGATIVE: 0
ENDOCRINE NEGATIVE: 0
HEMATOLOGIC/LYMPHATIC NEGATIVE: 0
PSYCHIATRIC NEGATIVE: 0
CARDIOVASCULAR NEGATIVE: 0
ALLERGIC/IMMUNOLOGIC NEGATIVE: 0
GASTROINTESTINAL NEGATIVE: 0
NEUROLOGICAL NEGATIVE: 1
MUSCULOSKELETAL NEGATIVE: 0
CONSTITUTIONAL NEGATIVE: 0

## 2024-11-06 ASSESSMENT — REFRACTION_WEARINGRX
OD_CYLINDER: -2.50
OD_SPHERE: -0.75
OS_SPHERE: -0.25
OS_AXIS: 053
OD_AXIS: 092
OS_CYLINDER: -3.00

## 2024-11-06 ASSESSMENT — VISUAL ACUITY
OD_CC: 20/50
OD_PH_CC: 20/40
OD_CC+: +2
OS_CC: CF AT 3'
METHOD: SNELLEN - LINEAR
CORRECTION_TYPE: GLASSES

## 2024-11-06 ASSESSMENT — PATIENT HEALTH QUESTIONNAIRE - PHQ9
2. FEELING DOWN, DEPRESSED OR HOPELESS: NOT AT ALL
1. LITTLE INTEREST OR PLEASURE IN DOING THINGS: NOT AT ALL
SUM OF ALL RESPONSES TO PHQ9 QUESTIONS 1 AND 2: 0

## 2024-11-06 ASSESSMENT — EXTERNAL EXAM - RIGHT EYE: OD_EXAM: NORMAL

## 2024-11-06 ASSESSMENT — SLIT LAMP EXAM - LIDS: COMMENTS: NORMAL

## 2024-11-06 ASSESSMENT — PAIN - FUNCTIONAL ASSESSMENT: PAIN_FUNCTIONAL_ASSESSMENT: 0-10

## 2024-11-06 ASSESSMENT — PAIN SCALES - GENERAL
PAINLEVEL_OUTOF10: 2
PAINLEVEL_OUTOF10: 0-NO PAIN

## 2024-11-06 ASSESSMENT — EXTERNAL EXAM - LEFT EYE: OS_EXAM: NORMAL

## 2024-11-06 NOTE — ASSESSMENT & PLAN NOTE
Seems like small gradual improvements over time in lid position. Advised would recommend more time to allow healing but there is likely a date in the near future where returns will be diminishing. Will ask for oculoplastics consult if unable to maintain ocular surface with a less aggressive lubrication regimen.

## 2024-11-06 NOTE — PATIENT INSTRUCTIONS
Thank you so much for choosing me to provide your care today!    If you were dilated your vision may remain blurry   or light sensitive for several hours.    The nature of eye and vision problems can require frequent follow up, please make every effort to adhere to any future appointments.    If you have any issues, questions, or concerns,   please do not hesitate to reach out.    If you receive a survey in regards to your care today, please mention any exceptional care my office staff and/or technicians provided.    You can reach our office at this number:    [phone]    Please consider signing up for and utilizing [employer]!  This is the best way to directly reach me or other  providers

## 2024-11-06 NOTE — ASSESSMENT & PLAN NOTE
Well covered with ointment and no signs of exposure or inflammation today. Will have continue with current lubrication/ointment regimen and see back in 6 weeks.

## 2024-11-06 NOTE — PROGRESS NOTES
"Physical Therapy Treatment    Patient Name: Denae Nolasco  MRN: [id number]  Today's Date: 11/6/2024  Time Calculation  Start Time: 0915  Stop Time: 1000  Time Calculation (min): 45 min  PT Therapeutic Procedures Time Entry  Therapeutic Exercise Time Entry: 45    Insurance:  Visit number: 2 of 20  Authorization info: NO AUTH / $6100 DEDUCT - MET / $7500 OOP - MET / 100% COVERAGE thru 12/31/24 / On 1/1/25, 60% coins needs reverified / 20V - 0 USED   Insurance Type: Payor: MEDICAL [employer] [facility] / Plan: [employer] / Product Type: *No Product type* /     Current Problem   1. Schwannoma  Follow Up In Physical Therapy      2. Hx of craniotomy  Follow Up In Physical Therapy      3. Ataxia  Follow Up In Physical Therapy      4. Unsteadiness on feet  Follow Up In Physical Therapy          Subjective   General    Patient reports no significant changes since evaluation. Reports she feels her balance is \"a little better\" and reports her R arm \"doesn't want to move\". Reports she walked 5 miles the other day and was tired but \"felt good\". Is right handed and is having difficulty raising her right shoulder with pain.     Precautions:   Falls     Pain   Pain Assessment: 0-10  0-10 (Numeric) Pain Score: 2  Pain Type: Acute pain  Pain Location: Shoulder  Pain Orientation: Right    Post Treatment Pain Level   No change    Objective   Weakness to scapular retractors with fatigue present    Shakiness in quadruped to bilat arms indicating instability and muscle weakness to scaps and core    Treatments:  Therapeutic Exercise:  Therapeutic Exercise  Therapeutic Exercise Performed: Yes  Therapeutic Exercise Activity 1: NuStep with arms 8' warm up level 3  Therapeutic Exercise Activity 2: standing scap squeezes OTB 10x2  Therapeutic Exercise Activity 3: standing scap LAE OTB 10x2  Therapeutic Exercise Activity 4: shld pulleys flex 3'  Therapeutic Exercise Activity 5: shld pulleys abd 3'  Therapeutic Exercise Activity 6: supine " "shld flex with dowel 10x2  Therapeutic Exercise Activity 7: supine bench press with dowel 10x2  Therapeutic Exercise Activity 8: quadruped holds in protraction 3 x 30\"      Assessment   Assessment:    Focus and emphasis on scapular strengthening, shld ROM, and GH/scap/core stability through use of quadruped. Tolerated full session well with no increased pain but signs of instability and muscle fatigue present with shakiness through Ue's and compensation upon fatigue present. Will focus on higher level vestibular/balance next session.     Plan:    See above    OP EDUCATION:   Creation, demonstration, and performance of HEP:   Access Code: [email or web address]  URL: [email or web address]  Date: 11/06/2024  Prepared by: Nash Parker    Exercises  - Supine Shoulder Flexion Extension AAROM with Dowel  - 1 x daily - 7 x weekly - 3 sets - 10 reps  - Supine Shoulder Press with Dowel  - 1 x daily - 7 x weekly - 3 sets - 10 reps  - Standing Shoulder Row with Anchored Resistance  - 1 x daily - 7 x weekly - 3 sets - 10 reps  - Shoulder extension with resistance - Neutral  - 1 x daily - 7 x weekly - 3 sets - 10 reps  - Quadruped Transversus Abdominis Bracing  - 1 x daily - 7 x weekly - 3 sets - 10 reps    Goals:   Active       LTG       Pt will be 100% IND with HEP in 10 weeks in order to maintain progress with therapy.         Start:  10/23/24    Expected End:  12/31/24            Pt will improve DGI score to 24/24 in 10 weeks in order to reduce fall risk.        Start:  10/23/24    Expected End:  12/31/24            Pt will demonstrated improve AROM of R GH as follows: flexion 150, abduction 150, ER functional C7, IR functional T8 in 10 weeks to improve mobility required for dressing, bathing, cooking and cleaning.        Start:  10/23/24    Expected End:  12/31/24            Pt will improve R GH strength to 5/5 in 10 weeks in order to improve strength required to lift objects at home including groceries and to improve cleaning " tasks.         Start:  10/23/24    Expected End:  12/31/24            Pt will demonstrate subjective improvement of ADLs and recreational activities through improved score of 100 on ABC in 10 weeks for return to prior level of function and work tasks without fall risk.        Start:  10/23/24    Expected End:  12/31/24               STG       Pt will be 50% IND with HEP in 5 weeks in order to progress with therapy.        Start:  10/23/24    Expected End:  12/02/24            Pt will improve DGI score to 12/12 in 5 weeks in order to reduce fall risk.        Start:  10/23/24    Expected End:  12/02/24            Pt will be able to perform ascending reciprocal pattern of 12 steps with use of 1 rail in 5 weeks for home and community ambulation needs.         Start:  10/23/24    Expected End:  12/02/24            Pt will be able to ambulate in outdoor environment without imbalance in 5 weeks for recreational activities.       Start:  10/23/24    Expected End:  12/02/24            Pt will demonstrate subjective improvement of ADLs and recreational activities through improved score of 55 on ABC in 5 weeks for reduce fall risk.        Start:  10/23/24    Expected End:  12/02/24

## 2024-11-06 NOTE — PROGRESS NOTES
Assessment/Plan   Problem List Items Addressed This Visit       Paralytic lagophthalmos - Primary     Seems like small gradual improvements over time in lid position. Advised would recommend more time to allow healing but there is likely a date in the near future where returns will be diminishing. Will ask for oculoplastics consult if unable to maintain ocular surface with a less aggressive lubrication regimen.          Exposure keratopathy     Well covered with ointment and no signs of exposure or inflammation today. Will have continue with current lubrication/ointment regimen and see back in 6 weeks.             Provided reassurance regarding above diagnoses and care received in the office visit today. Discussed outcomes and options along with the importance of treatment compliance. Understands the importance of any follow up visits. Patient instructed to call/communicate with our office if any new issues, questions, or concerns.     Will plan to see back in 6 weeks surface check or sooner PRN

## 2024-11-07 ENCOUNTER — TREATMENT (OUTPATIENT)
Dept: SPEECH THERAPY | Facility: HOSPITAL | Age: 34
End: 2024-11-07
Payer: COMMERCIAL

## 2024-11-07 DIAGNOSIS — R13.10 DYSPHAGIA, UNSPECIFIED TYPE: ICD-10-CM

## 2024-11-07 DIAGNOSIS — R49.0 DYSPHONIA: ICD-10-CM

## 2024-11-07 PROCEDURE — 92526 ORAL FUNCTION THERAPY: CPT | Mod: GN

## 2024-11-07 ASSESSMENT — PAIN SCALES - GENERAL: PAINLEVEL_OUTOF10: 0 - NO PAIN

## 2024-11-07 ASSESSMENT — PAIN - FUNCTIONAL ASSESSMENT: PAIN_FUNCTIONAL_ASSESSMENT: 0-10

## 2024-11-07 NOTE — PROGRESS NOTES
Speech-Language Pathology Clinical Swallow Evaluation    Patient Name: Denae Nolasco  MRN: [id number]  : 1990  Today's Date: 24  Time Calculation  Start Time: 805  Stop Time: 905  Time Calculation (min): 60 min      ASSESSMENT  Impressions:  Patient reports practicing home exercises daily. July practiced 70-90 repetitions. Hyolaryngeal elevation exercise difficult. SLP instructed increasing manual seal of lips during this exercise and patient was able to complete.    Educated and instructed use of sEMG as part of treatment plan to instruct mechanics of swallowing. Patient participate in sEMG and completed 40 swallows with mechanics instructed on 50% of trials with max visual and verbal cues. Patient initially tolerated nectar thick liquids with no overt s/s of aspiration, however, as session progressed SLP noted fatigue and subsequent overt s/s of aspiration.    Patient reports sipping on thin liquids daily for several weeks without pulmonary compromise. She understands aspiration risks and wishes to continue with sips of liquids at home, however, she felt more comfortable with nectar thick liquids trialed during therapy today. They were provided list of naturally nectar thick liquids. Oral care prior to any oral intake was reviewed with patient and spouse.    PLAN  Recommendations:  Is MBSS recommended? No, due to recent MBSS completed.  Solid consistency: NPO  Liquid consistency: NPO except for ice chips and single sips of thin water, after oral care  Medication administration: Via PEG tube  Compensatory swallow strategies:  - Upright positioning for all PO intake  - Small sips  - Single sips  - Effortful swallow    Recommended frequency/duration:  Skilled SLP services recommended: Yes  Frequency: 2x/week  Duration: 12 weeks     Strengths: Cognition, Motivation, Family/caregiver support, and Age  Barriers to participation in tx: N/A    Payor authorization information:  Payor: [employer] [place] [employer]  [place] / Plan: [employer] / Product Type: *No Product type* /   Certification Period Start Date: 10/28/24  Certification Period End Date: 01/26/25  Number of Authorized Treatments : 20  Total Number of Visits : 3    Goals:  Short-Term Goals:  In 12 weeks...    DYSPHAGIA  Short term goals established 10/28/24:    Patient will tolerate  PO trials without overt s/s of aspiration, further difficulty, or concern for aspiration-related complications in 90% of observed therapeutic trials.  Progress: trialed sips of nectar thick liquids. Noted toleration of 2 sips without overt s/s of aspiration. However, as session progressed noted throat clear and eventually coughing.  Status: progressing    2.    Patient will implement safe swallowing strategies to reduce risk of aspiration and other s/sx of dysphagia in 90% of trials given caregiver assistance/cueing as needed.  Progress: reviewed oral hygiene and strategies listed per POC  Status: progressing    3.    Patient will complete recommended swallowing exercises masko, hyolaryngeal elevation etc and oral intake in conjunction with NMES during treatment session given minimal-moderate cues. GOAL UPDATED  Progress:  Status:     4.    Patient will complete effortful swallow at target strength as measured via sEMG on 90% of swallow trials.  Progress: instructed mechanics of labial seal, and increasing effort in conjunction with sEMG. Patient able to complete 40 swallows at target strength with and without nectar thick liquid trials. Swallows were not consistent - required max verbal and visual cues to complete.  Status: progressing    5.    Patient/Family will verbalize/demonstrate comprehension of dysphagia education, strategies, recommendations and POC.  Progress: reviewed aspiration risks, education on oral hygiene prior to PO intake, and swallow strategies.  Status: progressing    6. Patient will complete home exercise program daily (per patient report).  Progress:  instructed july, hyolaryngeal elevation and effortful swallow to be completed daily.  Status: progressing        Long term goals 10/28/24:   Patient will resume/maintain oral intake in order to promote optimal oropharyngeal swallow function and reduce risk for dehydration, malnutrition and weight loss.      GOAL START: 10/28/2024    GOAL END: 1/26/24   Status: Goal initiated this date   Progress this date: n/a    Long-Term Goals:  In 12 weeks, pt will meet 25% of nutrition/hydration needs by mouth, via least restrictive diet, without pulmonary compromise.    VOICE     LONG TERM GOALS (10/28/24 to 1/26/25)  Improve overall vocal health to foster increased participation levels at home, work and in the community environment.  Patient will demonstrate reduction in symptoms as evidenced by improved scores on VHI-10, RSI, and/or CSI following treatment.     SHORT TERM GOALS (10/28/24 to 1/26/25)  Not addressed 11/7/24    Patient will increase vocal wellness and decrease phono trauma in adherence with clinician prescribed vocal hygiene and wellness program per patient report 80% of his/her day.  Patient will increase ability to produce voice without tension within 5 minute conversational task x 80% accuracy as judged by clinician observation and/or patient report.  Patient will demonstrate independent use of voice/swallow/breathing techniques x 80% accuracy.  Patient will increase the balance/strength of the respiratory/laryngeal/swallowing musculature x 80% accuracy.     SUBJECTIVE    Respiratory Status: Room air  Current diet:  NPO with PEG      Pt was alert, pleasant, and cooperative for session.  Ability to follow functional commands: WFL  Nutritional status: Has PEG tube     Patient positioning: Upright in chair      OBJECTIVE    Home Exercise Program:  July, hyolaryngeal elevation, effortful swallow    Treatment/Education:  Results and recommendations were relayed to: Patient and Family  Education provided:  Yes   Learner: Patient and Significant other   Barriers to learning: None   Method of teaching: Verbal   Topic: role of ST, results of assessment, risk for aspiration, recommended diet modifications, recommended safe swallow strategies, and swallow anatomy/physiology   Outcome of teaching: Pt/family demonstrated good understanding

## 2024-11-08 ENCOUNTER — CLINICAL SUPPORT (OUTPATIENT)
Dept: PHYSICAL THERAPY | Facility: CLINIC | Age: 34
End: 2024-11-08
Payer: COMMERCIAL

## 2024-11-08 DIAGNOSIS — R27.0 ATAXIA: ICD-10-CM

## 2024-11-08 DIAGNOSIS — D36.10 SCHWANNOMA: ICD-10-CM

## 2024-11-08 DIAGNOSIS — Z98.890 HX OF CRANIOTOMY: ICD-10-CM

## 2024-11-08 DIAGNOSIS — R26.81 UNSTEADINESS ON FEET: ICD-10-CM

## 2024-11-08 PROCEDURE — 97110 THERAPEUTIC EXERCISES: CPT | Mod: GP,CQ

## 2024-11-08 PROCEDURE — 97140 MANUAL THERAPY 1/> REGIONS: CPT | Mod: GP,CQ

## 2024-11-08 NOTE — PROGRESS NOTES
"Physical Therapy Treatment    Patient Name: Denae Nolasco  MRN: [id number]  Today's Date: 11/8/2024  Time Calculation  Start Time: 1000  Stop Time: 1030  Time Calculation (min): 30 min  PT Therapeutic Procedures Time Entry  Manual Therapy Time Entry: 15  Therapeutic Exercise Time Entry: 15    Insurance:  Visit number: 3 of 20  Authorization info:  MMO - NO AUTH / $6100 DEDUCT MET / $7500 OOP MET / 100% COVERAGE thru 12/31/24 / reverify 60% COINS on 1/1/25 / 20V - 1 USED / Availity [id number] / ds     Insurance Type: Payor: [employer] [facility] / Plan: [employer] / Product Type: *No Product type* /     Current Problem   1. Schwannoma  Follow Up In Physical Therapy      2. Hx of craniotomy  Follow Up In Physical Therapy      3. Ataxia  Follow Up In Physical Therapy      4. Unsteadiness on feet  Follow Up In Physical Therapy          Subjective   General    Nothing new to report at this time  Precautions:   None  Pain   0   Post Treatment Pain Level 0    Objective   GH flexion in supine at 122 after manual work  ER to 40  Treatments:  Therapeutic Exercise:  UBE x 5'   Self assist Gh flexion in supine with bridge to increase thoracic mobility  SPO x 20  Scap add with MGTB x 20  45 degree lat #5 2 x 15   Ball rolls CW,CCW for 30\" each       Manual:   Scapular mobes  Soft tissue releasing of sub scap, SA and pec minor  Self assist Gh flexion in supine with bridge to increase thoracic mobility  SPO x 20  Scap add with MGTB x 20  45 degree lat #5 2 x 15   Ball rolls CW, CCW for 30\" each  SB rollouts for flexion x 5    Assessment   Assessment:    Pt demonstrated improved shoulder flexion thoracic mobility via bridging and ER ROM    Plan:    Continue with POC    OP EDUCATION:  Added self assist flexion with bridging     Goals:   Active       LTG       Pt will be 100% IND with HEP in 10 weeks in order to maintain progress with therapy.         Start:  10/23/24    Expected End:  12/31/24            Pt will " improve DGI score to 24/24 in 10 weeks in order to reduce fall risk.        Start:  10/23/24    Expected End:  12/31/24            Pt will demonstrated improve AROM of R GH as follows: flexion 150, abduction 150, ER functional C7, IR functional T8 in 10 weeks to improve mobility required for dressing, bathing, cooking and cleaning.        Start:  10/23/24    Expected End:  12/31/24            Pt will improve R GH strength to 5/5 in 10 weeks in order to improve strength required to lift objects at home including groceries and to improve cleaning tasks.         Start:  10/23/24    Expected End:  12/31/24            Pt will demonstrate subjective improvement of ADLs and recreational activities through improved score of 100 on ABC in 10 weeks for return to prior level of function and work tasks without fall risk.        Start:  10/23/24    Expected End:  12/31/24               STG       Pt will be 50% IND with HEP in 5 weeks in order to progress with therapy.        Start:  10/23/24    Expected End:  12/02/24            Pt will improve DGI score to 12/12 in 5 weeks in order to reduce fall risk.        Start:  10/23/24    Expected End:  12/02/24            Pt will be able to perform ascending reciprocal pattern of 12 steps with use of 1 rail in 5 weeks for home and community ambulation needs.         Start:  10/23/24    Expected End:  12/02/24            Pt will be able to ambulate in outdoor environment without imbalance in 5 weeks for recreational activities.       Start:  10/23/24    Expected End:  12/02/24            Pt will demonstrate subjective improvement of ADLs and recreational activities through improved score of 55 on ABC in 5 weeks for reduce fall risk.        Start:  10/23/24    Expected End:  12/02/24

## 2024-11-11 ENCOUNTER — TREATMENT (OUTPATIENT)
Dept: PHYSICAL THERAPY | Facility: CLINIC | Age: 34
End: 2024-11-11
Payer: COMMERCIAL

## 2024-11-11 DIAGNOSIS — D36.10 SCHWANNOMA: ICD-10-CM

## 2024-11-11 DIAGNOSIS — R27.0 ATAXIA: ICD-10-CM

## 2024-11-11 DIAGNOSIS — Z98.890 HX OF CRANIOTOMY: ICD-10-CM

## 2024-11-11 DIAGNOSIS — R26.81 UNSTEADINESS ON FEET: ICD-10-CM

## 2024-11-11 PROCEDURE — 97112 NEUROMUSCULAR REEDUCATION: CPT | Mod: GP,CQ

## 2024-11-11 ASSESSMENT — PAIN SCALES - GENERAL: PAINLEVEL_OUTOF10: 0 - NO PAIN

## 2024-11-11 ASSESSMENT — PAIN - FUNCTIONAL ASSESSMENT: PAIN_FUNCTIONAL_ASSESSMENT: 0-10

## 2024-11-11 NOTE — PROGRESS NOTES
"Physical Therapy Treatment    Patient Name: Denae Noalsco  MRN: [id number]  Today's Date: 11/11/2024  Time Calculation  Start Time: 0950  Stop Time: 1032  Time Calculation (min): 42 min  PT Therapeutic Procedures Time Entry  Neuromuscular Re-Education Time Entry: 42    Insurance:  Visit number: 4 of 20  Authorization info: MMO - NO AUTH   Insurance Type: Payor: [employer] [facility] / Plan: [employer] / Product Type: *No Product type* /     Current Problem   1. Schwannoma  Follow Up In Physical Therapy      2. Hx of craniotomy  Follow Up In Physical Therapy      3. Ataxia  Follow Up In Physical Therapy      4. Unsteadiness on feet  Follow Up In Physical Therapy          Subjective   General    Patient reports no significant changes since last session. Continues soreness in shoulder with movements, compliant with HEP. Aware of focus on neuro this session.     Precautions:   Falls    Pain   Pain Assessment: 0-10  0-10 (Numeric) Pain Score: 0 - No pain    Post Treatment Pain Level   No change    Objective   Easily fatigued by box squats with slight valgus B    Improved tolerance to quadruped positioning with ease of ability to maintain scap protract    Treatments:    Neuro Re-ed:   Balance/Neuromuscular Re-Education  Balance/Neuromuscular Re-Education Activity Performed: Yes  Balance/Neuromuscular Re-Education Activity 1: NuStep level 5 for 10'  Balance/Neuromuscular Re-Education Activity 2: box squats with goblet holds 6# MB 8x2 (last set w/ feet on air ex)  Balance/Neuromuscular Re-Education Activity 3: quadruped scap protract x30\"  Balance/Neuromuscular Re-Education Activity 4: quadruped UEs on air ex with lateral WS x30\"  Balance/Neuromuscular Re-Education Activity 5: quadruped UEs on air ex with A/P weight shift 30\"  Balance/Neuromuscular Re-Education Activity 6: DNS supported 90/90 anjum breathing 3 x 30\"  Balance/Neuromuscular Re-Education Activity 7: DNS heel bug touches with anjum breathing " "10x2  Balance/Neuromuscular Re-Education Activity 8: tall kneel holds (with anjum breathing 3 x 30\")  Balance/Neuromuscular Re-Education Activity 9: tall kneels with mini SB roll outs (anjum breathing 3 x 30\")      Assessment   Assessment:    Focus and emphasis on neuromuscular rehabilitation through proximal stabilization, proprioception, and dynamic stabilization methods/position for re-education of control and neurodynamic movements. Cues throughout for deep diaphragmatic breathing to improve TrA strength and to improve muscle function. Challenged by quadruped and tall kneeling positioning's with lack of control  and fatigue present. Will continue to progress in order to achieve all goals.     Plan:    Next session: shld, session following: quadruped, 90/90, tall kneeling, low oblique?    OP EDUCATION:   Review of quadruped for HEP    Goals:   Active       LTG       Pt will be 100% IND with HEP in 10 weeks in order to maintain progress with therapy.         Start:  10/23/24    Expected End:  12/31/24            Pt will improve DGI score to 24/24 in 10 weeks in order to reduce fall risk.        Start:  10/23/24    Expected End:  12/31/24            Pt will demonstrated improve AROM of R GH as follows: flexion 150, abduction 150, ER functional C7, IR functional T8 in 10 weeks to improve mobility required for dressing, bathing, cooking and cleaning.        Start:  10/23/24    Expected End:  12/31/24            Pt will improve R GH strength to 5/5 in 10 weeks in order to improve strength required to lift objects at home including groceries and to improve cleaning tasks.         Start:  10/23/24    Expected End:  12/31/24            Pt will demonstrate subjective improvement of ADLs and recreational activities through improved score of 100 on ABC in 10 weeks for return to prior level of function and work tasks without fall risk.        Start:  10/23/24    Expected End:  12/31/24               STG       Pt will be 50% IND " with HEP in 5 weeks in order to progress with therapy.        Start:  10/23/24    Expected End:  12/02/24            Pt will improve DGI score to 12/12 in 5 weeks in order to reduce fall risk.        Start:  10/23/24    Expected End:  12/02/24            Pt will be able to perform ascending reciprocal pattern of 12 steps with use of 1 rail in 5 weeks for home and community ambulation needs.         Start:  10/23/24    Expected End:  12/02/24            Pt will be able to ambulate in outdoor environment without imbalance in 5 weeks for recreational activities.       Start:  10/23/24    Expected End:  12/02/24            Pt will demonstrate subjective improvement of ADLs and recreational activities through improved score of 55 on ABC in 5 weeks for reduce fall risk.        Start:  10/23/24    Expected End:  12/02/24

## 2024-11-12 ENCOUNTER — TREATMENT (OUTPATIENT)
Dept: SPEECH THERAPY | Facility: HOSPITAL | Age: 34
End: 2024-11-12
Payer: COMMERCIAL

## 2024-11-12 DIAGNOSIS — R13.10 DYSPHAGIA, UNSPECIFIED TYPE: ICD-10-CM

## 2024-11-12 DIAGNOSIS — R49.0 DYSPHONIA: ICD-10-CM

## 2024-11-12 PROCEDURE — 92526 ORAL FUNCTION THERAPY: CPT | Mod: GN

## 2024-11-12 PROCEDURE — 92507 TX SP LANG VOICE COMM INDIV: CPT | Mod: GN

## 2024-11-12 ASSESSMENT — PAIN SCALES - GENERAL: PAINLEVEL_OUTOF10: 0 - NO PAIN

## 2024-11-12 ASSESSMENT — PAIN - FUNCTIONAL ASSESSMENT: PAIN_FUNCTIONAL_ASSESSMENT: 0-10

## 2024-11-12 NOTE — PROGRESS NOTES
Speech-Language Pathology Swallow and Voice Treatment Note    Patient Name: Denae Nolasco  MRN: [id number]  : 1990  Today's Date: 24  Time Calculation  Start Time: 1015  Stop Time: 1110  Time Calculation (min): 55 min      ASSESSMENT  Impressions:  Patient's voice noticeably stronger today as patient was entering her therapy appointment. She stated she feels her voice is stronger and her swallow a bit better. She has been drinking about 4 sips of naturally nectar thick liquids per day at home with minimal overt s/s of aspiration reported. She is completing her oral care as directed per report.    Introduced NMES today to assist with pharyngeal strengthening. Will begin facial NMES next scheduled session as well as continue with voice exercise progression that was initiated this date. Consider use of EMST pending healing of trach site.    PLAN  Recommendations:  Is MBSS recommended? Not at this time  Solid consistency: NPO  Liquid consistency: NPO except for ice chips and single sips of naturally nectar thick liquids, after oral care  Medication administration: Via PEG tube  Compensatory swallow strategies:  - Upright positioning for all PO intake  - Small sips  - Single sips  - Effortful swallow    Recommended frequency/duration:  Skilled SLP services recommended: Yes  Frequency: 2x/week  Duration: 12 weeks     Strengths: Cognition, Motivation, Family/caregiver support, and Age  Barriers to participation in tx: N/A    Payor authorization information:  Payor: [employer] [place] / Plan: [employer] / Product Type: *No Product type* /   Certification Period Start Date: 10/28/24  Certification Period End Date: 25  Number of Authorized Treatments : 20       Goals:  Short-Term Goals:  In 12 weeks...    DYSPHAGIA  Short term goals established 10/28/24:    Patient will tolerate  PO trials without overt s/s of aspiration, further difficulty, or concern for aspiration-related  complications in 90% of observed therapeutic trials.  Progress: trialed sips of nectar thick liquids. Noted toleration of 10 sips without overt s/s of aspiration - improved from prior session  Status: progressing    2.    Patient will implement safe swallowing strategies to reduce risk of aspiration and other s/sx of dysphagia in 90% of trials given caregiver assistance/cueing as needed.  Progress: reviewed oral hygiene and strategies listed per POC  Status: progressing    3.    Patient will complete recommended swallowing exercises masko, hyolaryngeal elevation etc and oral intake in conjunction with NMES during treatment session given minimal-moderate cues. GOAL UPDATED  Progress: n/a  Status:     4.    Patient will complete effortful swallow at target strength as measured via sEMG on 90% of swallow trials.  Progress: much improved this session - minimal cues required (max cues needed prior session). Pt able to complete 40 effortful swallows with and without nectar thick liquid trials as measure vis sEMG.  Status: progressing    5. ADD GOAL: Patient will participate in NMES to improve pharyngeal strength and allow for PO diet initiation/tolerance.   Progress: in conjunction with NMES placement 2 targeting geniohyoid for protraction and elevation of the mylohyoid:   Work: Rest 57:3   180 us  7 mA   8 min.   Trialed: nectar thick liquids  Tolerated: well    6.    Patient/Family will verbalize/demonstrate comprehension of dysphagia education, strategies, recommendations and POC.  Progress: reviewed aspiration risks, education on oral hygiene prior to PO intake, and swallow strategies.  Status: progressing    7. Patient will complete home exercise program daily (per patient report).  Progress: pt completing lan, hyolaryngeal elevation and effortful swallow daily.  Status: progressing        Long term goals 10/28/24:   Patient will resume/maintain oral intake in order to promote optimal oropharyngeal swallow  function and reduce risk for dehydration, malnutrition and weight loss.      GOAL START: 10/28/2024    GOAL END: 1/26/24   Status: Goal initiated this date   Progress this date: n/a    Long-Term Goals:  In 12 weeks, pt will meet 25% of nutrition/hydration needs by mouth, via least restrictive diet, without pulmonary compromise.    VOICE     LONG TERM GOALS (10/28/24 to 1/26/25)  Improve overall vocal health to foster increased participation levels at home, work and in the community environment.  Patient will demonstrate reduction in symptoms as evidenced by improved scores on VHI-10, RSI, and/or CSI following treatment.     SHORT TERM GOALS (10/28/24 to 1/26/25)    Patient will increase vocal wellness and decrease phono trauma in adherence with clinician prescribed vocal hygiene and wellness program per patient report 80% of his/her day.   Progress: n/a   Status:     2.   Patient will increase ability to produce voice without tension within 5 minute conversational task x 80% accuracy as judged by clinician observation and/or patient report.   Progress: intermittent tension and weakened voice noted during conversation.    Status: progressing    3. Patient will demonstrate independent use of voice/swallow/breathing techniques @ 80-85 dBSPL in conversational speech.   Progress:    Sustained Phonation: 9 second duration 78.5 dBSPL   Conversational speech  N/a 63 dBSPL   Status: Progressing    4. Patient will increase the balance/strength of the respiratory/laryngeal/swallowing musculature x 80% accuracy.   Progress - n/a will address with EMST when trach site healed   Status:     SUBJECTIVE    Respiratory Status: Room air  Current diet:  NPO with PEG      Pt was alert, pleasant, and cooperative for session.  Ability to follow functional commands: WFL  Nutritional status: Has PEG tube     Patient positioning: Upright in chair      OBJECTIVE    Home Exercise Program:  July, hyolaryngeal elevation, effortful  swallow    Treatment/Education:  Results and recommendations were relayed to: Patient and Family  Education provided: Yes   Learner: Patient and Significant other   Barriers to learning: None   Method of teaching: Verbal   Topic: role of ST, results of assessment, risk for aspiration, recommended diet modifications, recommended safe swallow strategies, and swallow anatomy/physiology   Outcome of teaching: Pt/family demonstrated good understanding

## 2024-11-13 ENCOUNTER — OFFICE VISIT (OUTPATIENT)
Dept: OTOLARYNGOLOGY | Facility: HOSPITAL | Age: 34
End: 2024-11-13
Payer: COMMERCIAL

## 2024-11-13 VITALS — BODY MASS INDEX: 22.08 KG/M2 | WEIGHT: 120 LBS | TEMPERATURE: 98.6 F | HEIGHT: 62 IN

## 2024-11-13 DIAGNOSIS — R13.10 DYSPHAGIA, UNSPECIFIED TYPE: ICD-10-CM

## 2024-11-13 DIAGNOSIS — Z43.0 ATTENTION TO TRACHEOSTOMY (MULTI): ICD-10-CM

## 2024-11-13 DIAGNOSIS — R49.1 APHONIA: ICD-10-CM

## 2024-11-13 PROCEDURE — 31575 DIAGNOSTIC LARYNGOSCOPY: CPT | Performed by: OTOLARYNGOLOGY

## 2024-11-13 PROCEDURE — 1036F TOBACCO NON-USER: CPT | Performed by: OTOLARYNGOLOGY

## 2024-11-13 PROCEDURE — 99214 OFFICE O/P EST MOD 30 MIN: CPT | Performed by: OTOLARYNGOLOGY

## 2024-11-13 PROCEDURE — 3008F BODY MASS INDEX DOCD: CPT | Performed by: OTOLARYNGOLOGY

## 2024-11-13 NOTE — PROGRESS NOTES
Patient: Denae Nolasco   MRN: [id number] YOB: 1990   Sex: female Age: 34 y.o.  Date of Service: 2024       ASSESSMENT AND PLAN  I discussed the findings with Denae Nolasco and have recommended the followin. 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 Voice is improving. Laryngoscopy today with bilateral vocal folds with severe atrophy and there is now more movement left > right. She is getting touch hourglass closure. Decannulated 10/23/24  - Follow-up 3-4 months    2. Dysphagia, suspect high vagal injury with multilevel swallowing deficits. High aspiration risk, tracheotomy in place. I recommend that she keep the tracheotomy in place for now given her high risk for aspiration  - Meticulous oral care TID  - Continue SLP swallow therapy, working with Marla ZUNIGA as an outpatient  - Nutrition referral    3. PEG in place  - Referral place to GI      CHIEF COMPLAINT  Chief Complaint   Patient presents with    Follow-up       HISTORY OF PRESENT ILLNESS  Denae Nolasco is a 34 y.o. female referred for evaluation of bilateral vocal fold paralysis and tracheotomy care.  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 Previously admitted to [facility] on 2024 due to ongoing functional deficits, now home since 24.    24  No issues since decannulation. She is doing more by mouth, drinking boost    10/23/24  She has tolerated capping. No respiratory issues. Discharged from home therapy, seeing Marla KILGORE next week    10/9/24  She reports her voice is improving. She is wearing her PMV about 7-8 hours throughout the day, then she will get into a coughing fit and takes the valve off.     24  They are working with a PMV valve and feel like her voice is a little  "stronger. She is home since last Friday. They are working on getting home SLP therapy    24  She has a #6 cuffless Shiley in place. She denies difficulty breathing. She has not trialed a speaking valve or capping. She is getting all her nutrition through PEG. Her voice is still very weak.      ADDITIONAL HISTORY  Past Medical History  She has a past medical history of Depression and Visual impairment. Surgical History  She has no past surgical history on file.   Social History  She reports that she has never smoked. She has never used smokeless tobacco. She reports that she does not currently use drugs after having used the following drugs: Marijuana. She reports that she does not drink alcohol. Allergies  Patient has no known allergies.     Family History  Family History   Problem Relation Name Age of Onset    Blood clot Mother Delmis Mantilla-  of clot 2016      labor Sister Megan         REVIEW OF SYSTEMS  All 10 systems were reviewed and negative except for above.      PHYSICAL EXAM  ENT Physical Exam   GENERAL: Thin young woman, aphonic, response to commands  RESPIRATORY: Breathing quietly, no stridor or difficulty breathing with finger occlusion  HEAD: Normocephalic atraumatic  FACE: Asymmetric left HB 6/6 no masses or lesions  EYES:  Left eye incomplete closure  EARS:  Pinnae normal.   NOSE:  No anterior lesions, masses or polyps.  ORAL CAVITY/OROPHARYNX:  Buccal mucosa is dry with, crusting along palate, tongue deviated and palate elevates asymmetrically.  NECK:  Soft. #6 cuffless Shiley in place, removed. Stoma patent. Dressed with xeroform, gauze, tegaderm     Last Recorded Vitals  Temperature 37 °C (98.6 °F), temperature source Temporal, height 1.575 m (5' 2\"), weight 54.4 kg (120 lb).    RESULTS    Patient Reported Outcome Measures  N/A    Laboratory, Radiology, and Pathology  I personally reviewed the following results, with the following interpretation:   WBC 24 13.8    MBS " 10/2/24  Impaired epiglottic motion, aspiration during the swallow    PROCEDURES  Flexible Fiberoptic Laryngoscopy    Patient failed a mirror exam due to limitations of equipment and the need for stroboscopy to assess glottic vibration and closure.     PREOPERATIVE DIAGNOSIS: Dysphonia    POSTOPERATIVE DIAGNOSIS: Same    PROCEDURE:  Strobovideolaryngoscopy    ANESTHESIA:  Topical    COMPLICATIONS:  None    SPECIMENS:  None    PROCEDURE IN DETAIL: The patient was seated in an upright position.  The nasal cavity was topically decongested and anesthetized.  The stroboscopic microphone was held to the neck at the level of the larynx.  The distal chip video laryngoscope was passed through the nasal cavity.  The nasal cavity and nasopharynx were within normal limits except noted below.  The following findings on stroboscopy were noted:      Tongue Base: no masses or lesions   Vocal Fold Mobility               Right VF: mobile               Left VF: mobile   TVF Appearance               Edema/Erythema: none               Lesions/vibratory margin irregularities: atrophy   Glottic Closure Pattern: hourglass    Muscle Tension Patterns:  mild AP   Other Findings: pooled secretions throughout    The patient tolerated the procedure well.       ----------------------------------------------------------------------  Smiley Craft MD, MAEd    [facility]  Department of Otolaryngology - Head and Neck Surgery  [facility]    The total time I spent in care of this patient today (excluding time spent on other billable services) is as follows:    Time Spent  Prep time on day of patient encounter: 5 minutes  Time spent directly with patient, family or caregiver: 15 minutes  Additional Time Spent on Patient Care Activities: 5 minutes  Documentation Time: 5 minutes  Other Time Spent: 0 minutes  Total: 30 minutes

## 2024-11-14 ENCOUNTER — TREATMENT (OUTPATIENT)
Dept: SPEECH THERAPY | Facility: HOSPITAL | Age: 34
End: 2024-11-14
Payer: COMMERCIAL

## 2024-11-14 DIAGNOSIS — R49.0 DYSPHONIA: ICD-10-CM

## 2024-11-14 DIAGNOSIS — R13.10 DYSPHAGIA, UNSPECIFIED TYPE: ICD-10-CM

## 2024-11-14 PROCEDURE — 2700000082 HC SLP EMST 75 EXP MUSCLE STRGTH TRAINER

## 2024-11-14 ASSESSMENT — PAIN - FUNCTIONAL ASSESSMENT: PAIN_FUNCTIONAL_ASSESSMENT: 0-10

## 2024-11-14 ASSESSMENT — PAIN SCALES - GENERAL: PAINLEVEL_OUTOF10: 0 - NO PAIN

## 2024-11-14 NOTE — PROGRESS NOTES
Speech-Language Pathology Swallow and Voice Treatment Note    Patient Name: Denae Nolasco  MRN: [id number]  : 1990  Today's Date: 24  Time Calculation  Start Time: 1410  Stop Time: 1510  Time Calculation (min): 60 min      ASSESSMENT  Impressions:  Patient had follow up appt with Dr Craft 24. Vocal cords improved - now hour glass shape. She no longer is required to tape over trach site and use of RMST is permissible now.    The patient reported taking 21 sips of naturally thick nectar liquids yesterday and both she and spouse report no overt s/s of aspiration, however, they acknowledge the risks.    PLAN  Recommendations:  Is MBSS recommended? Not at this time  Solid consistency: NPO  Liquid consistency: NPO except for ice chips and single sips of naturally nectar thick liquids, after oral care  Medication administration: Via PEG tube  Compensatory swallow strategies:  - Upright positioning for all PO intake  - Small sips  - Single sips  - Effortful swallow    Recommended frequency/duration:  Skilled SLP services recommended: Yes  Frequency: 2x/week  Duration: 12 weeks     Strengths: Cognition, Motivation, Family/caregiver support, and Age  Barriers to participation in tx: N/A    Payor authorization information:  Payor: [employer] [facility] / Plan: [employer] / Product Type: *No Product type* /   Certification Period Start Date: 10/28/24  Certification Period End Date: 25  Number of Authorized Treatments : 20  Total Number of Visits : 5    Goals:  Short-Term Goals:  In 12 weeks...    DYSPHAGIA  Short term goals established 10/28/24:    Patient will tolerate  PO trials without overt s/s of aspiration, further difficulty, or concern for aspiration-related complications in 90% of observed therapeutic trials.  Progress: not addressed   Status:     2.    Patient will implement safe swallowing strategies to reduce risk of aspiration and other s/sx of dysphagia in 90% of trials  given caregiver assistance/cueing as needed.  Progress: patient reports use of strategies at home  Status: progressing    3.    Patient will complete recommended swallowing exercises masko, hyolaryngeal elevation etc and oral intake in conjunction with NMES during treatment session given minimal-moderate cues. GOAL UPDATED  Progress: n/a  Status:     4.    Patient will complete effortful swallow at target strength as measured via sEMG on 90% of swallow trials.  Progress: not addressed  Prior data: much improved this session - minimal cues required (max cues needed prior session). Pt able to complete 40 effortful swallows with and without nectar thick liquid trials as measure vis sEMG.  Status: progressing    5. ADD GOAL: Patient will participate in NMES to improve pharyngeal strength and allow for PO diet initiation/tolerance.   Progress: not addressed  Prior data: in conjunction with NMES placement 2 targeting geniohyoid for protraction and elevation of the mylohyoid:   Work: Rest 57:3   180 us  7 mA   8 min.   Trialed: nectar thick liquids  Tolerated: well    6.    Patient/Family will verbalize/demonstrate comprehension of dysphagia education, strategies, recommendations and POC.  Progress: reviewed aspiration risks, education on oral hygiene prior to PO intake, and swallow strategies.  Status: progressing    7. Patient will complete home exercise program daily (per patient report).  Progress: pt completing lan, hyolaryngeal elevation and effortful swallow daily.  Status: progressing    ADD GOAL 11/14/24: Short Term Goals:   Patient will improve labial ROM on effected side by 5mm to promote symmetry upon ROM for speech and swallow.   Rest Closed lip smile Open lip Smile   Right side +5mm +8mm +15mm   Left side -10mm -6mm 0        2.    Patient will complete home exercise program daily (per patient report)  3.    Patient will complete 20-30 facial exercise repetitions in conjunction with neuromuscular electrical  stimulation (NMES).  Note: patient had difficulty with placement of stim on face. Will attempt x 1 session    Facial Stimulation Point Stim Level reduced tolerance Contraction percentage left   Levators 4 absent   Zygomatics 4 absent   Obicularis Oris 4 absent   Masseter 6 Absent initially - twinge at end x 2   Depressors 4 absent   Buccinator 4 absent   Mentalis 4 absent        Long Term Goals:  Patient will improve facial ROM in order to promote optimal swallow and speech function for activities of daily living.           Long term goals 10/28/24:   Patient will resume/maintain oral intake in order to promote optimal oropharyngeal swallow function and reduce risk for dehydration, malnutrition and weight loss.      GOAL START: 10/28/2024    GOAL END: 1/26/24   Status: Goal initiated this date   Progress this date: n/a    Long-Term Goals:  In 12 weeks, pt will meet 25% of nutrition/hydration needs by mouth, via least restrictive diet, without pulmonary compromise.    VOICE     LONG TERM GOALS (10/28/24 to 1/26/25)  Improve overall vocal health to foster increased participation levels at home, work and in the community environment.  Patient will demonstrate reduction in symptoms as evidenced by improved scores on VHI-10, RSI, and/or CSI following treatment.     SHORT TERM GOALS (10/28/24 to 1/26/25)    Patient will increase vocal wellness and decrease phono trauma in adherence with clinician prescribed vocal hygiene and wellness program per patient report 80% of his/her day.   Progress: n/a   Status:     2.   Patient will increase ability to produce voice without tension within  5 minute conversational task x 80% accuracy as judged by clinician observation and/or patient report.   Progress: intermittent tension and weakened voice noted during conversation.    Status: progressing    3. Patient will demonstrate independent use of voice/swallow/breathing techniques @ 80-85 dBSPL in conversational speech.   Progress:   per video taken by spouse  Sustained Phonation: 4 second duration 80+ dBSPL   Conversational speech  N/a 63 dBSPL   Status: Progressing    4.  Patient to use EMST daily to improve expiratory effort to assist with management of laryngeal penetration/aspiration and increase balance/strength of the respiratory system.   Progress - patient completed 10 reps at 85% of max effort at 10 cm/H2O /2 quarter turns. Average for age is ____.   Status: progressing     SUBJECTIVE    Respiratory Status: Room air  Current diet:  NPO with PEG      Pt was alert, pleasant, and cooperative for session.  Ability to follow functional commands: WFL  Nutritional status: Has PEG tube     Patient positioning: Upright in chair      OBJECTIVE    Home Exercise Program:  July, hyolaryngeal elevation, effortful swallow    Treatment/Education:  Results and recommendations were relayed to: Patient and Family  Education provided: Yes   Learner: Patient and Significant other   Barriers to learning: None   Method of teaching: Verbal   Topic: role of ST, results of assessment, risk for aspiration, recommended diet modifications, recommended safe swallow strategies, and swallow anatomy/physiology   Outcome of teaching: Pt/family demonstrated good understanding

## 2024-11-15 ENCOUNTER — APPOINTMENT (OUTPATIENT)
Dept: OTOLARYNGOLOGY | Facility: HOSPITAL | Age: 34
End: 2024-11-15
Payer: COMMERCIAL

## 2024-11-15 ENCOUNTER — TREATMENT (OUTPATIENT)
Dept: PHYSICAL THERAPY | Facility: CLINIC | Age: 34
End: 2024-11-15
Payer: COMMERCIAL

## 2024-11-15 DIAGNOSIS — Z98.890 HX OF CRANIOTOMY: ICD-10-CM

## 2024-11-15 DIAGNOSIS — D36.10 SCHWANNOMA: ICD-10-CM

## 2024-11-15 DIAGNOSIS — R26.81 UNSTEADINESS ON FEET: ICD-10-CM

## 2024-11-15 DIAGNOSIS — R27.0 ATAXIA: ICD-10-CM

## 2024-11-15 PROCEDURE — 97110 THERAPEUTIC EXERCISES: CPT | Mod: GP | Performed by: PHYSICAL THERAPIST

## 2024-11-15 PROCEDURE — 97140 MANUAL THERAPY 1/> REGIONS: CPT | Mod: GP | Performed by: PHYSICAL THERAPIST

## 2024-11-15 ASSESSMENT — PAIN DESCRIPTION - DESCRIPTORS: DESCRIPTORS: SORE

## 2024-11-15 ASSESSMENT — PAIN SCALES - GENERAL: PAINLEVEL_OUTOF10: 3

## 2024-11-15 ASSESSMENT — PAIN - FUNCTIONAL ASSESSMENT: PAIN_FUNCTIONAL_ASSESSMENT: 0-10

## 2024-11-15 NOTE — PROGRESS NOTES
Physical Therapy Treatment    Patient Name: Denae Nolasco  MRN: [id number]  Today's Date: 11/15/2024  Time Calculation  Start Time: 0950  Stop Time: 1030  Time Calculation (min): 40 min  PT Therapeutic Procedures Time Entry  Manual Therapy Time Entry: 15  Therapeutic Exercise Time Entry: 25    Insurance:  Visit number: 5 of 20  Authorization info: NO AUTH / $6100 DEDUCT MET / $7500 OOP MET / 100% COVERAGE thru 12/31/24 / reverify 60% COINS on 1/1/25 / 20V   Insurance Type: Payor: [employer] [facility] / Plan: [employer] / Product Type: *No Product type* /     Current Problem   1. Schwannoma  Follow Up In Physical Therapy      2. Hx of craniotomy  Follow Up In Physical Therapy      3. Ataxia  Follow Up In Physical Therapy      4. Unsteadiness on feet  Follow Up In Physical Therapy          Subjective   General   General Comment: Pt reports shoulder remaines grossly the same continues to have difficulty with activiation. Balance and ambulation is improving however remains difficulty on uneven surfaces.  Precautions:  Precautions  Precautions Comment: None  Pain   Pain Assessment: 0-10  0-10 (Numeric) Pain Score: 3  Pain Location: Shoulder  Pain Orientation: Right  Pain Descriptors: Sore  Post Treatment Pain Level sore    Objective   Noted compensation with scapular elevation reduced GH glide and lumbar extension - standing flexion    Reduced horizontal abd during s/l abd with compensation of scapular protraciton    Palpation during MWM: noted increased upper trap activation compared to anterior delt and bicep    Soft tissue palpation: noted increased tension throughout bicep and teres muscle groups as well as subscap     Treatments:  Therapeutic Exercise:  Therapeutic Exercise  Therapeutic Exercise Performed: Yes  Therapeutic Exercise Activity 1: UEB 3/3 fwd/bwd  Therapeutic Exercise Activity 2: slide board flexion L5 10x3  Therapeutic Exercise Activity 3: s/l R GH abd 10x3  Therapeutic Exercise  Activity 4: s/l R GH ER 10x3  Therapeutic Exercise Activity 5: pulley flexion 10x3  Therapeutic Exercise Activity 6: SB roll out 10x3    Manual:  Manual Therapy  Manual Therapy Performed: Yes  Manual Therapy Activity 1: AP joint mobs  Manual Therapy Activity 2: MWM flexion and abduction  Manual Therapy Activity 3: contract/relax with flexion and elbow flexion      Assessment   Assessment:   PT Assessment  Assessment Comment: Continue to note compensation of scapula with reduction in shoulder girdle mechanics along with trunk compensations due to weakness. Focus on mobiltiy with exercise and manual techniques this date. WIll potentially utilize KT tape and MWM via manual and cupping throughout sessions for proper muslce activiation and prevent compensation.    Plan:    KT tape for shoulder position    OP EDUCATION:   Continue with established HEP     Goals:   Active       LTG       Pt will be 100% IND with HEP in 10 weeks in order to maintain progress with therapy.         Start:  10/23/24    Expected End:  12/31/24            Pt will improve DGI score to 24/24 in 10 weeks in order to reduce fall risk.        Start:  10/23/24    Expected End:  12/31/24            Pt will demonstrated improve AROM of R GH as follows: flexion 150, abduction 150, ER functional C7, IR functional T8 in 10 weeks to improve mobility required for dressing, bathing, cooking and cleaning.        Start:  10/23/24    Expected End:  12/31/24            Pt will improve R GH strength to 5/5 in 10 weeks in order to improve strength required to lift objects at home including groceries and to improve cleaning tasks.         Start:  10/23/24    Expected End:  12/31/24            Pt will demonstrate subjective improvement of ADLs and recreational activities through improved score of 100 on ABC in 10 weeks for return to prior level of function and work tasks without fall risk.        Start:  10/23/24    Expected End:  12/31/24               STG       Pt  will be 50% IND with HEP in 5 weeks in order to progress with therapy.        Start:  10/23/24    Expected End:  12/02/24            Pt will improve DGI score to 12/12 in 5 weeks in order to reduce fall risk.        Start:  10/23/24    Expected End:  12/02/24            Pt will be able to perform ascending reciprocal pattern of 12 steps with use of 1 rail in 5 weeks for home and community ambulation needs.         Start:  10/23/24    Expected End:  12/02/24            Pt will be able to ambulate in outdoor environment without imbalance in 5 weeks for recreational activities.       Start:  10/23/24    Expected End:  12/02/24            Pt will demonstrate subjective improvement of ADLs and recreational activities through improved score of 55 on ABC in 5 weeks for reduce fall risk.        Start:  10/23/24    Expected End:  12/02/24

## 2024-11-18 ENCOUNTER — TREATMENT (OUTPATIENT)
Dept: PHYSICAL THERAPY | Facility: CLINIC | Age: 34
End: 2024-11-18
Payer: COMMERCIAL

## 2024-11-18 ENCOUNTER — APPOINTMENT (OUTPATIENT)
Dept: NEUROLOGY | Facility: CLINIC | Age: 34
End: 2024-11-18
Payer: COMMERCIAL

## 2024-11-18 DIAGNOSIS — R27.0 ATAXIA: ICD-10-CM

## 2024-11-18 DIAGNOSIS — R26.81 UNSTEADINESS ON FEET: ICD-10-CM

## 2024-11-18 DIAGNOSIS — D36.10 SCHWANNOMA: ICD-10-CM

## 2024-11-18 DIAGNOSIS — Z98.890 HX OF CRANIOTOMY: ICD-10-CM

## 2024-11-18 PROCEDURE — 97112 NEUROMUSCULAR REEDUCATION: CPT | Mod: GP,CQ

## 2024-11-18 ASSESSMENT — PAIN SCALES - GENERAL: PAINLEVEL_OUTOF10: 3

## 2024-11-18 ASSESSMENT — PAIN - FUNCTIONAL ASSESSMENT: PAIN_FUNCTIONAL_ASSESSMENT: 0-10

## 2024-11-18 NOTE — PROGRESS NOTES
"Physical Therapy Treatment    Patient Name: Denae Nolasco  MRN: [id number]  Today's Date: 11/18/2024  Time Calculation  Start Time: 1130  Stop Time: 1213  Time Calculation (min): 43 min  PT Therapeutic Procedures Time Entry  Neuromuscular Re-Education Time Entry: 43    Insurance:  Visit number: 6 of 20  Authorization info: MMO - NO AUTH   Insurance Type: Payor: [employer] [facility] / Plan: [employer] / Product Type: *No Product type* /     Current Problem   1. Schwannoma  Follow Up In Physical Therapy      2. Hx of craniotomy  Follow Up In Physical Therapy      3. Ataxia  Follow Up In Physical Therapy      4. Unsteadiness on feet  Follow Up In Physical Therapy          Subjective   General    Patient reports no significant changes since last session, reports she continues to have some shoulder soreness. Reports that she still feels slightly unsteady when she walks.     Precautions:   none    Pain   Pain Assessment: 0-10  0-10 (Numeric) Pain Score: 3  Pain Location: Shoulder    Post Treatment Pain Level   No change    Objective   Posterior displacement with STS with poor eccentric control and valgus knees (able to correct with cues)    Treatments:     Neuro Re-ed:   Balance/Neuromuscular Re-Education  Balance/Neuromuscular Re-Education Activity Performed: Yes  Balance/Neuromuscular Re-Education Activity 1: NuStep level 5 for 16'  Balance/Neuromuscular Re-Education Activity 2: box squats with goblet holds 6# MB, feet on air ex 10x2  Balance/Neuromuscular Re-Education Activity 3: DNS bug marches with anjum breathing 10x2  Balance/Neuromuscular Re-Education Activity 4: DNS 90/90 holds with PRN support 3 x 30\" H   Balance/Neuromuscular Re-Education Activity 5: quadruped alt hip ext 8x2   Balance/Neuromuscular Re-Education Activity 6: tall kneeling hip thrusts 8x2     Assessment:    Patient continues to demonstrate good participation and motivation in therapy. Continues to be easily fatigued with signs of " shakiness during all tasks. Highly challenged by all core strengthening, neuro stability, and proprioceptive exercises with fatigue and ataxic noted. Will continue to progress to improve balance and dynamic movements.     Plan:    Shoulder next session, DNS, quadruped, standing proprioception    OP EDUCATION:   Deep diaphragmatic breathing    Goals:   Active       LTG       Pt will be 100% IND with HEP in 10 weeks in order to maintain progress with therapy.         Start:  10/23/24    Expected End:  12/31/24            Pt will improve DGI score to 24/24 in 10 weeks in order to reduce fall risk.        Start:  10/23/24    Expected End:  12/31/24            Pt will demonstrated improve AROM of R GH as follows: flexion 150, abduction 150, ER functional C7, IR functional T8 in 10 weeks to improve mobility required for dressing, bathing, cooking and cleaning.        Start:  10/23/24    Expected End:  12/31/24            Pt will improve R GH strength to 5/5 in 10 weeks in order to improve strength required to lift objects at home including groceries and to improve cleaning tasks.         Start:  10/23/24    Expected End:  12/31/24            Pt will demonstrate subjective improvement of ADLs and recreational activities through improved score of 100 on ABC in 10 weeks for return to prior level of function and work tasks without fall risk.        Start:  10/23/24    Expected End:  12/31/24               STG       Pt will be 50% IND with HEP in 5 weeks in order to progress with therapy.        Start:  10/23/24    Expected End:  12/02/24            Pt will improve DGI score to 12/12 in 5 weeks in order to reduce fall risk.        Start:  10/23/24    Expected End:  12/02/24            Pt will be able to perform ascending reciprocal pattern of 12 steps with use of 1 rail in 5 weeks for home and community ambulation needs.         Start:  10/23/24    Expected End:  12/02/24            Pt will be able to ambulate in outdoor  environment without imbalance in 5 weeks for recreational activities.       Start:  10/23/24    Expected End:  12/02/24            Pt will demonstrate subjective improvement of ADLs and recreational activities through improved score of 55 on ABC in 5 weeks for reduce fall risk.        Start:  10/23/24    Expected End:  12/02/24

## 2024-11-19 ENCOUNTER — TREATMENT (OUTPATIENT)
Dept: SPEECH THERAPY | Facility: HOSPITAL | Age: 34
End: 2024-11-19
Payer: COMMERCIAL

## 2024-11-19 DIAGNOSIS — R49.0 DYSPHONIA: ICD-10-CM

## 2024-11-19 DIAGNOSIS — R13.12 OROPHARYNGEAL DYSPHAGIA: Primary | ICD-10-CM

## 2024-11-19 DIAGNOSIS — R13.10 DYSPHAGIA, UNSPECIFIED TYPE: ICD-10-CM

## 2024-11-19 PROCEDURE — 92526 ORAL FUNCTION THERAPY: CPT | Mod: GN

## 2024-11-19 PROCEDURE — 92507 TX SP LANG VOICE COMM INDIV: CPT | Mod: GN

## 2024-11-19 ASSESSMENT — PAIN - FUNCTIONAL ASSESSMENT: PAIN_FUNCTIONAL_ASSESSMENT: 0-10

## 2024-11-19 ASSESSMENT — PAIN SCALES - GENERAL: PAINLEVEL_OUTOF10: 0 - NO PAIN

## 2024-11-19 NOTE — PROGRESS NOTES
Speech-Language Pathology Swallow and Voice Treatment Note    Patient Name: Denae Nolasco  MRN: [id number]  : 1990  Today's Date: 24  Time Calculation  Start Time: 1415  Stop Time: 1515  Time Calculation (min): 60 min      ASSESSMENT  Impressions:  Patient continues to limit oral intake at home to nectar thick liquids and minimal amounts. Following puree and nectar thick liquid trials, patient appears to have improvement in overall pharyngeal function and repeat MBS is indicated. Order received and MBS scheduled for 24.    Following facial placement with NMES Vital Stim today patient was able to elicit a small contraction in her left masseter independently for the first time. She will increase facial ROM exercises at home.    PLAN  Recommendations:  Is MBSS recommended? YES  Solid consistency: NPO  Liquid consistency: NPO except for ice chips and single sips of naturally nectar thick liquids, after oral care  Medication administration: Via PEG tube  Compensatory swallow strategies:  - Upright positioning for all PO intake  - Small sips  - Single sips  - Effortful swallow    Recommended frequency/duration:  Skilled SLP services recommended: Yes  Frequency: 2x/week  Duration: 12 weeks     Strengths: Cognition, Motivation, Family/caregiver support, and Age  Barriers to participation in tx: N/A    Payor authorization information:  Payor: [employer] [facility] / Plan: [employer] / Product Type: *No Product type* /   Certification Period Start Date: 10/28/24  Certification Period End Date: 25  Number of Authorized Treatments : 20  Total Number of Visits : 6    Goals:  Short-Term Goals:  In 12 weeks...    DYSPHAGIA  Short term goals established 10/28/24:    Patient will tolerate  PO trials without overt s/s of aspiration, further difficulty, or concern for aspiration-related complications in 90% of observed therapeutic trials.  Progress: trialed 1oz of applesauce and 1oz of nectar  thick liquids. Required reswallows independently to clear as well as head turn to left. Patient without overt s/s of aspiration. Will complete MBS.  Status: progressing    2.    Patient will implement safe swallowing strategies to reduce risk of aspiration and other s/sx of dysphagia in 90% of trials given caregiver assistance/cueing as needed.  Progress: patient reports use of strategies at home  Status: progressing    3.    Patient will complete recommended swallowing exercises masko, hyolaryngeal elevation etc and oral intake in conjunction with NMES during treatment session given minimal-moderate cues. GOAL UPDATED  Progress: n/a  Status:     4.    Patient will complete effortful swallow at target strength as measured via sEMG on 90% of swallow trials.  Progress: not addressed  Prior data: much improved this session - minimal cues required (max cues needed prior session). Pt able to complete 40 effortful swallows with and without nectar thick liquid trials as measure vis sEMG.  Status: progressing    5. ADD GOAL: Patient will participate in NMES to improve pharyngeal strength and allow for PO diet initiation/tolerance.   Progress: not addressed  Prior data: in conjunction with NMES placement 2 targeting geniohyoid for protraction and elevation of the mylohyoid:   Work: Rest 57:3   180 us  7 mA   8 min.   Trialed: nectar thick liquids  Tolerated: well    6.    Patient/Family will verbalize/demonstrate comprehension of dysphagia education, strategies, recommendations and POC.  Progress: reviewed aspiration risks, education on oral hygiene prior to PO intake, and swallow strategies.  Status: progressing    7. Patient will complete home exercise program daily (per patient report).  Progress: pt completing lan, hyolaryngeal elevation and effortful swallow daily.  Status: progressing    ADD GOAL 11/14/24: Short Term Goals:   Patient will improve labial ROM on effected side by 5mm to promote symmetry upon ROM for  speech and swallow.  11/14/24 DATA:   Rest Closed lip smile Open lip Smile   Right side +5mm +8mm +15mm   Left side -10mm -6mm 0        2.    Patient will complete home exercise program daily (per patient report)  3.    Patient will complete 20-30 facial exercise repetitions in conjunction with neuromuscular electrical stimulation (NMES) vital stim.  Completed facial placement 220 usec,  5.0 mA  57: 3 on/off ratio  Time: 3.45 minutes  Complete facial retraction and noted 10% movement in masseter. Able to repeat without stim on. Pt to practice at home.      DISCHARGE DIRECT CURRENT NMES listed below as patient responded well to Vital stim NMES.  Facial Stimulation Point Stim Level reduced tolerance Contraction percentage left   Levators 4 absent   Zygomatics 4 absent   Obicularis Oris 4 absent   Masseter 6 Absent initially - twinge at end x 2   Depressors 4 absent   Buccinator 4 absent   Mentalis 4 absent        Long Term Goals:  Patient will improve facial ROM in order to promote optimal swallow and speech function for activities of daily living.           Long term goals 10/28/24:   Patient will resume/maintain oral intake in order to promote optimal oropharyngeal swallow function and reduce risk for dehydration, malnutrition and weight loss.      GOAL START: 10/28/2024    GOAL END: 1/26/24   Status: Goal initiated this date   Progress this date: n/a    Long-Term Goals:  In 12 weeks, pt will meet 25% of nutrition/hydration needs by mouth, via least restrictive diet, without pulmonary compromise.    VOICE     LONG TERM GOALS (10/28/24 to 1/26/25)  Improve overall vocal health to foster increased participation levels at home, work and in the community environment.  Patient will demonstrate reduction in symptoms as evidenced by improved scores on VHI-10, RSI, and/or CSI following treatment.     SHORT TERM GOALS (10/28/24 to 1/26/25)    Patient will increase vocal wellness and decrease phono trauma in adherence with  clinician prescribed vocal hygiene and wellness program per patient report 80% of his/her day.   Progress: n/a   Status:     2.   Patient will increase ability to produce voice without tension within  5 minute conversational task x 80% accuracy as judged by clinician observation and/or patient report.   Progress: intermittent weakened voice noted during conversation.    Status: progressing    3. Patient will demonstrate independent use of voice/swallow/breathing techniques @ 80-85 dBSPL in conversational speech.   Progress:  not addressed - prior data  Sustained Phonation: 4 second duration 80+ dBSPL   Conversational speech  N/a 63 dBSPL   Status: Progressing    4.  Patient to use EMST daily to improve expiratory effort to assist with management of laryngeal penetration/aspiration and increase balance/strength of the respiratory system.   Progress - not addressed  Prior data: patient completed 10 reps at 85% of max effort at 10 cm/H2O /2 quarter turns. Average for age is ____.   Status: progressing     SUBJECTIVE    Respiratory Status: Room air  Current diet:  NPO with PEG      Pt was alert, pleasant, and cooperative for session.  Ability to follow functional commands: WFL  Nutritional status: Has PEG tube     Patient positioning: Upright in chair      OBJECTIVE    Home Exercise Program:  July, hyolaryngeal elevation, effortful swallow    Treatment/Education:  Results and recommendations were relayed to: Patient and Family  Education provided: Yes   Learner: Patient and Significant other   Barriers to learning: None   Method of teaching: Verbal   Topic: role of ST, results of assessment, risk for aspiration, recommended diet modifications, recommended safe swallow strategies, and swallow anatomy/physiology   Outcome of teaching: Pt/family demonstrated good understanding

## 2024-11-21 ENCOUNTER — APPOINTMENT (OUTPATIENT)
Dept: SPEECH THERAPY | Facility: HOSPITAL | Age: 34
End: 2024-11-21
Payer: COMMERCIAL

## 2024-11-21 ENCOUNTER — HOSPITAL ENCOUNTER (OUTPATIENT)
Dept: RADIOLOGY | Facility: HOSPITAL | Age: 34
Discharge: HOME | End: 2024-11-21
Payer: COMMERCIAL

## 2024-11-21 ENCOUNTER — TREATMENT (OUTPATIENT)
Dept: PHYSICAL THERAPY | Facility: CLINIC | Age: 34
End: 2024-11-21
Payer: COMMERCIAL

## 2024-11-21 DIAGNOSIS — Z98.890 HX OF CRANIOTOMY: ICD-10-CM

## 2024-11-21 DIAGNOSIS — R26.81 UNSTEADINESS ON FEET: ICD-10-CM

## 2024-11-21 DIAGNOSIS — D36.10 SCHWANNOMA: ICD-10-CM

## 2024-11-21 DIAGNOSIS — R13.12 OROPHARYNGEAL DYSPHAGIA: ICD-10-CM

## 2024-11-21 DIAGNOSIS — R27.0 ATAXIA: ICD-10-CM

## 2024-11-21 PROCEDURE — A9698 NON-RAD CONTRAST MATERIALNOC: HCPCS

## 2024-11-21 PROCEDURE — 92526 ORAL FUNCTION THERAPY: CPT | Mod: GN

## 2024-11-21 PROCEDURE — 74230 X-RAY XM SWLNG FUNCJ C+: CPT | Performed by: RADIOLOGY

## 2024-11-21 PROCEDURE — 92611 MOTION FLUOROSCOPY/SWALLOW: CPT | Mod: GN

## 2024-11-21 PROCEDURE — 97140 MANUAL THERAPY 1/> REGIONS: CPT | Mod: GP | Performed by: PHYSICAL THERAPIST

## 2024-11-21 PROCEDURE — 97110 THERAPEUTIC EXERCISES: CPT | Mod: GP | Performed by: PHYSICAL THERAPIST

## 2024-11-21 PROCEDURE — 2500000005 HC RX 250 GENERAL PHARMACY W/O HCPCS

## 2024-11-21 PROCEDURE — 74230 X-RAY XM SWLNG FUNCJ C+: CPT

## 2024-11-21 ASSESSMENT — PAIN SCALES - GENERAL
PAINLEVEL_OUTOF10: 6
PAINLEVEL_OUTOF10: 0 - NO PAIN

## 2024-11-21 ASSESSMENT — PAIN - FUNCTIONAL ASSESSMENT
PAIN_FUNCTIONAL_ASSESSMENT: 0-10
PAIN_FUNCTIONAL_ASSESSMENT: 0-10

## 2024-11-21 ASSESSMENT — PAIN DESCRIPTION - DESCRIPTORS: DESCRIPTORS: DISCOMFORT

## 2024-11-21 NOTE — PROCEDURES
Speech-Language Pathology    Outpatient Modified Barium Swallow Study    Patient Name: Denae Nolasco  MRN: [id number]  : 1990  Today's Date: 24     Time Calculation  Start Time: 1420  Stop Time: 1455  Time Calculation (min): 35 min       Modified Barium Swallow Study completed. Informed verbal consent obtained prior to completion of exam. Trials of thin, nectar/mildly thick liquid,  puree, regular solids and barium tablet with thin liquid were given.     Modified Barium Swallow Study completed. Informed verbal consent obtained prior to completion of exam. The study was completed per protocol with various liquid barium consistencies, pudding, and solids.  A 1.9 cm or .75 inch (outer diameter) ring was placed on the chin in the lateral view and on the lateral in order to complete objective measurements during swallowing. AP view not completed. Completed lateral scan screening. The anatomic structures and function of the oropharynx, larynx, hypopharynx and cervical esophagus were evaluated.    SLP: JAME Warren   Contact info: [place]; phone: [phone] Option 2    Reason for Referral: assess swallow function for possible diet initiation  Patient Hx per chart:  past medical history of depression and visual impairment who presented to [facility] ED on 24 with complaints of hearing loss, vertigo, ataxia (since ), headache, dizziness, and muscle spasms. Patient transferred to [facility] for concern for intracranial mass concerning for schwannoma vs. meningioma seen on outpatient imaging. 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 Patient admitted to [facility] s/p L retrosigmoid craniotomy for tumor resection.   PEG reliant    #s/p L retrosignmoid craniotomy for tumor resection with RF EVD placement    Respiratory Status: Room air  Current diet: NPO with PEG for main source of nutrition, hydration, medications. Small amounts of nectar  thick liquids and puree for pleasure.    Pain:  Pain Scale: 0-10  Ratin    DIET RECOMMENDATIONS:   - Minced & Moist (IDDSI Level 5)  - Thin liquids (IDDSI Level 0)  - Continue medications via PEG at this time    Per the results of today's MBSS, patient to slowly initiate larger amounts of pleasure feeds and increase to above diet as tolerated with guidance of OP SLP.  Continue current PEG TF at this time.  Follow the swallow strategies listed below:    STRATEGIES:  - Small bites  - Small, single sips  - Alternate consistencies  - Effortful swallow  - Add moisture to dry foods  - Upright for all PO intake  - Swallow 2-3 times per bite/sip      SLP PLAN:  Skilled SLP Services: Skilled SLP intervention for dysphagia is warranted.  SLP Frequency: 2x per week  Duration: 90 days  Treatment/Interventions:   - Oropharyngeal exercises  - Bolus trials  - Compensatory strategy training  - Diet tolerance/advancement  - Patient/caregiver education    Discussed POC: Patient  Discussed Risks/Benefits: Yes  Patient/Caregiver Agreeable: Yes    Short term goals established 24:   - Patient will tolerate baseline/recommended PO diet without overt s/s of aspiration, further difficulty, or concern for aspiration-related complications in 90% of observed therapeutic trials.  - Patient will implement safe swallowing strategies to reduce risk of aspiration and other s/sx of dysphagia in 90% of trials given caregiver assistance/cueing as needed.  - Patient/Family will verbalize/demonstrate comprehension of dysphagia education, strategies, recommendations and POC.  - Patient will complete home exercise program daily (per patient report).  - Patient will complete exercises and/or oral bolus trials in conjunction with neuromuscular electrical stimulation (NMES).  - Patient will complete oral motor exercises at 75% accuracy to improve oral bolus manipulation.      Long term goals 24:   Patient will resume/maintain oral intake in  order to promote optimal oropharyngeal swallow function and reduce risk for dehydration, malnutrition and weight loss.       Education Provided: Results and recommendations per MBSS, with video review; recommendations and POC at this time. Verbal understanding and agreement given on all accounts.     Treatment Provided Today: ST provided extensive education and training to pt/pt family regarding anatomy/physiology of swallow function, risk factors of aspiration/aspiration pna & how to mitigate factors, diet modifications, and the use of compensatory swallow strategies to promote pt safety upon PO intake including small bites/sips, 2-3 swallows per bolus, alternate consistencies, medications continue via PEG. Continue with current PEG TF for main source of nutrition at this time.    In addition, ST provided instruction/training on beginning to initiate soft moist/minced solids and thin liquids small sips. Provided oral intake tracking guide for patient use to assist medical team in the decision process of when reducing the PEG TF is appropriate.      Repeat Study: Per MD or treating SLP       Mechanics of the Swallow Summary:  ORAL PHASE:  Lip Closure - No labial escape/anterior loss of bolus   Tongue Control During Bolus Hold - Escape to lateral buccal cavity and/or floor of mouth   Bolus prep/mastication - Slow prolonged mastication with complete re-collection necessary   Bolus transport/lingual motion - Delayed initiation of tongue motion for A-P movement of the bolus   Oral residue - Majority of bolus remaining     PHARYNGEAL PHASE:  Initiation of pharyngeal swallow - Bolus head at vallecular pit   Soft palate elevation - No bolus between soft palate/pharyngeal wall   Laryngeal elevation - Partial superior movement of thyroid cartilage and/or partial approximation of arytenoids to epiglottic petiole   Anterior hyoid excursion - Partial anterior movement   Epiglottic movement - Partial inversion  Laryngeal  vestibule closure - Incomplete - narrow column of air/contrast in laryngeal vestibule   Pharyngeal stripping wave - Present, however, diminished   Pharyngeal contraction (A/P view) - Not tested       Pharyngoesophageal segment opening - Partial distension/partial duration with partial obstruction of flow of bolus   Tongue base retraction - Wide column of contrast or air between tongue base and pharyngeal wall   Pharyngeal residue - Collection of residue within or on the pharyngeal structures     ESOPHAGEAL PHASE:  Esophageal clearance - Esophageal retention slow clearance during lateral screen      SLP Impressions with Severity Rating:   Pt presents with  marked improved overall swallow function when compared to prior MBS on 10/2/24 however, moderate oral and mild-moderate pharyngeal dysphagia persists. She is able to increase pleasure feeds with goal of slow transition to oral diet as tolerated.    Noted difficulty with oral bolus manipulation due to lingual weakness and reduced range of motion. This is impacting swallow initiation. Difficulty with AP transfer of bolus. Collection of pharyngeal residue was reduced with spontaneous reswallows and/or liquid chasers. Head turn chin tuck did not improve swallow function. Patient observed to have overt laryngeal penetration with thin, nectar and puree with attempt to clear all penetration. Larger thin liquid sip x1 contacted the vocal cord with attempt to eject. No aspiration observed.    Slow motility noted during brief lateral scan of the esophagus.    Swallowing physiology is detailed above. Impairments most impacting swallowing safety and efficiency include laryngeal penetration with thin,nectar and pureed as well as pharyngeal residue.     *Of note: The lateral bolus follow-through is not intended to be utilized as a diagnostic assessment of the esophagus, rather a tool to observe the biomechanical aspects of the swallow continuum and to inform the need for further  evaluation by medical specialists, as applicable.     Strategies attempted- Effortful and multiple swallows resulted in improved clearance of solids.       OUTCOME MEASURES:  Functional Oral Intake Scale  Functional Oral Intake Scale: Level 3        tube dependent with consistent oral intake of food and liquid       Eating Assessment Tool (EAT-10)   0=No problem, 1=Mild problem, 2=Mild to moderate problem, 3=Moderate problem, 4=Severe problem    EAT 10  My swallowing problem has caused me to lose weight.: 0  My swallowing problem interferes with my ability to go out for meals.: 4  Swallowing liquids takes extra effort.: 2  Swallowing is painful: 0  The pleasure of eating is affected by my swallowing.: 3  When I swallow food sticks in my throat.: 3  I cough when I eat.: 2  Swallowing is stressful: 2    A total score of 3 or above may indicate difficulty with swallowing safely and/or efficiently      Rosenbek's Penetration Aspiration Scale  Thin Liquids: 3. PENETRATION with LOW ASPIRATION risk - contrast remains above vocal cords, visible residue]   Nectar Thick Liquids: 3. PENETRATION with LOW ASPIRATION risk - contrast remains above vocal cords, visible residue]   Puree: 5. DEEP PENETRATION with HIGH ASPIRATION risk - contrast contacts vocal cords, visible residue  Solids: 1. NO ASPIRATION & NO PENETRATION - no aspiration, contrast does not enter airway

## 2024-11-21 NOTE — PROGRESS NOTES
Physical Therapy Treatment    Patient Name: Denae Nolasco  MRN: [id number]  Today's Date: 11/21/2024  Time Calculation  Start Time: 1023  Stop Time: 1105  Time Calculation (min): 42 min  PT Therapeutic Procedures Time Entry  Manual Therapy Time Entry: 10  Therapeutic Exercise Time Entry: 30    Insurance:  Visit number: 7 of 20  Authorization info: MMO - NO AUTH / $6100 DEDUCT MET / $7500 OOP MET / 100% COVERAGE thru 12/31/24   Insurance Type: Payor: [employer] [facility] / Plan: [employer] / Product Type: *No Product type* /     Current Problem   1. Schwannoma  Follow Up In Physical Therapy      2. Hx of craniotomy  Follow Up In Physical Therapy      3. Ataxia  Follow Up In Physical Therapy      4. Unsteadiness on feet  Follow Up In Physical Therapy          Subjective   General   General Comment: Pt reports that her shoulder is a little more painful today along bicep region.  Precautions:  Precautions  Precautions Comment: None  Pain   Pain Assessment: 0-10  0-10 (Numeric) Pain Score: 6  Pain Location: Shoulder  Pain Orientation: Right  Pain Descriptors: Discomfort  Post Treatment Pain Level 4    Objective   Continued scapular compensation with upper trap overactivity     Treatments:  Therapeutic Exercise:  Therapeutic Exercise  Therapeutic Exercise Performed: Yes  Therapeutic Exercise Activity 1: UEB 3/3 fwd/bwd  Therapeutic Exercise Activity 2: slide board flexion L5 10x3  Therapeutic Exercise Activity 3: BOSU scapular push 10x3  Therapeutic Exercise Activity 4: wax on/off at counter 20x  Therapeutic Exercise Activity 5: dowel AAROM flexion with bridge 10x3  Therapeutic Exercise Activity 6: KT tape applied along R GH 3 I strip for improved postural and UT inhabition    Manual:  Manual Therapy  Manual Therapy Performed: Yes  Manual Therapy Activity 1: Dry Needling  IDN performed this date. Pt educated on risks and benefits of dry needling. Pt provided verbal consent. All universal precautions  followed.    4 - 30 mm R bicep   3 - 30 mm R supraspinatus/mid delt    No adverse response. All IDN guidelines and safety protocols followed.      Assessment   Assessment:    Focused on overall mobility exercises however continues to have scapular compensation and reduced GH activation, utilized KT tape and DN to reduce pain and help with muscle activation, will assess benefits next session.     Plan:    Continue focus on GH activation/mechanics along with balance/core activation      OP EDUCATION:   KT use and removal     Goals:   Active       LTG       Pt will be 100% IND with HEP in 10 weeks in order to maintain progress with therapy.         Start:  10/23/24    Expected End:  12/31/24            Pt will improve DGI score to 24/24 in 10 weeks in order to reduce fall risk.        Start:  10/23/24    Expected End:  12/31/24            Pt will demonstrated improve AROM of R GH as follows: flexion 150, abduction 150, ER functional C7, IR functional T8 in 10 weeks to improve mobility required for dressing, bathing, cooking and cleaning.        Start:  10/23/24    Expected End:  12/31/24            Pt will improve R GH strength to 5/5 in 10 weeks in order to improve strength required to lift objects at home including groceries and to improve cleaning tasks.         Start:  10/23/24    Expected End:  12/31/24            Pt will demonstrate subjective improvement of ADLs and recreational activities through improved score of 100 on ABC in 10 weeks for return to prior level of function and work tasks without fall risk.        Start:  10/23/24    Expected End:  12/31/24               STG       Pt will be 50% IND with HEP in 5 weeks in order to progress with therapy.        Start:  10/23/24    Expected End:  12/02/24            Pt will improve DGI score to 12/12 in 5 weeks in order to reduce fall risk.        Start:  10/23/24    Expected End:  12/02/24            Pt will be able to perform ascending reciprocal pattern of 12  steps with use of 1 rail in 5 weeks for home and community ambulation needs.         Start:  10/23/24    Expected End:  12/02/24            Pt will be able to ambulate in outdoor environment without imbalance in 5 weeks for recreational activities.       Start:  10/23/24    Expected End:  12/02/24            Pt will demonstrate subjective improvement of ADLs and recreational activities through improved score of 55 on ABC in 5 weeks for reduce fall risk.        Start:  10/23/24    Expected End:  12/02/24

## 2024-11-25 ENCOUNTER — TREATMENT (OUTPATIENT)
Dept: PHYSICAL THERAPY | Facility: CLINIC | Age: 34
End: 2024-11-25
Payer: COMMERCIAL

## 2024-11-25 DIAGNOSIS — Z98.890 HX OF CRANIOTOMY: ICD-10-CM

## 2024-11-25 DIAGNOSIS — D36.10 SCHWANNOMA: ICD-10-CM

## 2024-11-25 DIAGNOSIS — R27.0 ATAXIA: ICD-10-CM

## 2024-11-25 DIAGNOSIS — R26.81 UNSTEADINESS ON FEET: ICD-10-CM

## 2024-11-25 PROCEDURE — 97112 NEUROMUSCULAR REEDUCATION: CPT | Mod: GP,CQ

## 2024-11-25 ASSESSMENT — PAIN SCALES - GENERAL: PAINLEVEL_OUTOF10: 0 - NO PAIN

## 2024-11-25 ASSESSMENT — PAIN - FUNCTIONAL ASSESSMENT: PAIN_FUNCTIONAL_ASSESSMENT: 0-10

## 2024-11-25 NOTE — PROGRESS NOTES
Physical Therapy Treatment    Patient Name: Denae Nolasco  MRN: [id number]  Today's Date: 11/25/2024  Time Calculation  Start Time: 1130  Stop Time: 1210  Time Calculation (min): 40 min  PT Therapeutic Procedures Time Entry  Neuromuscular Re-Education Time Entry: 40    Insurance:  Visit number: 8 of 20  Authorization info: MMO - NO AUTH / $6100 DEDUCT MET / $7500 OOP MET / 100% COVERAGE thru 12/31/24   Insurance Type: Payor: [employer] [facility] / Plan: [employer]ICAL [employer] / Product Type: *No Product type* /     Current Problem   1. Schwannoma  Follow Up In Physical Therapy      2. Hx of craniotomy  Follow Up In Physical Therapy      3. Ataxia  Follow Up In Physical Therapy      4. Unsteadiness on feet  Follow Up In Physical Therapy          Subjective   General    Patient reports no significant changes, still having shoulder pain with reaching. Continues to feel like she is unsteady with some activities and fatigues quickly, although improving. Wants to continue with overall strength and balance.    Precautions:   None    Pain   Pain Assessment: 0-10  0-10 (Numeric) Pain Score: 0 - No pain    Post Treatment Pain Level   No change    Objective   Poor + dynamic standing balance    Improved activity/exercise tolerance compared to previous session    Treatments:    Neuro Re-ed:   Balance/Neuromuscular Re-Education  Balance/Neuromuscular Re-Education Activity Performed: Yes  Balance/Neuromuscular Re-Education Activity 1: NuStep level 5 for 6'  Balance/Neuromuscular Re-Education Activity 2: tall kneeling hip thrusts 10x2  Balance/Neuromuscular Re-Education Activity 3: tall kneeling holds with 2# DB bicep curls 10x2  Balance/Neuromuscular Re-Education Activity 4: tall kneeling with fwd SB rolls outs 10x2  Balance/Neuromuscular Re-Education Activity 5: anchored squats on air ex 10x2  Balance/Neuromuscular Re-Education Activity 6: romberg stance with BUE elevation 3 x 10  Balance/Neuromuscular Re-Education Activity  7: rebound mini lunges on air ex anchored x10 B  Balance/Neuromuscular Re-Education Activity 8: lateral side step >squat on balance beam BUE assist  Balance/Neuromuscular Re-Education Activity 9: Bottom of BOSU alt taps no UE      Assessment   Assessment:    Improved tolerance to neuromuscular stabilization tasks including tall kneeling with less fatigue, slight improved control, and improved quality of positions/movements compared to previous session. Also showing signs of improved activity/exercise tolerance with less fatigue. Progressed strength and balance tasks in standing this date with good tolerance, challenged by all standing balance tasks with unsteadiness present with instances of LOB and limited coordination on RLE compared to L.     Plan:    Progress balance and core, planks    OP EDUCATION:   Review of HEP    Goals:   Active       LTG       Pt will be 100% IND with HEP in 10 weeks in order to maintain progress with therapy.         Start:  10/23/24    Expected End:  12/31/24            Pt will improve DGI score to 24/24 in 10 weeks in order to reduce fall risk.        Start:  10/23/24    Expected End:  12/31/24            Pt will demonstrated improve AROM of R GH as follows: flexion 150, abduction 150, ER functional C7, IR functional T8 in 10 weeks to improve mobility required for dressing, bathing, cooking and cleaning.        Start:  10/23/24    Expected End:  12/31/24            Pt will improve R GH strength to 5/5 in 10 weeks in order to improve strength required to lift objects at home including groceries and to improve cleaning tasks.         Start:  10/23/24    Expected End:  12/31/24            Pt will demonstrate subjective improvement of ADLs and recreational activities through improved score of 100 on ABC in 10 weeks for return to prior level of function and work tasks without fall risk.        Start:  10/23/24    Expected End:  12/31/24               STG       Pt will be 50% IND with HEP in  5 weeks in order to progress with therapy.        Start:  10/23/24    Expected End:  12/02/24            Pt will improve DGI score to 12/12 in 5 weeks in order to reduce fall risk.        Start:  10/23/24    Expected End:  12/02/24            Pt will be able to perform ascending reciprocal pattern of 12 steps with use of 1 rail in 5 weeks for home and community ambulation needs.         Start:  10/23/24    Expected End:  12/02/24            Pt will be able to ambulate in outdoor environment without imbalance in 5 weeks for recreational activities.       Start:  10/23/24    Expected End:  12/02/24            Pt will demonstrate subjective improvement of ADLs and recreational activities through improved score of 55 on ABC in 5 weeks for reduce fall risk.        Start:  10/23/24    Expected End:  12/02/24

## 2024-11-27 ENCOUNTER — TREATMENT (OUTPATIENT)
Dept: SPEECH THERAPY | Facility: HOSPITAL | Age: 34
End: 2024-11-27
Payer: COMMERCIAL

## 2024-11-27 DIAGNOSIS — R13.10 DYSPHAGIA, UNSPECIFIED TYPE: ICD-10-CM

## 2024-11-27 DIAGNOSIS — R49.0 DYSPHONIA: ICD-10-CM

## 2024-11-27 PROCEDURE — 92526 ORAL FUNCTION THERAPY: CPT | Mod: GN

## 2024-11-27 PROCEDURE — 92507 TX SP LANG VOICE COMM INDIV: CPT | Mod: GN

## 2024-11-27 ASSESSMENT — PAIN - FUNCTIONAL ASSESSMENT: PAIN_FUNCTIONAL_ASSESSMENT: 0-10

## 2024-11-27 ASSESSMENT — PAIN SCALES - GENERAL: PAINLEVEL_OUTOF10: 0 - NO PAIN

## 2024-11-27 NOTE — PROGRESS NOTES
Speech-Language Pathology Swallow and Voice Treatment Note  30 day progress note    Patient Name: Denae Nolasco  MRN: [id number]  : 1990  Today's Date: 24  Time Calculation  Start Time: 1115  Stop Time: 1210  Time Calculation (min): 55 min      ASSESSMENT  Impressions:  Per patient and spouse - following MBS 24 patient has increased oral intake and is eating near baseline amounts of foods. Minimal coughing, continues with some pharyngeal residue. Does not need head turn/chin tuck strategies. She has not used PEG tube since 24 and weight is stable. Patient and spouse are tracking oral intake and weight daily on an excel spreadsheet. She would like to have PEG removed. GI MD contacted by SLP for next steps in removal process.    Following facial stim, now observing mentalis ROM - about 25%.    PLAN    DIET RECOMMENDATIONS:   - Minced & Moist (IDDSI Level 5)  - Thin liquids (IDDSI Level 0)  - Continue medications via PEG at this time        STRATEGIES:  - Small bites  - Small, single sips  - Alternate consistencies  - Effortful swallow  - Add moisture to dry foods  - Upright for all PO intake  - Swallow 2-3 times per bite/sip       Recommended frequency/duration:  Skilled SLP services recommended: Yes  Frequency: 2x/week  Duration: 12 weeks     Strengths: Cognition, Motivation, Family/caregiver support, and Age  Barriers to participation in tx: N/A    Payor authorization information:  Payor: MEDICAL [facility] / Plan: [employer] / Product Type: *No Product type* /   Certification Period Start Date: 10/28/24  Certification Period End Date: 25  Number of Authorized Treatments : 20  Total Number of Visits : 8    Goals:  Short-Term Goals:  In 12 weeks...    DYSPHAGIA  Short term goals established 24:   - Patient will tolerate baseline/recommended PO diet without overt s/s of aspiration, further difficulty, or concern for aspiration-related complications in 90% of  observed therapeutic trials.  Progress: per report - patient eating near normal amounts of foods, increasing variety. Intake tracked daily.  Status: progressing    - Patient/Family will verbalize/demonstrate comprehension of dysphagia education, strategies, recommendations and POC.  Progress: patient reports use of strategies at home  Status: progressing    - Patient will complete home exercise program daily (per patient report).  Progress: pt completing lan, hyolaryngeal elevation and effortful swallow daily.   Status: progressing    - Patient will complete exercises and/or oral bolus trials in conjunction with neuromuscular electrical stimulation (NMES).  Progress: not addressed  Prior data: in conjunction with NMES placement 2 targeting geniohyoid for protraction and elevation of the mylohyoid:   Work: Rest 57:3   180 us  7 mA   8 min.   Trialed: nectar thick liquids  Tolerated: well  - Patient will complete oral motor exercises at 75% accuracy to improve oral bolus manipulation.        Long term goals 11/21/24:   Patient will resume/maintain oral intake in order to promote optimal oropharyngeal swallow function and reduce risk for dehydration, malnutrition and weight loss.          VOICE/Speech Goals:      LONG TERM GOALS (10/28/24 to 1/26/25)  Improve overall vocal health to foster increased participation levels at home, work and in the community environment.  Patient will demonstrate reduction in symptoms as evidenced by improved scores on VHI-10, RSI, and/or CSI following treatment.     SHORT TERM GOALS (10/28/24 to 1/26/25)     1.  Patient will complete 20-30 facial exercise repetitions in conjunction with neuromuscular electrical stimulation (NMES) vital stim.  Completed facial placement   220 usec,  5.0 mA  57: 3 on/off ratio  Time: 15 minutes  Completed exercises: obicularis oris upper left 25% contraction, levator absent ROM, mentalis twinge ROM     Patient will improve labial ROM on effected side by  5mm to promote symmetry upon ROM for speech and swallow.  211/14/24 DATA: not completed 11/27   Rest Closed lip smile Open lip Smile   Right side +5mm +8mm +15mm   Left side -10mm -6mm 0       3. Patient will increase vocal wellness and decrease phono trauma in adherence with clinician prescribed vocal hygiene and wellness program per patient report 80% of his/her day.   Progress: n/a   Status:     4.   Patient will increase ability to produce voice without tension within  5 minute conversational task x 80% accuracy as judged by clinician observation and/or patient report.   Progress: strong vocal quality intermittently during conversation. Spouse reports near baseline speech - 90% acc   Status: progressing    5. Patient will demonstrate independent use of voice/swallow/breathing techniques @ 80-85 dBSPL in conversational speech.   Progress:  not addressed - prior data  Sustained Phonation: 4 second duration 80+ dBSPL   Conversational speech  N/a 63 dBSPL   Status: Progressing    6.  Patient to use EMST daily to improve expiratory effort to assist with management of laryngeal penetration/aspiration and increase balance/strength of the respiratory system.   Progress - not using per patient report  Prior data: patient completed 10 reps at 85% of max effort at 10 cm/H2O /2 quarter turns. Average for age is ____.   Status: progressing     SUBJECTIVE    Respiratory Status: Room air  Current diet:  NPO with PEG      Pt was alert, pleasant, and cooperative for session.  Ability to follow functional commands: WFL  Nutritional status: Has PEG tube     Patient positioning: Upright in chair      OBJECTIVE    Home Exercise Program:  July, hyolaryngeal elevation, effortful swallow, lingual strength and ROM exercises    Treatment/Education:  Results and recommendations were relayed to: Patient and Family  Education provided: Yes   Learner: Patient and Significant other   Barriers to learning: None   Method of teaching:  Verbal   Topic oral intake, home exercise program   Outcome of teaching: Pt/family demonstrated good understanding

## 2024-11-29 ENCOUNTER — TREATMENT (OUTPATIENT)
Dept: PHYSICAL THERAPY | Facility: CLINIC | Age: 34
End: 2024-11-29
Payer: COMMERCIAL

## 2024-11-29 DIAGNOSIS — R26.81 UNSTEADINESS ON FEET: ICD-10-CM

## 2024-11-29 DIAGNOSIS — Z98.890 HX OF CRANIOTOMY: ICD-10-CM

## 2024-11-29 DIAGNOSIS — D36.10 SCHWANNOMA: ICD-10-CM

## 2024-11-29 DIAGNOSIS — R27.0 ATAXIA: ICD-10-CM

## 2024-11-29 PROCEDURE — 97110 THERAPEUTIC EXERCISES: CPT | Mod: GP | Performed by: PHYSICAL THERAPIST

## 2024-11-29 PROCEDURE — 97140 MANUAL THERAPY 1/> REGIONS: CPT | Mod: GP | Performed by: PHYSICAL THERAPIST

## 2024-11-29 ASSESSMENT — PAIN SCALES - GENERAL: PAINLEVEL_OUTOF10: 5 - MODERATE PAIN

## 2024-11-29 ASSESSMENT — PAIN - FUNCTIONAL ASSESSMENT: PAIN_FUNCTIONAL_ASSESSMENT: 0-10

## 2024-11-29 NOTE — PROGRESS NOTES
Physical Therapy Treatment    Patient Name: Denae Nolasco  MRN: [id number]  Today's Date: 11/29/2024  Time Calculation  Start Time: 0946  Stop Time: 1025  Time Calculation (min): 39 min  PT Therapeutic Procedures Time Entry  Manual Therapy Time Entry: 12  Therapeutic Exercise Time Entry: 27    Insurance:  Visit number: 9 of 20  Authorization info: MMO - NO AUTH / $6100 DEDUCT MET / $7500 OOP MET / 100% COVERAGE thru 12/31/24   Insurance Type: Payor: MEDICAL [employer] [facility] / Plan: MEDICAL [employer] / Product Type: *No Product type* /     Current Problem   1. Schwannoma  Follow Up In Physical Therapy      2. Hx of craniotomy  Follow Up In Physical Therapy      3. Ataxia  Follow Up In Physical Therapy      4. Unsteadiness on feet  Follow Up In Physical Therapy          Subjective   General   General Comment: Pt reports yesterday she had incresaed shoulder pain but it is better today, she does not feel that there has been much improvement iwth shoulder however balance is getting better. She doesn't feel that the KT tape made much difference.  Precautions:  Precautions  Precautions Comment: None  Pain   Pain Assessment: 0-10  0-10 (Numeric) Pain Score: 5 - Moderate pain  Post Treatment Pain Level 4    Objective   Reduced ability to isolate rhomboids without row   Reduced ER hold ability noted with compensation of bicep and wrist musculature   Trunk compensation during pulley flexion with lateral lean and trunk extension     Significant tension of GH rotation     Treatments:  Therapeutic Exercise:  Therapeutic Exercise  Therapeutic Exercise Performed: Yes  Therapeutic Exercise Activity 1: UEB 3/3 fwd/bwd  Therapeutic Exercise Activity 2: Scapular retraction mint TB 10x3  Therapeutic Exercise Activity 3: ER step out mint TB 10x2  Therapeutic Exercise Activity 4: pulley flexion 2'  Therapeutic Exercise Activity 5: bridge with GH flexion 10x3  Therapeutic Exercise Activity 6: s/l GH abd 10x3 (required PT  assist)    Manual:  Manual Therapy  Manual Therapy Performed: Yes  Manual Therapy Activity 1: AP joint mobs  Manual Therapy Activity 2: MWM flexion and abduction  Manual Therapy Activity 3: MWM ER/IR      Assessment   Assessment:    Continue to focus on overall GH mobility tasks with emphasis on preventing trunk compensation this date. Noted significant weakness throughout GH RTC musculature this date. Will continue as tolerated.     Plan:    Continue focus on manual therapy for GH mobility and overall balance/coordination - reassess next visit     OP EDUCATION:   Continue with established HEP     Goals:   Active       LTG       Pt will be 100% IND with HEP in 10 weeks in order to maintain progress with therapy.         Start:  10/23/24    Expected End:  12/31/24            Pt will improve DGI score to 24/24 in 10 weeks in order to reduce fall risk.        Start:  10/23/24    Expected End:  12/31/24            Pt will demonstrated improve AROM of R GH as follows: flexion 150, abduction 150, ER functional C7, IR functional T8 in 10 weeks to improve mobility required for dressing, bathing, cooking and cleaning.        Start:  10/23/24    Expected End:  12/31/24            Pt will improve R GH strength to 5/5 in 10 weeks in order to improve strength required to lift objects at home including groceries and to improve cleaning tasks.         Start:  10/23/24    Expected End:  12/31/24            Pt will demonstrate subjective improvement of ADLs and recreational activities through improved score of 100 on ABC in 10 weeks for return to prior level of function and work tasks without fall risk.        Start:  10/23/24    Expected End:  12/31/24               STG       Pt will be 50% IND with HEP in 5 weeks in order to progress with therapy.        Start:  10/23/24    Expected End:  12/02/24            Pt will improve DGI score to 12/12 in 5 weeks in order to reduce fall risk.        Start:  10/23/24    Expected End:   12/02/24            Pt will be able to perform ascending reciprocal pattern of 12 steps with use of 1 rail in 5 weeks for home and community ambulation needs.         Start:  10/23/24    Expected End:  12/02/24            Pt will be able to ambulate in outdoor environment without imbalance in 5 weeks for recreational activities.       Start:  10/23/24    Expected End:  12/02/24            Pt will demonstrate subjective improvement of ADLs and recreational activities through improved score of 55 on ABC in 5 weeks for reduce fall risk.        Start:  10/23/24    Expected End:  12/02/24

## 2024-12-02 ENCOUNTER — TREATMENT (OUTPATIENT)
Dept: PHYSICAL THERAPY | Facility: CLINIC | Age: 34
End: 2024-12-02
Payer: COMMERCIAL

## 2024-12-02 DIAGNOSIS — R26.81 UNSTEADINESS ON FEET: ICD-10-CM

## 2024-12-02 DIAGNOSIS — R27.0 ATAXIA: ICD-10-CM

## 2024-12-02 DIAGNOSIS — D36.10 SCHWANNOMA: ICD-10-CM

## 2024-12-02 DIAGNOSIS — Z98.890 HX OF CRANIOTOMY: ICD-10-CM

## 2024-12-02 PROCEDURE — 97110 THERAPEUTIC EXERCISES: CPT | Mod: GP,CQ

## 2024-12-02 PROCEDURE — 97140 MANUAL THERAPY 1/> REGIONS: CPT | Mod: GP,CQ

## 2024-12-02 ASSESSMENT — PAIN SCALES - GENERAL: PAINLEVEL_OUTOF10: 0 - NO PAIN

## 2024-12-02 NOTE — PROGRESS NOTES
"Physical Therapy Treatment    Patient Name: Denae Nolasco  MRN: [id number]  Today's Date: 12/2/2024  Time Calculation  Start Time: 1117  Stop Time: 1205  Time Calculation (min): 48 min  PT Therapeutic Procedures Time Entry  Manual Therapy Time Entry: 10  Therapeutic Exercise Time Entry: 35    Insurance:  Visit number: 10 of 20  Authorization info: 11/20/24:  Speech - MMO - NO AUTH / $6100 DEDUCT not met / $7500 OOP not met / 60% COINS / 20V - 2 USED / Availity [id number] / ds     10/28/24:  MMO - NO AUTH / $6100 DEDUCT MET / $7500 OOP MET / 100% COVERAGE thru 12/31/24 / reverify 60% COINS on 1/1/25 / 20V - 1 USED / Availity [id number] / ds      8/22  Insurance Type: Payor: MEDICAL [employer] [facility] / Plan: MEDICAL M[employer]UAL [employer] / Product Type: *No Product type* /     Current Problem   1. Schwannoma  Follow Up In Physical Therapy      2. Hx of craniotomy  Follow Up In Physical Therapy      3. Ataxia  Follow Up In Physical Therapy      4. Unsteadiness on feet  Follow Up In Physical Therapy          Subjective   General   Reason for Referral: Schwannoma +3 more  Referred By: Alondra Alegre, BLU-CNP  General Comment: Pt reports R shoulder S/S come and go depending on activites.  Precautions:  Precautions  Precautions Comment: None  Pain   0-10 (Numeric) Pain Score: 0 - No pain  Post Treatment Pain Level 0/10    Objective   Pt requires moderate postural cueing during ther ex.    Treatments:  Therapeutic Exercise:  Therapeutic Exercise  Therapeutic Exercise Performed: Yes  Therapeutic Exercise Activity 1: UEB 3/3 fwd/bwd  Therapeutic Exercise Activity 2: pulley flexion 5\" hold x20  Therapeutic Exercise Activity 3: SB roll out 5\" hold x20  Therapeutic Exercise Activity 4: Scapular retraction 5\" hold 2x10  Therapeutic Exercise Activity 5: Rows Mint T band 2x10  Therapeutic Exercise Activity 6: ER step out mint TB 10x2  Therapeutic Exercise Activity 7: Slide board L3 2x10  Therapeutic Exercise Activity 8: Sidelying ER " 2x10  Therapeutic Exercise Activity 9: Sidelying abduction 2x10    Manual:  Manual Therapy  Manual Therapy Performed: Yes  Manual Therapy Activity 1: Axial [id number] with oscillation  Manual Therapy Activity 2: PROM all planes to tolerance with stretches at end ranges.  Manual Therapy Activity 3: GH mobes post and inf Gr 2       Assessment   Assessment:   PT Assessment  PT Assessment Results:  (abnormal coordination, abnormal gait, abnormal muscle firing, abnormal muscle tone, abnormal or restricted ROM, activity intolerance, impaired balance, impaired physical strength)  Rehab Prognosis: Good  Evaluation/Treatment Tolerance: Patient tolerated treatment well  Assessment Comment: Pt tolerates ther ex progressions with mild increases in pain level, reduced after manual.    Plan:   OP PT Plan  Treatment/Interventions: Gait training, Manual therapy, Self care/ home management, Therapeutic activities, Therapeutic exercises, Vasopneumatic device  PT Plan: Skilled PT  PT Frequency: 2 times per week  Duration: 10 weeks  Number of Treatments Authorized: 20  Rehab Potential: Good    OP EDUCATION:  Outpatient Education  Individual(s) Educated: Patient  Education Provided: Body Mechanics, Anatomy  Patient/Caregiver Demonstrated Understanding: yes  Patient Response to Education: Patient/Caregiver Verbalized Understanding of Information, Patient/Caregiver Performed Return Demonstration of Exercises/Activities, Patient/Caregiver Asked Appropriate Questions    Goals:   Active       LTG       Pt will be 100% IND with HEP in 10 weeks in order to maintain progress with therapy.         Start:  10/23/24    Expected End:  12/31/24            Pt will improve DGI score to 24/24 in 10 weeks in order to reduce fall risk.        Start:  10/23/24    Expected End:  12/31/24            Pt will demonstrated improve AROM of R GH as follows: flexion 150, abduction 150, ER functional C7, IR functional T8 in 10 weeks to improve mobility required  for dressing, bathing, cooking and cleaning.        Start:  10/23/24    Expected End:  12/31/24            Pt will improve R GH strength to 5/5 in 10 weeks in order to improve strength required to lift objects at home including groceries and to improve cleaning tasks.         Start:  10/23/24    Expected End:  12/31/24            Pt will demonstrate subjective improvement of ADLs and recreational activities through improved score of 100 on ABC in 10 weeks for return to prior level of function and work tasks without fall risk.        Start:  10/23/24    Expected End:  12/31/24               STG       Pt will be 50% IND with HEP in 5 weeks in order to progress with therapy.        Start:  10/23/24    Expected End:  12/02/24            Pt will improve DGI score to 12/12 in 5 weeks in order to reduce fall risk.        Start:  10/23/24    Expected End:  12/02/24            Pt will be able to perform ascending reciprocal pattern of 12 steps with use of 1 rail in 5 weeks for home and community ambulation needs.         Start:  10/23/24    Expected End:  12/02/24            Pt will be able to ambulate in outdoor environment without imbalance in 5 weeks for recreational activities.       Start:  10/23/24    Expected End:  12/02/24            Pt will demonstrate subjective improvement of ADLs and recreational activities through improved score of 55 on ABC in 5 weeks for reduce fall risk.        Start:  10/23/24    Expected End:  12/02/24

## 2024-12-03 ENCOUNTER — TREATMENT (OUTPATIENT)
Dept: SPEECH THERAPY | Facility: HOSPITAL | Age: 34
End: 2024-12-03
Payer: COMMERCIAL

## 2024-12-03 DIAGNOSIS — R49.0 DYSPHONIA: ICD-10-CM

## 2024-12-03 DIAGNOSIS — R13.10 DYSPHAGIA, UNSPECIFIED TYPE: ICD-10-CM

## 2024-12-03 PROCEDURE — 92507 TX SP LANG VOICE COMM INDIV: CPT | Mod: GN

## 2024-12-03 PROCEDURE — 92526 ORAL FUNCTION THERAPY: CPT | Mod: GN

## 2024-12-03 ASSESSMENT — PAIN SCALES - GENERAL: PAINLEVEL_OUTOF10: 0 - NO PAIN

## 2024-12-03 ASSESSMENT — PAIN - FUNCTIONAL ASSESSMENT: PAIN_FUNCTIONAL_ASSESSMENT: 0-10

## 2024-12-03 NOTE — PROGRESS NOTES
"Speech-Language Pathology Swallow and Voice Treatment Note      Patient Name: Denae Nolasco  MRN: [id number]  : 1990  Today's Date: 24  Time Calculation  Start Time: 1115  Stop Time: 1215  Time Calculation (min): 60 min      ASSESSMENT  Impressions:  Patient is doing well with pharyngeal function of swallow. She reports minimal coughing and pharyngeal residue with foods. She does eat less quantity as it \"takes so long\" to eat - due to oral (lingual and labial) weakness. Will focus on lingual and labial strengthening.    PLAN    DIET RECOMMENDATIONS:   - Minced & Moist (IDDSI Level 5)  - Thin liquids (IDDSI Level 0)  - Continue medications via PEG at this time        STRATEGIES:  - Small bites  - Small, single sips  - Alternate consistencies  - Effortful swallow  - Add moisture to dry foods  - Upright for all PO intake  - Swallow 2-3 times per bite/sip       Recommended frequency/duration:  Skilled SLP services recommended: Yes  Frequency: 2x/week  Duration: 12 weeks     Strengths: Cognition, Motivation, Family/caregiver support, and Age  Barriers to participation in tx: N/A    Payor authorization information:  Payor: [employer] [facility] / Plan: [employer] / Product Type: *No Product type* /   Certification Period Start Date: 10/28/24  Certification Period End Date: 25  Number of Authorized Treatments : 20  Total Number of Visits : 9    Goals:  Short-Term Goals:  In 12 weeks...    DYSPHAGIA  Short term goals established 24:   - Patient will tolerate baseline/recommended PO diet without overt s/s of aspiration, further difficulty, or concern for aspiration-related complications in 90% of observed therapeutic trials.  Progress: per report - Minimal s/s of aspiration, less quantity of foods reported due to oral weakness.  Status: progressing    - Patient/Family will verbalize/demonstrate comprehension of dysphagia education, strategies, recommendations and POC.  Progress: " patient reports use of strategies at home  Status: Goal met    - Patient will complete home exercise program daily (per patient report).  Progress: has not been completing per patient report   Status: progressing    - Patient will complete exercises and/or oral bolus trials in conjunction with neuromuscular electrical stimulation (NMES).  Progress: not addressed Hold goal 12/3/24- will complete facial NMES  Prior data: in conjunction with NMES placement 2 targeting geniohyoid for protraction and elevation of the mylohyoid:   Work: Rest 57:3   180 us  7 mA   8 min.   Trialed: nectar thick liquids  Tolerated: well    - Patient will complete oral motor exercises at 75% accuracy to improve oral bolus manipulation.  Progress: Instructed lingual strengthening exercises: marked left lingual weakness noted (40% acc). Right: adequate. Lingual ROM: apraxia noted, reduced control and range of lateral movement on 100% of trials.  Facial: instructed to block face on right: absent movement in levators, zygomatics and mentalis today on left.  Labial: instructed strength exercise with tactile stim: 50% ROM - lingual tip greater movement then posterior.        Long term goals 11/21/24:   Patient will resume/maintain oral intake in order to promote optimal oropharyngeal swallow function and reduce risk for dehydration, malnutrition and weight loss.          VOICE/Speech Goals:      LONG TERM GOALS (10/28/24 to 1/26/25)  Improve overall vocal health to foster increased participation levels at home, work and in the community environment.  Patient will demonstrate reduction in symptoms as evidenced by improved scores on VHI-10, RSI, and/or CSI following treatment.     SHORT TERM GOALS (10/28/24 to 1/26/25)     1.  Patient will complete 20-30 facial exercise repetitions in conjunction with neuromuscular electrical stimulation (NMES) vital stim.  Completed facial placement   220 usec,  6.0 mA  57: 3 on/off ratio  Time: 11  minutes  Completed exercises: stable results: obicularis oris upper left 25% contraction, levator absent ROM, mentalis twinge ROM     Patient will improve labial ROM on effected side by 5mm to promote symmetry upon ROM for speech and swallow.  11/14/24 DATA:    Rest Closed lip smile Open lip Smile   Right side +5mm +8mm +15mm   Left side -10mm -6mm 0       3. Patient will increase vocal wellness and decrease phono trauma in adherence with clinician prescribed vocal hygiene and wellness program per patient report 80% of his/her day.   Progress: Spouse and patient report patient is at baseline volume and voice.   Status:  GOAL MET    4.   Patient will increase ability to produce voice without tension within  5 minute conversational task x 80% accuracy as judged by clinician observation and/or patient report.   Progress:.Spouse and patient report patient is at baseline volume and voice.   Status:  GOAL MET    5. Patient will demonstrate independent use of voice/swallow/breathing techniques @ 80-85 dBSPL in conversational speech.   Progress:  not addressed - prior data  Sustained Phonation: 4 second duration 80+ dBSPL   Conversational speech  N/a 63 dBSPL   Status: GOAL MET    6.  Patient to use EMST daily to improve expiratory effort to assist with management of laryngeal penetration/aspiration and increase balance/strength of the respiratory system.   Progress - not using per patient report. Spouse and patient report patient is at baseline volume and voice.     Status: GOAL MET     SUBJECTIVE    Respiratory Status: Room air  Current diet:  NPO with PEG      Pt was alert, pleasant, and cooperative for session.  Ability to follow functional commands: WFL  Nutritional status: Has PEG tube     Patient positioning: Upright in chair      OBJECTIVE    Home Exercise Program:  July, hyolaryngeal elevation, effortful swallow, lingual strength and ROM exercises    Treatment/Education:  Results and recommendations were relayed  to: Patient and Family  Education provided: Yes   Learner: Patient and Significant other   Barriers to learning: None   Method of teaching: Verbal   Topic oral intake, home exercise program   Outcome of teaching: Pt/family demonstrated good understanding

## 2024-12-05 ENCOUNTER — APPOINTMENT (OUTPATIENT)
Dept: SPEECH THERAPY | Facility: HOSPITAL | Age: 34
End: 2024-12-05
Payer: COMMERCIAL

## 2024-12-05 ENCOUNTER — TREATMENT (OUTPATIENT)
Dept: PHYSICAL THERAPY | Facility: CLINIC | Age: 34
End: 2024-12-05
Payer: COMMERCIAL

## 2024-12-05 DIAGNOSIS — R26.81 UNSTEADINESS ON FEET: ICD-10-CM

## 2024-12-05 DIAGNOSIS — Z98.890 HX OF CRANIOTOMY: ICD-10-CM

## 2024-12-05 DIAGNOSIS — R27.0 ATAXIA: ICD-10-CM

## 2024-12-05 DIAGNOSIS — D36.10 SCHWANNOMA: ICD-10-CM

## 2024-12-05 PROCEDURE — 97110 THERAPEUTIC EXERCISES: CPT | Mod: GP | Performed by: PHYSICAL THERAPIST

## 2024-12-05 NOTE — PROGRESS NOTES
Physical Therapy Treatment    Patient Name: Denae Nolasco  MRN: [id number]  Today's Date: 12/5/2024  Time Calculation  Start Time: 1017  Stop Time: 1055  Time Calculation (min): 38 min  PT Therapeutic Procedures Time Entry  Therapeutic Exercise Time Entry: 38    Insurance:  Visit number: 11 of 20  Authorization info: MMO - NO AUTH / $6100 DEDUCT MET / $7500 OOP MET / 100% COVERAGE thru 12/31/24   Insurance Type: Payor: MEDICAL [facility] / Plan: [employer] / Product Type: *No Product type* /     Current Problem   1. Schwannoma  Follow Up In Physical Therapy      2. Hx of craniotomy  Follow Up In Physical Therapy      3. Ataxia  Follow Up In Physical Therapy      4. Unsteadiness on feet  Follow Up In Physical Therapy          Subjective   General   Reason for Referral: Schwannoma +3 more  Referred By: Alondra Alegre, APRN-CNP  General Comment: Pt reports her arm continues to be intermittently painful with difficulty donning pants this date.  Precautions:  Precautions  Precautions Comment: None  Pain    sore  Post Treatment Pain Level sore    Objective   Palpable tension along infraspinatus   ER limitations persists  AP mob reduced   Minimal scapular mobility during SPO   Supine flexion AROM: 90, abd 45, ER minimal     Treatments:  Therapeutic Exercise:  Therapeutic Exercise  Therapeutic Exercise Performed: Yes  Therapeutic Exercise Activity 1: UEB 3/3 fwd/bwd  Therapeutic Exercise Activity 2: supine R GH flexion AROM 15x  Therapeutic Exercise Activity 3: supine SPO 15x2  Therapeutic Exercise Activity 4: supine isometric ER/IR/flexion/tricep 3x3  Therapeutic Exercise Activity 5: supine snow cherie 10x3  Therapeutic Exercise Activity 6: supine horizontal abd 10x3  Therapeutic Exercise Activity 7: seated shoulder rolls 10x3 posterior      Assessment   Assessment:    Pt tolerated session well continues to have significant limitation throughout R GH, focus on supine exercises this date to fix scapular  and achieve proper biomechanics without compensations. Will continue to focus on GH mobility and strength as pt continues to report no problems with ambulation/balance.     Plan:    Continue GH mobility promotion    OP EDUCATION:   Use of heat for soreness/muscle tension    Goals:   Active       LTG       Pt will be 100% IND with HEP in 10 weeks in order to maintain progress with therapy.         Start:  10/23/24    Expected End:  12/31/24            Pt will improve DGI score to 24/24 in 10 weeks in order to reduce fall risk.        Start:  10/23/24    Expected End:  12/31/24            Pt will demonstrated improve AROM of R GH as follows: flexion 150, abduction 150, ER functional C7, IR functional T8 in 10 weeks to improve mobility required for dressing, bathing, cooking and cleaning.        Start:  10/23/24    Expected End:  12/31/24            Pt will improve R GH strength to 5/5 in 10 weeks in order to improve strength required to lift objects at home including groceries and to improve cleaning tasks.         Start:  10/23/24    Expected End:  12/31/24            Pt will demonstrate subjective improvement of ADLs and recreational activities through improved score of 100 on ABC in 10 weeks for return to prior level of function and work tasks without fall risk.        Start:  10/23/24    Expected End:  12/31/24               STG       Pt will be 50% IND with HEP in 5 weeks in order to progress with therapy.        Start:  10/23/24    Expected End:  12/02/24            Pt will improve DGI score to 12/12 in 5 weeks in order to reduce fall risk.        Start:  10/23/24    Expected End:  12/02/24            Pt will be able to perform ascending reciprocal pattern of 12 steps with use of 1 rail in 5 weeks for home and community ambulation needs.         Start:  10/23/24    Expected End:  12/02/24            Pt will be able to ambulate in outdoor environment without imbalance in 5 weeks for recreational activities.        Start:  10/23/24    Expected End:  12/02/24            Pt will demonstrate subjective improvement of ADLs and recreational activities through improved score of 55 on ABC in 5 weeks for reduce fall risk.        Start:  10/23/24    Expected End:  12/02/24

## 2024-12-09 ENCOUNTER — TREATMENT (OUTPATIENT)
Dept: PHYSICAL THERAPY | Facility: CLINIC | Age: 34
End: 2024-12-09
Payer: COMMERCIAL

## 2024-12-09 ENCOUNTER — TREATMENT (OUTPATIENT)
Dept: SPEECH THERAPY | Facility: HOSPITAL | Age: 34
End: 2024-12-09
Payer: COMMERCIAL

## 2024-12-09 DIAGNOSIS — D36.10 SCHWANNOMA: ICD-10-CM

## 2024-12-09 DIAGNOSIS — R26.81 UNSTEADINESS ON FEET: ICD-10-CM

## 2024-12-09 DIAGNOSIS — R27.0 ATAXIA: ICD-10-CM

## 2024-12-09 DIAGNOSIS — R13.10 DYSPHAGIA, UNSPECIFIED TYPE: ICD-10-CM

## 2024-12-09 DIAGNOSIS — R49.0 DYSPHONIA: ICD-10-CM

## 2024-12-09 DIAGNOSIS — Z98.890 HX OF CRANIOTOMY: ICD-10-CM

## 2024-12-09 PROCEDURE — 92526 ORAL FUNCTION THERAPY: CPT | Mod: GN

## 2024-12-09 PROCEDURE — 97112 NEUROMUSCULAR REEDUCATION: CPT | Mod: GP,CQ

## 2024-12-09 ASSESSMENT — PAIN SCALES - GENERAL
PAINLEVEL_OUTOF10: 2
PAINLEVEL_OUTOF10: 0 - NO PAIN

## 2024-12-09 ASSESSMENT — PAIN - FUNCTIONAL ASSESSMENT
PAIN_FUNCTIONAL_ASSESSMENT: 0-10
PAIN_FUNCTIONAL_ASSESSMENT: 0-10

## 2024-12-09 NOTE — PROGRESS NOTES
Speech-Language Pathology Swallow and Voice Treatment Note      Patient Name: Denae Nolasco  MRN: [id number]  : 1990  Today's Date: 24  Time Calculation  Start Time: 1015  Stop Time: 1110  Time Calculation (min): 55 min      ASSESSMENT  Impressions:  Ate well at [facility]. Quantity of food is intermittently increasing. It depends on the texture however, as her oral phase of her swallow impacts her desire to eat solids due to length of time to masticate and manipulate the food.    PLAN    DIET RECOMMENDATIONS:   - Soft and bite sized  - Thin liquids (IDDSI Level 0)  - Continue medications via PEG at this time        STRATEGIES:  - Small bites  - Small, single sips  - Alternate consistencies  - Effortful swallow  - Add moisture to dry foods  - Upright for all PO intake  - Swallow 2-3 times per bite/sip       Recommended frequency/duration:  Skilled SLP services recommended: Yes  Frequency: 2x/week  Duration: 12 weeks     Strengths: Cognition, Motivation, Family/caregiver support, and Age  Barriers to participation in tx: N/A    Payor authorization information:  Payor: [employer] [facility] / Plan: [employer] / Product Type: *No Product type* /   Certification Period Start Date: 24  Certification Period End Date: 25  Number of Authorized Treatments : 20  Total Number of Visits : 11 (2 (new POC 24))    Goals:  Short-Term Goals:  In 12 weeks...    DYSPHAGIA  Short term goals established 24:   - Patient will tolerate baseline/recommended PO diet without overt s/s of aspiration, further difficulty, or concern for aspiration-related complications in 90% of observed therapeutic trials.  Progress: per report - continues with  Minimal s/s of aspiration, less quantity of foods reported due to oral weakness. She will try pills orally as she is to get her PEG removed 24  Status: progressing    - Patient/Family will verbalize/demonstrate comprehension of dysphagia  education, strategies, recommendations and POC.  Progress: patient reports use of strategies at home  Status: Goal met    - Patient will complete home exercise program daily (per patient report).  Progress: is not completing lingual strengthening. She will begin to complete consistently -SLP recommends minimum 2x daily. She agrees to 1x to begin.  Status: progressing    - Patient will complete exercises and/or oral bolus trials in conjunction with neuromuscular electrical stimulation (NMES).  Progress: not addressed Hold goal 12/3/24- will complete facial NMES  Prior data: in conjunction with NMES placement 2 targeting geniohyoid for protraction and elevation of the mylohyoid:   Work: Rest 57:3   180 us  7 mA   8 min.   Trialed: nectar thick liquids  Tolerated: well    - Patient will complete oral motor exercises at 75% accuracy to improve oral bolus manipulation.  Progress: improved lingual ROM and slight increase in strength upon palpation  Facial: instructed to block face on right: slight increase in obicularis oris on left corner with marked block tension of left.  Labial: n/a        Long term goals 11/27/24:   Patient will resume/maintain oral intake in order to promote optimal oropharyngeal swallow function and reduce risk for dehydration, malnutrition and weight loss.          Speech Goals:      LONG TERM GOALS (11/27/24 to 2/26/25)  Improve overall vocal health to foster increased participation levels at home, work and in the community environment.  Patient will demonstrate reduction in symptoms as evidenced by improved scores on VHI-10, RSI, and/or CSI following treatment.     SHORT TERM GOALS (1/27/24 to 2/26/25)     1.  Patient will complete 20-30 facial exercise repetitions in conjunction with neuromuscular electrical stimulation (NMES) vital stim.  Completed facial placement with electrodes under mandible and left masseter  Increased to 225 usec,  8.0 mA  57: 3 on/off ratio  Time: 12 minutes  Completed  exercises: improved left corner of lip, no levator or zygomatic ROM.     Patient will improve labial ROM on effected side by 5mm to promote symmetry upon ROM for speech and swallow.  12/9/24 DATA:    Rest Closed lip smile Open lip Smile   Right side 0 mm, prior +5 +10 mm, prior+8mm +15mm   Left side -10mm stable -6mm stab 0       3. Patient will increase vocal wellness and decrease phono trauma in adherence with clinician prescribed vocal hygiene and wellness program per patient report 80% of his/her day.   Progress: Spouse and patient report patient is at baseline volume and voice.   Status:  GOAL MET    NEW GOAL:   Due to reduced lingual strength and ROM,  suggest add goal of sensory stimulation (DPNS) through use of lemon glycerine swabsticks targeted on her tongue and pharyngeal wall to improve speech intelligibility (as well as pharyngeal function).  4 packets completed   Lingual groove: noted 100% ROM on all trials.  Palatal ROM: increased to 100% ROM   Pharyngeal wall contraction: 25% ROM overall improved today      4.   Patient will increase ability to produce voice without tension within  5 minute conversational task x 80% accuracy as judged by clinician observation and/or patient report.   Progress:.Spouse and patient report patient is at baseline volume and voice.   Status:  GOAL MET    5. Patient will demonstrate independent use of voice/swallow/breathing techniques @ 80-85 dBSPL in conversational speech.   Progress:  not addressed - prior data  Sustained Phonation: 4 second duration 80+ dBSPL   Conversational speech  N/a 63 dBSPL   Status: GOAL MET    6.  Patient to use EMST daily to improve expiratory effort to assist with management of laryngeal penetration/aspiration and increase balance/strength of the respiratory system.   Progress - not using per patient report. Spouse and patient report patient is at baseline volume and voice.     Status: GOAL MET     SUBJECTIVE    Respiratory Status: Room  air  Current diet:  NPO with PEG      Pt was alert, pleasant, and cooperative for session.  Ability to follow functional commands: WFL  Nutritional status: Has PEG tube     Patient positioning: Upright in chair      OBJECTIVE    Home Exercise Program:   lingual and labial strength and ROM exercises    Treatment/Education:  Results and recommendations were relayed to: Patient and Family  Education provided: Yes   Learner: Patient and Significant other   Barriers to learning: None   Method of teaching: Verbal   Topic oral intake, home exercise program   Outcome of teaching: Pt/family demonstrated good understanding

## 2024-12-09 NOTE — PROGRESS NOTES
"Physical Therapy Treatment    Patient Name: Denae Nolasco  MRN: [id number]  Today's Date: 12/9/2024  Time Calculation  Start Time: 1125  Stop Time: 1210  Time Calculation (min): 45 min  PT Therapeutic Procedures Time Entry  Neuromuscular Re-Education Time Entry: 45    Insurance:  Visit number: 12 of 20  Authorization info: MMO - NO AUTH / $6100 DEDUCT MET / $7500 OOP MET / 100%   Insurance Type: Payor: MEDICAL [facility] / Plan: [employer]ICAL [employer] / Product Type: *No Product type* /     Current Problem   1. Schwannoma  Follow Up In Physical Therapy      2. Hx of craniotomy  Follow Up In Physical Therapy      3. Ataxia  Follow Up In Physical Therapy      4. Unsteadiness on feet  Follow Up In Physical Therapy          Subjective   General    Patient reports she continues to feel overall weakness and imbalance but is getting better. Reports she feels as her endurance is improving slightly but still gets fatigued easily.     Precautions:   none    Pain   Pain Assessment: 0-10  0-10 (Numeric) Pain Score: 2  Pain Location: Shoulder    Post Treatment Pain Level       Objective   Poor lower extremity motor control with donkey kicks    Easily fatigued by all quadruped tasks with UE shakiness with LOB d/t UE fatigue 2x in quadruped    Evidence of limited spinal mobility during cat camels    Easily fatigued by core stability tasks    Treatments:    Neuro Re-ed:   Balance/Neuromuscular Re-Education  Balance/Neuromuscular Re-Education Activity Performed: Yes  Balance/Neuromuscular Re-Education Activity 1: recumbent bike/UBE 5'  Balance/Neuromuscular Re-Education Activity 2: quadruped cat/camels x10  Balance/Neuromuscular Re-Education Activity 3: quadruped alt donkey kicks 10x2  Balance/Neuromuscular Re-Education Activity 4: tall kneeling hip thrusts 10x2  Balance/Neuromuscular Re-Education Activity 5: low oblique holds 5\" x10 ((modified on R))  Balance/Neuromuscular Re-Education Activity 6: SB assisted nordic HS curl " "(small ROM) 10x2  Balance/Neuromuscular Re-Education Activity 7: DNS 90/90 holds 3x ~15\"  Balance/Neuromuscular Re-Education Activity 8: hip ABD resisted bridges GTB 10x2      Assessment   Assessment:    Focus on proximal stability this date in order to encourage distal muscle activity to increased strength, functional mobility, balance, and motor control/coordination. Continues to show deficits in all listed areas as well as limited (although improved) exercise tolerance. Will continue to progress proximal stability tasks in order to encourage correction of all deficits.     Plan:    See above    OP EDUCATION:   Updated HEP: Access Code: [email or web address]  URL: [email or web address]  Date: 12/09/2024  Prepared by: Nash Parker    Exercises  - Cat Cow to Child's Pose  - 1 x daily - 7 x weekly - 3 sets - 10 reps  - Quadruped Hip Extension Kicks  - 1 x daily - 7 x weekly - 3 sets - 10 reps  - Tall Kneeling Hip Hinge  - 1 x daily - 7 x weekly - 3 sets - 10 reps  - DNS Bug Bracing March  - 1 x daily - 7 x weekly - 3 sets - 10 reps  - Quadruped Transversus Abdominis Bracing  - 1 x daily - 7 x weekly - 3 sets - 10 reps  - Sit to Stand with Arms Crossed  - 1 x daily - 7 x weekly - 3 sets - 10 reps  - Bridge with Hip Abduction and Resistance  - 1 x daily - 7 x weekly - 3 sets - 10 reps    Goals:   Active       LTG       Pt will be 100% IND with HEP in 10 weeks in order to maintain progress with therapy.         Start:  10/23/24    Expected End:  12/31/24            Pt will improve DGI score to 24/24 in 10 weeks in order to reduce fall risk.        Start:  10/23/24    Expected End:  12/31/24            Pt will demonstrated improve AROM of R GH as follows: flexion 150, abduction 150, ER functional C7, IR functional T8 in 10 weeks to improve mobility required for dressing, bathing, cooking and cleaning.        Start:  10/23/24    Expected End:  12/31/24            Pt will improve R GH strength to 5/5 in 10 weeks in order to " improve strength required to lift objects at home including groceries and to improve cleaning tasks.         Start:  10/23/24    Expected End:  12/31/24            Pt will demonstrate subjective improvement of ADLs and recreational activities through improved score of 100 on ABC in 10 weeks for return to prior level of function and work tasks without fall risk.        Start:  10/23/24    Expected End:  12/31/24               STG       Pt will be 50% IND with HEP in 5 weeks in order to progress with therapy.        Start:  10/23/24    Expected End:  12/02/24            Pt will improve DGI score to 12/12 in 5 weeks in order to reduce fall risk.        Start:  10/23/24    Expected End:  12/02/24            Pt will be able to perform ascending reciprocal pattern of 12 steps with use of 1 rail in 5 weeks for home and community ambulation needs.         Start:  10/23/24    Expected End:  12/02/24            Pt will be able to ambulate in outdoor environment without imbalance in 5 weeks for recreational activities.       Start:  10/23/24    Expected End:  12/02/24            Pt will demonstrate subjective improvement of ADLs and recreational activities through improved score of 55 on ABC in 5 weeks for reduce fall risk.        Start:  10/23/24    Expected End:  12/02/24

## 2024-12-11 ENCOUNTER — OFFICE VISIT (OUTPATIENT)
Dept: GASTROENTEROLOGY | Facility: HOSPITAL | Age: 34
End: 2024-12-11
Payer: COMMERCIAL

## 2024-12-11 VITALS
TEMPERATURE: 98.3 F | DIASTOLIC BLOOD PRESSURE: 64 MMHG | OXYGEN SATURATION: 99 % | SYSTOLIC BLOOD PRESSURE: 95 MMHG | BODY MASS INDEX: 21.88 KG/M2 | WEIGHT: 119.6 LBS | HEART RATE: 95 BPM

## 2024-12-11 DIAGNOSIS — R13.12 OROPHARYNGEAL DYSPHAGIA: Primary | ICD-10-CM

## 2024-12-11 PROCEDURE — 1036F TOBACCO NON-USER: CPT | Performed by: STUDENT IN AN ORGANIZED HEALTH CARE EDUCATION/TRAINING PROGRAM

## 2024-12-11 PROCEDURE — 99214 OFFICE O/P EST MOD 30 MIN: CPT | Performed by: STUDENT IN AN ORGANIZED HEALTH CARE EDUCATION/TRAINING PROGRAM

## 2024-12-11 SDOH — ECONOMIC STABILITY: FOOD INSECURITY: WITHIN THE PAST 12 MONTHS, YOU WORRIED THAT YOUR FOOD WOULD RUN OUT BEFORE YOU GOT MONEY TO BUY MORE.: NEVER TRUE

## 2024-12-11 SDOH — ECONOMIC STABILITY: FOOD INSECURITY: WITHIN THE PAST 12 MONTHS, THE FOOD YOU BOUGHT JUST DIDN'T LAST AND YOU DIDN'T HAVE MONEY TO GET MORE.: NEVER TRUE

## 2024-12-11 ASSESSMENT — LIFESTYLE VARIABLES
SKIP TO QUESTIONS 9-10: 1
HOW MANY STANDARD DRINKS CONTAINING ALCOHOL DO YOU HAVE ON A TYPICAL DAY: PATIENT DOES NOT DRINK
AUDIT-C TOTAL SCORE: 0
HOW OFTEN DO YOU HAVE A DRINK CONTAINING ALCOHOL: NEVER
HOW OFTEN DO YOU HAVE SIX OR MORE DRINKS ON ONE OCCASION: NEVER

## 2024-12-11 ASSESSMENT — PAIN SCALES - GENERAL: PAINLEVEL_OUTOF10: 0-NO PAIN

## 2024-12-11 NOTE — PROGRESS NOTES
[facility] [place]  INITIAL CONSULT NOTE       Reason For Consult  PEG removal     SUBJECTIVE     History Of Present Illness  Denae Nolasco is a 34 y.o. 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 Craft) initially for pain at the PEG site, who returned here for PEG removal.     Pt had PEG creation and tracheostomy 8/9/2024 with ENT (Dr. Placido Martin, Dr. Radha Brown) with indication of needing enteral nutrition due to neck cranial nerve paralysis post cranial surgery.     Patient has not been using PEG since 11/22/2024 and able to keep up with the weight. Tolerating any kind of food including chicken or beef without any swallowing issue. She takes extra time to chew. Patient and spouse are tracking oral intake and weight daily on an excel spreadsheet. Has been taking ensure two bottle per week.   No further pain at the PEG site.     Reports lowest weight 110 lbs.   53.5 kg (117 lb 15.1 oz) 8/2024  54.4 kg (120 lb)  as of 11/13/2024   54.3 kg (119 lb 9.6 oz)  as of 12/11/2024, BMI 21.88  She has not used PEG tube since 11/22/24 and weight is stable. She would like to have PEG removed.     Review of Systems  12 point ROS otherwise negative, unless indicated above     Past Medical History:    Past Medical History:   Diagnosis Date    Depression     Visual impairment        Home Medications  (Not in a hospital admission)        Surgical History:  No past surgical history on file.    Allergies:  No Known Allergies    Social History:    Social History     Socioeconomic History    Marital status:      Spouse name: Not on file    Number of children: Not on file    Years of education: Not on file     Highest education level: Not on file   Occupational History    Not on file   Tobacco Use    Smoking status: Never    Smokeless tobacco: Never   Vaping Use    Vaping status: Never Used   Substance and Sexual Activity    Alcohol use: Never    Drug use: Not Currently     Types: Marijuana     Comment: tried it once or twice    Sexual activity: Yes     Partners: Male     Birth control/protection: Male Sterilization   Other Topics Concern    Not on file   Social History Narrative    Not on file     Social Drivers of Health     Financial Resource Strain: Low Risk  (8/3/2024)    Overall Financial Resource Strain (CARDIA)     Difficulty of Paying Living Expenses: Not very hard   Food Insecurity: No Food Insecurity (2024)    Hunger Vital Sign     Worried About Running Out of Food in the Last Year: Never true     Ran Out of Food in the Last Year: Never true   Transportation Needs: No Transportation Needs (10/21/2024)    OASIS : Transportation     Lack of Transportation (Medical): No     Lack of Transportation (Non-Medical): No     Patient Unable or Declines to Respond: No   Physical Activity: Not on file   Stress: Not on file   Social Connections: Feeling Socially Integrated (10/21/2024)    OASIS : Social Isolation     Frequency of experiencing loneliness or isolation: Never   Intimate Partner Violence: Not At Risk (8/15/2024)    Received from [facility]    Humiliation, Afraid, Rape, and Kick questionnaire     Fear of Current or Ex-Partner: No     Emotionally Abused: No     Physically Abused: No     Sexually Abused: No   Housing Stability: Low Risk  (8/15/2024)    Received from [facility]    Housing Stability Vital Sign     Unable to Pay for Housing in the Last Year: No     Number of Times Moved in the Last Year: 0     Homeless in the Last Year: No       Family History:    Family History   Problem Relation Name Age of Onset    Blood clot Mother Delmis Mantilla-  of clot 2016      labor Sister Megan         EXAM     Vitals:    Vitals:    12/11/24 1257   BP: 95/64   Pulse: 95   Temp: 36.8 °C (98.3 °F)   SpO2: 99%   Weight: 54.3 kg (119 lb 9.6 oz)     Failed to redirect to the Timeline version of the [facility] SmartLink.      Physical Exam  General: well-nourished, no acute distress  HEENT: EOMI. Moist mucosa  Respiratory: nonlabored breathing on room air  Cardiovascular: RRR, no murmurs/rubs/gallops  Abdomen: Soft, nontender, nondistended, bowel sounds present. No masses palpated. PEG in place. Mild granulation. No erythema/fluctuance.   Extremities: no edema, no asterixis  Neuro: alert and oriented, CNII-XII grossly intact, moves all 4 extremities with no focal deficits    OBJECTIVE                                                                              Medications       Current Outpatient Medications:     acetaminophen (Tylenol) 325 mg tablet, 1 tablet (325 mg) by g-tube route every 4 hours if needed for mild pain (1 - 3) (crush pill)., Disp: , Rfl:     bacitracin 500 unit/gram ointment in packet, Apply 1 INCH Daily. Do not exceed more than 7 days., Disp: 1 each, Rfl: 0    chlorhexidine (Peridex) 0.12 % solution, Use 15 mL in the mouth or throat 3 times a day., Disp: , Rfl:     dextran 70-hypromellose, PF, (Bion Tears) 0.1-0.3 % ophthalmic solution, 1 drop 3 times a day as needed for dry eyes., Disp: , Rfl:     erythromycin (Romycin) 5 mg/gram (0.5 %) ophthalmic ointment, Apply to left eye 4 times a day., Disp: 3.5 g, Rfl: 3    escitalopram (Lexapro) 5 mg tablet, 1 tablet (5 mg) by g-tube route once daily., Disp: , Rfl:     ferrous sulfate (IRON ORAL), Take 1 tablet by mouth once daily., Disp: , Rfl:     lansoprazole (Prevacid SoluTab) 30 mg disintegrating tablet, Take 1 tablet (30 mg) by mouth once daily in the morning. Take before meals., Disp: 30 tablet, Rfl: 2    lubricating eye drops ophthalmic solution, Administer 1 drop into the left eye every 1 hour if needed for dry eyes., Disp: , Rfl:     omeprazole  (PriLOSEC) 2 mg/mL oral suspension - Compounded - Outpatient, 10 mL (20 mg) by g-tube route once daily. **SHAKE WELL**, Disp: 300 mL, Rfl: 2    ondansetron ODT (Zofran-ODT) 4 mg disintegrating tablet, 1 tablet (4 mg) by g-tube route every 6 hours if needed for nausea or vomiting., Disp: 9 tablet, Rfl: 2    sodium chloride 0.9 % irrigation solution, For suctioning and cleaning inner cannula, Disp: , Rfl:                                                                             Labs     No results found for this or any previous visit (from the past 24 hours).                                                                             GI Procedures     None           ASSESSMENT / PLAN                  ASSESSMENT/PLAN:    Denae Nolasco is a 34 y.o. 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 Craft) initially for pain at the PEG site, who returned here for PEG removal.     Pt had PEG creation and tracheostomy 8/9/2024 with ENT (Dr. Placido Martin, Dr. Radha Brown) with indication of needing enteral nutrition due to neck cranial nerve paralysis post cranial surgery.   Patient has not been using PEG since 11/22/2024 and able to keep up with the weight. Weight continues to increase based per chart review. Able to tolerate any kind of food. Will refer patient to ENT office for PEG removal.     Plan  -Refer to ENT    Patient was seen and discussed with Dr. Guerita Warner MD  PGY5 Gastroenterology Fellow  [facility]

## 2024-12-12 ENCOUNTER — APPOINTMENT (OUTPATIENT)
Dept: PRIMARY CARE | Facility: CLINIC | Age: 34
End: 2024-12-12
Payer: COMMERCIAL

## 2024-12-12 ENCOUNTER — TREATMENT (OUTPATIENT)
Dept: PHYSICAL THERAPY | Facility: CLINIC | Age: 34
End: 2024-12-12
Payer: COMMERCIAL

## 2024-12-12 ENCOUNTER — TREATMENT (OUTPATIENT)
Dept: SPEECH THERAPY | Facility: HOSPITAL | Age: 34
End: 2024-12-12
Payer: COMMERCIAL

## 2024-12-12 VITALS
HEART RATE: 58 BPM | BODY MASS INDEX: 21.4 KG/M2 | WEIGHT: 117 LBS | SYSTOLIC BLOOD PRESSURE: 96 MMHG | DIASTOLIC BLOOD PRESSURE: 52 MMHG | OXYGEN SATURATION: 94 %

## 2024-12-12 DIAGNOSIS — Z98.890 HX OF CRANIOTOMY: ICD-10-CM

## 2024-12-12 DIAGNOSIS — D36.10 SCHWANNOMA: ICD-10-CM

## 2024-12-12 DIAGNOSIS — R49.0 DYSPHONIA: ICD-10-CM

## 2024-12-12 DIAGNOSIS — R27.0 ATAXIA: ICD-10-CM

## 2024-12-12 DIAGNOSIS — R26.81 UNSTEADINESS ON FEET: ICD-10-CM

## 2024-12-12 DIAGNOSIS — L30.9 DERMATITIS: ICD-10-CM

## 2024-12-12 DIAGNOSIS — R13.10 DYSPHAGIA, UNSPECIFIED TYPE: ICD-10-CM

## 2024-12-12 DIAGNOSIS — R13.10 DYSPHAGIA, UNSPECIFIED TYPE: Primary | ICD-10-CM

## 2024-12-12 PROCEDURE — 1036F TOBACCO NON-USER: CPT

## 2024-12-12 PROCEDURE — 92526 ORAL FUNCTION THERAPY: CPT | Mod: GN

## 2024-12-12 PROCEDURE — 99213 OFFICE O/P EST LOW 20 MIN: CPT

## 2024-12-12 PROCEDURE — 92507 TX SP LANG VOICE COMM INDIV: CPT | Mod: GN

## 2024-12-12 PROCEDURE — 97530 THERAPEUTIC ACTIVITIES: CPT | Mod: GP | Performed by: PHYSICAL THERAPIST

## 2024-12-12 PROCEDURE — 97110 THERAPEUTIC EXERCISES: CPT | Mod: GP | Performed by: PHYSICAL THERAPIST

## 2024-12-12 PROCEDURE — 97140 MANUAL THERAPY 1/> REGIONS: CPT | Mod: GP | Performed by: PHYSICAL THERAPIST

## 2024-12-12 RX ORDER — TRIAMCINOLONE ACETONIDE 1 MG/G
CREAM TOPICAL 2 TIMES DAILY
Qty: 15 G | Refills: 0 | Status: SHIPPED | OUTPATIENT
Start: 2024-12-12

## 2024-12-12 ASSESSMENT — PAIN - FUNCTIONAL ASSESSMENT
PAIN_FUNCTIONAL_ASSESSMENT: 0-10
PAIN_FUNCTIONAL_ASSESSMENT: 0-10

## 2024-12-12 ASSESSMENT — PAIN SCALES - GENERAL
PAINLEVEL_OUTOF10: 0 - NO PAIN
PAINLEVEL_OUTOF10: 0-NO PAIN
PAINLEVEL_OUTOF10: 2

## 2024-12-12 ASSESSMENT — VISUAL ACUITY: OU: 1

## 2024-12-12 NOTE — PROGRESS NOTES
Speech-Language Pathology Swallow and Voice Treatment Note      Patient Name: Denae Nolasco  MRN: [id number]  : 1990  Today's Date: 24  Time Calculation  Start Time: 930  Stop Time:   Time Calculation (min): 50 min      ASSESSMENT  Impressions:  She did not get her PEG removed at her appointment with GI. She is having the PEG removed by Dr. Craft on . She continues to eat small amounts, yet feels her lingual ROM and strength is overall improving. Her voice is 95% of her baseline, at times is quiet per spouse. Left facial and labial ROM remains minimal.    PLAN    DIET RECOMMENDATIONS:   - Soft and bite sized  - Thin liquids (IDDSI Level 0)  - Patient to resume oral medications.        STRATEGIES:  - Small bites  - Small, single sips  - Alternate consistencies  - Effortful swallow  - Add moisture to dry foods  - Upright for all PO intake  - Swallow 2-3 times per bite/sip       Recommended frequency/duration:  Skilled SLP services recommended: Yes  Frequency: 2x/week  Duration: 12 weeks     Strengths: Cognition, Motivation, Family/caregiver support, and Age  Barriers to participation in tx: N/A    Payor authorization information:  Payor: [employer] [facility] / Plan: [employer] / Product Type: *No Product type* /   Certification Period Start Date: 24  Certification Period End Date: 25  Number of Authorized Treatments : 20  Total Number of Visits : 12    Goals:  Short-Term Goals:  In 12 weeks...    DYSPHAGIA  Short term goals established 24:   - Patient will tolerate baseline/recommended PO diet without overt s/s of aspiration, further difficulty, or concern for aspiration-related complications in 90% of observed therapeutic trials.  Progress: per report - continues with  Minimal s/s of aspiration, less quantity of foods reported due to oral weakness. She will begin pills orally.  Status: progressing    - Patient/Family will verbalize/demonstrate  comprehension of dysphagia education, strategies, recommendations and POC.  Progress: patient reports use of strategies at home  Status: Goal met    - Patient will complete home exercise program daily (per patient report).  Progress: She reports completing lingual ROM exercises 1x daily now and is noticing some improvements with ROM during meals. She does not have to use her finger to clear left side of oral cavity as often as she did in the past.  Status: progressing    - Patient will complete exercises and/or oral bolus trials in conjunction with neuromuscular electrical stimulation (NMES).  Progress: not addressed Hold goal 12/3/24- will complete facial NMES  Prior data: in conjunction with NMES placement 2 targeting geniohyoid for protraction and elevation of the mylohyoid:   Work: Rest 57:3   180 us  7 mA   8 min.   Trialed: nectar thick liquids  Tolerated: well    - Patient will complete oral motor exercises at 75% accuracy to improve oral bolus manipulation.  Progress: improved lingual ROM and slight increase in strength upon palpation  Facial: instructed to block face on right: slight increase in obicularis oris on left corner with marked block tension of left.  Labial: n/a        Long term goals 11/27/24:   Patient will resume/maintain oral intake in order to promote optimal oropharyngeal swallow function and reduce risk for dehydration, malnutrition and weight loss.          Speech Goals:      LONG TERM GOALS (11/27/24 to 2/26/25)  Improve overall vocal health to foster increased participation levels at home, work and in the community environment.  Patient will demonstrate reduction in symptoms as evidenced by improved scores on VHI-10, RSI, and/or CSI following treatment.     SHORT TERM GOALS (1/27/24 to 2/26/25)     1.  Patient will complete 20-30 facial exercise repetitions in conjunction with neuromuscular electrical stimulation (NMES) vital stim.  Completed facial placement with electrodes over facial  nerve to engage for labial and eye movement  Increased to 230 usec,  15.5 mA  57: 3 on/off ratio  Time: 7 minutes  Completed exercises: No ROM left.  no levator or zygomatic ROM.     Patient will improve labial ROM on effected side by 5mm to promote symmetry upon ROM for speech and swallow.  12/9/24 DATA:    Rest Closed lip smile Open lip Smile   Right side 0 mm, prior +5 +10 mm, prior+8mm +15mm   Left side -10mm stable -6mm stab 0       3. Patient will increase vocal wellness and decrease phono trauma in adherence with clinician prescribed vocal hygiene and wellness program per patient report 80% of his/her day.   Progress: Spouse and patient report patient is at baseline volume and voice.   Status:  GOAL MET    NEW GOAL:   Due to reduced lingual strength and ROM,  suggest add goal of sensory stimulation (DPNS) through use of lemon glycerine swabsticks targeted on her tongue and pharyngeal wall to improve speech intelligibility (as well as pharyngeal function).  5 packets completed   Lingual groove: 100% ROM on 50% of trials today - notable absent ROM when stimulated from posterior to anterior aspect of the tongue.  Palatal ROM: increased to 100% ROM   Pharyngeal wall contraction: 50% ROM  improved today      4.   Patient will increase ability to produce voice without tension within  5 minute conversational task x 80% accuracy as judged by clinician observation and/or patient report.   Progress:.Spouse and patient report patient is at baseline volume and voice.   Status:  GOAL MET    5. Patient will demonstrate independent use of voice/swallow/breathing techniques @ 80-85 dBSPL in conversational speech.   Progress:  not addressed - prior data  Sustained Phonation: 4 second duration 80+ dBSPL   Conversational speech  N/a 63 dBSPL   Status: GOAL MET    6.  Patient to use EMST daily to improve expiratory effort to assist with management of laryngeal penetration/aspiration and increase balance/strength of the  respiratory system.   Progress - not using per patient report. Spouse and patient report patient is at baseline volume and voice.     Status: GOAL MET     SUBJECTIVE    Respiratory Status: Room air  Current diet:  regular/thin liquids. Oral medications.      Pt was alert, pleasant, and cooperative for session.  Ability to follow functional commands: WFL  Nutritional status: Has PEG tube - to be removed 12/30/24.     Patient positioning: Upright in chair      OBJECTIVE    Home Exercise Program:   lingual and labial strength and ROM exercises    Treatment/Education:  Results and recommendations were relayed to: Patient and Family  Education provided: Yes   Learner: Patient and Significant other   Barriers to learning: None   Method of teaching: Verbal   Topic oral intake, home exercise program   Outcome of teaching: Pt/family demonstrated good understanding

## 2024-12-12 NOTE — PROGRESS NOTES
Physical Therapy Progress Note/Treatment    Patient Name: Denae Nolasco  MRN: [id number]  Today's Date: 12/12/2024  Time Calculation  Start Time: 1024  Stop Time: 1105  Time Calculation (min): 41 min  PT Therapeutic Procedures Time Entry  Manual Therapy Time Entry: 10  Therapeutic Exercise Time Entry: 10  Therapeutic Activity Time Entry: 20    Insurance:  Visit number: 13 of 20  Authorization info: MMO - NO AUTH / $6100 DEDUCT MET / $7500 OOP MET / 100%   Insurance Type: Payor: [employer] [facility] / Plan: [employer] / Product Type: *No Product type* /     Current Problem   1. Schwannoma  Follow Up In Physical Therapy      2. Hx of craniotomy  Follow Up In Physical Therapy      3. Ataxia  Follow Up In Physical Therapy      4. Unsteadiness on feet  Follow Up In Physical Therapy          Subjective   General   General Comment: Pt reports the last two days her shoulder was ok but continues to have intermittent pain without any large noted imrpovement of motion.  Precautions:   none  Pain   Pain Assessment: 0-10  0-10 (Numeric) Pain Score: 2  Pain Location: Shoulder  Post Treatment Pain Level 2    Objective   Shoulder    Shoulder palpation/Joint Assessment  Shoulder Palpation/Joint Mobility Comment: Hypomobile however end joint appears soft with increased tone of pec, subscap  Shoulder AROM  R Shoulder flexion: (180°): 95  R shoulder abduction: (180°): 62 (Pain)  R Shoulder ER: (90°): 10 (Pain)  Shoulder PROM  Shoulder PROM WFL: no (Same as AROM)    Shoulder Strength  Shoulder Strength WFL:  (Modified testing position in netural position)  R shoulder flexion: (5/5): 4 (tested in netural postion)  R shoulder abduction: (5/5): 4 (Tested in neutral position)  R shoulder ER: (5/5): 3 tested in netural position  R shoulder IR: (5/5) : 3 (Tested in neutral position)      Treatments:  Therapeutic Exercise:  Therapeutic Exercise  Therapeutic Exercise Performed: Yes  Therapeutic Exercise Activity 1: UEB 3/3  fwd/bwd  Therapeutic Exercise Activity 2: supine flexion with dowel 10x3  Therapeutic Exercise Activity 3: supine chest press with dowel 10x3  Therapeutic activity:  Therapeutic Activity  Therapeutic Activity Performed: Yes  Therapeutic Activity 1: reassessment see objective measures    Manual:  Manual Therapy  Manual Therapy Performed: Yes  Manual Therapy Activity 1: Dry needling  IDN performed this date. Pt educated on risks and benefits of dry needling. Pt provided verbal consent. All universal precautions followed.    3 - 30 mm subscap  2 - 30 mm pec     No adverse response. All IDN guidelines and safety protocols followed.     Assessment   Assessment:    Pt has had a reduction in R GH mobility since eval with noted increased muscle tone along pec and subscap regions with reduction in joint mechanics and pain at current end ranges. Discussed with patient and will continue with manual therapy techniques to help improve mobility.     Plan:    Manual therapy for R GH     OP EDUCATION:   Use of heat    Goals:   Active       LTG       Pt will be 100% IND with HEP in 10 weeks in order to maintain progress with therapy.         Start:  10/23/24    Expected End:  12/31/24            Pt will improve DGI score to 24/24 in 10 weeks in order to reduce fall risk.        Start:  10/23/24    Expected End:  12/31/24            Pt will demonstrated improve AROM of R GH as follows: flexion 150, abduction 150, ER functional C7, IR functional T8 in 10 weeks to improve mobility required for dressing, bathing, cooking and cleaning.        Start:  10/23/24    Expected End:  12/31/24            Pt will improve R GH strength to 5/5 in 10 weeks in order to improve strength required to lift objects at home including groceries and to improve cleaning tasks.         Start:  10/23/24    Expected End:  12/31/24            Pt will demonstrate subjective improvement of ADLs and recreational activities through improved score of 100 on ABC in 10  weeks for return to prior level of function and work tasks without fall risk.        Start:  10/23/24    Expected End:  12/31/24               STG       Pt will be 50% IND with HEP in 5 weeks in order to progress with therapy.        Start:  10/23/24    Expected End:  12/02/24            Pt will improve DGI score to 12/12 in 5 weeks in order to reduce fall risk.        Start:  10/23/24    Expected End:  12/02/24            Pt will be able to perform ascending reciprocal pattern of 12 steps with use of 1 rail in 5 weeks for home and community ambulation needs.         Start:  10/23/24    Expected End:  12/02/24            Pt will be able to ambulate in outdoor environment without imbalance in 5 weeks for recreational activities.       Start:  10/23/24    Expected End:  12/02/24            Pt will demonstrate subjective improvement of ADLs and recreational activities through improved score of 55 on ABC in 5 weeks for reduce fall risk.        Start:  10/23/24    Expected End:  12/02/24

## 2024-12-12 NOTE — PROGRESS NOTES
Subjective     Patient ID: Denae Nolasco is a 34 y.o. female who presents for Follow-up (No concerns or issues today. Doing well ).      HPI  Denae presents with her  for follow up of Schwannnoma with subsequent removal via craniotomy with occurred in August.  Since then, she has been working diligently with outpatient PT/OT/ and ST. Trach has been removed. Does still have PEG, flushing daily but able to eat meals and take medications oral.  ST is requesting additional MBS to subsequently discharge.  Neuro follow up scheduled for next Tuesday 12/17/2024.     Patient's recent visit notes, medication and allergy lists, past medical surgical social hx, immunization, vitals, problem list, recent tests were reviewed by me for pertinence to this visit.    Current Outpatient Medications:     acetaminophen (Tylenol) 325 mg tablet, 1 tablet (325 mg) by g-tube route every 4 hours if needed for mild pain (1 - 3) (crush pill)., Disp: , Rfl:     dextran 70-hypromellose, PF, (Bion Tears) 0.1-0.3 % ophthalmic solution, 1 drop 3 times a day as needed for dry eyes., Disp: , Rfl:     erythromycin (Romycin) 5 mg/gram (0.5 %) ophthalmic ointment, Apply to left eye 4 times a day., Disp: 3.5 g, Rfl: 3    escitalopram (Lexapro) 5 mg tablet, 1 tablet (5 mg) by g-tube route once daily., Disp: , Rfl:     lubricating eye drops ophthalmic solution, Administer 1 drop into the left eye every 1 hour if needed for dry eyes., Disp: , Rfl:     ondansetron ODT (Zofran-ODT) 4 mg disintegrating tablet, 1 tablet (4 mg) by g-tube route every 6 hours if needed for nausea or vomiting., Disp: 9 tablet, Rfl: 2    sodium chloride 0.9 % irrigation solution, For suctioning and cleaning inner cannula (Patient taking differently: if needed.), Disp: , Rfl:     bacitracin 500 unit/gram ointment in packet, Apply 1 INCH Daily. Do not exceed more than 7 days. (Patient not taking: Reported on 12/12/2024), Disp: 1 each, Rfl: 0    chlorhexidine (Peridex) 0.12  % solution, Use 15 mL in the mouth or throat 3 times a day. (Patient not taking: Reported on 12/12/2024), Disp: , Rfl:     ferrous sulfate (IRON ORAL), Take 1 tablet by mouth once daily. (Patient not taking: Reported on 12/12/2024), Disp: , Rfl:     lansoprazole (Prevacid SoluTab) 30 mg disintegrating tablet, Take 1 tablet (30 mg) by mouth once daily in the morning. Take before meals. (Patient not taking: Reported on 12/12/2024), Disp: 30 tablet, Rfl: 2      Review of Systems  All other systems have been reviewed and are negative except as noted in the HPI.         Objective   BP 96/52 (BP Location: Left arm)   Pulse 58   Wt 53.1 kg (117 lb)   SpO2 94%   BMI 21.40 kg/m²       Physical Exam  Vitals and nursing note reviewed.   Constitutional:       General: She is not in acute distress.     Appearance: Normal appearance. She is normal weight. She is not ill-appearing.      Comments: Left sided facial drooping following craniotomy   HENT:      Mouth/Throat:      Lips: Pink.      Mouth: Mucous membranes are moist.      Pharynx: Oropharynx is clear. Uvula midline.      Comments: Left sided droop  Eyes:      General: Lids are normal. Vision grossly intact. Gaze aligned appropriately.      Extraocular Movements: Extraocular movements intact.      Conjunctiva/sclera: Conjunctivae normal.      Pupils: Pupils are equal, round, and reactive to light.      Comments: Left lid unable to completely close, well lubricated   Cardiovascular:      Rate and Rhythm: Normal rate and regular rhythm.      Heart sounds: Normal heart sounds, S1 normal and S2 normal.   Pulmonary:      Effort: Pulmonary effort is normal.      Breath sounds: Normal breath sounds and air entry.   Abdominal:       Skin:     General: Skin is warm and dry.      Comments: Thick dry skin noted to left temple and into scalp   Neurological:      Mental Status: She is alert and oriented to person, place, and time.      Cranial Nerves: No cranial nerve deficit.       Sensory: Sensation is intact.      Motor: Motor function is intact.      Coordination: Coordination is intact.      Gait: Gait is intact.   Psychiatric:         Attention and Perception: Attention normal.         Behavior: Behavior is cooperative.             Assessment & Plan  Dysphagia, unspecified type  Improved/resolved  Order for repeat MBS to clear from speech therapy.   Orders:    FL modified barium swallow study; Future    Schwannoma  Continue with home health care , PT/OT/ST for strengthening following recent craniotomy.  Keep all scheduled appointments with neurology as scheduled.           Hx of craniotomy         Dermatitis  Acute  Recommend patient apply triamcinolone cream twice a day.   Wash hair with anti-dandruff shampoo such as Head and shoulders or Selsun Blue as discussed  Follow-up for erythema, purulent drainage, fever, or other signs of infection.   Patient verbalizes understanding regarding plan of care and all questions answered.    Orders:    triamcinolone (Kenalog) 0.1 % cream; Apply topically 2 times a day. Apply to affected area 1-2 times daily as needed.          Patient understands and agrees with treatment plan.    Alondra Alegre, APRN-CNP

## 2024-12-16 ENCOUNTER — TREATMENT (OUTPATIENT)
Dept: PHYSICAL THERAPY | Facility: CLINIC | Age: 34
End: 2024-12-16
Payer: COMMERCIAL

## 2024-12-16 ENCOUNTER — TREATMENT (OUTPATIENT)
Dept: SPEECH THERAPY | Facility: HOSPITAL | Age: 34
End: 2024-12-16
Payer: COMMERCIAL

## 2024-12-16 ENCOUNTER — APPOINTMENT (OUTPATIENT)
Dept: OPHTHALMOLOGY | Facility: CLINIC | Age: 34
End: 2024-12-16
Payer: COMMERCIAL

## 2024-12-16 DIAGNOSIS — R13.10 DYSPHAGIA, UNSPECIFIED TYPE: ICD-10-CM

## 2024-12-16 DIAGNOSIS — Z98.890 HX OF CRANIOTOMY: ICD-10-CM

## 2024-12-16 DIAGNOSIS — R49.0 DYSPHONIA: ICD-10-CM

## 2024-12-16 DIAGNOSIS — R27.0 ATAXIA: ICD-10-CM

## 2024-12-16 DIAGNOSIS — R26.81 UNSTEADINESS ON FEET: ICD-10-CM

## 2024-12-16 DIAGNOSIS — D36.10 SCHWANNOMA: ICD-10-CM

## 2024-12-16 PROCEDURE — 92507 TX SP LANG VOICE COMM INDIV: CPT | Mod: GN

## 2024-12-16 PROCEDURE — 92526 ORAL FUNCTION THERAPY: CPT | Mod: GN

## 2024-12-16 PROCEDURE — 97110 THERAPEUTIC EXERCISES: CPT | Mod: GP | Performed by: PHYSICAL THERAPIST

## 2024-12-16 ASSESSMENT — PAIN - FUNCTIONAL ASSESSMENT
PAIN_FUNCTIONAL_ASSESSMENT: 0-10
PAIN_FUNCTIONAL_ASSESSMENT: 0-10

## 2024-12-16 ASSESSMENT — PAIN SCALES - GENERAL
PAINLEVEL_OUTOF10: 0 - NO PAIN
PAINLEVEL_OUTOF10: 0 - NO PAIN

## 2024-12-16 NOTE — PROGRESS NOTES
"Physical Therapy Treatment    Patient Name: Denae Nolasco  MRN: [id number]  Today's Date: 12/16/2024  Time Calculation  Start Time: 1145  Stop Time: 1215  Time Calculation (min): 30 min  PT Therapeutic Procedures Time Entry  Therapeutic Exercise Time Entry: 30    Insurance:  Visit number: 14 of 20  Authorization info: NO AUTH / $6100 DEDUCT MET / $7500 OOP MET / 100% COVERAGE thru 12/31/24 / reverify 60% COINS on 1/1/25 / 20V   Insurance Type: Payor: MEDICAL [employer] [facility] / Plan: [employer] / Product Type: *No Product type* /     Current Problem   1. Schwannoma  Follow Up In Physical Therapy      2. Hx of craniotomy  Follow Up In Physical Therapy      3. Ataxia  Follow Up In Physical Therapy      4. Unsteadiness on feet  Follow Up In Physical Therapy          Subjective   General   General Comment: Pt reports no pain this date, stated driving again without any reported concerns. Overall ROM of GH remains the same.  Precautions:  Precautions  Precautions Comment: None  Pain   Pain Assessment: 0-10  0-10 (Numeric) Pain Score: 0 - No pain  Post Treatment Pain Level 0    Objective   Reduced scapular mobility with limited GH mechanics noted during tasks this date     Treatments:  Therapeutic Exercise:  Therapeutic Exercise  Therapeutic Exercise Performed: Yes  Therapeutic Exercise Activity 1: UEB 3/3 fwd/bwd  Therapeutic Exercise Activity 2: pendulums with #5 for distraction in all directions (AP/lateral/CW/CCW) 30x each  Therapeutic Exercise Activity 3: bar walk out stretch 30x  Therapeutic Exercise Activity 4: pec/bicep stretch 30\"x3 in doorframe  Therapeutic Exercise Activity 5: flexion pulley 2'        Assessment   Assessment:    Focus on different exercises to achieve distraction and overall mobility with movement as well as stretching for soft tissue mobility. Will continue to focus on GH mobility as tolerated.     Plan:    Potentially utilize cupping for GH    OP EDUCATION:   Continue with " established HEP     Goals:   Active       LTG       Pt will be 100% IND with HEP in 10 weeks in order to maintain progress with therapy.         Start:  10/23/24    Expected End:  12/31/24            Pt will improve DGI score to 24/24 in 10 weeks in order to reduce fall risk.        Start:  10/23/24    Expected End:  12/31/24            Pt will demonstrated improve AROM of R GH as follows: flexion 150, abduction 150, ER functional C7, IR functional T8 in 10 weeks to improve mobility required for dressing, bathing, cooking and cleaning.        Start:  10/23/24    Expected End:  12/31/24            Pt will improve R GH strength to 5/5 in 10 weeks in order to improve strength required to lift objects at home including groceries and to improve cleaning tasks.         Start:  10/23/24    Expected End:  12/31/24            Pt will demonstrate subjective improvement of ADLs and recreational activities through improved score of 100 on ABC in 10 weeks for return to prior level of function and work tasks without fall risk.        Start:  10/23/24    Expected End:  12/31/24               STG       Pt will be 50% IND with HEP in 5 weeks in order to progress with therapy.        Start:  10/23/24    Expected End:  12/02/24            Pt will improve DGI score to 12/12 in 5 weeks in order to reduce fall risk.        Start:  10/23/24    Expected End:  12/02/24            Pt will be able to perform ascending reciprocal pattern of 12 steps with use of 1 rail in 5 weeks for home and community ambulation needs.         Start:  10/23/24    Expected End:  12/02/24            Pt will be able to ambulate in outdoor environment without imbalance in 5 weeks for recreational activities.       Start:  10/23/24    Expected End:  12/02/24            Pt will demonstrate subjective improvement of ADLs and recreational activities through improved score of 55 on ABC in 5 weeks for reduce fall risk.        Start:  10/23/24    Expected End:  12/02/24

## 2024-12-16 NOTE — PROGRESS NOTES
Speech-Language Pathology Swallow and Voice Treatment Note      Patient Name: Denae Nolasco  MRN: [id number]  : 1990  Today's Date: 24  Time Calculation  Start Time: 1020  Stop Time: 1110  Time Calculation (min): 50 min      ASSESSMENT  Impressions:  Denae continues to practice home exercises for facial ROM, however, minimal improvement noted over 4 weeks consisting of home program and neuromuscular electrical stimulation to masseter, obicularis oris, buccinator, levators, zygomatics, depressors, and mentalis. Today, noted a small 2-4mm improvement pending strength of smile (open vs closed lip), however, overall asymmetry remains an average of 10mm discrepancy between left and right side.     Despite noting small amount of change today, minimal overall functional improvement with regards to facial ROM/strength is noted during speech or swallow functions. Patient continues to have labial escape with foods and liquids and difficulty with bilabial sounds. She is able to get some facial movement when clenching teeth on her affected side.    However, marked improvement noted with pharyngeal strength - she will have a repeat MBS on 24 with scheduled PEG removal with Dr. Craft 24. Lingual ROM and strength has improved allowing for increased rate of mastication and oral bolus manipulation and she has returned to her baseline speech rate. Her vocal quality is greatly improved with near baseline volume.    PLAN    DIET RECOMMENDATIONS:   - Soft and bite sized  - Thin liquids (IDDSI Level 0)  - Patient to resume oral medications.        STRATEGIES:  - Small bites  - Small, single sips  - Alternate consistencies  - Effortful swallow  - Add moisture to dry foods  - Upright for all PO intake  - Swallow 2-3 times per bite/sip       Recommended frequency/duration:  Skilled SLP services recommended: Yes  Frequency: 2x/week  Duration: 12 weeks     Strengths: Cognition, Motivation, Family/caregiver support,  and Age  Barriers to participation in tx: N/A    Payor authorization information:  Payor: MEDICAL [facility] / Plan: [employer] / Product Type: *No Product type* /   Certification Period Start Date: 11/27/24  Certification Period End Date: 02/26/25  Number of Authorized Treatments : 20  Total Number of Visits : 13    Goals:  Short-Term Goals:  In 12 weeks...    DYSPHAGIA  Short term goals established 11/27/24:   - Patient will tolerate baseline/recommended PO diet without overt s/s of aspiration, further difficulty, or concern for aspiration-related complications in 90% of observed therapeutic trials.  Progress: per report - continues with  Minimal s/s of aspiration, less quantity of foods reported due to oral weakness. She will begin pills orally.She is consuming small pills orally. Will complete MBS 12/23/24 for pharyngeal re assessment.  Status: progressing    - Patient/Family will verbalize/demonstrate comprehension of dysphagia education, strategies, recommendations and POC.  Progress: patient reports use of strategies at home  Status: Goal met    - Patient will complete home exercise program daily (per patient report).  Progress: She reports completing lingual and facial exercises 1x daily now - was inconsistent prior. Notes lingual improvement, minimal facial ROM changes functionally.  Status: progressing    - Patient will complete exercises and/or oral bolus trials in conjunction with neuromuscular electrical stimulation (NMES).  Progress: not addressed Hold goal 12/3/24- will complete facial NMES  Prior data: in conjunction with NMES placement 2 targeting geniohyoid for protraction and elevation of the mylohyoid:   Work: Rest 57:3   180 us  7 mA   8 min.   Trialed: nectar thick liquids  Tolerated: well    - Patient will complete oral motor exercises at 75% accuracy to improve oral bolus manipulation.  Progress: improved lingual ROM and slight increase in strength upon palpation  Facial:  Some noted movement (less then 25%) during exercises in conjunction with NMES.  Labial: Some mild movement (less then 25%) during exercises in conjunction with NMES.     Long term goals 11/27/24:   Patient will resume/maintain oral intake in order to promote optimal oropharyngeal swallow function and reduce risk for dehydration, malnutrition and weight loss.      Speech Goals:      LONG TERM GOALS (11/27/24 to 2/26/25)  Improve overall vocal health to foster increased participation levels at home, work and in the community environment.  Patient will demonstrate reduction in symptoms as evidenced by improved scores on VHI-10, RSI, and/or CSI following treatment.     SHORT TERM GOALS (1/27/24 to 2/26/25)     1.  Patient will complete 20-30 facial exercise repetitions in conjunction with neuromuscular electrical stimulation (NMES) vital stim.  Completed facial placement with electrodes over facial nerve to engage for labial and eye movement  185 usec,  10.5 mA  57: 3 on/off ratio  Time: 7 minutes  Completed exercises:  12/16/24 data:  Facial Stimulation Point Stim Level Contraction percentage (Left side of face)   Levators 10.5 mA absent   Zygomatics 10.5 mA absent   Obicularis Oris 10.5 mA Less then 25% ROM corner   Masseter 10.5 mA absent   Depressors 10.5 mA absent   Buccinator 10.5 mA absent   Mentalis 10.5 mA absent         Patient will improve labial ROM on effected side by 5mm to promote symmetry upon ROM for speech and swallow.  12/16/24 DATA:    Rest Closed lip smile Open lip Smile   Right side +3 mm, prior 0 Stable +10 mm +12 mm, Prior: +15mm   Left side -6mm, Prior: -10mm stable -2mm, prior: -6mm  Stable: 0       3. Patient will increase vocal wellness and decrease phono trauma in adherence with clinician prescribed vocal hygiene and wellness program per patient report 80% of his/her day.   Progress: Spouse and patient report patient is at baseline volume and voice.   Status:  GOAL MET    NEW GOAL:   Due to  reduced lingual strength and ROM,  suggest add goal of sensory stimulation (DPNS) through use of lemon glycerine swabsticks targeted on her tongue and pharyngeal wall to improve speech intelligibility (as well as pharyngeal function).  Not addressed 12/16/24:  Prior data:  5 packets completed   Lingual groove: 100% ROM on 50% of trials today - notable absent ROM when stimulated from posterior to anterior aspect of the tongue.  Palatal ROM: increased to 100% ROM   Pharyngeal wall contraction: 50% ROM  improved today      4.   Patient will increase ability to produce voice without tension within  5 minute conversational task x 80% accuracy as judged by clinician observation and/or patient report.   Progress:.Spouse and patient report patient is at baseline volume and voice.   Status:  GOAL MET    5. Patient will demonstrate independent use of voice/swallow/breathing techniques @ 80-85 dBSPL in conversational speech.   Progress:  not addressed - prior data  Sustained Phonation: 4 second duration 80+ dBSPL   Conversational speech  N/a 63 dBSPL   Status: GOAL MET    6.  Patient to use EMST daily to improve expiratory effort to assist with management of laryngeal penetration/aspiration and increase balance/strength of the respiratory system.   Progress - not using per patient report. Spouse and patient report patient is at baseline volume and voice.     Status: GOAL MET     SUBJECTIVE    Respiratory Status: Room air  Current diet:  regular/thin liquids. Oral medications.      Pt was alert, pleasant, and cooperative for session.  Ability to follow functional commands: WFL  Nutritional status: Has PEG tube - to be removed 12/30/24.     Patient positioning: Upright in chair      OBJECTIVE    Home Exercise Program:   lingual and labial strength and ROM exercises    Treatment/Education:  Results and recommendations were relayed to: Patient  Education provided: Yes   Learner: Patient    Barriers to learning: None   Method of  teaching: Verbal   Topic oral intake, home exercise program   Outcome of teaching: Pt/family demonstrated good understanding

## 2024-12-17 ENCOUNTER — OFFICE VISIT (OUTPATIENT)
Dept: NEUROSURGERY | Facility: HOSPITAL | Age: 34
End: 2024-12-17
Payer: COMMERCIAL

## 2024-12-17 VITALS
WEIGHT: 117 LBS | HEIGHT: 62 IN | BODY MASS INDEX: 21.53 KG/M2 | RESPIRATION RATE: 16 BRPM | SYSTOLIC BLOOD PRESSURE: 92 MMHG | HEART RATE: 88 BPM | DIASTOLIC BLOOD PRESSURE: 62 MMHG

## 2024-12-17 DIAGNOSIS — D36.10 SCHWANNOMA: Primary | ICD-10-CM

## 2024-12-17 PROCEDURE — 99214 OFFICE O/P EST MOD 30 MIN: CPT | Performed by: NEUROLOGICAL SURGERY

## 2024-12-17 PROCEDURE — 3008F BODY MASS INDEX DOCD: CPT | Performed by: NEUROLOGICAL SURGERY

## 2024-12-17 PROCEDURE — 1036F TOBACCO NON-USER: CPT | Performed by: NEUROLOGICAL SURGERY

## 2024-12-17 NOTE — PROGRESS NOTES
[facility]  Neurosurgery    History of Present Illness      Denae Nolasco is a 33-year-old right-handed female with a PMH significant for ASD s/p closure 1997, Raynaud's, and depression who [place] [facility] [place]igo and ataxia on 08/03/24. 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 TTE EF 57% and negative bubble study. Patient was taken to the OR on 08/05/24 for left retrosigmoid craniotomy for tumor resection with EVD placement. Postop MRI for GTR and showed incidental L transverse sigmoid sinus thrombosis.      Hospital course was significant for ENT consultation for scope with bilateral vocal cord paresis, L VII, XII paresis.  R tympanic membrane rupture post YOUSUF monitor placement (not requiring surgery). She is now s/p trach / peg 08/09/24. Holcomb was placed for urinary retention. 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 DVT US negative. Patient was able to be discharged to acute rehab on 08/14.  Final surgical pathology for schwannoma.      Patient presented for follow up last on 09/26/24 at which time trach and PEG remained in place. She was recommended for 2 -3 month follow up. Since last seen she was evaluated by ophthalmology and was recommended for continuation of erythromycin ointment, no gold weight recommended. She continues to follow with Dr. Craft with ENT and is s/p decannulation 10/23/24. She is continuing to work with SLP therapy and is scheduled for a MBS on 12/23/24 and PEG removal with Dr. Craft on 12/30. Patient presents for FUV.     She presents today for follow-up evaluation. She reports ambulating comfortably without any imbalance. She is able to eat well without swallowing difficulty. She continues to have R deltoid weakness and R facial weakness.       Objective   "    Vitals:   BP 92/62   Pulse 88   Resp 16   Ht 1.575 m (5' 2\")   Wt 53.1 kg (117 lb)   BMI 21.40 kg/m²       Physical Exam:    Awake, alert, oriented times 3.   EOM intact, no diplopia  Nystagmus on bilateral gaze  Mild decreased R jaw V3 numbness  HB 6 left facial palsy, partial eye closure  Facial sensation intact in V1-3 bilaterally except R V3 area  Left sided hearing loss  No evident tongue deviation  Palate elevates symmetrically  Symmetric shoulder elevation  Symmetric head turning  Mild Hoarseness  No imbalance, no Romberg, gait steady  RUE with 2-3/5 right deltoid weakness, otherwise full strength  5/5 in the RLE.  5/5 in LUE/LLE  No sensory deficits  Bilateral dysmetria    Relevant Results:    No new imaging         Assessment & Plan      Diagnosis:  Denae was seen today for follow-up.  Diagnoses and all orders for this visit:  Schwannoma      Provider Impression:     Mrs Nolasco presents to clinic for follow-up evaluation. She continues to improve neurologically and is able to ambulate independently with gait improved compared to preop. She is not using her PEG and is scheduled to have it removed on 12/30. She continues to have L facial weakness, so we discussed seeing facial plastics to discuss facial reanimation. Her right deltoid continues to be weak and we'll schedule her to undergo EMG/NVS for further workup - we suspect peripheral/upper plexus etiology. Lastly, we will schedule her to undergo repeat MRI brain with and without contrast in February which will be 6 months postop.     We will see her back for follow-up after MRI and EMG/NCS    Questions were answered and she and her  expressed understanding  Total time for this visit was 30 minutes        Medical History     Past Medical History:   Diagnosis Date    Depression     Visual impairment      Past Surgical History:   Procedure Laterality Date    BRAIN SURGERY  08/05/2024    GASTROSTOMY TUBE PLACEMENT  08/09/2024    " TRACHEOSTOMY CLOSURE  2024    TRACHEOSTOMY TUBE PLACEMENT  2024     Social History     Tobacco Use    Smoking status: Never    Smokeless tobacco: Never   Vaping Use    Vaping status: Never Used   Substance Use Topics    Alcohol use: Not Currently    Drug use: Not Currently     Types: Marijuana     Comment: tried it once or twice     Family History   Problem Relation Name Age of Onset    Blood clot Mother Delmis Mantilla-  of clot 2016      labor Sister Megan      No Known Allergies  Current Outpatient Medications   Medication Instructions    acetaminophen (TYLENOL) 325 mg, Every 4 hours PRN    dextran 70-hypromellose, PF, (Bion Tears) 0.1-0.3 % ophthalmic solution 1 drop, 3 times daily PRN    erythromycin (Romycin) 5 mg/gram (0.5 %) ophthalmic ointment Left Eye, 4 times daily    escitalopram (LEXAPRO) 5 mg, g-tube, Daily    lubricating eye drops ophthalmic solution 1 drop, Left Eye, Every 1 hour PRN    ondansetron ODT (ZOFRAN-ODT) 4 mg, g-tube, Every 6 hours PRN    sodium chloride 0.9 % irrigation solution For suctioning and cleaning inner cannula    triamcinolone (Kenalog) 0.1 % cream Topical, 2 times daily, Apply to affected area 1-2 times daily as needed.

## 2024-12-18 ENCOUNTER — APPOINTMENT (OUTPATIENT)
Dept: OPHTHALMOLOGY | Facility: CLINIC | Age: 34
End: 2024-12-18
Payer: COMMERCIAL

## 2024-12-18 DIAGNOSIS — R29.810 FACIAL WEAKNESS: Primary | ICD-10-CM

## 2024-12-18 DIAGNOSIS — H02.23B PARALYTIC LAGOPHTHALMOS OF UPPER AND LOWER EYELID OF LEFT EYE: Primary | ICD-10-CM

## 2024-12-18 DIAGNOSIS — H18.9 EXPOSURE KERATOPATHY: ICD-10-CM

## 2024-12-18 PROCEDURE — 99212 OFFICE O/P EST SF 10 MIN: CPT | Performed by: OPHTHALMOLOGY

## 2024-12-18 ASSESSMENT — VISUAL ACUITY
METHOD: SNELLEN - LINEAR
OS_CC: CF AT 3'
OD_CC+: -1
OD_CC: 20/40
CORRECTION_TYPE: GLASSES

## 2024-12-18 ASSESSMENT — ENCOUNTER SYMPTOMS
PSYCHIATRIC NEGATIVE: 0
RESPIRATORY NEGATIVE: 0
CONSTITUTIONAL NEGATIVE: 0
MUSCULOSKELETAL NEGATIVE: 0
ALLERGIC/IMMUNOLOGIC NEGATIVE: 0
NEUROLOGICAL NEGATIVE: 1
GASTROINTESTINAL NEGATIVE: 0
CARDIOVASCULAR NEGATIVE: 0
EYES NEGATIVE: 0
ENDOCRINE NEGATIVE: 0
HEMATOLOGIC/LYMPHATIC NEGATIVE: 0

## 2024-12-18 ASSESSMENT — REFRACTION_WEARINGRX
OS_CYLINDER: -3.00
OD_AXIS: 092
OS_SPHERE: -0.25
OD_SPHERE: -0.75
OD_CYLINDER: -2.50
SPECS_TYPE: SVL
OS_AXIS: 053

## 2024-12-18 ASSESSMENT — EXTERNAL EXAM - LEFT EYE: OS_EXAM: NORMAL

## 2024-12-18 ASSESSMENT — EXTERNAL EXAM - RIGHT EYE: OD_EXAM: NORMAL

## 2024-12-18 ASSESSMENT — PATIENT HEALTH QUESTIONNAIRE - PHQ9
1. LITTLE INTEREST OR PLEASURE IN DOING THINGS: NOT AT ALL
SUM OF ALL RESPONSES TO PHQ9 QUESTIONS 1 AND 2: 0
2. FEELING DOWN, DEPRESSED OR HOPELESS: NOT AT ALL

## 2024-12-18 ASSESSMENT — SLIT LAMP EXAM - LIDS: COMMENTS: NORMAL

## 2024-12-18 ASSESSMENT — PAIN SCALES - GENERAL: PAINLEVEL_OUTOF10: 0-NO PAIN

## 2024-12-18 NOTE — ASSESSMENT & PLAN NOTE
Still without signs of exposure. Good ointment retention overnight. Would be OK to trial ointment just at bedtime and artificial tears during the day. Suspect vision would improve with less ointment in place. Understands to call if any new redness or discomfort. Recheck surface in few months or sooner PRN.

## 2024-12-18 NOTE — ASSESSMENT & PLAN NOTE
? Slight interim improvement but lagophthalmos still placing at risk for exposure. Will consider oculoplastics eval if worsening issues but OK to continue to monitor with serial exam.

## 2024-12-18 NOTE — PROGRESS NOTES
Assessment/Plan   Problem List Items Addressed This Visit       Paralytic lagophthalmos - Primary     ? Slight interim improvement but lagophthalmos still placing at risk for exposure. Will consider oculoplastics eval if worsening issues but OK to continue to monitor with serial exam.          Exposure keratopathy     Still without signs of exposure. Good ointment retention overnight. Would be OK to trial ointment just at bedtime and artificial tears during the day. Suspect vision would improve with less ointment in place. Understands to call if any new redness or discomfort. Recheck surface in few months or sooner PRN.             Provided reassurance regarding above diagnoses and care received in the office visit today. Discussed outcomes and options along with the importance of treatment compliance. Understands the importance of any follow up visits. Patient instructed to call/communicate with our office if any new issues, questions, or concerns.     Will plan to see back in few months surface check or sooner PRN

## 2024-12-18 NOTE — PATIENT INSTRUCTIONS
Thank you so much for choosing me to provide your care today!    If you were dilated your vision may remain blurry   or light sensitive for several hours.    The nature of eye and vision problems can require frequent follow up, please make every effort to adhere to any future appointments.    If you have any issues, questions, or concerns,   please do not hesitate to reach out.    If you receive a survey in regards to your care today, please mention any exceptional care my office staff and/or technicians provided.    You can reach our office at this number:    [phone]    Please consider signing up for and utilizing [employer]!  This is the best way to directly reach me or other  providers

## 2024-12-19 ENCOUNTER — TREATMENT (OUTPATIENT)
Dept: PHYSICAL THERAPY | Facility: CLINIC | Age: 34
End: 2024-12-19
Payer: COMMERCIAL

## 2024-12-19 ENCOUNTER — TREATMENT (OUTPATIENT)
Dept: SPEECH THERAPY | Facility: HOSPITAL | Age: 34
End: 2024-12-19
Payer: COMMERCIAL

## 2024-12-19 DIAGNOSIS — R13.10 DYSPHAGIA, UNSPECIFIED TYPE: ICD-10-CM

## 2024-12-19 DIAGNOSIS — R49.0 DYSPHONIA: ICD-10-CM

## 2024-12-19 DIAGNOSIS — D36.10 SCHWANNOMA: ICD-10-CM

## 2024-12-19 DIAGNOSIS — Z98.890 HX OF CRANIOTOMY: ICD-10-CM

## 2024-12-19 DIAGNOSIS — R26.81 UNSTEADINESS ON FEET: ICD-10-CM

## 2024-12-19 DIAGNOSIS — R27.0 ATAXIA: ICD-10-CM

## 2024-12-19 PROCEDURE — 92610 EVALUATE SWALLOWING FUNCTION: CPT | Mod: GN

## 2024-12-19 PROCEDURE — 97112 NEUROMUSCULAR REEDUCATION: CPT | Mod: GP,CQ

## 2024-12-19 ASSESSMENT — PAIN SCALES - GENERAL
PAINLEVEL_OUTOF10: 0 - NO PAIN
PAINLEVEL_OUTOF10: 0 - NO PAIN

## 2024-12-19 ASSESSMENT — PAIN - FUNCTIONAL ASSESSMENT
PAIN_FUNCTIONAL_ASSESSMENT: 0-10
PAIN_FUNCTIONAL_ASSESSMENT: 0-10

## 2024-12-19 NOTE — PROGRESS NOTES
"Physical Therapy Treatment    Patient Name: Denae Nolasco  MRN: [id number]  Today's Date: 12/19/2024  Time Calculation  Start Time: 1040  Stop Time: 1110  Time Calculation (min): 30 min  PT Therapeutic Procedures Time Entry  Neuromuscular Re-Education Time Entry: 30    Insurance:  Visit number: 15 of 20  Authorization info: NO AUTH   Insurance Type: Payor: MEDICAL [facility] / Plan: [employer]ICAL [employer] / Product Type: *No Product type* /     Current Problem   1. Schwannoma  Follow Up In Physical Therapy      2. Hx of craniotomy  Follow Up In Physical Therapy      3. Ataxia  Follow Up In Physical Therapy      4. Unsteadiness on feet  Follow Up In Physical Therapy          Subjective   General    Patient reports she will be getting PEG tube out on 12/30 and facial nerve reconstruction. Getting EMG testing on shoulder d/t regression with worsening pain. Improved coordination, strength, and     Precautions:   none    Pain   Pain Assessment: 0-10  0-10 (Numeric) Pain Score: 0 - No pain  Post Treatment Pain Level   No change    Objective   Posterior positioning and LOB with monster walks with lack of coordination with stepping    Treatments:    Neuro Re-ed:   Balance/Neuromuscular Re-Education  Balance/Neuromuscular Re-Education Activity Performed: Yes  Balance/Neuromuscular Re-Education Activity 1: Schwinn bike 5'  Balance/Neuromuscular Re-Education Activity 2: modified monster walks  Balance/Neuromuscular Re-Education Activity 3: quadruped bird dogs 10x2 (modified for R ld)  Balance/Neuromuscular Re-Education Activity 4: SLS contralateral around the worlds B  Balance/Neuromuscular Re-Education Activity 5: DNS 90/90 3 x 30\" H      Assessment   Assessment:    Focus on core strength/stability, coordination, balance, and motor control. Improved core strength with improved ability to perform DNS tasks with less fatigue or compensation. Posterior LOB with modified monster walks with poor SLS on LLE observed and " lack of coordination with all tasks.     Plan:    PT spoke with pt who will be placed on PT hold as pt will be getting another surgery, will hold chart for 30 days prior to formal discharge.     OP EDUCATION:   Updated HEP: Access Code: [email or web address]  URL: [email or web address]  Date: 12/19/2024  Prepared by: Nash Parker    Exercises  - Cat Cow to Child's Pose  - 1 x daily - 7 x weekly - 3 sets - 10 reps  - Quadruped Hip Extension Kicks  - 1 x daily - 7 x weekly - 3 sets - 10 reps  - Tall Kneeling Hip Hinge  - 1 x daily - 7 x weekly - 3 sets - 10 reps  - DNS Bug Bracing March  - 1 x daily - 7 x weekly - 3 sets - 10 reps  - Sit to Stand with Arms Crossed  - 1 x daily - 7 x weekly - 3 sets - 10 reps  - Bridge with Hip Abduction and Resistance  - 1 x daily - 7 x weekly - 3 sets - 10 reps  - Bird Dog  - 1 x daily - 7 x weekly - 3 sets - 10 reps  - Supine 90/90 Abdominal Bracing  - 1 x daily - 7 x weekly - 3 sets - 1 reps - 30 hold  - Side Stepping with Resistance at Thighs  - 1 x daily - 7 x weekly - 3 sets - 10 reps  - Single Leg Balance with Clock Reach  - 1 x daily - 7 x weekly - 3 sets - 10 reps    Goals:   Active       LTG       Pt will be 100% IND with HEP in 10 weeks in order to maintain progress with therapy.         Start:  10/23/24    Expected End:  12/31/24            Pt will improve DGI score to 24/24 in 10 weeks in order to reduce fall risk.        Start:  10/23/24    Expected End:  12/31/24            Pt will demonstrated improve AROM of R GH as follows: flexion 150, abduction 150, ER functional C7, IR functional T8 in 10 weeks to improve mobility required for dressing, bathing, cooking and cleaning.        Start:  10/23/24    Expected End:  12/31/24            Pt will improve R GH strength to 5/5 in 10 weeks in order to improve strength required to lift objects at home including groceries and to improve cleaning tasks.         Start:  10/23/24    Expected End:  12/31/24            Pt will  demonstrate subjective improvement of ADLs and recreational activities through improved score of 100 on ABC in 10 weeks for return to prior level of function and work tasks without fall risk.        Start:  10/23/24    Expected End:  12/31/24               STG       Pt will be 50% IND with HEP in 5 weeks in order to progress with therapy.        Start:  10/23/24    Expected End:  12/02/24            Pt will improve DGI score to 12/12 in 5 weeks in order to reduce fall risk.        Start:  10/23/24    Expected End:  12/02/24            Pt will be able to perform ascending reciprocal pattern of 12 steps with use of 1 rail in 5 weeks for home and community ambulation needs.         Start:  10/23/24    Expected End:  12/02/24            Pt will be able to ambulate in outdoor environment without imbalance in 5 weeks for recreational activities.       Start:  10/23/24    Expected End:  12/02/24            Pt will demonstrate subjective improvement of ADLs and recreational activities through improved score of 55 on ABC in 5 weeks for reduce fall risk.        Start:  10/23/24    Expected End:  12/02/24

## 2024-12-19 NOTE — PROGRESS NOTES
"Speech-Language Pathology Swallow and Voice Treatment Note      Patient Name: Denae Nolasco  MRN: [id number]  : 1990  Today's Date: 24  Time Calculation  Start Time: 915  Stop Time: 950  Time Calculation (min): 35 min      ASSESSMENT  Impressions:  Taking longer to eat then prior to surgery and food/liquids continue to spill out of oral cavity - about 60% back to normal. Breakfast foods are ok and at her normal speed. Anything with \"substance\" takes some time to masticate and manipulate. Will complete MBS for further oral and pharyngeal evaluation and to further plan treatment course.  Denae reports her voice is back to baseline - she has a high pitch slightly hoarse vocal quality per her report.     Her neurosurgeon suggested a consult to plastic surgeon for possible facial reanimation surgery due to minimal improvement of labial and facial ROM with therapy.    PLAN    DIET RECOMMENDATIONS:   - Soft and bite sized  - Thin liquids (IDDSI Level 0)  - Large pills half in puree, small pills whole in puree        STRATEGIES:  - Small bites  - Small, single sips  - Alternate consistencies  - Effortful swallow  - Add moisture to dry foods  - Upright for all PO intake  - Swallow 2-3 times per bite/sip       Recommended frequency/duration:  Skilled SLP services recommended: Yes  Frequency: 2x/week  Duration: 12 weeks     Strengths: Cognition, Motivation, Family/caregiver support, and Age  Barriers to participation in tx: N/A    Payor authorization information:  Payor: MEDICAL [facility] / Plan: [employer]ICAL [employer] / Product Type: *No Product type* /   Certification Period Start Date: 24  Certification Period End Date: 25  Number of Authorized Treatments : 20  Total Number of Visits : 14    Goals:  Short-Term Goals:  In 12 weeks...    DYSPHAGIA  Short term goals established 24:   - Patient will tolerate baseline/recommended PO diet without overt s/s of aspiration, further " difficulty, or concern for aspiration-related complications in 90% of observed therapeutic trials.  Progress: per report - continues with  Minimal s/s of aspiration, less quantity of foods reported due to oral weakness. She will begin pills orally.She is consuming small pills orally. Will complete MBS 12/23/24 for pharyngeal re assessment.  Status: progressing    - Patient/Family will verbalize/demonstrate comprehension of dysphagia education, strategies, recommendations and POC.  Progress: patient reports use of strategies at home  Status: Goal met    - Patient will complete home exercise program daily (per patient report).  Progress: She reports completing lingual and facial exercises 1x daily now - was inconsistent prior. Notes lingual improvement, minimal facial ROM changes functionally.  Status: progressing    - Patient will complete exercises and/or oral bolus trials in conjunction with neuromuscular electrical stimulation (NMES).  Progress: not addressed Hold goal 12/3/24- will complete facial NMES  Prior data: in conjunction with NMES placement 2 targeting geniohyoid for protraction and elevation of the mylohyoid:   Work: Rest 57:3   180 us  7 mA   8 min.   Trialed: nectar thick liquids  Tolerated: well    - Patient will complete oral motor exercises at 75% accuracy to improve oral bolus manipulation.  Progress: improved lingual ROM and slight increase in strength upon palpation  Facial: Some noted movement (less then 25%) during exercises in conjunction with NMES.  Labial: Some mild movement (less then 25%) during exercises in conjunction with NMES.     Long term goals 11/27/24:   Patient will resume/maintain oral intake in order to promote optimal oropharyngeal swallow function and reduce risk for dehydration, malnutrition and weight loss.      Speech Goals:      LONG TERM GOALS (11/27/24 to 2/26/25)  Improve overall vocal health to foster increased participation levels at home, work and in the community  environment.  Patient will demonstrate reduction in symptoms as evidenced by improved scores on VHI-10, RSI, and/or CSI following treatment.     SHORT TERM GOALS (1/27/24 to 2/26/25)     1.  Patient will complete 20-30 facial exercise repetitions in conjunction with neuromuscular electrical stimulation (NMES) vital stim.  Completed facial placement with electrodes over facial nerve to engage for labial and eye movement  Not addressed  Prior data:   185 usec,  10.5 mA  57: 3 on/off ratio  Time: 7 minutes  Completed exercises:  12/16/24 data:  Facial Stimulation Point Stim Level Contraction percentage (Left side of face)   Levators 10.5 mA absent   Zygomatics 10.5 mA absent   Obicularis Oris 10.5 mA Less then 25% ROM corner   Masseter 10.5 mA absent   Depressors 10.5 mA absent   Buccinator 10.5 mA absent   Mentalis 10.5 mA absent         Patient will improve labial ROM on effected side by 5mm to promote symmetry upon ROM for speech and swallow.  12/16/24 DATA:    Rest Closed lip smile Open lip Smile   Right side +3 mm, prior 0 Stable +10 mm +12 mm, Prior: +15mm   Left side -6mm, Prior: -10mm stable -2mm, prior: -6mm  Stable: 0       3. Patient will increase vocal wellness and decrease phono trauma in adherence with clinician prescribed vocal hygiene and wellness program per patient report 80% of his/her day.   Progress: Spouse and patient report patient is at baseline volume and voice.   Status:  GOAL MET    NEW GOAL:   Due to reduced lingual strength and ROM,  suggest add goal of sensory stimulation (DPNS) through use of lemon glycerine swabsticks targeted on her tongue and pharyngeal wall to improve speech intelligibility (as well as pharyngeal function).  Not addressed  Prior data:  5 packets completed   Lingual groove: 100% ROM on 50% of trials today - notable absent ROM when stimulated from posterior to anterior aspect of the tongue.  Palatal ROM: increased to 100% ROM   Pharyngeal wall contraction: 50% ROM   improved today      4.   Patient will increase ability to produce voice without tension within  5 minute conversational task x 80% accuracy as judged by clinician observation and/or patient report.   Progress:.Spouse and patient report patient is at baseline volume and voice.   Status:  GOAL MET    5. Patient will demonstrate independent use of voice/swallow/breathing techniques @ 80-85 dBSPL in conversational speech.   Progress:  not addressed - prior data  Sustained Phonation: 4 second duration 80+ dBSPL   Conversational speech  N/a 63 dBSPL   Status: GOAL MET    6.  Patient to use EMST daily to improve expiratory effort to assist with management of laryngeal penetration/aspiration and increase balance/strength of the respiratory system.   Progress - not using per patient report. Spouse and patient report patient is at baseline volume and voice.     Status: GOAL MET     SUBJECTIVE    Respiratory Status: Room air  Current diet:  regular/thin liquids. Oral medications.      Pt was alert, pleasant, and cooperative for session.  Ability to follow functional commands: [facility]  Nutritional status: Has PEG tube - to be removed 12/30/24.     Patient positioning: Upright in chair      OBJECTIVE    Home Exercise Program:   lingual and labial strength and ROM exercises    Treatment/Education:  Results and recommendations were relayed to: Patient  Education provided: Yes   Learner: Patient    Barriers to learning: None   Method of teaching: Verbal   Topic oral intake, home exercise program   Outcome of teaching: Pt/family demonstrated good understanding

## 2024-12-20 ENCOUNTER — APPOINTMENT (OUTPATIENT)
Dept: NUTRITION | Facility: HOSPITAL | Age: 34
End: 2024-12-20
Payer: COMMERCIAL

## 2024-12-23 ENCOUNTER — APPOINTMENT (OUTPATIENT)
Dept: SPEECH THERAPY | Facility: HOSPITAL | Age: 34
End: 2024-12-23
Payer: COMMERCIAL

## 2024-12-23 ENCOUNTER — APPOINTMENT (OUTPATIENT)
Dept: PHYSICAL THERAPY | Facility: CLINIC | Age: 34
End: 2024-12-23
Payer: COMMERCIAL

## 2024-12-23 ENCOUNTER — APPOINTMENT (OUTPATIENT)
Dept: OTOLARYNGOLOGY | Facility: CLINIC | Age: 34
End: 2024-12-23
Payer: COMMERCIAL

## 2024-12-23 ENCOUNTER — HOSPITAL ENCOUNTER (OUTPATIENT)
Dept: RADIOLOGY | Facility: HOSPITAL | Age: 34
Discharge: HOME | End: 2024-12-23
Payer: COMMERCIAL

## 2024-12-23 DIAGNOSIS — R13.10 DYSPHAGIA, UNSPECIFIED TYPE: ICD-10-CM

## 2024-12-23 PROCEDURE — 92526 ORAL FUNCTION THERAPY: CPT | Mod: GN

## 2024-12-23 PROCEDURE — 92611 MOTION FLUOROSCOPY/SWALLOW: CPT | Mod: GN

## 2024-12-23 PROCEDURE — 74230 X-RAY XM SWLNG FUNCJ C+: CPT | Performed by: RADIOLOGY

## 2024-12-23 PROCEDURE — 2500000005 HC RX 250 GENERAL PHARMACY W/O HCPCS

## 2024-12-23 PROCEDURE — 74230 X-RAY XM SWLNG FUNCJ C+: CPT

## 2024-12-23 NOTE — PROCEDURES
Speech-Language Pathology    Outpatient Modified Barium Swallow Study    Patient Name: Denae Nolasco  MRN: [id number]  : 1990  Today's Date: 24     Time Calculation  Start Time: 1015  Stop Time: 1045  Time Calculation (min): 30 min       Modified Barium Swallow Study completed. Informed verbal consent obtained prior to completion of exam. Trials of thin, nectar/mildly thick liquid, puree, regular solids and barium tablet with thin liquid were given.     Modified Barium Swallow Study completed. Informed verbal consent obtained prior to completion of exam. The study was completed per protocol with various liquid barium consistencies, pudding, solids and a 13mm barium tablet.  A 1.9 cm or .75 inch (outer diameter) ring was placed on the chin in the lateral view and on the lateral, left side of the neck in the a-p view in order to complete objective measurements during swallowing. The anatomic structures and function of the oropharynx, larynx, hypopharynx and cervical esophagus were evaluated.    SLP: JAME Warren   Contact info: [employer] secure chat; [phone] Option 2    Reason for Referral: assess aspiration  Patient Hx per chart:  past medical history of depression and visual impairment who presented to OSH ED on 24 with complaints of hearing loss, vertigo, ataxia (since ), headache, dizziness, and muscle spasms. Patient transferred to [facility] for concern for intracranial mass concerning for schwannoma vs. meningioma seen on outpatient imaging. 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 Patient admitted to [facility] s/p L retrosigmoid craniotomy for tumor resection.   #s/p L retrosignmoid craniotomy for tumor resection with RF EVD placement    Respiratory Status: Room air  Current diet: regular foods/thin liquids. PEG in place. Flushing only. Scheduled for removal 24.    Pain:  Pain Scale: 0-10  Ratin    DIET  RECOMMENDATIONS:   - Regular (IDDSI Level 7)  - Thin liquids (IDDSI Level 0)  Per the results of today's MBSS, patient to continue baseline diet of regular consistencies and thin liquids following swallow strategies listed below:    STRATEGIES:  - Add moisture to dry foods  - Upright for all PO intake  - Medications whole in puree or as best tolerated    SLP PLAN:  Skilled SLP Services: Skilled SLP intervention for dysphagia is warranted.  SLP Frequency: 1x weekly  Duration: 60 days  Treatment/Interventions:   - Oral exercises  - Patient/caregiver education  - follow up after plastic surgeon consult for facial reanimation    Discussed POC: Patient and spouse  Discussed Risks/Benefits: Yes  Patient/Caregiver Agreeable: Yes    Short term goals established 12/23/24:   - Patient will complete recommended oral motor exercises (lingual strength and ROM) for at least 20-30 repetitions during treatment session given minimal-moderate cues.  - Patient will participate in deep pharyngeal neuromuscular strengthening (DPNS) to improve lingual strength as demonstrated by decreased bolus manipulation time during oral intake.  - Patient will complete home exercise program daily (per patient report).      Long term goals 12/23/24:   Patient will tolerate current diet without overt difficulty in 90% of opportunities or further pulmonary compromise.      Education Provided: Results and recommendations per MBSS, with video review; recommendations and POC at this time. Verbal understanding and agreement given on all accounts.     Treatment Provided Today: ST provided extensive education and training to pt/pt family regarding anatomy/physiology of swallow function,  diet modifications, and the use of compensatory swallow strategies to promote pt safety upon PO intake including solid bolus trials as a snack to improve functional bolus manipulation and lingual strength exercises with tactile stimulation. In addition, ST provided  instruction/training on oral exercises.    Additional Medical Consults Suggested:   Patient has consult with plastic surgeon regarding left facial paralysis    Repeat Study: Per MD or treating SLP    Mechanics of the Swallow Summary:  ORAL PHASE:  Lip Closure - Interlabial escape/no progression to anterior lip   Tongue Control During Bolus Hold - Escape to lateral buccal cavity and/or floor of mouth   Bolus prep/mastication - Slow prolonged mastication with complete re-collection necessary   Bolus transport/lingual motion - Delayed initiation of tongue motion for A-P movement of the bolus   Oral residue - Residue collection on oral structure     PHARYNGEAL PHASE:  Initiation of pharyngeal swallow - Bolus head at vallecular pit   Soft palate elevation - No bolus between soft palate/pharyngeal wall   Laryngeal elevation - Complete superior movement of thyroid cartilage with contact of arytenoids to epiglottic petiole   Anterior hyoid excursion - Partial anterior movement   Epiglottic movement - Complete inversion    Laryngeal vestibule closure - Complete - no air/contrast in laryngeal vestibule   Pharyngeal stripping wave - Complete  Pharyngeal contraction (A/P view) - Not tested       Pharyngoesophageal segment opening - Complete distension and complete duration/no obstruction of flow of bolus   Tongue base retraction - Narrow column of contrast or air between tongue base and pharyngeal wall   Pharyngeal residue - Collection of residue within or on the pharyngeal structures  - valleculae    ESOPHAGEAL PHASE:  Esophageal clearance - Esophageal retention with retrograde flow below the pharyngoesophageal segment with 5mL thin liquids only during screening.      SLP Impressions with Severity Rating:   Pt presents with much improved oropharyngeal dysphagia upon completion of modified barium swallow study this date when compared to 11/21/24 MBS. Swallowing physiology is detailed above.     Impairments most impacting  swallowing safety and efficiency include lingual weakness with subsequent increased bolus manipulation time as density of solids increases. Patient is able to clear oral cavity with multiple swallow and liquids chasers. Noted spillage of solid and liquid to floor of mouth most likely due to facial and lingual weakness.  Cough x2 noted during testing was unrelated to oral intake.     No penetration and no aspiration was visualized during study.     *Of note: The A-P bolus follow-through is not intended to be utilized as a diagnostic assessment of the esophagus, rather a tool to observe the biomechanical aspects of the swallow continuum and to inform the need for further evaluation by medical specialists, as applicable.     Strategies attempted-  Second swallow and liquid chaser resulted in improved clearance of puree and solids.       OUTCOME MEASURES:  Functional Oral Intake Scale  Functional Oral Intake Scale: Level 7        total oral diet with no restrictions       Eating Assessment Tool (EAT-10)   0=No problem, 1=Mild problem, 2=Mild to moderate problem, 3=Moderate problem, 4=Severe problem    EAT 10  My swallowing problem has caused me to lose weight.: 0  My swallowing problem interferes with my ability to go out for meals.: 0  Swallowing liquids takes extra effort.: 1  Swallowing solids takes extra effort.: 1  Swallowing pills takes extra effort.: 2  Swallowing is painful: 0  The pleasure of eating is affected by my swallowing.: 2  When I swallow food sticks in my throat.: 1  I cough when I eat.: 2  Swallowing is stressful: 0  EAT-10 TOTAL SCORE:: 9    A total score of 3 or above may indicate difficulty with swallowing safely and/or efficiently      Rosenbek's Penetration Aspiration Scale  Thin Liquids: 1. NO ASPIRATION & NO PENETRATION - no aspiration, contrast does not enter airway  [place] Thick Liquids: 1. NO ASPIRATION & NO PENETRATION - no aspiration, contrast does not enter airway  Puree: 1. NO  ASPIRATION & NO PENETRATION - no aspiration, contrast does not enter airway  Solids: 1. NO ASPIRATION & NO PENETRATION - no aspiration, contrast does not enter airway

## 2024-12-26 ENCOUNTER — APPOINTMENT (OUTPATIENT)
Dept: PHYSICAL THERAPY | Facility: CLINIC | Age: 34
End: 2024-12-26
Payer: COMMERCIAL

## 2024-12-30 ENCOUNTER — APPOINTMENT (OUTPATIENT)
Dept: PHYSICAL THERAPY | Facility: CLINIC | Age: 34
End: 2024-12-30
Payer: COMMERCIAL

## 2024-12-30 ENCOUNTER — OFFICE VISIT (OUTPATIENT)
Dept: OTOLARYNGOLOGY | Facility: HOSPITAL | Age: 34
End: 2024-12-30
Payer: COMMERCIAL

## 2024-12-30 VITALS — HEIGHT: 62 IN | BODY MASS INDEX: 21.53 KG/M2 | WEIGHT: 117 LBS

## 2024-12-30 DIAGNOSIS — R49.0 DYSPHONIA: ICD-10-CM

## 2024-12-30 DIAGNOSIS — R13.10 DYSPHAGIA, UNSPECIFIED TYPE: Primary | ICD-10-CM

## 2024-12-30 DIAGNOSIS — J38.01 VOCAL FOLD PARALYSIS, RIGHT: ICD-10-CM

## 2024-12-30 DIAGNOSIS — G51.0 FACIAL PARALYSIS: ICD-10-CM

## 2024-12-30 PROCEDURE — 31575 DIAGNOSTIC LARYNGOSCOPY: CPT | Performed by: OTOLARYNGOLOGY

## 2024-12-30 PROCEDURE — 99214 OFFICE O/P EST MOD 30 MIN: CPT | Mod: 25 | Performed by: OTOLARYNGOLOGY

## 2024-12-30 PROCEDURE — 99214 OFFICE O/P EST MOD 30 MIN: CPT | Performed by: OTOLARYNGOLOGY

## 2024-12-30 PROCEDURE — 1036F TOBACCO NON-USER: CPT | Performed by: OTOLARYNGOLOGY

## 2024-12-30 PROCEDURE — 3008F BODY MASS INDEX DOCD: CPT | Performed by: OTOLARYNGOLOGY

## 2024-12-30 ASSESSMENT — PATIENT HEALTH QUESTIONNAIRE - PHQ9
2. FEELING DOWN, DEPRESSED OR HOPELESS: NOT AT ALL
1. LITTLE INTEREST OR PLEASURE IN DOING THINGS: NOT AT ALL
SUM OF ALL RESPONSES TO PHQ9 QUESTIONS 1 & 2: 0

## 2024-12-30 ASSESSMENT — PAIN SCALES - GENERAL: PAINLEVEL_OUTOF10: 0-NO PAIN

## 2024-12-30 NOTE — PROGRESS NOTES
Patient: Denae Nolasco   MRN: [id number] YOB: 1990   Sex: female Age: 34 y.o.  Date of Service: 2024       ASSESSMENT AND PLAN  I discussed the findings with Denae Nolasco and have recommended the followin. 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 Voice is improving. Laryngoscopy today with bilateral vocal folds with severe atrophy and there is now more movement left > right. She is getting touch hourglass closure. Decannulated 10/23/24  - Follow-up 2 months for monitoring of motion    2. Dysphagia, suspect high vagal injury with multilevel swallowing deficits. Improving. Now on total oral diet.  - PEG removed 24  - Meticulous oral care TID  - Continue SLP swallow therapy, working with Maral ZUNIGA as an outpatient  - Nutrition referral      CHIEF COMPLAINT  Chief Complaint   Patient presents with    Follow-up     Peg tube removal.       HISTORY OF PRESENT ILLNESS  Denae Nolasco is a 34 y.o. female referred for evaluation of bilateral vocal fold paralysis and tracheotomy care.  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 Previously admitted to [facility] on 2024 due to ongoing functional deficits, now home since 24.    24  Doing well with total PO feeds, supplementing with ~1 ensure per day. No weight loss.     24  No issues since decannulation. She is doing more by mouth, drinking boost    10/23/24  She has tolerated capping. No respiratory issues. Discharged from home therapy, seeing Mrala KILGORE next week    10/9/24  She reports her voice is improving. She is wearing her PMV about 7-8 hours throughout the day, then she will get into a coughing fit and takes the valve off.     24  They are working with a PMV valve and feel like  her voice is a little stronger. She is home since last Friday. They are working on getting home SLP therapy    24  She has a #6 cuffless Shiley in place. She denies difficulty breathing. She has not trialed a speaking valve or capping. She is getting all her nutrition through PEG. Her voice is still very weak.      ADDITIONAL HISTORY  Past Medical History  She has a past medical history of Depression, Dizziness, Fatigue, Headache, HL (hearing loss), and Visual impairment. Surgical History  She has a past surgical history that includes Brain surgery (2024); Tracheostomy tube placement (2024); Gastrostomy tube placement (2024); and Tracheostomy closure (2024).   Social History  She reports that she has never smoked. She has never used smokeless tobacco. She reports that she does not currently use alcohol. She reports that she does not currently use drugs after having used the following drugs: Marijuana. Allergies  Patient has no known allergies.     Family History  Family History   Problem Relation Name Age of Onset    Blood clot Mother Delmis Mantilla-  of clot 2016     Thyroid disease Mother Delmis Mantilla-  of clot 2016      labor Sister Megan         REVIEW OF SYSTEMS  All 10 systems were reviewed and negative except for above.      PHYSICAL EXAM  ENT Physical Exam   GENERAL: Thin young woman, aphonic, response to commands  RESPIRATORY: Breathing quietly, no stridor or difficulty breathing with finger occlusion  HEAD: Normocephalic atraumatic  FACE: Asymmetric left HB 6/6 no masses or lesions  EYES:  Left eye incomplete closure  EARS:  Pinnae normal.   NOSE:  No anterior lesions, masses or polyps.  ORAL CAVITY/OROPHARYNX:  Buccal mucosa is dry with, crusting along palate, tongue deviated and palate elevates asymmetrically.  NECK:  Soft, previous trach site well healed  ABDOMEN: PEG in place, removed without issue, covered with gauze and tape     Last Recorded  "Vitals  Height 1.575 m (5' 2\"), weight 53.1 kg (117 lb).    RESULTS    Patient Reported Outcome Measures  N/A    Laboratory, Radiology, and Pathology  I personally reviewed the following results, with the following interpretation:   MBS 12/23/24  Much improved swallowing function, still requiring thins to wash down residue.    MBS 10/2/24  Impaired epiglottic motion, aspiration during the swallow    PROCEDURES  Flexible Fiberoptic Laryngoscopy    Patient failed a mirror exam due to limitations of equipment and the need for stroboscopy to assess glottic vibration and closure.     PREOPERATIVE DIAGNOSIS: Dysphonia    POSTOPERATIVE DIAGNOSIS: Same    PROCEDURE:  Strobovideolaryngoscopy    ANESTHESIA:  Topical    COMPLICATIONS:  None    SPECIMENS:  None    PROCEDURE IN DETAIL: The patient was seated in an upright position.  The nasal cavity was topically decongested and anesthetized.  The stroboscopic microphone was held to the neck at the level of the larynx.  The distal chip video laryngoscope was passed through the nasal cavity.  The nasal cavity and nasopharynx were within normal limits except noted below.  The following findings on stroboscopy were noted:      Tongue Base: no masses or lesions   Vocal Fold Mobility               Right VF: immobile               Left VF: mobile   TVF Appearance               Edema/Erythema: none               Lesions/vibratory margin irregularities: atrophy   Muscle Tension Patterns:  mild AP   Other Findings: pooled secretions throughout, improved from prior    The patient tolerated the procedure well.     PEG removal  Following the laryngoscopy, the traction PEG was removed without incident. The site was dressed with gauze and tape.    ----------------------------------------------------------------------  Smiley Craft MD, MAEd    [facility]  Department of Otolaryngology - Head and Neck Surgery  [facility] " Center    The total time I spent in care of this patient today (excluding time spent on other billable services) is as follows:    Time Spent  Prep time on day of patient encounter: 5 minutes  Time spent directly with patient, family or caregiver: 15 minutes  Additional Time Spent on Patient Care Activities: 5 minutes  Documentation Time: 5 minutes  Other Time Spent: 0 minutes  Total: 30 minutes

## 2024-12-31 ENCOUNTER — APPOINTMENT (OUTPATIENT)
Dept: PHYSICAL THERAPY | Facility: CLINIC | Age: 34
End: 2024-12-31
Payer: COMMERCIAL

## 2025-01-06 ENCOUNTER — APPOINTMENT (OUTPATIENT)
Dept: PLASTIC SURGERY | Facility: CLINIC | Age: 35
End: 2025-01-06
Payer: COMMERCIAL

## 2025-01-06 VITALS
DIASTOLIC BLOOD PRESSURE: 64 MMHG | BODY MASS INDEX: 21.53 KG/M2 | HEIGHT: 62 IN | SYSTOLIC BLOOD PRESSURE: 101 MMHG | HEART RATE: 101 BPM | WEIGHT: 117 LBS

## 2025-01-06 DIAGNOSIS — G51.0 FACIAL PARALYSIS ON LEFT SIDE: Primary | ICD-10-CM

## 2025-01-06 DIAGNOSIS — H02.23B PARALYTIC LAGOPHTHALMOS OF UPPER AND LOWER EYELID OF LEFT EYE: ICD-10-CM

## 2025-01-06 PROCEDURE — 99203 OFFICE O/P NEW LOW 30 MIN: CPT | Performed by: SURGERY

## 2025-01-06 PROCEDURE — 1036F TOBACCO NON-USER: CPT | Performed by: SURGERY

## 2025-01-06 PROCEDURE — 3008F BODY MASS INDEX DOCD: CPT | Performed by: SURGERY

## 2025-01-06 NOTE — PROGRESS NOTES
Clinic Note    Reason For Consult  Facial paralysis    History Of Present Illness  Denae Nolasco is a 34 y.o. female with a past medical history significant for ASD status post closure in 1997 and large left sided vestibular schwannoma.  She underwent left retrosigmoid craniotomy for tumor resection on 8/5/2024.  She subsequently developed left transverse sigmoid sinus thrombosis, bilateral vocal cord paralysis, left cranial nerve VII and XII paresis.  She required a tracheostomy and PEG on 8/9/2024 which has been subsequently removed.  Since then, she has been working with speech-language pathology I have not noted significant improvement in the left sided facial paralysis.  Therefore, she was referred to me to discuss facial reanimation options.      She is followed closely by ophthalmology for her lagophthalmos and has been using both eye ointment and eyedrops.  She denies any eye irritation or blurry vision.  She denies any breathing issues.  She denies any trismus or pain with chewing.  She does report some numbness in the right V2 and V3 distribution.       Past Medical History  She has a past medical history of Depression, Dizziness, Fatigue, Headache, HL (hearing loss), and Visual impairment.    Medications  Current Outpatient Medications on File Prior to Visit   Medication Sig Dispense Refill    acetaminophen (Tylenol) 325 mg tablet 1 tablet (325 mg) by g-tube route every 4 hours if needed for mild pain (1 - 3) (crush pill).      dextran 70-hypromellose, PF, (Bion Tears) 0.1-0.3 % ophthalmic solution 1 drop 3 times a day as needed for dry eyes.      erythromycin (Romycin) 5 mg/gram (0.5 %) ophthalmic ointment Apply to left eye 4 times a day. 3.5 g 3    escitalopram (Lexapro) 5 mg tablet 1 tablet (5 mg) by g-tube route once daily.      lubricating eye drops ophthalmic solution Administer 1 drop into the left eye every 1 hour if needed for dry eyes.      ondansetron ODT (Zofran-ODT) 4 mg disintegrating  tablet 1 tablet (4 mg) by g-tube route every 6 hours if needed for nausea or vomiting. 9 tablet 2    triamcinolone (Kenalog) 0.1 % cream Apply topically 2 times a day. Apply to affected area 1-2 times daily as needed. 15 g 0    sodium chloride 0.9 % irrigation solution For suctioning and cleaning inner cannula (Patient not taking: Reported on 1/6/2025)       No current facility-administered medications on file prior to visit.       Surgical History  She has a past surgical history that includes Brain surgery (08/05/2024); Tracheostomy tube placement (08/09/2024); Gastrostomy tube placement (08/09/2024); and Tracheostomy closure (11/2024).     Social History  She reports that she has never smoked. She has never used smokeless tobacco. She reports that she does not currently use alcohol. She reports that she does not currently use drugs after having used the following drugs: Marijuana.    Allergies  Patient has no known allergies.    Review of Systems  Negative other than what is included in the HPI.      Physical Exam  On exam, Denae Nolasco is well-appearing and well-developed.  she is breathing comfortably on room air and is in no distress.  Focused examination of Her affected region reveals:     Left sided complete facial paralysis House-Brackman VI  Absent brow elevation on the left  Upper eyelid lagophthalmos with positive Bell's reflex but partially exposed cornea  Lower eyelid malpositioning and laxity  Absent upper lip elevation  Absent lower lip depression  Bilateral temporalis and masseter muscle functioning     Relevant Results      Assessment/Plan     Denae Nolasco is a 34 y.o. female with a 5-month history of left facial paralysis after resection of right sided vestibular schwannoma.  Today we discussed that given lack of improvement in her facial paralysis, she would likely benefit from facial reanimation using a reinnervation technique.  We discussed various options and agreed to move forward  with a dual innervation technique use of both cross face nerve graft from the contralateral side and masseter nerve to facial nerve transfer.  Additionally, she also benefit from a platinum weight insertion to address her upper eyelid lagophthalmos.  Based on examination in the clinic today, she would benefit from a 8 to 10 g weight.  We went over the indication alternatives and risks of the surgical procedures along with this expected postoperative course.  The patient has several question which were answered in full and is interested in moving forward with scheduling.  My office will reach out to them to schedule this in the near future.    I spent 30 minutes in the professional and overall care of this patient.      Sukhwinder Rutherford MD

## 2025-01-07 ENCOUNTER — PREP FOR PROCEDURE (OUTPATIENT)
Dept: PLASTIC SURGERY | Facility: HOSPITAL | Age: 35
End: 2025-01-07
Payer: COMMERCIAL

## 2025-01-08 ENCOUNTER — TELEMEDICINE CLINICAL SUPPORT (OUTPATIENT)
Dept: SPEECH THERAPY | Facility: HOSPITAL | Age: 35
End: 2025-01-08
Payer: COMMERCIAL

## 2025-01-08 ENCOUNTER — APPOINTMENT (OUTPATIENT)
Dept: SPEECH THERAPY | Facility: HOSPITAL | Age: 35
End: 2025-01-08
Payer: COMMERCIAL

## 2025-01-08 DIAGNOSIS — R13.10 DYSPHAGIA, UNSPECIFIED TYPE: ICD-10-CM

## 2025-01-08 DIAGNOSIS — R49.0 DYSPHONIA: ICD-10-CM

## 2025-01-08 PROCEDURE — 92526 ORAL FUNCTION THERAPY: CPT | Mod: GN

## 2025-01-08 ASSESSMENT — PAIN - FUNCTIONAL ASSESSMENT: PAIN_FUNCTIONAL_ASSESSMENT: 0-10

## 2025-01-08 ASSESSMENT — PAIN SCALES - GENERAL: PAINLEVEL_OUTOF10: 0 - NO PAIN

## 2025-01-09 ENCOUNTER — HOSPITAL ENCOUNTER (OUTPATIENT)
Dept: NEUROLOGY | Facility: CLINIC | Age: 35
Discharge: HOME | End: 2025-01-09
Payer: COMMERCIAL

## 2025-01-09 DIAGNOSIS — D36.10 SCHWANNOMA: ICD-10-CM

## 2025-01-09 NOTE — PROGRESS NOTES
"Speech-Language Pathology Clinical Swallow Treatment    Patient Name: Denae Nolasco  MRN: [id number]  : 1990  Today's Date: 2025  Time Calculation  Start Time: 1500  Stop Time: 1520  Time Calculation (min): 20 min    ASSESSMENT   Virtual or Telephone Consent    An interactive audio and video telecommunication system which permits real time communications between the patient (at the originating site) and provider (at the distant site) was utilized to provide this telehealth service.   Verbal consent was requested and obtained from Denae Nolasco on this date, 25 for a telehealth visit.       The patient reports her  and father note increased eye closure and slight improvement in labial seal. She continues to report oral spillage and difficulty with bilabial sounds. She had a consultation with Dr. Rutherford - per his report:  \"she would likely benefit from facial reanimation using a reinnervation technique.  We discussed various options and agreed to move forward with a dual innervation technique use of both cross face nerve graft from the contralateral side and masseter nerve to facial nerve transfer.  Additionally, she also benefit from a platinum weight insertion to address her upper eyelid lagophthalmos.  Based on examination in the clinic today, she would benefit from a 8 to 10 g weight.\"    The patient has a second consultation with Dr. Lee in February. Questions were answered regarding sEMG. She will discuss with her  and they will contact SLP with further questions. Plan of care will be determined once decision is made regarding surgery.  Will await return call from the patient.    Pt would benefit from ongoing skilled ST to improve facial strength/ROM to allow for improved speech and oral swallow function.     PLAN  Will await return call to determine plan and goals of care.     SLP TX Plan: Goals Adjusted  SLP Plan: Skilled SLP     Duration: 6 weeks     Discussed POC: " Patient       Payor authorization information:  Payor: [employer] / Plan: [employer] / Product Type: *No Product type* /   Certification Period Start Date: 01/08/25  Certification Period End Date: 03/09/25          SUBJECTIVE  Respiratory status: Room air  Positioning: Upright in chair  Pt was alert, cooperative, attentive, and engaged for session.    Pain Assessment  Pain Assessment: 0-10  0-10 (Numeric) Pain Score: 0 - No pain    Orientation: Ox4  Ability to follow functional commands: WFL    OBJECTIVE     Treatment/Education:  Results and recommendations were relayed to: Patient  Education provided: Yes   Learner: Patient   Barriers to learning: None   Method of teaching: Verbal   Topic: role of ST   Outcome of teaching: Pt/family demonstrated good understanding    Goals:   To be determined when patient contacts SLP. Discussed addition of sEMG if she chooses not to complete surgery.

## 2025-01-13 ENCOUNTER — APPOINTMENT (OUTPATIENT)
Dept: SPEECH THERAPY | Facility: HOSPITAL | Age: 35
End: 2025-01-13
Payer: COMMERCIAL

## 2025-01-14 ENCOUNTER — APPOINTMENT (OUTPATIENT)
Dept: SPEECH THERAPY | Facility: HOSPITAL | Age: 35
End: 2025-01-14
Payer: COMMERCIAL

## 2025-01-15 ENCOUNTER — APPOINTMENT (OUTPATIENT)
Dept: SPEECH THERAPY | Facility: HOSPITAL | Age: 35
End: 2025-01-15
Payer: COMMERCIAL

## 2025-01-16 ENCOUNTER — APPOINTMENT (OUTPATIENT)
Dept: SPEECH THERAPY | Facility: HOSPITAL | Age: 35
End: 2025-01-16
Payer: COMMERCIAL

## 2025-01-18 DIAGNOSIS — D36.10 SCHWANNOMA: Primary | ICD-10-CM

## 2025-01-20 ENCOUNTER — APPOINTMENT (OUTPATIENT)
Dept: SPEECH THERAPY | Facility: HOSPITAL | Age: 35
End: 2025-01-20
Payer: COMMERCIAL

## 2025-01-21 ENCOUNTER — APPOINTMENT (OUTPATIENT)
Dept: SPEECH THERAPY | Facility: HOSPITAL | Age: 35
End: 2025-01-21
Payer: COMMERCIAL

## 2025-01-22 ENCOUNTER — APPOINTMENT (OUTPATIENT)
Dept: SPEECH THERAPY | Facility: HOSPITAL | Age: 35
End: 2025-01-22
Payer: COMMERCIAL

## 2025-01-27 ENCOUNTER — APPOINTMENT (OUTPATIENT)
Dept: SPEECH THERAPY | Facility: HOSPITAL | Age: 35
End: 2025-01-27
Payer: COMMERCIAL

## 2025-01-28 ENCOUNTER — APPOINTMENT (OUTPATIENT)
Dept: SPEECH THERAPY | Facility: HOSPITAL | Age: 35
End: 2025-01-28
Payer: COMMERCIAL

## 2025-01-29 ENCOUNTER — APPOINTMENT (OUTPATIENT)
Dept: SPEECH THERAPY | Facility: HOSPITAL | Age: 35
End: 2025-01-29
Payer: COMMERCIAL

## 2025-01-30 LAB
CREAT SERPL-MCNC: 0.41 MG/DL (ref 0.5–0.97)
EGFRCR SERPLBLD CKD-EPI 2021: 132 ML/MIN/1.73M2

## 2025-01-31 ENCOUNTER — HOSPITAL ENCOUNTER (OUTPATIENT)
Dept: RADIOLOGY | Facility: HOSPITAL | Age: 35
Discharge: HOME | End: 2025-01-31
Payer: COMMERCIAL

## 2025-01-31 DIAGNOSIS — D36.10 SCHWANNOMA: ICD-10-CM

## 2025-01-31 PROCEDURE — 70553 MRI BRAIN STEM W/O & W/DYE: CPT

## 2025-01-31 PROCEDURE — A9575 INJ GADOTERATE MEGLUMI 0.1ML: HCPCS | Performed by: NURSE PRACTITIONER

## 2025-01-31 PROCEDURE — 2550000001 HC RX 255 CONTRASTS: Performed by: NURSE PRACTITIONER

## 2025-01-31 RX ORDER — GADOTERATE MEGLUMINE 376.9 MG/ML
10 INJECTION INTRAVENOUS
Status: COMPLETED | OUTPATIENT
Start: 2025-01-31 | End: 2025-01-31

## 2025-01-31 RX ADMIN — GADOTERATE MEGLUMINE 10 ML: 376.9 INJECTION INTRAVENOUS at 18:20

## 2025-02-04 NOTE — PROGRESS NOTES
Facial Plastic & Reconstructive Surgery    [facility] Clinic Note    Chief Complaint: Left facial paralysis and upper eyelid lagopthalmos  Referring Provider: Self; Second opinion- previously worked up by Dr. Rutherford    HPI: Denae Nolasco is a 34 y.o. 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  She required a tracheostomy/PEG 8/9/2024 which has since been removed.  Presents for second opinion for facial reanimation and upper eyelid lagopthalmos    Onset: 5 months ago  Side: left  Severity: 5/6  Cause of paralysis: s/p vestibular schwannoma resection  Primary concerns: paralysis of the face, difficulty with speech, eyelid closure    Dry eye history: endorses dry eye, has been using tears and ointment  Previous interventions: none     A 14 organ review of systems was reviewed with the patient. Pertinent items are noted in HPI. The patient denies otalgia, odynophagia, dysphagia, dysarthria, voice changes, hemoptysis, or weight loss.    Past, family, and social history obtained but not pertinent to current problem.    Past Medical History  She has a past medical history of Depression, Dizziness, Fatigue, Headache, HL (hearing loss), and Visual impairment.    Surgical History  She has a past surgical history that includes Brain surgery (08/05/2024); Tracheostomy tube placement (08/09/2024); Gastrostomy tube placement (08/09/2024); and Tracheostomy closure (11/2024).     Social History  She reports that she has never smoked. She has never used smokeless tobacco. She reports that she does not currently use alcohol. She reports that she does not currently use drugs after having used the following drugs: Marijuana.    Family History  Family History   Problem Relation Name Age of Onset    Blood clot  Mother Delmis Mantilla-  of clot 2016     Thyroid disease Mother Delmis Mantilla-  of clot 2016      labor Sister Megan         Allergies  Patient has no known allergies.    Physical Exam    General: Well-developed and well-nourished in appearance.  Skin: No rashes or concerning lesions on the visible portions of the skin.  Eyes: Extraocular movements intact. Visual fields grossly normal.  Ears: Pinna are normal in shape and position. External canals are patent.  Nose: Dorsum is midline. Septum is midline and turbinates are normal on anterior  rhinoscopy.  Oral Cavity/Oropharynx: Dentition is intact. Mucous membranes moist.   Respiratory: No respiratory distress. Quiet breathing without stertor or stridor.  Cardiovascular: Regular rate and rhythm. Warm extremities with equal pulses.  Psych: Normal mood and affect. Judgement and insight appropriate.  Neuro: Alert and oriented. CN II-XII grossly intact. No focal deficits.  Musculoskeletal: Gait intact. Moves all extremities well without apparent deformities.    A comprehensive facial examination was performed with the following highlights:  HB: 5/6  Appearance at rest: paralysis on the left with asymmetry    Upper third exam:   Brow: mild ptosis   Upper eyelid: lagophthalmos   Lower eyelid: ectropion present   Corneal exam: healthy   [place] phenomenon: present    Middle third exam:   NL fold: blunted on the left   Oral commissure: level bilaterally, no excursion. Some tone with tensioning   Lower lip: hypotrophic    Lower third exam: platysma movement absent    Assessment: This patient has left near complete facial paralysis. There is a profound physical deformity, with asymmetry of the face. Function is compromised with oral incompetence, air escape, difficulty communicating with others, eye irritation, and visual field obstruction.    Plan: We discussed options for treatment, including both injectable treatments, facial retraining, and surgical  interventions. Each of these is is medically necessary.    Treatment plan:     Discussed evidence of some recovery in tone, this points to better prognosis. Given timeline, continued clinica observation would be appropriate with close follow up. Consider EMG to assess for evidence of reinnervation. We did discuss role for eyelid weight if bothered by dry eye but willing to wait with continued eye care to see if recovery continues.    We will observe for recovery another 1-2 months and reassess.    Heber Lee MD      , Facial Plastic & Reconstructive Surgery        P: [phone]  F: [phone]

## 2025-02-05 ENCOUNTER — APPOINTMENT (OUTPATIENT)
Dept: OTOLARYNGOLOGY | Facility: HOSPITAL | Age: 35
End: 2025-02-05
Payer: COMMERCIAL

## 2025-02-05 ENCOUNTER — APPOINTMENT (OUTPATIENT)
Dept: OTOLARYNGOLOGY | Facility: CLINIC | Age: 35
End: 2025-02-05
Payer: COMMERCIAL

## 2025-02-05 VITALS — BODY MASS INDEX: 20.89 KG/M2 | HEIGHT: 62 IN | WEIGHT: 113.5 LBS

## 2025-02-05 DIAGNOSIS — G51.9 ABNORMALITY OF FACIAL NERVE: Primary | ICD-10-CM

## 2025-02-05 DIAGNOSIS — R13.12 OROPHARYNGEAL DYSPHAGIA: ICD-10-CM

## 2025-02-05 DIAGNOSIS — H02.536 LID RETRACTION OF LEFT EYE: ICD-10-CM

## 2025-02-05 DIAGNOSIS — G24.4 ORAL DYSKINESIA: ICD-10-CM

## 2025-02-05 DIAGNOSIS — G51.0 FACIAL PARALYSIS: ICD-10-CM

## 2025-02-05 DIAGNOSIS — J34.8210 NASAL ALAR COLLAPSE: ICD-10-CM

## 2025-02-05 DIAGNOSIS — R42 VERTIGO: ICD-10-CM

## 2025-02-05 DIAGNOSIS — R47.1 DYSARTHRIA: ICD-10-CM

## 2025-02-05 DIAGNOSIS — D36.10 SCHWANNOMA: ICD-10-CM

## 2025-02-05 DIAGNOSIS — H02.23B PARALYTIC LAGOPHTHALMOS OF UPPER AND LOWER EYELID OF LEFT EYE: ICD-10-CM

## 2025-02-05 DIAGNOSIS — H91.92 DECREASED HEARING OF LEFT EAR: ICD-10-CM

## 2025-02-05 PROCEDURE — 3008F BODY MASS INDEX DOCD: CPT | Performed by: OTOLARYNGOLOGY

## 2025-02-05 PROCEDURE — 99214 OFFICE O/P EST MOD 30 MIN: CPT | Performed by: OTOLARYNGOLOGY

## 2025-02-10 ENCOUNTER — DOCUMENTATION (OUTPATIENT)
Dept: PHYSICAL THERAPY | Facility: CLINIC | Age: 35
End: 2025-02-10
Payer: COMMERCIAL

## 2025-02-10 NOTE — PROGRESS NOTES
Physical Therapy    Discharge Summary    Name: Denae Nolasco  MRN: [id number]  Date: 2/10/2025    Discharge Information:   Date of discharge 2/10/25  Date of last visit 12/19/24  Date of evaluation 10/23/24  Number of attended visits 15  Referred by Alondra Alegre CNP  Referred for Schwannoma, ataxia, unsteadiness on feet    Therapy Summary: At last session pt and PT discussed holding therapy due to potential need for another surgery, pt demonstrated improved balance and general strength but continued to have deficits of UE.     Discharge Reason(s): Progress plateaued and Other return to MD

## 2025-02-17 ENCOUNTER — APPOINTMENT (OUTPATIENT)
Dept: NEUROLOGY | Facility: CLINIC | Age: 35
End: 2025-02-17
Payer: COMMERCIAL

## 2025-02-19 ENCOUNTER — APPOINTMENT (OUTPATIENT)
Dept: OPHTHALMOLOGY | Facility: CLINIC | Age: 35
End: 2025-02-19
Payer: COMMERCIAL

## 2025-02-19 DIAGNOSIS — H18.9 EXPOSURE KERATOPATHY: ICD-10-CM

## 2025-02-19 DIAGNOSIS — H02.23B PARALYTIC LAGOPHTHALMOS OF UPPER AND LOWER EYELID OF LEFT EYE: Primary | ICD-10-CM

## 2025-02-19 PROCEDURE — 99212 OFFICE O/P EST SF 10 MIN: CPT | Performed by: OPHTHALMOLOGY

## 2025-02-19 ASSESSMENT — REFRACTION_WEARINGRX
OD_AXIS: 092
OS_SPHERE: -0.25
OD_CYLINDER: -2.50
SPECS_TYPE: SVL
OD_SPHERE: -0.75
OS_CYLINDER: -3.00
OS_AXIS: 053

## 2025-02-19 ASSESSMENT — ENCOUNTER SYMPTOMS
GASTROINTESTINAL NEGATIVE: 0
HEMATOLOGIC/LYMPHATIC NEGATIVE: 0
CARDIOVASCULAR NEGATIVE: 0
EYES NEGATIVE: 0
ENDOCRINE NEGATIVE: 0
CONSTITUTIONAL NEGATIVE: 0
MUSCULOSKELETAL NEGATIVE: 0
RESPIRATORY NEGATIVE: 0
ALLERGIC/IMMUNOLOGIC NEGATIVE: 0
NEUROLOGICAL NEGATIVE: 0
PSYCHIATRIC NEGATIVE: 0

## 2025-02-19 ASSESSMENT — EXTERNAL EXAM - LEFT EYE: OS_EXAM: NORMAL

## 2025-02-19 ASSESSMENT — SLIT LAMP EXAM - LIDS: COMMENTS: NORMAL

## 2025-02-19 ASSESSMENT — VISUAL ACUITY
OS_CC: 20/200
OD_CC+: -1
OS_CC+: -1
METHOD: SNELLEN - LINEAR
OD_CC: 20/30
CORRECTION_TYPE: GLASSES

## 2025-02-19 ASSESSMENT — PAIN SCALES - GENERAL: PAINLEVEL_OUTOF10: 0-NO PAIN

## 2025-02-19 ASSESSMENT — EXTERNAL EXAM - RIGHT EYE: OD_EXAM: NORMAL

## 2025-02-19 NOTE — PATIENT INSTRUCTIONS
Thank you so much for choosing me to provide your care today!    If you were dilated your vision may remain blurry   or light sensitive for several hours.    The nature of eye and vision problems can require frequent follow up, please make every effort to adhere to any future appointments.    If you have any issues, questions, or concerns,   please do not hesitate to reach out.    If you receive a survey in regards to your care today, please mention any exceptional care my office staff and/or technicians provided.    You can reach our office at this number:    [phone]    Please consider signing up for and utilizing [employer]!  This is the best way to directly reach me or other  providers

## 2025-02-19 NOTE — ASSESSMENT & PLAN NOTE
Stable and no active keratopathy. Advised could trial lubricants frequently instead of ointment during the day, should continue ointment at bedtime.

## 2025-02-19 NOTE — ASSESSMENT & PLAN NOTE
Stable lagophthalmos and still placing at risk for exposure. Despite good control of symptoms with ointment lubrication, would like to ask for specialist opinion. Will refer to oculoplastics.

## 2025-02-20 ENCOUNTER — HOSPITAL ENCOUNTER (OUTPATIENT)
Dept: NEUROLOGY | Facility: CLINIC | Age: 35
Discharge: HOME | End: 2025-02-20
Payer: COMMERCIAL

## 2025-02-20 ENCOUNTER — APPOINTMENT (OUTPATIENT)
Dept: NEUROLOGY | Facility: CLINIC | Age: 35
End: 2025-02-20
Payer: COMMERCIAL

## 2025-02-20 DIAGNOSIS — D36.10 SCHWANNOMA: ICD-10-CM

## 2025-02-20 DIAGNOSIS — R13.12 OROPHARYNGEAL DYSPHAGIA: ICD-10-CM

## 2025-02-21 ENCOUNTER — TELEPHONE (OUTPATIENT)
Dept: NEUROSURGERY | Facility: HOSPITAL | Age: 35
End: 2025-02-21
Payer: COMMERCIAL

## 2025-02-21 NOTE — TELEPHONE ENCOUNTER
I spoke with patient to let her know we would review her reports early next week and get back to her with an update. Patient in agreement with plan.    Zeenat Sultana R.N.

## 2025-02-25 ENCOUNTER — DOCUMENTATION (OUTPATIENT)
Dept: NEUROSURGERY | Facility: HOSPITAL | Age: 35
End: 2025-02-25
Payer: COMMERCIAL

## 2025-02-25 DIAGNOSIS — D36.10 SCHWANNOMA: Primary | ICD-10-CM

## 2025-02-25 NOTE — PROGRESS NOTES
Was contacted by Dr Lowery that EMG/NCS was nondiagnostic re: etiology of r deltoid weakness, but that the EMG demonstrated myotonia and would warrant consultation with neuromuscular neurology.  Discussed with patient and we will place referral  We will also place referral for ongoing PT for her dletoid

## 2025-03-11 ENCOUNTER — OFFICE VISIT (OUTPATIENT)
Dept: NEUROLOGY | Facility: HOSPITAL | Age: 35
End: 2025-03-11
Payer: COMMERCIAL

## 2025-03-11 VITALS
SYSTOLIC BLOOD PRESSURE: 105 MMHG | BODY MASS INDEX: 20.98 KG/M2 | DIASTOLIC BLOOD PRESSURE: 67 MMHG | WEIGHT: 114 LBS | HEART RATE: 83 BPM | RESPIRATION RATE: 18 BRPM | HEIGHT: 62 IN | TEMPERATURE: 97.3 F

## 2025-03-11 DIAGNOSIS — G71.20: Primary | ICD-10-CM

## 2025-03-11 DIAGNOSIS — G50.9 TRIGEMINAL NEUROPATHY: ICD-10-CM

## 2025-03-11 PROCEDURE — 99215 OFFICE O/P EST HI 40 MIN: CPT | Mod: GC | Performed by: PSYCHIATRY & NEUROLOGY

## 2025-03-11 RX ORDER — GABAPENTIN 100 MG/1
100 CAPSULE ORAL 2 TIMES DAILY
Qty: 180 CAPSULE | Refills: 1 | Status: SHIPPED | OUTPATIENT
Start: 2025-03-11 | End: 2025-09-07

## 2025-03-11 ASSESSMENT — PAIN SCALES - GENERAL: PAINLEVEL_OUTOF10: 0-NO PAIN

## 2025-03-11 NOTE — PATIENT INSTRUCTIONS
Thank you for visiting us at the neuromuscular clinic.  We evaluated you for underlying muscle disease that may be genetically transmitted.  We would like you to see our genetic teams for further workup and counseling.  Additionally, given our concern for underlying myotonic dystrophy, we would like you to see a cardiology team for further evaluation of any related cardiac concerns.  We will also place a referral to Pulmonology to assess your breathing and rule out underlying sleep apnea.  Lastly, you have been following with ophthalmology with reassuring examination, but we would like to keep in mind of cataracts evaluation when you see them in the future.    He will plan to follow-up in June 11,2025 for further care.

## 2025-03-11 NOTE — PROGRESS NOTES
Neuromuscular Medicine Consult     Denae Nolasco, MRN: [id number], : 1990  Reason for Referral: No chief complaint on file.     Referring Provider: No ref. provider found  Primary Care Physician: MERRILL Carrera     Impression/Plan:   Ms. Nolasco is a 34 y.o. right handed female with past medical history of ASD s/p closure , Raynaud's, left vestibular schwannoma s/p resection in 2024 who presents to neuromuscular clinic for further evaluation of noted myotonic discharges on her prior EMG studies completed January and 2025.    Patient reports onset of bilateral upper extremity spasms with , and tongue spasm since .  Denies any known family history of underlying myopathy.  Over time, her bilateral hand spasms, and tongue spasms became more frequent and severe prompting further workup.  She also reported progression of vertigo/ataxia, headaches and hearing loss which prompted further neuroimaging, and underwent left retrosigmoid craniotomy for tumor resection 2024 with pathology showing schwannoma.  Patient was evaluated for right shoulder weakness, with EMG  showing myotonia on several tested muscles including right deltoid, biceps, first dorsal interossei, abductor pollicis brevis muscles.  Since then, patient was referred to neuromuscular clinic for further evaluation of underlying myelopathy.    Per our evaluation, she has clinical features that are concerning for an underlying myopathy including percussion myotonia and  myotonia, tongue myotonia, facial features including thin/long face and hollow temples, and high arched palate.  Given her EMG findings, and clinical evaluation, she would benefit from further genetic testing and counseling.  Patient is agreeable to see genetics for further evaluation.  Additionally, she has family history of atrial fibrillation from paternal side, and history of heart complications from paternal grandmother.  Given  concern for underlying myopathy, including myotonic dystrophy, she would benefit from seeing cardiology for further evaluation of possible cardiac involvement.  Additionally, she would benefit from seeing pulmonology to rule out underlying KYLER, and assess pulmonary function.  She has been following with ophthalmology for lagophthalmos and keratopathy, with our additional recommendation to monitor for cataracts.  Lastly, given her burning type pain involving the right side of her face, we will initiate low-dose gabapentin.    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 Will need periodic EKGs, Echocardiogram, Holter/Event monitor   - Pulmonology (to rule out pulmonary involvement)--needs sleep study and PFTs to  be done given association between myotonic dystrophy type 1 and sleep apnea and respiratory failure  - Continue following ophthalmology to assess for cataracts and regular exams  - Start gabapentin 100 mg twice daily  - Follow-up in 6/11/2025 for further care    Karthik Dang MD  PGY-5 Neuromuscular Fellow    History of Present Illness:    Ms. Nolasco is a 34 y.o. right handed female with past medical history of ASD s/p closure 1997, Raynaud's, left vestibular schwannoma s/p resection in 8/5/2024 who presents to neuromuscular clinic for further evaluation of noted myotonic discharges on her prior EMG studies completed January and February 2025. Patient accompanied by her .     Per patient, she denies any past history of weakness.  Denies any issues during childhood and teenage years.  She was able to play basketball and baseball with no  issues.  No noted history of growth delays, developmental delays.  She also reports no issues with the bilateral lower extremities.      Since around 2020, she reported issues with releasing her  while trying to open cans or holding heavy items such as a dryer basket.  Per patient, her hands would lock up and spasm.  Additionally, she reported similar issues with tongue spasms since 2020.  Her tongue spasm is triggered by prolonged talking, or coughing.  Both her hand and tongue issues have been more frequent and severe over the years requiring further workup.  She reports completing MRI of the brain sometime in 2020 through [facility], but unable to obtain imaging or reports through our electronic medical record.  She then seeked further advice after being referred to Dr. Garcia around June 2024.  She reports completing EMG (not seen through epic) with concern for underlying myopathy.  She also completed MRI of the brain for further evaluation of her dizziness, ataxia, vertigo, headaches, and worsening hearing loss.  After completion of the MRI, which showed a cerebellopontine angle mass, she was referred to [facility] for further evaluation on 8/3/2024.  She underwent left retrosigmoid craniotomy for tumor resection 8/5/2024.    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  She underwent tracheostomy/PEG tube placement 8/9/2024.  After discharge, she had her tracheostomy removed around September 2024, and PEG tube removed around December 2024.    She is currently followed by neurosurgery for surveillance given history of schwannoma, ophthalmology for lagophthalmos and keratopathy, plastic surgery for discussions involving possible facial reanimation options.  She was referred for EMG in January 2025 given right shoulder  "weakness.  EMG showed myotonia in several tested muscles including right deltoid, biceps, FDI, and APB muscles.  The incidental finding of myotonic discharges on EMG prompted further neuromuscular evaluation to assess for an underlying myopathy.    Per patient, reports right shoulder weakness, with some improvement since working with physical therapy.  Remains to have dizziness and balance issues, but improving overall.  Did not report significant improvement with hearing from the left side, and left facial strength thus far.  She also reports burning type pain involving the right side of her face especially when showering.  Denies any ongoing overt signs of dysphagia. Reports improvement in her vocal cord movement, and currently following with ENT.    Family history:  -Older sister with no reported issues.  -Mother passed in 2016 from a \" blood clot\", with history of MS and thyroid disease  -Father had heart complications after COVID infection, and currently on anticoagulation for atrial fibrillation.  -Paternal grandmother known to have underlying heart disease    Relevant past medical, surgical, family, and social histories, along with ROS was reviewed and pertinent details noted above.     Social history:   Works as [employer]   Denies alcohol use, or tobacco use    No known drug allergies    Past Medical History:   Diagnosis Date    Depression     Dizziness     Fatigue     Headache     HL (hearing loss)     Visual impairment      Past Surgical History:   Procedure Laterality Date    BRAIN SURGERY  2024    GASTROSTOMY TUBE PLACEMENT  2024    TRACHEOSTOMY CLOSURE  2024    TRACHEOSTOMY TUBE PLACEMENT  2024     Family History   Problem Relation Name Age of Onset    Blood clot Mother Delmis Mantilla-  of clot 2016     Thyroid disease Mother Delmis Mantilla-  of clot 2016      labor Sister Megan      Social History     Tobacco Use    Smoking status: Never     Passive exposure: " Never    Smokeless tobacco: Never   Substance Use Topics    Alcohol use: Not Currently      No Known Allergies     Medications:    Current Outpatient Medications:     acetaminophen (Tylenol) 325 mg tablet, 1 tablet (325 mg) by g-tube route every 4 hours if needed for mild pain (1 - 3) (crush pill)., Disp: , Rfl:     dextran 70-hypromellose, PF, (Bion Tears) 0.1-0.3 % ophthalmic solution, 1 drop 3 times a day as needed for dry eyes., Disp: , Rfl:     erythromycin (Romycin) 5 mg/gram (0.5 %) ophthalmic ointment, Apply to left eye 4 times a day., Disp: 3.5 g, Rfl: 3    escitalopram (Lexapro) 5 mg tablet, 1 tablet (5 mg) by g-tube route once daily., Disp: , Rfl:     lubricating eye drops ophthalmic solution, Administer 1 drop into the left eye every 1 hour if needed for dry eyes., Disp: , Rfl:     ondansetron ODT (Zofran-ODT) 4 mg disintegrating tablet, 1 tablet (4 mg) by g-tube route every 6 hours if needed for nausea or vomiting., Disp: 9 tablet, Rfl: 2    triamcinolone (Kenalog) 0.1 % cream, Apply topically 2 times a day. Apply to affected area 1-2 times daily as needed., Disp: 15 g, Rfl: 0       Physical Exam:     Vitals:    03/11/25 0905   BP: 105/67   Pulse: 83   Resp: 18   Temp: 36.3 °C (97.3 °F)     General Appearance:  No distress, alert, interactive and cooperative.   Thin and long face with hollow temples, high arched palate, and left LMN facial weakness    Neurological:  Mental status: the patient provided an accurate history, language was normal.  Able to follow two-step complex commands.  Cranial Nerves:  CN 2   Visual fields full to confrontation.   CN 3, 4, 6   EOMs full range  CN 5   Facial sensation intact bilaterally, except right V3 distribution with decreased sensation to pinprick and light touch   Jaw opening 5/5   CN 7   Asymmetrical facial strength with weakness on the left side (LMN)  Nasolabial folds asymmetric.   Unable to lift left eyebrow and close eye lid fully.  CN 8   Significantly  reduced hearing on left side.     CN 9, 10   Palate elevates symmetrically.  High arched palate.  Phonation within normal limits, no dysarthria.   CN 11   Normal strength of shoulder shrug and neck turning.   CN 12   Tongue midline, with normal bulk, but reduced strength with resistance.  Tongue myotonia present with percussion    Motor:   Muscle bulk: Normal throughout.  Muscle tone: Normal in both upper and lower extremities.  Movements: No fasciculations, tremors, or other abnormal movement.   There is no scapular winging.    There is percussion myotonia and  myotonia.     Manual Muscle Testing (MMT) reveals the following MRC grades:  Neck Flexion 5  Neck Extension 5  R L   Shoulder abduction  5- 5  Shoulder adduction                5          5  Elbow flexion   5- 5  Elbow extension  5- 5  Wrist extension                       5          5  Wrist flexion                            5         5  Finger flexion   5 5  Finger extension  5 5  Finger abduction  5 5  Thumb abduction   5 5    Hip flexion   5 5  Hip extension   5 5  Hip abduction    5 5  Hip adduction    5 5  Knee flexion   5 5  Knee extension  5 5  Ankle dorsiflexion  5 5  Ankle plantarflexion  5 5  Ankle Inversion   5 5  Ankle Eversion   5 5  Big toe extension  5 5  Toe flexion   5 5    Reflexes:     R          L  BR:               1          2  Biceps:         1          2  Triceps:        1          2  Knee:           3          3  Ankle:          2          2    Babinski: Toes are down going  No clonus or other pathological reflexes  No jaw jerk reflex    Sensory:   In both upper and lower extremities, sensation was intact to light touch, temperature, pinprick, and vibration.     Coordination:    In left upper extremities, finger-nose-finger with mild dysmetria   In the right upper extremity finger-nose-finger intact with no dysmetria or overshoot.     Gait:   Station was stable with a normal base. Gait was stable with a normal arm swing and  "speed.  Unable to complete heel walk and tandem gait due to balance issues.  Able to toe walk.    No Romberg sign was present.    Results:   The following labs, imaging, and/or data were personally reviewed and demonstrated:    She underwent 2 EMG studies.  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 EMG showed myotonia several tested muscles including right deltoid, biceps, FDI, and APB muscles.  Second EMG completed 2/20/2025 for evaluation of left facial palsy.  This study showed electrophysiologic evidence of facial nerve continuity left mentalis and left nasalis muscles.  There was limited EMG examination given reduced muscle bulk on the left side.  There was also evidence of myotonia in the left mentalis muscles.    FINAL DIAGNOSIS   A, B.  LEFT CEREBELLOPONTINE ANGLE TUMOR, BIOPSY AND REMOVAL:  - SCHWANNOMA   Electronically signed by Russell Nunez MD on 8/9/2024 at 1128             Lab Results   Component Value Date    TSH 0.85 06/06/2024     No results found for: \"HGBA1C\"        Component  Ref Range & Units 7 mo ago   Cholesterol  0 - 199 mg/dL 179   Comment:      Age      Desirable   Borderline High   High     0-19 Y     0 - 169       170 - 199     >/= 200    20-24 Y     0 - 189       190 - 224     >/= 225        >24 Y     0 - 199       200 - 239     >/= 240   **All ranges are based on fasting samples. Specific   therapeutic targets will vary based on patient-specific   cardiac risk.    Pediatric guidelines reference:Pediatrics 2011, 128(S5).Adult guidelines reference: NCEP ATPIII Guidelines,FINN 2001, 258:2486-97    Venipuncture immediately after or during the administration of Metamizole may lead to falsely low results. Testing should be performed immediately prior to Metamizole dosing.   HDL-Cholesterol  mg/dL 48.1   Comment:  Age       Very Low   Low     Normal    High    0-19 Y   "  < 35      < 40     40-45     ----  20-24 Y    ----     < 40      >45      ----        >24 Y      ----     < 40     40-60      >60   Cholesterol/HDL Ratio 3.7   Comment:  Ref Values  Desirable  < 3.4  High Risk  > 5.0   LDL Calculated  mg/dL 83   Comment:                            Near   Borderline      AGE      Desirable  Optimal    High     High     Very High     0-19 Y     0 - 109     ---    110-129   >/= 130     ----    20-24 Y     0 - 119     ---    120-159   >/= 160     ----      >24 Y     0 -  99   100-129  130-159   160-189     >/=190   VLDL  0 - 40 mg/dL 48 High    Triglycerides  0 - 149 mg/dL 242 High    Non HDL Cholesterol  0 - 149 mg/dL 131   Comment:      Age       Desirable   Borderline High   High     Very High     0-19 Y     0 - 119       120 - 144     >/= 145    >/= 160    20-24 Y     0 - 149       150 - 189     >/= 190      ----        >24 Y    30 mg/dL above LDL Cholesterol goal          Lab Results   Component Value Date    ALT 18 06/06/2024    AST 21 06/06/2024    ALKPHOS 66 06/06/2024    BILITOT 0.5 06/06/2024       -----------------------------------------------------------------------------------------------------------------------------  ATTENDING ATTESTATION    I saw patient with trainee and agree with the edits, history and exam that I helped formulate per above.    I personally spent 65 minutes on the day of the visit completing the review of the medical record and outside records, obtaining history and performing an appropriate physical exam, patient care, counseling and education, placing orders, independently reviewing results, communicating with the patient/family and other providers, coordinating care.    Chelsea Wesley MD  Neuromuscular Neurology  [facility]  Office Phone Number: [phone]

## 2025-03-12 ENCOUNTER — OFFICE VISIT (OUTPATIENT)
Dept: OTOLARYNGOLOGY | Facility: HOSPITAL | Age: 35
End: 2025-03-12
Payer: COMMERCIAL

## 2025-03-12 VITALS — BODY MASS INDEX: 21.33 KG/M2 | WEIGHT: 115.9 LBS | HEIGHT: 62 IN | TEMPERATURE: 97.5 F

## 2025-03-12 DIAGNOSIS — R49.0 DYSPHONIA: Primary | ICD-10-CM

## 2025-03-12 DIAGNOSIS — G51.9 ABNORMALITY OF FACIAL NERVE: ICD-10-CM

## 2025-03-12 DIAGNOSIS — D36.10 SCHWANNOMA: ICD-10-CM

## 2025-03-12 DIAGNOSIS — J38.01 VOCAL FOLD PARALYSIS, RIGHT: ICD-10-CM

## 2025-03-12 PROCEDURE — 99213 OFFICE O/P EST LOW 20 MIN: CPT | Performed by: OTOLARYNGOLOGY

## 2025-03-12 PROCEDURE — 1036F TOBACCO NON-USER: CPT | Performed by: OTOLARYNGOLOGY

## 2025-03-12 PROCEDURE — 31579 LARYNGOSCOPY TELESCOPIC: CPT | Performed by: OTOLARYNGOLOGY

## 2025-03-12 PROCEDURE — 99213 OFFICE O/P EST LOW 20 MIN: CPT | Mod: 25 | Performed by: OTOLARYNGOLOGY

## 2025-03-12 PROCEDURE — 3008F BODY MASS INDEX DOCD: CPT | Performed by: OTOLARYNGOLOGY

## 2025-03-12 ASSESSMENT — PAIN SCALES - GENERAL: PAINLEVEL_OUTOF10: 0-NO PAIN

## 2025-03-12 NOTE — PROGRESS NOTES
Patient: Denae Nolasco   MRN: [id number] YOB: 1990   Sex: female Age: 34 y.o.  Date of Service: 3/12/2025       ASSESSMENT AND PLAN  I discussed the findings with Denae Nolasco and have recommended the followin. Aphonia, bilateral vocal fold paralysis with tracheotomy in place in setting of complicated vestibular schwannoma resection 2024. Voice is improving. Laryngoscopy today with bilateral vocal folds with severe atrophy and there is now more movement left > right. She is getting touch hourglass closure. Decannulated 10/23/24. Stroboscopy stable today.  - Follow-up in 6 months    2. Dysphagia, suspect high vagal injury with multilevel swallowing deficits. Improving. Now on total oral diet.  - PEG removed 24  - Meticulous oral care TID  - Nutrition as needed      CHIEF COMPLAINT  Chief Complaint   Patient presents with    Follow-up       HISTORY OF PRESENT ILLNESS  Denae Nolasco is a 34 y.o. female referred for evaluation of bilateral vocal fold paralysis and tracheotomy care.  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 Previously admitted to [facility] on 2024 due to ongoing functional deficits, now home since 24.    3/12/25  No issues since last visit.     24  Doing well with total PO feeds, supplementing with ~1 ensure per day. No weight loss.     24  No issues since decannulation. She is doing more by mouth, drinking boost    10/23/24  She has tolerated capping. No respiratory issues. Discharged from home therapy, seeing Marla SLP next week    10/9/24  She reports her voice is improving. She is wearing her PMV about 7-8 hours throughout the day, then she will get into a coughing fit and takes the valve off.     24  They are working with a PMV valve and feel like her voice is a little stronger. She  is home since last Friday. They are working on getting home SLP therapy    24  She has a #6 cuffless Shiley in place. She denies difficulty breathing. She has not trialed a speaking valve or capping. She is getting all her nutrition through PEG. Her voice is still very weak.      ADDITIONAL HISTORY  Past Medical History  She has a past medical history of Depression, Dizziness, Fatigue, Headache, HL (hearing loss), and Visual impairment. Surgical History  She has a past surgical history that includes Brain surgery (2024); Tracheostomy tube placement (2024); Gastrostomy tube placement (2024); and Tracheostomy closure (2024).   Social History  She reports that she has never smoked. She has never been exposed to tobacco smoke. She has never used smokeless tobacco. She reports that she does not currently use alcohol. She reports that she does not currently use drugs after having used the following drugs: Marijuana. Allergies  Patient has no known allergies.     Family History  Family History   Problem Relation Name Age of Onset    Blood clot Mother Delmis Mantilla-  of clot 2016     Thyroid disease Mother Delmis Mantilla-  of clot 2016      labor Sister Megan         REVIEW OF SYSTEMS  All 10 systems were reviewed and negative except for above.      PHYSICAL EXAM  ENT Physical Exam   GENERAL: Thin young woman, aphonic, response to commands  RESPIRATORY: Breathing quietly, no stridor or difficulty breathing with finger occlusion  HEAD: Normocephalic atraumatic  FACE: Asymmetric left HB 5/6 no masses or lesions  EYES:  Left eye incomplete closure  EARS:  Pinnae normal.   NOSE:  No anterior lesions, masses or polyps.  ORAL CAVITY/OROPHARYNX:  Buccal mucosa is dry with, crusting along palate, tongue deviated and palate elevates asymmetrically.  NECK:  Soft, previous trach site well healed  ABDOMEN: PEG in place, removed without issue, covered with gauze and tape     Last Recorded  "Vitals  Temperature 36.4 °C (97.5 °F), height 1.575 m (5' 2\"), weight 52.6 kg (115 lb 14.4 oz).    RESULTS    Patient Reported Outcome Measures  N/A    Laboratory, Radiology, and Pathology  I personally reviewed the following results, with the following interpretation:   MBS 12/23/24  Much improved swallowing function, still requiring thins to wash down residue.    MBS 10/2/24  Impaired epiglottic motion, aspiration during the swallow    PROCEDURES  Flexible Fiberoptic Laryngoscopy    Patient failed a mirror exam due to limitations of equipment and the need for stroboscopy to assess glottic vibration and closure.     PREOPERATIVE DIAGNOSIS: Dysphonia    POSTOPERATIVE DIAGNOSIS: Same    PROCEDURE:  Strobovideolaryngoscopy    ANESTHESIA:  Topical    COMPLICATIONS:  None    SPECIMENS:  None    PROCEDURE IN DETAIL: The patient was seated in an upright position.  The nasal cavity was topically decongested and anesthetized.  The stroboscopic microphone was held to the neck at the level of the larynx.  The distal chip video laryngoscope was passed through the nasal cavity.  The nasal cavity and nasopharynx were within normal limits except noted below.  The following findings on stroboscopy were noted:      Tongue Base: no masses or lesions   Vocal Fold Mobility               Right VF: immobile               Left VF: mobile   TVF Appearance               Edema/Erythema: none               Lesions/vibratory margin irregularities: atrophy   Muscle Tension Patterns:  mild AP   Other Findings: pooled secretions throughout, improved from prior    The patient tolerated the procedure well.     ----------------------------------------------------------------------  Smiley Craft MD, MAEd    [facility]  Department of Otolaryngology - Head and Neck Surgery  [facility]      "

## 2025-03-14 NOTE — PROGRESS NOTES
Primary Care Physician: Alondra Alegre, BLU-CNP   Date of Visit: 03/21/2025  3:30 PM EDT  Type of Visit: New      Chief Complaint:  Chief Complaint   Patient presents with    Establish Care     Referred by dr Sebastián Nolasco 34 y.o. female with a PMH of ASD s/p closure in 1997, raynaud's disease. She was admitted to [facility] ED 8/2024 with worsening vertigo and ataxia. 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 TTE 8/2024 showed LVEF 57% and no residual interatrial shunt detected by bubble study. EKG in the past has shown ST. 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    Family history of Dad - atrial fibrillation.     She denies cardiac symptoms - CP, dyspnea, palpitations, orthopnea, edema, syncope. She does have occasional vertigo.     Review of Systems   Review of Systems   Neurological:  Positive for dizziness.   All other systems reviewed and are negative.     12 points review of systems are negative expect for the above    Social History:  Social History     Socioeconomic History    Marital status:      Spouse name: Not on file    Number of children: Not on file    Years of education: Not on file    Highest education level: Not on file   Occupational History    Not on file   Tobacco Use    Smoking status: Never     Passive exposure: Never    Smokeless tobacco: Never   Vaping Use    Vaping status: Never Used   Substance and Sexual Activity    Alcohol use: Not Currently    Drug use: Not Currently     Types: Marijuana     Comment: tried it once or twice    Sexual activity: Yes     Partners: Male     Birth control/protection: Male Sterilization   Other Topics Concern    Not on file   Social History Narrative    Not on file     Social Drivers of Health     Financial Resource Strain: Low Risk  (8/3/2024)     Overall Financial Resource Strain (CARDIA)     Difficulty of Paying Living Expenses: Not very hard   Food Insecurity: No Food Insecurity (2024)    Hunger Vital Sign     Worried About Running Out of Food in the Last Year: Never true     Ran Out of Food in the Last Year: Never true   Transportation Needs: No Transportation Needs (10/21/2024)    OASIS : Transportation     Lack of Transportation (Medical): No     Lack of Transportation (Non-Medical): No     Patient Unable or Declines to Respond: No   Physical Activity: Not on file   Stress: Not on file   Social Connections: Feeling Socially Integrated (10/21/2024)    OASIS : Social Isolation     Frequency of experiencing loneliness or isolation: Never   Intimate Partner Violence: Not At Risk (8/15/2024)    Received from [employer]    Humiliation, Afraid, Rape, and Kick questionnaire     Fear of Current or Ex-Partner: No     Emotionally Abused: No     Physically Abused: No     Sexually Abused: No   Housing Stability: Low Risk  (8/15/2024)    Received from [employer]    Housing Stability Vital Sign     Unable to Pay for Housing in the Last Year: No     Number of Times Moved in the Last Year: 0     Homeless in the Last Year: No        Past Medical History:  Past Medical History:   Diagnosis Date    Depression     Dizziness     Fatigue     Headache     HL (hearing loss)     Visual impairment        Past Surgical History:  Past Surgical History:   Procedure Laterality Date    BRAIN SURGERY  2024    GASTROSTOMY TUBE PLACEMENT  2024    TRACHEOSTOMY CLOSURE  2024    TRACHEOSTOMY TUBE PLACEMENT  2024       Family History:  Family History   Problem Relation Name Age of Onset    Blood clot Mother Delmis Mantilla-  of clot 2016     Thyroid disease Mother Delmis Mantilla-  of clot 2016      labor Sister Megan         Objective:   Physical Exam  Vitals reviewed.   Constitutional:       Appearance: Normal appearance. She is normal  "weight.   HENT:      Head: Normocephalic and atraumatic.   Neck:      Vascular: No carotid bruit or JVD.   Cardiovascular:      Rate and Rhythm: Normal rate and regular rhythm.      Pulses: Normal pulses.      Heart sounds: Normal heart sounds.   Pulmonary:      Effort: Pulmonary effort is normal.      Breath sounds: Normal breath sounds.   Chest:      Chest wall: No tenderness.   Abdominal:      General: Abdomen is flat. Bowel sounds are normal.      Palpations: Abdomen is soft.   Skin:     General: Skin is warm and dry.   Neurological:      General: No focal deficit present.      Mental Status: She is alert and oriented to person, place, and time. Mental status is at baseline.   Psychiatric:         Mood and Affect: Mood normal.         Behavior: Behavior normal.             12/30/2024    12:48 PM 1/6/2025     3:01 PM 1/31/2025     5:45 PM 2/5/2025     3:12 PM 3/11/2025     9:05 AM 3/12/2025     3:30 PM 3/21/2025     3:06 PM   Vitals   Systolic  101   105  100   Diastolic  64   67  67   BP Location       Left arm   Heart Rate  101   83  77   Temp     36.3 °C (97.3 °F) 36.4 °C (97.5 °F)    Resp     18     Height 1.575 m (5' 2\") 1.575 m (5' 2\") 1.575 m (5' 2\") 1.575 m (5' 2\") 1.575 m (5' 2\") 1.575 m (5' 2\") 1.575 m (5' 2\")   Weight (lb) 117 117 115 113.5 114 115.9 115   BMI 21.4 kg/m2 21.4 kg/m2 21.03 kg/m2 20.76 kg/m2 20.85 kg/m2 21.2 kg/m2 21.03 kg/m2   BSA (m2) 1.52 m2 1.52 m2 1.51 m2 1.5 m2 1.5 m2 1.52 m2 1.51 m2   Visit Report Report Report  Report Report Report Report        Allergies:  No Known Allergies    Medications:  Current Outpatient Medications   Medication Instructions    acetaminophen (TYLENOL) 325 mg, Every 4 hours PRN    dextran 70-hypromellose, PF, (Bion Tears) 0.1-0.3 % ophthalmic solution 1 drop, 3 times daily PRN    erythromycin (Romycin) 5 mg/gram (0.5 %) ophthalmic ointment Left Eye, 4 times daily    escitalopram (LEXAPRO) 5 mg, g-tube, Daily    gabapentin (NEURONTIN) 100 mg, oral, 2 times " daily    lubricating eye drops ophthalmic solution 1 drop, Left Eye, Every 1 hour PRN    ondansetron ODT (ZOFRAN-ODT) 4 mg, g-tube, Every 6 hours PRN    triamcinolone (Kenalog) 0.1 % cream Topical, 2 times daily, Apply to affected area 1-2 times daily as needed.        Labs and Imaging:     Lab Results   Component Value Date    WBC 8.2 08/13/2024    HGB 11.8 (L) 08/13/2024    HCT 35.2 (L) 08/13/2024     08/13/2024    CHOL 179 08/06/2024    TRIG 242 (H) 08/06/2024    HDL 48.1 08/06/2024    ALT 18 06/06/2024    AST 21 06/06/2024     08/13/2024    K 3.7 08/13/2024     08/13/2024    CREATININE 0.41 (L) 01/29/2025    BUN 13 08/13/2024    CO2 30 08/13/2024    TSH 0.85 06/06/2024    INR 1.1 08/04/2024         Echocardiogram  8/3/2024  CONCLUSIONS:   1. The left ventricular systolic function is normal, with a Carrillo's biplane calculated ejection fraction of 57%.   2. There is normal right ventricular global systolic function.   3. The patient is reportedly s/p ASD closure in ~1997. No residual interatrial shunt detected by bubble study.   4. Normal aortic root.    Stress Testing: No results found for this or any previous visit from the past 1825 days.    Cardiac Catheterization: No results found for this or any previous visit from the past 1825 days.    Cardiac Scoring: No results found for this or any previous visit from the past 1825 days.    AAA : No results found for this or any previous visit from the past 1825 days.    OTHER: No results found for this or any previous visit from the past 1825 days.      The ASCVD Risk score (Shana HOFFMANN, et al., 2019) failed to calculate for the following reasons:    The 2019 ASCVD risk score is only valid for ages 40 to 79       Assessment:   34 y.o. female with a PMH of ASD s/p closure in 1997, raynaud's disease, L vestibular schwannoma s/p craniotomy for tumor resection c/b resection bed hemorrhage. EKG in the past has shown ST. She was referred per neurology to  rule out cardiac manifestations of myotonic dystrophy. She denies cardiac symptoms - CP, dyspnea, palpitations, orthopnea, edema, syncope. She does have occasional vertigo.     1. Dystrophy, myotonica (Multi)  Holter Or Event Cardiac Monitor    MR cardiac morphology and function w and wo IV contrast      2. Myopathy, congenital (Multi)  Referral to Cardiology         Plan:  -Cardiac MRI  -Ambulatory ECG monitor  -Will notify with results  -Follow up prn  ____________________________________________________________  Farhad Issa, CNP  Cardiovascular Medicine   [facility] [place]  [facility]    Lab review: I have personally reviewed the laboratory result(s) CBC, BMP  Diagnostic review: I have personally reviewed the result(s) of the EKG and Echocardiogram

## 2025-03-18 ENCOUNTER — APPOINTMENT (OUTPATIENT)
Dept: NEUROLOGY | Facility: HOSPITAL | Age: 35
End: 2025-03-18
Payer: COMMERCIAL

## 2025-03-21 ENCOUNTER — TELEPHONE (OUTPATIENT)
Dept: OTOLARYNGOLOGY | Facility: CLINIC | Age: 35
End: 2025-03-21
Payer: COMMERCIAL

## 2025-03-21 ENCOUNTER — OFFICE VISIT (OUTPATIENT)
Dept: CARDIOLOGY | Facility: CLINIC | Age: 35
End: 2025-03-21
Payer: COMMERCIAL

## 2025-03-21 VITALS
BODY MASS INDEX: 21.16 KG/M2 | WEIGHT: 115 LBS | SYSTOLIC BLOOD PRESSURE: 100 MMHG | HEART RATE: 77 BPM | DIASTOLIC BLOOD PRESSURE: 67 MMHG | HEIGHT: 62 IN | OXYGEN SATURATION: 100 %

## 2025-03-21 DIAGNOSIS — G71.20: ICD-10-CM

## 2025-03-21 DIAGNOSIS — G71.11: Primary | ICD-10-CM

## 2025-03-21 PROCEDURE — 99214 OFFICE O/P EST MOD 30 MIN: CPT | Performed by: NURSE PRACTITIONER

## 2025-03-21 PROCEDURE — 1036F TOBACCO NON-USER: CPT | Performed by: NURSE PRACTITIONER

## 2025-03-21 PROCEDURE — 99204 OFFICE O/P NEW MOD 45 MIN: CPT | Performed by: NURSE PRACTITIONER

## 2025-03-21 PROCEDURE — 3008F BODY MASS INDEX DOCD: CPT | Performed by: NURSE PRACTITIONER

## 2025-03-21 ASSESSMENT — ENCOUNTER SYMPTOMS: DIZZINESS: 1

## 2025-03-21 NOTE — TELEPHONE ENCOUNTER
Recevived fax on behalf of [employer] requesting medical records for pt, sent fax to O on 3/21 (f:[phone])

## 2025-03-31 ENCOUNTER — HOSPITAL ENCOUNTER (OUTPATIENT)
Dept: CARDIOLOGY | Facility: CLINIC | Age: 35
Discharge: HOME | End: 2025-03-31
Payer: COMMERCIAL

## 2025-03-31 DIAGNOSIS — G71.11: ICD-10-CM

## 2025-03-31 PROCEDURE — 93246 EXT ECG>7D<15D RECORDING: CPT

## 2025-04-02 ENCOUNTER — OFFICE VISIT (OUTPATIENT)
Dept: OPHTHALMOLOGY | Facility: CLINIC | Age: 35
End: 2025-04-02
Payer: COMMERCIAL

## 2025-04-02 ENCOUNTER — HOSPITAL ENCOUNTER (OUTPATIENT)
Facility: CLINIC | Age: 35
Setting detail: OUTPATIENT SURGERY
End: 2025-04-02
Payer: COMMERCIAL

## 2025-04-02 ENCOUNTER — APPOINTMENT (OUTPATIENT)
Dept: OPHTHALMOLOGY | Facility: CLINIC | Age: 35
End: 2025-04-02
Payer: COMMERCIAL

## 2025-04-02 ENCOUNTER — TELEPHONE (OUTPATIENT)
Dept: OPHTHALMOLOGY | Facility: CLINIC | Age: 35
End: 2025-04-02

## 2025-04-02 DIAGNOSIS — H57.02 ANISOCORIA: ICD-10-CM

## 2025-04-02 DIAGNOSIS — H18.9 EXPOSURE KERATOPATHY: ICD-10-CM

## 2025-04-02 DIAGNOSIS — H02.23B PARALYTIC LAGOPHTHALMOS OF UPPER AND LOWER EYELID OF LEFT EYE: Primary | ICD-10-CM

## 2025-04-02 DIAGNOSIS — H02.155 PARALYTIC ECTROPION OF LEFT LOWER EYELID: ICD-10-CM

## 2025-04-02 PROCEDURE — 99213 OFFICE O/P EST LOW 20 MIN: CPT

## 2025-04-02 ASSESSMENT — VISUAL ACUITY
CORRECTION_TYPE: GLASSES
METHOD: SNELLEN - LINEAR
OS_CC: 20/200
OD_CC: 20/40
OD_PH_CC: 20/30

## 2025-04-02 ASSESSMENT — TONOMETRY
OS_IOP_MMHG: 13
OD_IOP_MMHG: 7
IOP_METHOD: TONOPEN

## 2025-04-02 NOTE — PROGRESS NOTES
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  She required a tracheostomy/PEG 8/9/2024 which has since been removed.    Seen by Dr. Lee 2/5/25 for second opinion for facial reanimation and upper eyelid lagopthalmos   Assessment:   This patient has left near complete facial paralysis. There is a profound physical deformity, with asymmetry of the face. Function is compromised with oral incompetence, air escape, difficulty communicating with others, eye irritation, and visual field obstruction.  Plan:   We discussed options for treatment, including both injectable treatments, facial retraining, and surgical interventions. Each of these is is medically necessary.  Treatment plan:   Discussed evidence of some recovery in tone, this points to better prognosis. Given timeline, continued clinical observation would be appropriate with close follow up. Consider EMG to assess for evidence of reinnervation. We did discuss role for eyelid weight if bothered by dry eye but willing to wait with continued eye care to see if recovery continues.  We will observe for recovery another 1-2 months and reassess.    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 There is 5mm lagophthalmos OS on lid closure, but good Bell's reflex and minimal exposure keratopathy of the left eye. While CN7 function is reportedly improving slowly in the left lower face, the upper face has not regained any function and pt is interested in surgical repair.    If desired, can proceed with placement of gold/platinum weight in LEFT upper eyelid along with tightening and improving closure of lower  eyelid and lateral canthus with lateral tarsal strip. No guarantee of result. Risks of weight extrusion, persistent lagophthalmos, lid malposition, bleeding, infection, asymmetry, persistent keratopathy, and need for additional surgery explained in detail. Pt understands and wishes to proceed.     Of note, pt has already seen Dr. Lee who has discussed monitoring for continued improvement of CN7 function and combination reanimation surgery if not fully improved. Encouraged pt to consider this option as she has recovered some function in the lower face and also because multiple reanimation procedures can then be performed concurrently, but pt is concerned about appearance, discomfort, and exposure of left eye and would like to proceed with eyelid surgery first. Pt understands that additional surgery may still be indicated in the future. Will discuss case with Dr. Lee before proceeding, and pt understands that my surgical recommendations may change pending this discussion.         Primary Gaze      Closure of both eyelids; lagophthalmos OS      Upper Face CN7 Function      Lower Face CN7 Function

## 2025-04-03 ASSESSMENT — PUNCTA - ASSESSMENT
OD_PUNCTA: NORMAL
OS_PUNCTA: NORMAL

## 2025-04-03 ASSESSMENT — CONF VISUAL FIELD
OS_SUPERIOR_NASAL_RESTRICTION: 0
OS_INFERIOR_NASAL_RESTRICTION: 0
OD_SUPERIOR_NASAL_RESTRICTION: 0
OD_INFERIOR_NASAL_RESTRICTION: 0
OS_INFERIOR_TEMPORAL_RESTRICTION: 0
OS_SUPERIOR_TEMPORAL_RESTRICTION: 0
OD_INFERIOR_TEMPORAL_RESTRICTION: 0
OD_SUPERIOR_TEMPORAL_RESTRICTION: 0

## 2025-04-03 ASSESSMENT — EXTERNAL EXAM - RIGHT EYE: OD_EXAM: NORMAL

## 2025-04-03 ASSESSMENT — SLIT LAMP EXAM - LIDS: COMMENTS: NORMAL

## 2025-04-04 ENCOUNTER — DOCUMENTATION (OUTPATIENT)
Dept: OPHTHALMOLOGY | Facility: CLINIC | Age: 35
End: 2025-04-04
Payer: COMMERCIAL

## 2025-04-15 ENCOUNTER — PATIENT MESSAGE (OUTPATIENT)
Dept: PRIMARY CARE | Facility: CLINIC | Age: 35
End: 2025-04-15
Payer: COMMERCIAL

## 2025-04-15 DIAGNOSIS — F41.9 ANXIETY: ICD-10-CM

## 2025-04-16 ENCOUNTER — OFFICE VISIT (OUTPATIENT)
Dept: GENETICS | Facility: HOSPITAL | Age: 35
End: 2025-04-16
Payer: COMMERCIAL

## 2025-04-16 VITALS
DIASTOLIC BLOOD PRESSURE: 73 MMHG | WEIGHT: 116.4 LBS | HEIGHT: 63 IN | TEMPERATURE: 98 F | BODY MASS INDEX: 20.62 KG/M2 | SYSTOLIC BLOOD PRESSURE: 112 MMHG | HEART RATE: 75 BPM

## 2025-04-16 DIAGNOSIS — D36.10 SCHWANNOMA: ICD-10-CM

## 2025-04-16 DIAGNOSIS — M62.89 MYOTONIA: Primary | ICD-10-CM

## 2025-04-16 DIAGNOSIS — G71.20: ICD-10-CM

## 2025-04-16 PROCEDURE — 3008F BODY MASS INDEX DOCD: CPT | Performed by: MEDICAL GENETICS

## 2025-04-16 PROCEDURE — 99205 OFFICE O/P NEW HI 60 MIN: CPT | Performed by: MEDICAL GENETICS

## 2025-04-16 PROCEDURE — 99215 OFFICE O/P EST HI 40 MIN: CPT | Performed by: MEDICAL GENETICS

## 2025-04-16 ASSESSMENT — ENCOUNTER SYMPTOMS
OCCASIONAL FEELINGS OF UNSTEADINESS: 0
LOSS OF SENSATION IN FEET: 0
DEPRESSION: 0

## 2025-04-16 NOTE — LETTER
04/19/25    Chelsea Wesley MD  [address]  Department Of Neurology  [place] [id number]      Dear Dr. Chelsea Wesley MD,    Thank you for referring your patient, Denae Nolasco, to receive care in my office. I have enclosed a summary of the care provided to Denae on 04/19/25.    Please contact me with any questions you may have regarding the visit.    Sincerely,         Rosa Isela Aponte MD  [id number]  [address] [address]  [phone]    CC: No Recipients

## 2025-04-16 NOTE — LETTER
04/19/25    MERRILL Carrera  [address]  [facility] [address]  [facility] [address]      Dear MERRILL Tanner,    I am writing to confirm that your patient, Denae Nolasco  received care in my office on 04/19/25. I have enclosed a summary of the care provided to Denae for your reference.    Please contact me with any questions you may have regarding the visit.    Sincerely,         Rosa Isela Aponte MD  [address]  [facility] [address]  [facility] [address]  [phone]    CC: No Recipients

## 2025-04-16 NOTE — PROGRESS NOTES
"Denae Nolasco is a 34 y.o female referred to genetics by Chelsea Wesley MD.  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 She is here for genetics evaluation.  She attended the visit unaccompanied today.    On interview today:  Started to notice hand and tongue spasms in 2020, worsened over time.  After neurosurgery in 2024 to remove the schwannoma, her right arm became paralyzed, she notes.  This later improved but did not resolve, so she was sent for arm EMG in 1/2025, and referred to Dr. Wesley after myotonia noted on EMG.    Regarding her schwannoma, it may have been present before age 30.  She had an MRI of the brain in 2020 at [facility], after symptoms of vertigo, balance issues, and hearing loss, and notes she was told later by the neurosurgeon treating her in 2024 that \"you can can kind of see it\" on that 2020 image.  She is following up with neurosurgery annually now.    Muscle:  Myotonia (spasms, exam findings such as  and percussion myotonia, and EMG findings).  No crispin muscle weakness.    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    Learning:  No problems.    Endo:  No known diabetes or glucose intolerance.  Thyroid function normal when checked in June 2024.    Derm: No hair loss, normal hair pattern on scalp.    Eye:  Last eye exam this March or April 2025.  No cataract on exam, she notes.      PMH: Admitted to [facility] in 8/2024 for ataxia and worsening vertigo, for neurosurgical treatment of her schwannoma.  Per chart, this was complicated by left transverse sigmoid sagittal thrombosis, cranial nerve VII, XII paralysis, and temporary tracheostomy and PEG " "placement.  Discharge summary from that admission also notes: \"CT CAP with R middle lobe calcifications consistent wthi granulomatous disease, dilated CBD, intrahepatic dilatation (rec'd MRCP or ERCP).”     Developmental/occupational history: All developmental milestones achieved on time.  Used to work as a , currently looking for a new job.      Family History: No known family history of myotonic dystrophy or early cataracts.  Father with atrial fibrillation.  He does not have muscle stiffness.  Mother passed away at age 53 of a blood clot affecting her bowel in the setting of multiple sclerosis, and she also had hypothyroidism.  She has a healthy 36-year-old sister and 1 young niece.  See scanned pedigree.    Social History:  Lives in [place] with her  and their dog.   works in [employer] for a company.     Surgical History: As above.  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    EMG:     EMG & nerve conduction study, 2/20/2025:    “Impression:     There is electrophysiologic evidence of facial nerve continuity to the left mentalis and left nasalis muscles. On needle examination, limited muscles could be chosen for study given muscle bulk is reduced on the left side. In the left mentalis muscle,   motor units were normal in appearance. In the left frontalis muscle, there was pain with study of this muscle and only distant units could be seen and thus could not well study for signs of reinnervation. The left nasalis could not be studied by needle   exam as the muscle was atrophic.     Of note, in the left mentalis muscle, there was evidence of active denervation called myotonia. Myotonia was also seen on EMG study of the right upper extremity completed January 2025 which raised concern for an underlying myopathy with myotonia. A   neuromuscular neurology " "consult can be considered to further evaluate for an underlying myopathy with myotonia.”    EMG & nerve conduction study, 1/23/2025:        Imaging:     MR brain w and wo IV contrast, 1/18/25:    \"IMPRESSION:  No evidence of residual recurrent mass lesion with postoperative  changes in the region of the left aspect of the posterior fossa as  above.”    MRI MR IAC w and wo IV contrast 8/3/24:    \"IMPRESSION:  1. There is a heterogeneously enhancing mass within the left  cerebellar pontine angle cistern and internal auditory canal  measuring 3.5 x 2.9 x 3.2 cm. There is mild thickening of the  adjacent tentorium measuring 0.2 cm in thickness. Differential  considerations include a vestibular schwannoma or possibly a  meningioma. There is marked mass effect upon the leticia, brachium  pontis and cerebellum. There is near-complete effacement of the 4th  ventricle. There is dilatation of the lateral and 3rd ventricles,  similar to the prior exams.\"      MR cardiac morphology and function w and wo IV contrast ordered on 3/21/2025, not yet performed.    Relevant Specialist Visits:     Chelsea Wesley, Neurology, 3/11/2025  “Impression/Plan:  Ms. Nolasco is a 34 y.o. right handed female with past medical history of ASD s/p closure 1997, Raynaud's, left vestibular schwannoma s/p resection in 8/5/2024 who presents to neuromuscular clinic for further evaluation of noted myotonic discharges on her prior EMG studies completed January and February 2025.     Patient reports onset of bilateral upper extremity spasms with , and tongue spasm since 2020.  Denies any known family history of underlying myopathy.  Over time, her bilateral hand spasms, and tongue spasms became more frequent and severe prompting further workup.  She also reported progression of vertigo/ataxia, headaches and hearing loss which prompted further neuroimaging, and underwent left retrosigmoid craniotomy for tumor resection 8/2024 with pathology showing " schwannoma.  Patient was evaluated for right shoulder weakness, with EMG January [2025] showing myotonia on several tested muscles including right deltoid, biceps, first dorsal interossei, abductor pollicis brevis muscles.  Since then, patient was referred to neuromuscular clinic for further evaluation of underlying myelopathy.     Per our evaluation, she has clinical features that are concerning for an underlying myopathy including percussion myotonia and  myotonia, tongue myotonia, facial features including thin/long face and hollow temples, and high arched palate.  Given her EMG findings, and clinical evaluation, she would benefit from further genetic testing and counseling.  Patient is agreeable to see genetics for further evaluation.  Additionally, she has family history of atrial fibrillation from paternal side, and history of heart complications from paternal grandmother.  Given concern for underlying myopathy, including myotonic dystrophy, she would benefit from seeing cardiology for further evaluation of possible cardiac involvement.  Additionally, she would benefit from seeing pulmonology to rule out underlying KYLER, and assess pulmonary function.  She has been following with ophthalmology for lagophthalmos and keratopathy, with our additional recommendation to monitor for cataracts.  Lastly, given her burning type pain involving the right side of her face, we will initiate low-dose gabapentin.     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 Will need periodic EKGs, Echocardiogram, Holter/Event monitor   - Pulmonology (to rule out pulmonary  "involvement)--needs sleep study and PFTs to  be done given association between myotonic dystrophy type 1 and sleep apnea and respiratory failure  - Continue following ophthalmology to assess for cataracts and regular exams  - Start gabapentin 100 mg twice daily  - Follow-up in 6/11/2025 for further care”        BLU Plummer-CNP, Cardiology, 3/21/2025  “Assessment  34 y.o. female with a PMH of ASD s/p closure in 1997, raynaud's disease, L vestibular schwannoma s/p craniotomy for tumor resection c/b resection bed hemorrhage. EKG in the past has shown ST. She was referred per neurology to rule out cardiac manifestations of myotonic dystrophy. She denies cardiac symptoms - CP, dyspnea, palpitations, orthopnea, edema, syncope. She does have occasional vertigo.      1. Dystrophy, myotonica (Multi)  Holter Or Event Cardiac Monitor    MR cardiac morphology and function w and wo IV contrast     2. Myopathy, congenital (Multi)  Referral to Cardiology        Plan:  -Cardiac MRI  -Ambulatory ECG monitor  -Will notify with results  -Follow up prn”    Physical Exam:     Exam from neuromuscular visit 3/11/2025:    \"General Appearance:  No distress, alert, interactive and cooperative.   Thin and long face with hollow temples, high arched palate, and left LMN facial weakness    Neurological:  Mental status: the patient provided an accurate history, language was normal.  Able to follow two-step complex commands.  Cranial Nerves:  CN 2      Visual fields full to confrontation.   CN 3, 4, 6   EOMs full range  CN 5   Facial sensation intact bilaterally, except right V3 distribution with decreased sensation to pinprick and light touch   Jaw opening 5/5   CN 7   Asymmetrical facial strength with weakness on the left side (LMN)  Nasolabial folds asymmetric.   Unable to lift left eyebrow and close eye lid fully.  CN 8   Significantly reduced hearing on left side.     CN 9, 10   Palate elevates symmetrically.  High arched " palate.  Phonation within normal limits, no dysarthria.   CN 11   Normal strength of shoulder shrug and neck turning.   CN 12   Tongue midline, with normal bulk, but reduced strength with resistance.  Tongue myotonia present with percussion     Motor:   Muscle bulk: Normal throughout.  Muscle tone: Normal in both upper and lower extremities.  Movements: No fasciculations, tremors, or other abnormal movement.   There is no scapular winging.    There is percussion myotonia and  myotonia.      Manual Muscle Testing (MMT) reveals the following MRC grades:  Neck Flexion 5  Neck Extension 5  R          L   Shoulder abduction                 5-5  Shoulder adduction                5          5  Elbow flexion                           5-         5  Elbow extension                      5-5  Wrist extension                       5          5  Wrist flexion                            5         5  Finger flexion                           5          5  Finger extension                      5          5  Finger abduction                     5          5  Thumb abduction                    5          5     Hip flexion                               5          5  Hip extension                          5          5  Hip abduction                          5          5  Hip adduction                          5          5  Knee flexion                             5          5  Knee extension                        5          5  Ankle dorsiflexion                    5          5  Ankle plantarflexion                5          5  Ankle Inversion                        5          5  Ankle Eversion                         5          5  Big toe extension                    5          5  Toe flexion                               5          5     Reflexes:     R          L  BR:               1          2  Biceps:         1          2  Triceps:        1          2  Knee:           3          3  Ankle:          2          2     Babinski: Toes  "are down going  No clonus or other pathological reflexes  No jaw jerk reflex     Sensory:   In both upper and lower extremities, sensation was intact to light touch, temperature, pinprick, and vibration.      Coordination:    In left upper extremities, finger-nose-finger with mild dysmetria   In the right upper extremity finger-nose-finger intact with no dysmetria or overshoot.      Gait:   Station was stable with a normal base. Gait was stable with a normal arm swing and speed.  Unable to complete heel walk and tandem gait due to balance issues.  Able to toe walk.     No Romberg sign was present.\"    Discussion:     It was a pleasure to meet you today!    We discussed testing for myotonic dystrophy type 1 today.  You also have had some separate health issues.  Of note, most atrial septal defect does not have a findable genetic cause, nor does Raynaud's.  Genetic testing for schwannoma is available but tends to be relatively low-yield if there is only one schwannoma.  Most adults with unilateral (1 side only) vestibular schwannoma (also known as acoustic neuroma) after age 30 will not develop one on the other side, but you noted that yours likely did start before age 30, although it sounds like it was tiny at that age.  LZTR1 gene can be linked with unilateral vestibular schwannoma, but often there are other schwannomas elsewhere.  See Plan.    Dr. Wesley and I, and some of your other healthcare providers, suspect you may have myotonic dystrophy type 1. This is caused by having one of a person's 2 copies of the DMPK gene that has a higher than normal number of \"repeats.\" Repeats refers to how many times the letters \"CTG\" are repeated in the genetic code. Most people have fewer than 35 CTG repeats. When a person has 35 to 49 CTG repeats of the DMPK gene, they are not expected to have any symptoms of myotonic dystrophy, but may be at risk to have children with myotonic dystrophy. When a person has 50 to about 150 CTG " repeats, they may have a mild form of myotonic dystrophy. This mild form typically includes cataracts and mild problems relaxing the muscles (called myotonia). The myotonia may be obvious to the affected person, may only be found on the physical examination, or may only be found by the EMG test. Those with mild forms of myotonic dystrophy tend to have the onset of symptoms between ages 20 and 70.    When a person has about 100 to approximately 1000 CTG repeats (note the overlap with the previous category as far as repeat number), they will likely develop classic symptoms of myotonic dystrophy. Classic symptoms include weakness, myotonia, cataracts, hair loss in a balding pattern, heart rhythm problems, and possibly some other health problems, such as diabetes. Symptoms usually start between ages 10 and 30 (more often in the 20s), though some individuals have earlier symptoms.     If an individual has more than the 1000 CTG repeats (some researchers believe the number is more than 700), that person can have the “congenital” form of myotonic dystrophy. This is the most severe form of myotonic dystrophy. Symptoms may be seen at birth. Some children will develop symptoms later. Severely affected infants have floppy muscle tone, and may have trouble breathing. They may need special care in the  Intensive Care Unit. Infants who survive the  period typically have improvement in their breathing. They may have intellectual disability, also known as mental retardation. They often later have the same symptoms as people with classic myotonic dystrophy.    We discussed how myotonic dystrophy is inherited within families. The myotonic dystrophy gene is located on chromosome 19. Each person has two copies of chromosome 19, one inherited from their mother, and one inherited from their father. They have one copy of DMPK gene for myotonic dystrophy on each copy of chromosome 19, for a total of two copies of the DMPK  gene. The DMPK gene is also sometimes called DM1, as is the condition itself.     When a person passes on their genes to the next generation, each of their children will inherit one copy of the DMPK gene from each parent. In the case of a person with myotonic dystrophy, they have one normal copy of this gene and one copy with a larger number of CTG repeats. If the child inherits the copy with a normal number of CTG repeats, then that child will not develop myotonic dystrophy. If the child inherits the copy of the DMPK gene with a larger number of repeats, then that child may develop myotonic dystrophy, depending on how large the repeats are. The number of repeats may increase in number from generation to generation, particularly when passed through a mother. The repeats are less likely to increase in number when passed through a father, with the exception of repeats on the smaller end of the range.    Neither of your parents were known to have myotonic dystrophy.  However, if you have this, you may have inherited it from a parent with a smaller number of repeats who had very subtle symptoms that they may not have noticed.  If you test positive for myotonic dystrophy, this would mean that your sister would have been at risk at birth to inherit myotonic dystrophy.  If she has no symptoms now, her risk may decrease, but it is still possible that she could have inherited a milder form.  We will discuss this in more detail if your test results are positive.    A positive result would greatly affect your health management.  Individuals with myotonic dystrophy type 1 can live long and healthy lives, but would need to have heart rhythm and heart muscle health monitored, eye exams looking for early-onset cataract, evaluation of lung health, some anesthesia precautions, and some other testing that we will discuss in more detail at the next visit if your test is positive.    We discussed that myotonic dystrophy type 2 is a  "similar condition caused by change in a different gene, CNBP.  It tends to be a little milder than type 1, and often, a parent also has symptoms, which are reasons that we suspect type 1 more for you.  However, your lack of learning concerns would be consistent with type 2.      Plan:    1) My office will do a benefits investigation and prior authorization for myotonic dystrophy type 1 testing.  Consent form signed and cheek swab kit given.  Please wait to collect the samples until I give you the go ahead.      2) Expect an update about the testing plan within 2-3 weeks.  Please contact me via [facility] if you have not heard from us by then.    3) When it is time to collect the samples, please follow the instructions and be sure to label each tube with your name, date of birth, and date of collection.  You will return your samples via the prepaid [employer] materials.  Results take about 3 weeks.  They will typically appear in [facility].    4) When results return, my office will contact you, and if positive we will schedule a virtual visit to discuss them and discuss management recommendations.  If results are negative, further genetic testing will be recommended to try to identify the cause of your myotonia and weakness, starting with testing for myotonic dystrophy type 2. (We would not need to meet again before this next test.)    5) We can consider genetic testing for schwannomas, but the chance of successful test is much higher when there is more than 1 schwannoma.  I will discuss with neurosurgery.      6) Discharge summary from your August 2024 admission also notes: \"CT CAP with R middle lobe calcifications consistent with granulomatous disease, dilated CBD, intrahepatic dilatation (rec'd MRCP or ERCP).”  I will mention this to your PCP, since you were not familiar with this part of your admission.  Some of these issues may already have resolved or been addressed.    If you have any questions, please call Brittanie aMdsen " in the [facility] at [phone] option 1 or at her direct number [phone], or you can send me a non-urgent message through [employer].     Rosa Isela Aponte MD  Clinical       cC: Dr. Chelsea Wesley, Dr. Renetta Amanda, Alondra Alegre, APRN-CNP, Dr. Goyo Mosley ([facility]), Dr. Farhad Issa      Prep time: 1:32-1:41  Visit time: 1:41- 2:44

## 2025-04-17 RX ORDER — ESCITALOPRAM OXALATE 5 MG/1
5 TABLET ORAL DAILY
Qty: 90 TABLET | Refills: 1 | Status: SHIPPED | OUTPATIENT
Start: 2025-04-17

## 2025-04-19 PROBLEM — M62.89 MYOTONIA: Status: ACTIVE | Noted: 2025-04-19

## 2025-04-21 ENCOUNTER — TELEPHONE (OUTPATIENT)
Dept: GENETICS | Facility: CLINIC | Age: 35
End: 2025-04-21
Payer: COMMERCIAL

## 2025-04-21 NOTE — TELEPHONE ENCOUNTER
Called to submit a pre-certification for CPT code 81234x1. They said it was not required and the confirmation number was [id number]. The confirmation will also be faxed to the genetics office.

## 2025-04-25 NOTE — PROGRESS NOTES
Facial Plastic & Reconstructive Surgery    [facility] Note    4/30/25 EST FUV  Facial paralysis, lagopthalmos    Denae Nolasco is a 34 y.o. 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  She required a tracheostomy/PEG 8/9/2024 which has since been removed.      She has had improvement of the commissure position.    Recall: Our 2/5/25 plan was:  Discussed evidence of some recovery in tone, this points to better prognosis. Given timeline, continued clinica observation would be appropriate with close follow up. Consider EMG to assess for evidence of reinnervation. We did discuss role for eyelid weight if bothered by dry eye but willing to wait with continued eye care to see if recovery continues. Observe for recovery another 1-2 months and reassess.    Presents today for followup visit.   Endorses: She has had improvement of her oral commissure and eye closure.  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  She still has some mild ectropion on the left as well.  She is scheduled to undergo eyelid weight and lower eyelid reconstruction with oculoplastics.  I recommend that she proceeds with this given the delayed recovery.  We did discuss interventions which may include reinnervation and suspension procedures for the delayed recovery on the left however she is not interested at this time and would like to continue to observe.    Plan: Intervention for eyelid closure including eyelid weight and ectropion repair with Dr. Squires, continued observation of recovery of the midface.  She will follow-up with me in 3 to 4 months.    Today, I was in direct face-to-face consultation with the patient, of which greater than 50% of the time was  spent discussing the diagnoses, treatment plan, counseling, and care coordination. The patient and/or caregiver voiced understanding of our conversation and all questions were satisfactorily answered.    Total time for this visit: 30 min    ----------------------  2/5/25  Chief Complaint: Left facial paralysis and upper eyelid lagopthalmos  Referring Provider: Self; Second opinion- previously worked up by Dr. Rutherford    HPI: Denae Nolasco is a 34 y.o. 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  She required a tracheostomy/PEG 8/9/2024 which has since been removed.  Presents for second opinion for facial reanimation and upper eyelid lagopthalmos    Onset: 5 months ago  Side: left  Severity: 5/6  Cause of paralysis: s/p vestibular schwannoma resection  Primary concerns: paralysis of the face, difficulty with speech, eyelid closure    Dry eye history: endorses dry eye, has been using tears and ointment  Previous interventions: none     A 14 organ review of systems was reviewed with the patient. Pertinent items are noted in HPI. The patient denies otalgia, odynophagia, dysphagia, dysarthria, voice changes, hemoptysis, or weight loss.    Past, family, and social history obtained but not pertinent to current problem.    Past Medical History  She has a past medical history of Depression, Dizziness, Fatigue, Headache, HL (hearing loss), and Visual impairment.    Surgical History  She has a past surgical history that includes Brain surgery (08/05/2024); Tracheostomy tube placement (08/09/2024); Gastrostomy tube placement (08/09/2024); and Tracheostomy closure (11/2024).     Social History  She reports that she has never smoked. She has never been exposed to tobacco smoke. She has never used smokeless tobacco. She reports  that she does not currently use alcohol. She reports that she does not currently use drugs after having used the following drugs: Marijuana.    Family History  Family History   Problem Relation Name Age of Onset    Blood clot Mother Delmis Mantilla-  of clot 2016     Thyroid disease Mother Delmis Mantilla-  of clot 2016      labor Sister Megan         Allergies  Patient has no known allergies.    Physical Exam    General: Well-developed and well-nourished in appearance.  Skin: No rashes or concerning lesions on the visible portions of the skin.  Eyes: Extraocular movements intact. Visual fields grossly normal.  Ears: Pinna are normal in shape and position. External canals are patent.  Nose: Dorsum is midline. Septum is midline and turbinates are normal on anterior  rhinoscopy.  Oral Cavity/Oropharynx: Dentition is intact. Mucous membranes moist.   Respiratory: No respiratory distress. Quiet breathing without stertor or stridor.  Cardiovascular: Regular rate and rhythm. Warm extremities with equal pulses.  Psych: Normal mood and affect. Judgement and insight appropriate.  Neuro: Alert and oriented. CN II-XII grossly intact. No focal deficits.  Musculoskeletal: Gait intact. Moves all extremities well without apparent deformities.    A comprehensive facial examination was performed with the following highlights:  HB: 5/6  Appearance at rest: paralysis on the left with asymmetry    Upper third exam:   Brow: mild ptosis   Upper eyelid: lagophthalmos   Lower eyelid: ectropion present   Corneal exam: healthy   Pickford phenomenon: present    Middle third exam:   NL fold: blunted on the left   Oral commissure: level bilaterally, no excursion. Some tone with tensioning   Lower lip: hypotrophic    Lower third exam: platysma movement absent    Assessment: This patient has left near complete facial paralysis. There is a profound physical deformity, with asymmetry of the face. Function is compromised with oral  incompetence, air escape, difficulty communicating with others, eye irritation, and visual field obstruction.    Plan: We discussed options for treatment, including both injectable treatments, facial retraining, and surgical interventions. Each of these is is medically necessary.    Treatment plan:     Discussed evidence of some recovery in tone, this points to better prognosis. Given timeline, continued clinica observation would be appropriate with close follow up. Consider EMG to assess for evidence of reinnervation. We did discuss role for eyelid weight if bothered by dry eye but willing to wait with continued eye care to see if recovery continues.    We will observe for recovery another 1-2 months and reassess.    Heber Lee MD      , Facial Plastic & Reconstructive Surgery        P: [phone]  F: [phone]

## 2025-04-30 ENCOUNTER — APPOINTMENT (OUTPATIENT)
Dept: OTOLARYNGOLOGY | Facility: CLINIC | Age: 35
End: 2025-04-30
Payer: COMMERCIAL

## 2025-04-30 DIAGNOSIS — G51.9 ABNORMALITY OF FACIAL NERVE: Primary | ICD-10-CM

## 2025-04-30 PROCEDURE — 99214 OFFICE O/P EST MOD 30 MIN: CPT | Performed by: OTOLARYNGOLOGY

## 2025-04-30 ASSESSMENT — PAIN SCALES - GENERAL: PAINLEVEL_OUTOF10: 0-NO PAIN

## 2025-05-12 ENCOUNTER — APPOINTMENT (OUTPATIENT)
Dept: OPHTHALMOLOGY | Facility: CLINIC | Age: 35
End: 2025-05-12
Payer: COMMERCIAL

## 2025-05-15 ENCOUNTER — HOSPITAL ENCOUNTER (OUTPATIENT)
Dept: RADIOLOGY | Facility: HOSPITAL | Age: 35
Discharge: HOME | End: 2025-05-15
Payer: COMMERCIAL

## 2025-05-15 VITALS — HEIGHT: 63 IN | BODY MASS INDEX: 20.31 KG/M2 | WEIGHT: 114.64 LBS

## 2025-05-15 DIAGNOSIS — G71.20: ICD-10-CM

## 2025-05-15 DIAGNOSIS — M62.89 MYOTONIA: Primary | ICD-10-CM

## 2025-05-15 DIAGNOSIS — G71.11: ICD-10-CM

## 2025-05-15 PROCEDURE — 75561 CARDIAC MRI FOR MORPH W/DYE: CPT

## 2025-05-15 PROCEDURE — A9575 INJ GADOTERATE MEGLUMI 0.1ML: HCPCS | Mod: JZ | Performed by: NURSE PRACTITIONER

## 2025-05-15 PROCEDURE — 2550000001 HC RX 255 CONTRASTS: Mod: JZ | Performed by: NURSE PRACTITIONER

## 2025-05-15 RX ORDER — GADOTERATE MEGLUMINE 376.9 MG/ML
22 INJECTION INTRAVENOUS
Status: COMPLETED | OUTPATIENT
Start: 2025-05-15 | End: 2025-05-15

## 2025-05-15 RX ADMIN — GADOTERATE MEGLUMINE 22 ML: 376.9 INJECTION INTRAVENOUS at 13:37

## 2025-05-20 ENCOUNTER — ANESTHESIA EVENT (OUTPATIENT)
Dept: OPERATING ROOM | Facility: CLINIC | Age: 35
End: 2025-05-20

## 2025-05-20 RX ORDER — FENTANYL CITRATE 50 UG/ML
25 INJECTION, SOLUTION INTRAMUSCULAR; INTRAVENOUS EVERY 5 MIN PRN
Refills: 0 | OUTPATIENT
Start: 2025-05-20

## 2025-05-20 RX ORDER — METOCLOPRAMIDE HYDROCHLORIDE 5 MG/ML
10 INJECTION INTRAMUSCULAR; INTRAVENOUS ONCE AS NEEDED
OUTPATIENT
Start: 2025-05-20

## 2025-05-20 RX ORDER — LABETALOL HYDROCHLORIDE 5 MG/ML
5 INJECTION, SOLUTION INTRAVENOUS ONCE AS NEEDED
OUTPATIENT
Start: 2025-05-20

## 2025-05-20 RX ORDER — ACETAMINOPHEN 325 MG/1
650 TABLET ORAL EVERY 4 HOURS PRN
OUTPATIENT
Start: 2025-05-20

## 2025-05-20 RX ORDER — ALBUTEROL SULFATE 0.83 MG/ML
2.5 SOLUTION RESPIRATORY (INHALATION) ONCE AS NEEDED
OUTPATIENT
Start: 2025-05-20

## 2025-05-20 RX ORDER — LIDOCAINE HYDROCHLORIDE 10 MG/ML
0.1 INJECTION, SOLUTION EPIDURAL; INFILTRATION; INTRACAUDAL; PERINEURAL ONCE
OUTPATIENT
Start: 2025-05-20 | End: 2025-05-20

## 2025-05-20 RX ORDER — OXYCODONE HYDROCHLORIDE 5 MG/1
5 TABLET ORAL EVERY 4 HOURS PRN
Refills: 0 | OUTPATIENT
Start: 2025-05-20

## 2025-05-20 RX ORDER — SODIUM CHLORIDE, SODIUM LACTATE, POTASSIUM CHLORIDE, CALCIUM CHLORIDE 600; 310; 30; 20 MG/100ML; MG/100ML; MG/100ML; MG/100ML
50 INJECTION, SOLUTION INTRAVENOUS CONTINUOUS
OUTPATIENT
Start: 2025-05-20 | End: 2025-05-22

## 2025-05-20 RX ORDER — FENTANYL CITRATE 50 UG/ML
50 INJECTION, SOLUTION INTRAMUSCULAR; INTRAVENOUS EVERY 5 MIN PRN
Refills: 0 | OUTPATIENT
Start: 2025-05-20

## 2025-05-20 RX ORDER — ONDANSETRON HYDROCHLORIDE 2 MG/ML
4 INJECTION, SOLUTION INTRAVENOUS ONCE AS NEEDED
OUTPATIENT
Start: 2025-05-20

## 2025-05-22 RX ORDER — APREPITANT 40 MG/1
40 CAPSULE ORAL ONCE
OUTPATIENT
Start: 2025-05-23

## 2025-05-23 ENCOUNTER — ANESTHESIA (OUTPATIENT)
Dept: OPERATING ROOM | Facility: CLINIC | Age: 35
End: 2025-05-23
Payer: COMMERCIAL

## 2025-05-23 ENCOUNTER — HOSPITAL ENCOUNTER (OUTPATIENT)
Facility: HOSPITAL | Age: 35
Setting detail: OUTPATIENT SURGERY
End: 2025-05-23
Payer: COMMERCIAL

## 2025-05-27 ENCOUNTER — HOSPITAL ENCOUNTER (OUTPATIENT)
Facility: CLINIC | Age: 35
Setting detail: OUTPATIENT SURGERY
End: 2025-05-27
Attending: OTOLARYNGOLOGY | Admitting: OTOLARYNGOLOGY
Payer: COMMERCIAL

## 2025-05-27 DIAGNOSIS — G51.9 DISORDER OF SEVENTH CRANIAL NERVE: ICD-10-CM

## 2025-05-27 DIAGNOSIS — R29.810 FACIAL WEAKNESS: ICD-10-CM

## 2025-05-29 ENCOUNTER — APPOINTMENT (OUTPATIENT)
Dept: NEUROLOGY | Facility: CLINIC | Age: 35
End: 2025-05-29
Payer: COMMERCIAL

## 2025-05-30 ENCOUNTER — TELEPHONE (OUTPATIENT)
Dept: GENETICS | Facility: CLINIC | Age: 35
End: 2025-05-30
Payer: COMMERCIAL

## 2025-05-30 ENCOUNTER — TELEPHONE (OUTPATIENT)
Dept: OTOLARYNGOLOGY | Facility: CLINIC | Age: 35
End: 2025-05-30
Payer: COMMERCIAL

## 2025-05-30 NOTE — TELEPHONE ENCOUNTER
Spoke to patient regarding [facility] wanting her to see her pulmonologist for sleep study and PFTs prior to surgery with Dr. Lee. Patient stated she has an appointment for this 8/1. Patient was offered to proceed under local anesthesia. Dr. Lee to call patient and go over options. Will follow-up with patient to discuss decision.

## 2025-06-01 NOTE — PROGRESS NOTES
Subjective   Patient ID: Denae Nolasco is a 34 y.o. female who presents for No chief complaint on file..  HPI  Denae Nolasco is a 34 y.o. female dysphagia, paralytic lagophthamos, ASD closure 1997 schwannoma c/b L tansverse sigmoid sinus thombosis with biateral vocal cord paralysis, CB 7 and 12 paresis, referred to pulmonary for myopathy? By Dr. Wesley.     #***  Chronic elevated bicarb    Family History:        Social History:         Review of Systems    Objective   Physical Exam    CXR 8/15/24  FINDINGS:   Lines, tubes, and devices: Tracheostomy in satisfactory position. Indeterminate metallic density overlies the lower mediastinum.     Lungs and pleura: Bandlike opacity in the right lower lung compatible scarring/atelectasis. No significant pleural effusion. No discernible pneumothorax.     Cardiomediastinal silhouette: Normal cardiomediastinal silhouette.     Musculoskeletal: No acute osseous abnormality identified.     IMPRESSION:   Bandlike scarring/atelectasis in the right lower lung.       CT chest/abdomen/pelvis 8/3/24  IMPRESSION:  1. Dilated common bile duct measuring to 1.1 cm with gradual  transition to normal caliber and associated mild intrahepatic biliary  dilation. No evidence of radiopaque choledocholithiasis, radiopaque  cholelithiasis, pancreatic duct dilation, or abnormal focal  pancreatic enhancement. Findings can be secondary to chronic  periampullary stricture, however neoplasm given patient's history can  not be excluded. Nonemergent MRCP and/or ERCP is recommended for  further assessment.  2. Heterogeneous cervix with hypodense areas, which can be secondary  to nabothian cysts. Nonemergent pelvic ultrasound can be obtained for  further assessment as per clinical discretion.  3. Subpleural nodular opacity within the right middle lobe with  internal calcifications, as above. Additional calcified granulomas  and calcified right hilar lymph nodes, compatible with remote  granulomatous  disease.  4. Additional findings as discussed above.    Assessment/Plan   {Assess/PlanSmartLinks:99464}         Liana Ayers MD 06/01/25 1:17 PM    Future  Other orders  -     albuterol 2.5 mg /3 mL (0.083 %) nebulizer solution 3 mL  -     albuterol 90 mcg/actuation inhaler 1 puff    Denae Nolasco is a 34 y.o. female PMH myotonic dystrophy type 1, dysphagia, paralytic lagophthamos, ASD closure 1997, schwannoma removal 2024 c/b L transverse sigmoid sinus thombosis with biateral vocal cord paralysis, CN 7 and 12 paresis, referred to pulmonary for myopathy By Dr. Wesley.     #Myotonic dystrophy  Patient reports no respiratory symptoms on exertion or rest. She denies aspiration, obstructive lung disease symptoms.   She exercises regularly with her dog  Patient referred to sleep medicine. Given her difficulty getting appointments, will refer for sleep study after reviewing order with one of the sleep physicians as I am pulmonary only.   Patient does not report symptoms consistent with sleep apnea.   PFT ordered per neurology request    RTC PRLENORE Ayers MD 06/06/25 1:02 PM

## 2025-06-03 ENCOUNTER — TELEPHONE (OUTPATIENT)
Dept: OPHTHALMOLOGY | Facility: CLINIC | Age: 35
End: 2025-06-03
Payer: COMMERCIAL

## 2025-06-04 ENCOUNTER — TELEMEDICINE (OUTPATIENT)
Dept: GENETICS | Facility: CLINIC | Age: 35
End: 2025-06-04
Payer: COMMERCIAL

## 2025-06-04 DIAGNOSIS — G71.11 MYOTONIC DYSTROPHY, TYPE 1 (MULTI): Primary | ICD-10-CM

## 2025-06-04 PROCEDURE — 99215 OFFICE O/P EST HI 40 MIN: CPT | Performed by: MEDICAL GENETICS

## 2025-06-04 NOTE — PROGRESS NOTES
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   She was last seen in genetics on 4/16/2015, at which time we made a plan to send genetic testing for myotonic dystrophy type 1.  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  Here by herself today.    Virtual or Telephone Consent    An interactive audio and video telecommunication system which permits real time communications between the patient (at the originating site) and provider (at the distant site) was utilized to provide this telehealth service.   Verbal consent was requested and obtained from Denae Nolasco on this date, 06/04/25 for a telehealth visit and the patient's location was confirmed at the time of the visit.     Interval history:    Since last visit, I did message with her neurosurgeon and confirmed that she is not suspecting schwannomatosis or NF2 in the setting of a single schwannoma.    I also messaged her primary care provider about the imaging findings from her August 2024 admission. MERRILL Carrera planned to look into this further.    She saw ENT, Heber Lee MD, for follow-up on 4/30/2025:    \"Plan: Intervention for eyelid closure including eyelid weight and ectropion repair with Dr. Squires, continued observation of recovery of the midface.  She will follow-up with me in 3 to 4 months.\"    Her surgery was postponed awaiting pulmonary/sleep evaluation in the setting of her new diagnosis of myotonic dystrophy type 1.    Results, reported 5/30/2025:        Discussion:    It was a pleasure to meet with you " "virtually today.    You had more than 200 repeats in 1 copy of your DMPK gene.  This would be consistent with the classic form of myotonic dystrophy type 1.  There is a congenital form in which individuals usually have more than 1000 repeats, but this is not suspected given your age of onset.  The [employer] report does not actually give the exact repeat number when it exceeds 200, but knowing your exact repeat number would not really help us predict symptoms for you.    We discussed that having a longer repeat number causes \"toxic RNA\" (messenger molecule) to build up and affect other genes.  This is the reason that myotonic dystrophy can affect different aspects of health, not just the muscles.    We discussed your family history. Neither of your parents were known to have myotonic dystrophy.  However, if you have this, you may have inherited it from a parent with a smaller number of repeats who had very subtle symptoms that they may not have noticed.  Your positive results mean that your sister would have been at risk at birth to inherit myotonic dystrophy.  If she has no symptoms now, her risk may decrease, but it is still possible that she could have inherited a milder form.  Testing would be available to her if desired.    Next Steps:    There is currently no cure for myotonic dystrophy type 1, although research is ongoing.  (See the end of this Discussion.)    https://www.myotonic.org/ is a website with a lot of good information for patients and doctors.     Additional resources are at:    https://www.myotonic.org/toolkits-publications    This includes a \"toolkit,\" information you can give relatives about getting tested, as well as information about applying for disability benefits, if ever needed.    There is also a guide to exercise for people with myotonic dystrophy type 1.    www.myotonic.org/sites/default/files/pages/files/MDF_Exercise-Guide-for-the-Community_1_21.pdf    The muscle symptoms can be slowly " progressive. I recommend that you return to Dr. Wesley, who can help follow your muscle strength over time.     I recommend the following for individuals with a new diagnosis of myotonic dystrophy type 1, based on a couple of sets of recent guidelines.     Most of the information below is based on these guidelines (2018):    https://www.myotonic.org/sites/default/files/pages/files/MDF_Consensus-basedCareRecsAdultsDM1_1_21.pdf    1) People with type 1 myotonic dystrophy should have annual EKGs. The heart symptoms to watch for include palpitations (feeling your heart flutter, pound or beat quickly), chest pain, shortness of breath, difficulty breathing while lying flat, lightheadedness, & fainting.    You are already seeing cardiology.    2) I recommend that you have an eye exam every 1-2 years (guidelines vary, definitely you should go every year if you have vision symptoms).     You are already seeing ophthalmology.    3) A sleep study if you have sleep concerns is recommended.     This is planned for you.    4) Evaluation of your breathing and lung health is recommended. Pulmonary function testing is recommended, about every 6 months, but this may be less frequent if you are doing well. Your primary care physician will likely choose to refer you to a lung specialist (pulmonologist) to get this testing done.     You are scheduled to see pulmonology on June 6.    5) I recommend that you get a flu shot annually, and consider the pneumonia vaccine, which is recommended for people with myotonic dystrophy type 1. I also recommend the COVID vaccine. You would be considered higher risk to have complications from respiratory infections, and thus this diagnosis is a reason to more strongly consider the COVID vaccine, not a reason to avoid it.    6) Statin medications should typically be avoided, as it may affect the muscle health of people with this condition. If cholesterol-lowering medication is needed, a different type  of medication would be preferable. Myotonic dystrophy type 1 is a risk factor for increased cholesterol.    7) Some guidelines for anesthesia for people with myotonic dystrophy type 1 are at the end of this note.  Please bring your diagnosis to the attention of any future anesthesiologists if you are having surgery or a sedated procedure. These guidelines state a recommendation to avoid succinylcholine, and I am placing this on your allergy list. Other guidelines (GeneReviews) recommend also avoiding vecuronium and propofol, but the evidence may be less strong.  (See Plan.)    8) The following blood lab work is recommended:    According to the consensus guidelines:    “a) Liver enzymes and bilirubin levels at baseline and then annually. Chronic liver enzyme elevation is commonly seen in DM patients and does not necessarily indicate the need for liver biopsy   b. Thyroid dysfunction in DM1 patients; measure thyroid-stimulating hormone (TSH) and free T4 levels at baseline and every three years. More frequent monitoring is necessary if thyroid dysfunction is suspected   c. Hyperlipidemia via testing for levels of serum lipids at baseline and then every three years, with more frequent testing if hyperlipidemia develops. As the impact of statins on DM1 patients' health is uncertain, clinicians should be monitor patients carefully for muscle-related impacts if these lipid-lowering medications are used.”    You appear to have had elevated liver function tests (LFTs) in 8/24 while you were seriously ill.  Your PCP may wish to repeat these when you are well to establish your true baseline.    d) Additionally, annual fasting blood sugar and HA1C are recommended.    These labs are typically managed by a PCP.  I will message Alondra Alegre, APRN-CNP.      9) There may be an increased risk for certain cancers, especially those arising in the ovary, colon, endometrium, brain and thyroid gland. Routine screening, as recommended  "for others of your age and sex, are recommended. As noted in the consensus guidelines “There is an increased risk of pilomatrixoma, a rare, usually benign, skin tumor of the hair follicles that only occasionally proves to be malignant.” You can feel for “small, hard lumps under the skin and inform patients that these are most often found on the head, especially near the hairline, and on the neck, with some found on the arms, legs or torso.” If you are unsure about a lump or bump, you should see your primary care physician, who can refer you to a dermatologist if needed.    10) You could consider neuropsychological/cognitive testing to see if this condition is affecting your learning or other cognitive functioning.  This is optional.     11) Your PCP should monitor you for symptoms of the following stomach and intestinal problems: Poor swallowing, slow transition of food through digestive tract (pseudoobstruction), diarrhea, constipation, and gallbladder dysfunction. There is no specific imaging recommended, it depends on your symptoms.     12) In addition to statins and the anesthesia medications mentioned above, experts recommend avoiding smoking, carrying excessive body weight, excessive alcohol intake, or the use of illicit drugs. They also recommend avoiding the chemotherapy drug called doxorubicin.    Because of all the special considerations, you could consider wearing a medical alert bracelet to alert healthcare providers to your diagnosis of myotonic dystrophy type 1 in case of emergency.    We discussed the status of research:    https://www.[employer].com/view/gzot-922-psbschwi-fda-fast-track-designation-to-treat-myotonic-dystrophy    \"DYNE-101 targets DM1 by reducing toxic DMPK RNA, potentially halting or reversing disease progression through improved protein function.\"  This is entering phase 1/2 trials in 2025.  Phase 3 would be the last phase before potential FDA " "approval.    chrome-extension://efaidnbmnnnibpcajpcglclefindmkaj/https://www.myotonic.org/sites/default/files/2024-03/IEJ_Ywho-Fqqqdmgitlq-Tuklkatt_Uhb-2024.pdf    In this slightly out of date research pipeline summary (the most recent I could fine), the furthest along as pitolisant, which is for the excessive sleepiness aspect that some people with myotonic dystrophy type 1 experience, and mexiletine for muscle stiffness.  Neither is a genetic therapy like DYNE-101.    You can also follow research here:    https://www.myotonic.org/resources/current-studies-and-trials    Plan:    Although you have safely had general anesthesia before, before your upcoming surgery I do recommend that you complete the pulmonary function tests and other testing as recommended by the anesthesiology team at [facility].    This is a link that I would want to share with the anesthesiology team.  I will ask your ENT doctor whether he can help me get this to them.    Full version:  https://www.myotonic.org/sites/default/files/pages/files/MDF_PracticalSuggestionsDM1_Anesthesia2_17_21.pdf    Quick reference guide:  https://www.myotonic.org/sites/default/files/pages/files/MDF_PracticalConsiderationsAnesthesiaQG_1_21.pdf    I added succinylcholine to your \"allergy list\" because it is contraindicated in people with myotonic dystrophy.      I will message your PCP about lab work.  Consider returning to genetics in 3 to 5 years to see if guidelines have changed.  You could also monitor the guidelines at the myotonic dystrophy website, and call me if you see a new version posted, or you could even call me in genetics to see if it is time to return before scheduling a repeat visit.  You can see me at my [facility] location next time.  (Currently, this is in [place] at [facility].  I am also happy to see you sooner (1+ years) to discuss the status of research.  If your dad or sister wants to see me to consider testing, they can call " Brittanie at the number below, to set up an in person visit.  Please Dr. Wesley for her thoughts about L-creatine, at your appt. 6/25.  The use of this supplement in myotonic dystrophy, to my knowledge, may remain controversial, as far as whether it offers benefit.    If you have any questions, please call Brittanie Madsen in the [facility] at [phone] option 1 or at her direct number [phone], or you can send me a non-urgent message through [facility].    Rosa Isela Aponte MD  Clinical     cC: Dr. Chelsea Wesley, Dr. Renetta Amanda, Alondra Alegre, BLU-SANAZ, Dr. Goyo Mosley ([facility]), Dr. Farhad Issa, Dr. Liana Ayers, Dr. Heber Lee    Prep time: 10:45-10:49  Visit time 10:49- 11:30

## 2025-06-06 ENCOUNTER — APPOINTMENT (OUTPATIENT)
Facility: CLINIC | Age: 35
End: 2025-06-06
Payer: COMMERCIAL

## 2025-06-06 VITALS
DIASTOLIC BLOOD PRESSURE: 76 MMHG | RESPIRATION RATE: 18 BRPM | BODY MASS INDEX: 20.76 KG/M2 | HEART RATE: 93 BPM | WEIGHT: 112.8 LBS | OXYGEN SATURATION: 99 % | SYSTOLIC BLOOD PRESSURE: 109 MMHG | HEIGHT: 62 IN

## 2025-06-06 DIAGNOSIS — G71.20: ICD-10-CM

## 2025-06-06 PROCEDURE — 3008F BODY MASS INDEX DOCD: CPT | Performed by: INTERNAL MEDICINE

## 2025-06-06 PROCEDURE — 99203 OFFICE O/P NEW LOW 30 MIN: CPT | Performed by: INTERNAL MEDICINE

## 2025-06-06 PROCEDURE — G8433 SCR FOR DEP NOT CPT DOC RSN: HCPCS | Performed by: INTERNAL MEDICINE

## 2025-06-06 PROCEDURE — 1036F TOBACCO NON-USER: CPT | Performed by: INTERNAL MEDICINE

## 2025-06-06 RX ORDER — ALBUTEROL SULFATE 90 UG/1
1 INHALANT RESPIRATORY (INHALATION) ONCE
OUTPATIENT
Start: 2025-06-06

## 2025-06-06 RX ORDER — ALBUTEROL SULFATE 0.83 MG/ML
3 SOLUTION RESPIRATORY (INHALATION) ONCE
OUTPATIENT
Start: 2025-06-06 | End: 2025-06-06

## 2025-06-06 ASSESSMENT — ENCOUNTER SYMPTOMS
SORE THROAT: 0
LOSS OF SENSATION IN FEET: 0
COUGH: 0
SHORTNESS OF BREATH: 0
FEVER: 0
DEPRESSION: 0
RHINORRHEA: 0
CHILLS: 0
OCCASIONAL FEELINGS OF UNSTEADINESS: 0

## 2025-06-06 NOTE — PATIENT INSTRUCTIONS
Thank you for coming in today! Please call with questions or concerns.    I think you are doing really well caring for yourself. On old labs I see a slight increase in one level that may suggest you hold onto carbon dioxide but the rest of your story does not fit with that.   I ordered the sleep study to help streamline this process to get you to surgery.   It can be done at [facility], [facility], and [place] on the [place]. They should call you to schedule. If you don't hear from them by the end of next week, give us a call.     Please schedule:  - Pulmonary function tests - these check for evidence of obstruction, how much air you take in, and how well your lungs provide oxygen      Return to clinic in 2-3 months to review testing    Call [phone] to schedule tests

## 2025-06-08 ASSESSMENT — PROMIS GLOBAL HEALTH SCALE
EMOTIONAL_PROBLEMS: SOMETIMES
CARRYOUT_SOCIAL_ACTIVITIES: FAIR
CARRYOUT_PHYSICAL_ACTIVITIES: MOSTLY
RATE_GENERAL_HEALTH: GOOD
RATE_PHYSICAL_HEALTH: GOOD
RATE_SOCIAL_SATISFACTION: FAIR
RATE_AVERAGE_PAIN: 2
RATE_MENTAL_HEALTH: GOOD
RATE_QUALITY_OF_LIFE: FAIR
RATE_AVERAGE_FATIGUE: SEVERE

## 2025-06-09 ENCOUNTER — APPOINTMENT (OUTPATIENT)
Dept: OPHTHALMOLOGY | Facility: CLINIC | Age: 35
End: 2025-06-09
Payer: COMMERCIAL

## 2025-06-11 ENCOUNTER — APPOINTMENT (OUTPATIENT)
Dept: NEUROLOGY | Facility: HOSPITAL | Age: 35
End: 2025-06-11
Payer: COMMERCIAL

## 2025-06-12 ENCOUNTER — APPOINTMENT (OUTPATIENT)
Dept: PRIMARY CARE | Facility: CLINIC | Age: 35
End: 2025-06-12
Payer: COMMERCIAL

## 2025-06-12 VITALS
OXYGEN SATURATION: 98 % | SYSTOLIC BLOOD PRESSURE: 116 MMHG | TEMPERATURE: 97.7 F | DIASTOLIC BLOOD PRESSURE: 64 MMHG | HEART RATE: 74 BPM | BODY MASS INDEX: 19.49 KG/M2 | HEIGHT: 63 IN | WEIGHT: 110 LBS

## 2025-06-12 DIAGNOSIS — Z12.4 PAP SMEAR FOR CERVICAL CANCER SCREENING: ICD-10-CM

## 2025-06-12 DIAGNOSIS — Z13.220 SCREENING FOR LIPID DISORDERS: ICD-10-CM

## 2025-06-12 DIAGNOSIS — K83.8 DILATED INTRAHEPATIC BILE DUCT: ICD-10-CM

## 2025-06-12 DIAGNOSIS — D36.10 SCHWANNOMA: ICD-10-CM

## 2025-06-12 DIAGNOSIS — F41.9 ANXIETY: ICD-10-CM

## 2025-06-12 DIAGNOSIS — Z13.1 SCREENING FOR DIABETES MELLITUS: ICD-10-CM

## 2025-06-12 DIAGNOSIS — Z00.00 ANNUAL PHYSICAL EXAM: Primary | ICD-10-CM

## 2025-06-12 PROCEDURE — G8433 SCR FOR DEP NOT CPT DOC RSN: HCPCS

## 2025-06-12 PROCEDURE — 99395 PREV VISIT EST AGE 18-39: CPT

## 2025-06-12 PROCEDURE — 3008F BODY MASS INDEX DOCD: CPT

## 2025-06-12 ASSESSMENT — ANXIETY QUESTIONNAIRES
3. WORRYING TOO MUCH ABOUT DIFFERENT THINGS: NOT AT ALL
6. BECOMING EASILY ANNOYED OR IRRITABLE: SEVERAL DAYS
4. TROUBLE RELAXING: NOT AT ALL
5. BEING SO RESTLESS THAT IT IS HARD TO SIT STILL: NOT AT ALL
IF YOU CHECKED OFF ANY PROBLEMS ON THIS QUESTIONNAIRE, HOW DIFFICULT HAVE THESE PROBLEMS MADE IT FOR YOU TO DO YOUR WORK, TAKE CARE OF THINGS AT HOME, OR GET ALONG WITH OTHER PEOPLE: NOT DIFFICULT AT ALL
1. FEELING NERVOUS, ANXIOUS, OR ON EDGE: NOT AT ALL
2. NOT BEING ABLE TO STOP OR CONTROL WORRYING: NOT AT ALL
7. FEELING AFRAID AS IF SOMETHING AWFUL MIGHT HAPPEN: NOT AT ALL
GAD7 TOTAL SCORE: 1

## 2025-06-12 ASSESSMENT — COLUMBIA-SUICIDE SEVERITY RATING SCALE - C-SSRS
1. IN THE PAST MONTH, HAVE YOU WISHED YOU WERE DEAD OR WISHED YOU COULD GO TO SLEEP AND NOT WAKE UP?: NO
6. HAVE YOU EVER DONE ANYTHING, STARTED TO DO ANYTHING, OR PREPARED TO DO ANYTHING TO END YOUR LIFE?: NO
2. HAVE YOU ACTUALLY HAD ANY THOUGHTS OF KILLING YOURSELF?: NO

## 2025-06-12 NOTE — PROGRESS NOTES
"Subjective   Patient ID: Denae Nolasco is a 34 y.o. female who presents for Annual Exam (Referral to an OBGYN).    HPI     Denae Nolasco presents for annual physical exam.       Patient's recent visit notes, medication and allergy lists, past medical surgical social hx, immunization, vitals, problem list, recent tests were reviewed by me for pertinence to this visit.      PMH:   Schwannoma with removal via craniotomy.    Has been working with genetics, diagnosed with myatonic dystrophy- added creatinine and muscle support/PT as needed      Social Hx:  , no children  Not currently working due to health issues  - goal to return to work  Smoking: No  Alcohol: No  Recreational drug use: No      Screening tools:  PAP: Yes - unsure of last screening      Vaccinations:  Tdap: 2016        Review of Systems  GENERAL - Denies fever/chills, recent illness, unexplained weight loss  HEENT- Denies change in vision, double vision, blurred. Denies hearing changes, ear pain. Denies nose bleeds. Denies sore throat, difficulty swallowing.    RESP - Denies SOB or cough  CVS - Denies CP, palpitations  GI - Denies nausea or abdominal pain, hematochezia/melena  - Denies urinary frequency, urgency or incontinence.  Denies nocturia.   NEURO - Denies headache, dizziness  MSK - Denies joint, neck or back pain  Skin - Denies abnormal lesions, rash  PSYCH-Denies anxiety, depression, changes in mood      Objective     /64 (BP Location: Left arm, Patient Position: Sitting, BP Cuff Size: Adult)   Pulse 74   Temp 36.5 °C (97.7 °F) (Temporal)   Ht 1.6 m (5' 3\")   Wt 49.9 kg (110 lb)   SpO2 98%   BMI 19.49 kg/m²     Physical Exam  Vitals and nursing note reviewed.   Constitutional:       General: She is not in acute distress.     Appearance: Normal appearance. She is well-developed and well-groomed.   HENT:      Head: Normocephalic.      Jaw: There is normal jaw occlusion.      Comments: Left-sided facial droop.    "  Right Ear: Hearing, tympanic membrane, ear canal and external ear normal.      Left Ear: Hearing, tympanic membrane, ear canal and external ear normal.      Nose: Nose normal.      Mouth/Throat:      Lips: Pink.      Mouth: Mucous membranes are moist.      Pharynx: Oropharynx is clear. Uvula midline.   Eyes:      General: Lids are normal. Vision grossly intact. Gaze aligned appropriately.      Extraocular Movements: Extraocular movements intact.      Conjunctiva/sclera: Conjunctivae normal.      Pupils: Pupils are equal, round, and reactive to light.      Comments: Left-sided eyelid droop   Neck:      Thyroid: No thyromegaly or thyroid tenderness.      Vascular: No carotid bruit or JVD.      Trachea: Trachea and phonation normal.   Cardiovascular:      Rate and Rhythm: Normal rate and regular rhythm.      Pulses: Normal pulses.      Heart sounds: Normal heart sounds, S1 normal and S2 normal.   Pulmonary:      Effort: Pulmonary effort is normal.      Breath sounds: Normal breath sounds and air entry.   Abdominal:      General: Bowel sounds are normal. There is no distension.      Palpations: Abdomen is soft. There is no hepatomegaly, splenomegaly or mass.      Tenderness: There is no abdominal tenderness. There is no right CVA tenderness, left CVA tenderness, guarding or rebound.   Musculoskeletal:         General: Normal range of motion.      Cervical back: Normal, full passive range of motion without pain, normal range of motion and neck supple.      Thoracic back: Normal.      Lumbar back: Normal.      Right lower leg: No edema.      Left lower leg: No edema.   Lymphadenopathy:      Cervical: No cervical adenopathy.   Skin:     General: Skin is warm and dry.      Capillary Refill: Capillary refill takes less than 2 seconds.   Neurological:      General: No focal deficit present.      Mental Status: She is alert and oriented to person, place, and time.   Psychiatric:         Attention and Perception: Attention  normal.         Speech: Speech normal.         Behavior: Behavior normal. Behavior is cooperative.           Assessment & Plan  Annual physical exam  Well adult exam.  1. Age appropriate preventative measures reviewed.   2. Encouraged healthy diet and exercise.  3. Immunizations- Reviewed  4. Labs-reviewed with patient at this visit  5. Medications- Reviewed    *Follow-up in 1 year for repeat annual physical exam. Patient verbalizes understanding  regarding plan of care and all questions answered.    Orders:    CBC and Auto Differential; Future    Comprehensive metabolic panel; Future    Anxiety  Anxiety well controlled.  Continue escitalopram 5 mg by mouth daily.    Discussed medication desired effects, potential side effects, and how to administer the medication.   Discussed non-pharmacological interventions such seeing a therapist, stress reduction, diet, exercise, and sleep.   Follow up in 6 months or sooner if needed.   Patient verbalizes understanding regarding plan of care and all questions answered.         Dilated intrahepatic bile duct  Incidental finding of dilated CBD/intrahepatic dilation with CT scan.  Recommended follow-up with ERCP, will follow-up with results once completed  Orders:    [place] GI ERCP; Future    Schwannoma  Continue to follow with neurology as scheduled  Orders:    TSH with reflex to Free T4 if abnormal; Future    Pap smear for cervical cancer screening  She would like referral for gynecology at this visit to update Pap.  Orders:    Referral to Gynecology; Future    Screening for lipid disorders    Orders:    Lipid Panel; Future    Screening for diabetes mellitus    Orders:    Hemoglobin A1C; Future

## 2025-06-16 ENCOUNTER — HOSPITAL ENCOUNTER (OUTPATIENT)
Dept: RESPIRATORY THERAPY | Facility: CLINIC | Age: 35
Discharge: HOME | End: 2025-06-16
Payer: COMMERCIAL

## 2025-06-16 DIAGNOSIS — G71.20: ICD-10-CM

## 2025-06-16 PROCEDURE — 94727 GAS DIL/WSHOT DETER LNG VOL: CPT | Performed by: INTERNAL MEDICINE

## 2025-06-16 PROCEDURE — 94727 GAS DIL/WSHOT DETER LNG VOL: CPT

## 2025-06-16 PROCEDURE — 94729 DIFFUSING CAPACITY: CPT

## 2025-06-16 PROCEDURE — 94664 DEMO&/EVAL PT USE INHALER: CPT | Mod: 59

## 2025-06-16 PROCEDURE — 94060 EVALUATION OF WHEEZING: CPT

## 2025-06-16 PROCEDURE — 94729 DIFFUSING CAPACITY: CPT | Performed by: INTERNAL MEDICINE

## 2025-06-16 PROCEDURE — 94060 EVALUATION OF WHEEZING: CPT | Performed by: INTERNAL MEDICINE

## 2025-06-16 PROCEDURE — 94760 N-INVAS EAR/PLS OXIMETRY 1: CPT

## 2025-06-16 PROCEDURE — 9420000001 HC RT PATIENT EDUCATION 5 MIN

## 2025-06-22 ASSESSMENT — VISUAL ACUITY: OU: 1

## 2025-06-22 NOTE — ASSESSMENT & PLAN NOTE
Continue to follow with neurology as scheduled  Orders:    TSH with reflex to Free T4 if abnormal; Future

## 2025-06-25 ENCOUNTER — APPOINTMENT (OUTPATIENT)
Dept: OPHTHALMOLOGY | Facility: CLINIC | Age: 35
End: 2025-06-25
Payer: COMMERCIAL

## 2025-06-25 ENCOUNTER — OFFICE VISIT (OUTPATIENT)
Dept: NEUROLOGY | Facility: HOSPITAL | Age: 35
End: 2025-06-25
Payer: COMMERCIAL

## 2025-06-25 VITALS
TEMPERATURE: 97.1 F | HEIGHT: 63 IN | WEIGHT: 110 LBS | BODY MASS INDEX: 19.49 KG/M2 | DIASTOLIC BLOOD PRESSURE: 74 MMHG | SYSTOLIC BLOOD PRESSURE: 104 MMHG | RESPIRATION RATE: 18 BRPM | HEART RATE: 93 BPM

## 2025-06-25 DIAGNOSIS — H02.23B PARALYTIC LAGOPHTHALMOS OF UPPER AND LOWER EYELID OF LEFT EYE: ICD-10-CM

## 2025-06-25 DIAGNOSIS — H18.9 EXPOSURE KERATOPATHY: Primary | ICD-10-CM

## 2025-06-25 DIAGNOSIS — M62.81 MUSCLE WEAKNESS OF RIGHT UPPER EXTREMITY: ICD-10-CM

## 2025-06-25 DIAGNOSIS — G71.11 MYOTONIC DYSTROPHY, TYPE 1 (MULTI): Primary | ICD-10-CM

## 2025-06-25 PROCEDURE — 99213 OFFICE O/P EST LOW 20 MIN: CPT | Performed by: OPHTHALMOLOGY

## 2025-06-25 PROCEDURE — 99215 OFFICE O/P EST HI 40 MIN: CPT | Mod: GC | Performed by: PSYCHIATRY & NEUROLOGY

## 2025-06-25 PROCEDURE — 99215 OFFICE O/P EST HI 40 MIN: CPT | Performed by: PSYCHIATRY & NEUROLOGY

## 2025-06-25 PROCEDURE — 3008F BODY MASS INDEX DOCD: CPT | Performed by: PSYCHIATRY & NEUROLOGY

## 2025-06-25 ASSESSMENT — SLIT LAMP EXAM - LIDS: COMMENTS: NORMAL

## 2025-06-25 ASSESSMENT — ENCOUNTER SYMPTOMS
ENDOCRINE NEGATIVE: 0
MUSCULOSKELETAL NEGATIVE: 0
CONSTITUTIONAL NEGATIVE: 0
EYES NEGATIVE: 0
CARDIOVASCULAR NEGATIVE: 0
GASTROINTESTINAL NEGATIVE: 0
NEUROLOGICAL NEGATIVE: 0
RESPIRATORY NEGATIVE: 0
ALLERGIC/IMMUNOLOGIC NEGATIVE: 0
PSYCHIATRIC NEGATIVE: 0
HEMATOLOGIC/LYMPHATIC NEGATIVE: 0

## 2025-06-25 ASSESSMENT — VISUAL ACUITY
OS_CC: 20/200
CORRECTION_TYPE: GLASSES
METHOD: SNELLEN - LINEAR
OD_CC: 20/40

## 2025-06-25 ASSESSMENT — REFRACTION_WEARINGRX
OD_CYLINDER: -2.50
OS_AXIS: 053
OS_CYLINDER: -3.00
OS_SPHERE: -0.25
OD_AXIS: 092
OD_SPHERE: -0.75
SPECS_TYPE: SVL

## 2025-06-25 ASSESSMENT — PAIN SCALES - GENERAL
PAINLEVEL_OUTOF10: 0-NO PAIN
PAINLEVEL_OUTOF10: 0-NO PAIN

## 2025-06-25 ASSESSMENT — PUNCTA - ASSESSMENT
OD_PUNCTA: NORMAL
OS_PUNCTA: NORMAL

## 2025-06-25 ASSESSMENT — EXTERNAL EXAM - RIGHT EYE: OD_EXAM: NORMAL

## 2025-06-25 NOTE — PATIENT INSTRUCTIONS
Thank you so much for choosing me to provide your care today!    If you were dilated your vision may remain blurry   or light sensitive for several hours.    The nature of eye and vision problems can require frequent follow up, please make every effort to adhere to any future appointments.    If you have any issues, questions, or concerns,   please do not hesitate to reach out.    If you receive a survey in regards to your care today, please mention any exceptional care my office staff and/or technicians provided.    You can reach our office at this number:    [phone]    Please consider signing up for and utilizing [email or web address]!  This is the best way to directly reach me or other  providers

## 2025-06-25 NOTE — ASSESSMENT & PLAN NOTE
Well covered ocular surface with ointment lubricants. No signs of active exposure. Will have continue and will monitor with serial exam.

## 2025-06-25 NOTE — PATIENT INSTRUCTIONS
- MRI Cspine and MRI right shoulder without contrast to look for etiology of right arm weakness. You can call [phone] to schedule appointment for images, such as MRI.  - provided more information on precautions for anesthesia  - increase gabapentin to 200mg twice a day as tolerated  - Complete blood work from primary provider   - Continue to follow up with cardiology, pulmonology, ophthalmology providers routinely  - we will reach out her pulmonologist to see if there is anyway for her to get sleep study done sooner to allow her to proceed with eye surgery and plan for follow up  - Follow up in 6 months

## 2025-06-25 NOTE — PROGRESS NOTES
Assessment/Plan   Problem List Items Addressed This Visit       Paralytic lagophthalmos    Following with Dr. Squires. Had upcoming surgery but appears like will be rescheduled.          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 No signs of active exposure. Will have continue and will monitor with serial exam.             Provided reassurance regarding above diagnoses and care received in the office visit today. Discussed outcomes and options along with the importance of treatment compliance. Understands the importance of any follow up visits. Patient instructed to call/communicate with our office if any new issues, questions, or concerns.     Will plan to see back in 6 months full or sooner PRN

## 2025-06-25 NOTE — PROGRESS NOTES
Neuromuscular Medicine Follow Up     Denae Nolasco, MRN: [id number], : 1990  Reason for Visit: No chief complaint on file.     Primary Care Physician: MERRILL Carrera     Impression/Plan:   Denae Nolasco presents to follow-up myotonic dystrophy type I, right face and neck numbness and hyperesthesia and right arm weakness.      For her myotonic dystrophy, she has been seen by genetics who confirmed the diagnosis on genetic testing; she was also evaluated by cardiology and pulmonology and will be seeing ophthalmology today.  She is trying to get surgery of the left eye and is try to schedule a sleep study per anesthesiology request; we will reach out to her pulmonologist to see if there is any way to get her an appointment and to follow-up option given her myotonic dystrophy and high risk of respiratory issues.  We also provided her with information on precautions for anesthesia and stressed that she should avoid succinylcholine and similar neuromuscular blocking agents, which her anesthesiology team is aware of.    For her right face and neck numbness and hyperesthesia, we discussed to trial increasing gabapentin to 200 mg twice a day and see how things will go.  She is taking very low-dose and there is room to go up if tolerated and if she finds it beneficial.    She continued to have right-sided proximal arm weakness and etiology is unclear at this time.  We will obtain an MRI of her cervical spine and MRI of the right shoulder to look for possible etiology.    She mentioned that she has been tired several months.  Her blood work from about 1 year ago showed evidence of anemia and vitamin D deficiency.  She recently saw her PCP who ordered blood works.  We encouraged her to complete the blood work and follow-up with her PCP about it.    Will plan to see her in 6 months or sooner if needed.    Plan:  Problem List Items Addressed This Visit    None  Visit Diagnoses         Myotonic dystrophy,  type 1 (Multi)    -  Primary      Muscle weakness of right upper extremity        Relevant Orders    MR cervical spine wo IV contrast    MR shoulder right wo IV contrast          - MRI Cspine and MRI right shoulder without contrast to look for etiology of right arm weakness  - provided more information on precautions for anesthesia  - increase gabapentin to 200mg twice a day as tolerated  - Continue to follow up with cardiology, pulmonology, ophthalmology providers routinely  - Complete blood work from primary provider   - we will reach out her pulmonologist to see if there is anyway for her to get sleep study done sooner to allow her to proceed with eye surgery and plan for follow up  - Follow up in 6 months     Britney Brady MD  Neuromuscular Fellow       History of Present Illness:    Ms. Nolasco is a 34 y.o.  female with myotonic dystrophy type I who presents for follow up. She is accompanied by her .     Since last visit, she was able to see genetics and was diagnosed with myotonic dystrophy type I.     She was seen by cardiology, had cardiac MRI  and 2 weeks of holter monitor which were both normal. Plan to follow up yearly.    She was seen by pulmonology and had PFT, result is still pending. She denies having any trouble breathing.     She will see her ophthalmologist this afternoon.     She still has burning pain involving the right side of her face and neck with tender to palpation. No pain if not touched or not in the shower. She has been taking gabapentin 100mg BID prescribed in March. Denies side effect and didn't experience any relief. She reports being sleepy since August 2024. She sleeps 8 hours, feels ok in the morning then has to nap for 1-3 hours during the day.    She tried to schedule sleep study but was told to call back In August/Sept. She has an appt with sleep medicine provider 9/11/2025.    Prior History:   She initially presented to neuromuscular clinic in March 2025 for evaluation of  "noted myotonic discharges on her EMG studies in Jan and Feb 2025.   Patient reports onset of bilateral upper extremity spasms with , and tongue spasm since 2020.  Denies any known family history of underlying myopathy.  Over time, her bilateral hand spasms, and tongue spasms became more frequent and severe prompting further workup.  She also reported progression of vertigo/ataxia, headaches and hearing loss which prompted further neuroimaging, and underwent left retrosigmoid craniotomy for tumor resection 8/2024 with pathology showing schwannoma.  Patient was evaluated for right shoulder weakness, with EMG January 20205 showing myotonia on several tested muscles including right deltoid, biceps, first dorsal interossei, abductor pollicis brevis muscles.  Since then, patient was referred to neuromuscular clinic for further evaluation of underlying myelopathy.   Examination was noted for percussion myotonia and  myotonia, tongue myotonia, facial features including thin/long face and hollow temples, and high arched palate. This raised concern for myotonic dystrophy.   Patient was referred to genetics, cardiology, pulmonology and continued to see ophthalmology.     Relevant past medical, surgical, family, and social histories, along with ROS was reviewed and pertinent details noted above.     RX Allergies[1]   Medications:  Current Medications[2]       Physical Exam:   /74   Pulse 93   Temp 36.2 °C (97.1 °F)   Resp 18   Ht 1.6 m (5' 3\")   Wt 49.9 kg (110 lb)   BMI 19.49 kg/m²      General Appearance:  No distress, alert, interactive and cooperative.   Thin and long face with hollow temples, high arched palate, and left LMN facial weakness    Neurological:  Mental status: the patient provided an accurate history, language was normal.  Able to follow two-step complex commands.  Cranial Nerves:  CN 2      Visual fields full to confrontation.   CN 3, 4, 6   EOMs full range. Right beating nystagmus at " right end gaze.   CN 5   Facial sensation intact bilaterally, except right V3 (and V2) distribution with decreased sensation to pinprick and light touch   Jaw opening 5/5   CN 7   Asymmetrical facial strength with weakness on the left side (LMN)  Nasolabial folds asymmetric.   Unable to lift left eyebrow and close eye lid fully.  CN 8   Significantly reduced hearing on left side.     CN 9, 10   Palate elevates symmetrically.  High arched palate.  Phonation within normal limits, no dysarthria.   CN 11   Normal strength of shoulder shrug and neck turning.   CN 12   Tongue midline, with normal bulk, but reduced strength with resistance.  Tongue myotonia present with percussion     Motor:   Muscle bulk: Normal throughout.  Muscle tone: Normal in both upper and lower extremities.  Movements: No fasciculations, tremors, or other abnormal movement.   There is no scapular winging.    There is very subtle percussion myotonia and mild  myotonia.      Manual Muscle Testing (MMT) reveals the following MRC grades:  Neck Flexion 5  Neck Extension 5  R          L   Shoulder abduction                 5- 5  Shoulder adduction                5          5  Elbow flexion                           5         5  Elbow extension                      4+ 5  Wrist extension                       5          5  Wrist flexion                            5         5  Finger flexion                           5          5  Finger extension                      5          5  Finger abduction                     5          5  Thumb abduction                    5          5     Hip flexion                               5          5  Hip extension                          5          5  Hip abduction                          5          5  Hip adduction                          5          5  Knee flexion                             5          5  Knee extension                        5          5  Ankle dorsiflexion                    5           5  Ankle plantarflexion                5          5  Ankle Inversion                        5          5  Ankle Eversion                         5          5  Big toe extension                    5          5  Toe flexion                               5          5     Reflexes:     R          L  BR:               1          2  Biceps:         1          2  Triceps:        1          2  Knee:           3          3  Ankle:          2          2     Babinski: Toes are down going  No clonus or other pathological reflexes     Sensory:   In both upper and lower extremities, sensation was intact to light touch, temperature, pinprick, and vibration.      Coordination:    In left upper extremities, finger-nose-finger with mild dysmetria   In the right upper extremity finger-nose-finger intact with no dysmetria or overshoot.      Gait:   Station was stable with a normal base. Gait was stable with a normal arm swing and speed.  Unable to complete heel walk and tandem gait due to balance issues.  Able to toe walk.  No Romberg sign was present.    Results:     The following labs, imaging, and/or data were personally reviewed and demonstrated:    She underwent 2 EMG studies.  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 EMG showed myotonia several tested muscles including right deltoid, biceps, FDI, and APB muscles.  Second EMG completed 2/20/2025 for evaluation of left facial palsy.  This study showed electrophysiologic evidence of facial nerve continuity left mentalis and left nasalis muscles.  There was limited EMG examination given reduced muscle bulk on the left side.  There was also evidence of myotonia in the left mentalis muscles.        FINAL DIAGNOSIS   A, B.  LEFT CEREBELLOPONTINE ANGLE TUMOR, BIOPSY AND REMOVAL:  - SCHWANNOMA   Electronically signed by Russell Nunez MD on 8/9/2024 at 1128              Lab Results   Component Value Date    TUUDFJYY36 433 06/06/2024     CBC:   Lab Results   Component Value Date    WBC 8.2 08/13/2024    HGB 11.8 (L) 08/13/2024    HCT 35.2 (L) 08/13/2024     08/13/2024     BMP:   Lab Results   Component Value Date     08/13/2024    K 3.7 08/13/2024     08/13/2024    CO2 30 08/13/2024    BUN 13 08/13/2024    CREATININE 0.41 (L) 01/29/2025    CALCIUM 8.7 08/13/2024    MG 2.08 08/13/2024    PHOS 3.0 08/13/2024     LFT:   Lab Results   Component Value Date    ALKPHOS 66 06/06/2024    BILITOT 0.5 06/06/2024    PROT 6.6 06/06/2024    ALBUMIN 3.8 08/13/2024    ALT 18 06/06/2024    AST 21 06/06/2024     -----------------------------------------------------------------------------------------------------------------------------  ATTENDING ATTESTATION    I saw patient with trainee and agree with the edits, history and exam that I helped formulate per above.    I personally spent 40 minutes on the day of the visit completing the review of the medical record and outside records, obtaining history and performing an appropriate physical exam, patient care, counseling and education, independently reviewing results, communicating with the patient/family, and coordinating care.    Chelsea Wesley MD  Neuromuscular Neurology  [facility]  Office Phone Number: [phone]          [1]   Allergies  Allergen Reactions    Succinylcholine Other     Contraindicated in Myotonic Dystrophy type 1   [2]   Current Outpatient Medications:     dextran 70-hypromellose, PF, (Bion Tears) 0.1-0.3 % ophthalmic solution, 1 drop 3 times a day as needed for dry eyes., Disp: , Rfl:     erythromycin (Romycin) 5 mg/gram (0.5 %) ophthalmic ointment, Apply to left eye 4 times a day., Disp: 3.5 g, Rfl: 3    escitalopram (Lexapro) 5 mg tablet, Take 1 tablet (5 mg) by mouth once daily., Disp: 90 tablet, Rfl: 1    gabapentin (Neurontin) 100 mg capsule, Take 1 capsule (100 mg) by mouth 2 times a  day., Disp: 180 capsule, Rfl: 1    lubricating eye drops ophthalmic solution, Administer 1 drop into the left eye every 1 hour if needed for dry eyes., Disp: , Rfl:

## 2025-06-28 LAB
ALBUMIN SERPL-MCNC: 4.4 G/DL (ref 3.6–5.1)
ALP SERPL-CCNC: 58 U/L (ref 31–125)
ALT SERPL-CCNC: 20 U/L (ref 6–29)
ANION GAP SERPL CALCULATED.4IONS-SCNC: 7 MMOL/L (CALC) (ref 7–17)
AST SERPL-CCNC: 25 U/L (ref 10–30)
BASOPHILS # BLD AUTO: 22 CELLS/UL (ref 0–200)
BASOPHILS NFR BLD AUTO: 0.5 %
BILIRUB SERPL-MCNC: 0.6 MG/DL (ref 0.2–1.2)
BUN SERPL-MCNC: 8 MG/DL (ref 7–25)
CALCIUM SERPL-MCNC: 8.9 MG/DL (ref 8.6–10.2)
CHLORIDE SERPL-SCNC: 105 MMOL/L (ref 98–110)
CHOLEST SERPL-MCNC: 192 MG/DL
CHOLEST/HDLC SERPL: 2.8 (CALC)
CO2 SERPL-SCNC: 27 MMOL/L (ref 20–32)
CREAT SERPL-MCNC: 0.58 MG/DL (ref 0.5–0.97)
EGFRCR SERPLBLD CKD-EPI 2021: 122 ML/MIN/1.73M2
EOSINOPHIL # BLD AUTO: 30 CELLS/UL (ref 15–500)
EOSINOPHIL NFR BLD AUTO: 0.7 %
ERYTHROCYTE [DISTWIDTH] IN BLOOD BY AUTOMATED COUNT: 12.1 % (ref 11–15)
EST. AVERAGE GLUCOSE BLD GHB EST-MCNC: 100 MG/DL
EST. AVERAGE GLUCOSE BLD GHB EST-SCNC: 5.5 MMOL/L
GLUCOSE SERPL-MCNC: 79 MG/DL (ref 65–99)
HBA1C MFR BLD: 5.1 %
HCT VFR BLD AUTO: 40.3 % (ref 35–45)
HDLC SERPL-MCNC: 69 MG/DL
HGB BLD-MCNC: 13 G/DL (ref 11.7–15.5)
LDLC SERPL CALC-MCNC: 104 MG/DL (CALC)
LYMPHOCYTES # BLD AUTO: 1329 CELLS/UL (ref 850–3900)
LYMPHOCYTES NFR BLD AUTO: 30.9 %
MCH RBC QN AUTO: 28.5 PG (ref 27–33)
MCHC RBC AUTO-ENTMCNC: 32.3 G/DL (ref 32–36)
MCV RBC AUTO: 88.4 FL (ref 80–100)
MONOCYTES # BLD AUTO: 361 CELLS/UL (ref 200–950)
MONOCYTES NFR BLD AUTO: 8.4 %
NEUTROPHILS # BLD AUTO: 2559 CELLS/UL (ref 1500–7800)
NEUTROPHILS NFR BLD AUTO: 59.5 %
NONHDLC SERPL-MCNC: 123 MG/DL (CALC)
PLATELET # BLD AUTO: 214 THOUSAND/UL (ref 140–400)
PMV BLD REES-ECKER: 10.6 FL (ref 7.5–12.5)
POTASSIUM SERPL-SCNC: 4.8 MMOL/L (ref 3.5–5.3)
PROT SERPL-MCNC: 6.8 G/DL (ref 6.1–8.1)
RBC # BLD AUTO: 4.56 MILLION/UL (ref 3.8–5.1)
SODIUM SERPL-SCNC: 139 MMOL/L (ref 135–146)
TRIGL SERPL-MCNC: 96 MG/DL
TSH SERPL-ACNC: 0.99 MIU/L
WBC # BLD AUTO: 4.3 THOUSAND/UL (ref 3.8–10.8)

## 2025-07-03 ENCOUNTER — APPOINTMENT (OUTPATIENT)
Dept: RADIOLOGY | Facility: HOSPITAL | Age: 35
End: 2025-07-03
Payer: COMMERCIAL

## 2025-07-07 DIAGNOSIS — D36.10 SCHWANNOMA: Primary | ICD-10-CM

## 2025-07-11 ENCOUNTER — APPOINTMENT (OUTPATIENT)
Dept: RADIOLOGY | Facility: HOSPITAL | Age: 35
End: 2025-07-11
Payer: COMMERCIAL

## 2025-07-11 DIAGNOSIS — D36.10 SCHWANNOMA: Primary | ICD-10-CM

## 2025-07-22 ENCOUNTER — OFFICE VISIT (OUTPATIENT)
Dept: PRIMARY CARE | Facility: CLINIC | Age: 35
End: 2025-07-22
Payer: COMMERCIAL

## 2025-07-22 VITALS
OXYGEN SATURATION: 97 % | BODY MASS INDEX: 19.49 KG/M2 | SYSTOLIC BLOOD PRESSURE: 112 MMHG | HEIGHT: 63 IN | WEIGHT: 110 LBS | TEMPERATURE: 97.6 F | HEART RATE: 88 BPM | DIASTOLIC BLOOD PRESSURE: 62 MMHG

## 2025-07-22 DIAGNOSIS — N63.14 BREAST LUMP ON RIGHT SIDE AT 5 O'CLOCK POSITION: Primary | ICD-10-CM

## 2025-07-22 PROCEDURE — 1036F TOBACCO NON-USER: CPT

## 2025-07-22 PROCEDURE — 99212 OFFICE O/P EST SF 10 MIN: CPT

## 2025-07-22 PROCEDURE — 3008F BODY MASS INDEX DOCD: CPT

## 2025-07-22 NOTE — PROGRESS NOTES
"Subjective     Patient ID: Denae Nolasco is a 34 y.o. female who presents for Breast Mass (Right breast mass  ).      HPI  Denae presents for concerns for mass in her right breast.   noticed small pea sized mass about 1 year ago, at that time she was recovering from craniotomy secondary to schwannoma.  Recently the mass has become larger in size and more noticeable.  She denies any breast pain.  No skin changes of her right breast.     Patient's recent visit notes, medication and allergy lists, past medical surgical social hx, immunization, vitals, problem list, recent tests were reviewed by me for pertinence to this visit.        Review of Systems  All other systems have been reviewed and are negative except as noted in the HPI.         Objective   /62 (BP Location: Left arm, Patient Position: Sitting, BP Cuff Size: Adult)   Pulse 88   Temp 36.4 °C (97.6 °F) (Temporal)   Ht 1.6 m (5' 3\")   Wt 49.9 kg (110 lb)   SpO2 97%   BMI 19.49 kg/m²       Physical Exam  Vitals and nursing note reviewed. Chaperone present: declines.   Constitutional:       General: She is not in acute distress.  Chest:      Chest wall: No tenderness.   Breasts:     Breasts are symmetrical.      Right: Mass (5 oclock position) present. No swelling, inverted nipple, nipple discharge, skin change or tenderness.      Left: Normal.     Lymphadenopathy:      Upper Body:      Right upper body: No supraclavicular, axillary or pectoral adenopathy.      Left upper body: No supraclavicular, axillary or pectoral adenopathy.     Neurological:      Mental Status: She is alert.     Psychiatric:         Behavior: Behavior is cooperative.             Assessment & Plan  Breast lump on right side at 5 o'clock position  Persistent  concern  Obtain diagnostic mammogram as discussed, will follow up with results upon completion  Orders:    BI mammo bilateral diagnostic; Future              Alondra Alegre, APRN-CNP  "

## 2025-08-01 ENCOUNTER — APPOINTMENT (OUTPATIENT)
Dept: PULMONOLOGY | Facility: CLINIC | Age: 35
End: 2025-08-01
Payer: COMMERCIAL

## 2025-08-06 ENCOUNTER — EVALUATION (OUTPATIENT)
Dept: OCCUPATIONAL THERAPY | Facility: CLINIC | Age: 35
End: 2025-08-06
Payer: COMMERCIAL

## 2025-08-06 DIAGNOSIS — R41.844 IMPAIRED EXECUTIVE FUNCTIONING: Primary | ICD-10-CM

## 2025-08-06 PROCEDURE — 97165 OT EVAL LOW COMPLEX 30 MIN: CPT | Mod: GO | Performed by: OCCUPATIONAL THERAPIST

## 2025-08-06 NOTE — PROGRESS NOTES
Occupational Therapy    Evaluation    Patient Name: Denae Nolasco  MRN: [id number]  Department: [address]  Room: Room/bed info not found  Today's Date: 8/6/2025           Assessment:   Pt arrived today with goal of having cognitive testing completed, specially the [place] COGNITIVE ASSESSMENT (MOCA).   The patient scored a 27/30, which indicates normal cognitive functioning.  It should be noted that the patient did take slight increase in time for completion of the test.  Pt's only deficits were within the language and memory subtests.      Plan:   MOCA testing only   -no follow up treatments required at this time        Subjective   Current Problem:  No diagnosis found.    Pt arrived today with goal of having the MOCA test completed for a cognitive assessment.  Pt notes wanting to feel confident in returning to work as a .   Pt with diagnosis of Myotonic Dystrophy Type 1.       Objective   Cognition:      [place] Cognitive Test Completed (MOCA)   Visuospatial/executive: 5/5    Naming: 3/3    Attention: 6/6    Language: 1/3   Abstraction: 2/2   Delayed Recall: 4/5  (was able to provide the last word with category cueing)     Outcome Measures:    OP EDUCATION:   Pt educated on results of MOCA testing.     Goals:  No goals at this time.  Evaluation only.

## 2025-08-12 ENCOUNTER — HOSPITAL ENCOUNTER (OUTPATIENT)
Dept: RADIOLOGY | Facility: HOSPITAL | Age: 35
Discharge: HOME | End: 2025-08-12
Payer: COMMERCIAL

## 2025-08-12 ENCOUNTER — DOCUMENTATION (OUTPATIENT)
Dept: NEUROLOGY | Facility: HOSPITAL | Age: 35
End: 2025-08-12
Payer: COMMERCIAL

## 2025-08-12 VITALS — BODY MASS INDEX: 19.49 KG/M2 | WEIGHT: 110 LBS | HEIGHT: 63 IN

## 2025-08-12 DIAGNOSIS — N63.14 BREAST LUMP ON RIGHT SIDE AT 5 O'CLOCK POSITION: ICD-10-CM

## 2025-08-12 PROCEDURE — 76642 ULTRASOUND BREAST LIMITED: CPT | Mod: RT

## 2025-08-12 PROCEDURE — 76642 ULTRASOUND BREAST LIMITED: CPT | Performed by: RADIOLOGY

## 2025-08-12 PROCEDURE — 77062 BREAST TOMOSYNTHESIS BI: CPT | Performed by: RADIOLOGY

## 2025-08-12 PROCEDURE — 77062 BREAST TOMOSYNTHESIS BI: CPT

## 2025-08-12 PROCEDURE — 77066 DX MAMMO INCL CAD BI: CPT | Performed by: RADIOLOGY

## 2025-08-15 ENCOUNTER — TELEPHONE (OUTPATIENT)
Facility: CLINIC | Age: 35
End: 2025-08-15
Payer: COMMERCIAL

## 2025-08-18 PROBLEM — N63.13 MASS OF LOWER OUTER QUADRANT OF RIGHT BREAST: Status: ACTIVE | Noted: 2025-08-18

## 2025-08-19 ENCOUNTER — OFFICE VISIT (OUTPATIENT)
Dept: SURGERY | Facility: CLINIC | Age: 35
End: 2025-08-19
Payer: COMMERCIAL

## 2025-08-19 ENCOUNTER — HOSPITAL ENCOUNTER (OUTPATIENT)
Dept: RADIOLOGY | Facility: HOSPITAL | Age: 35
Discharge: HOME | End: 2025-08-19
Payer: COMMERCIAL

## 2025-08-19 VITALS
SYSTOLIC BLOOD PRESSURE: 102 MMHG | WEIGHT: 110 LBS | RESPIRATION RATE: 17 BRPM | BODY MASS INDEX: 19.49 KG/M2 | TEMPERATURE: 98.2 F | HEART RATE: 84 BPM | DIASTOLIC BLOOD PRESSURE: 77 MMHG | OXYGEN SATURATION: 98 % | HEIGHT: 63 IN

## 2025-08-19 DIAGNOSIS — N63.10 BREAST MASS, RIGHT: ICD-10-CM

## 2025-08-19 DIAGNOSIS — N63.13 MASS OF LOWER OUTER QUADRANT OF RIGHT BREAST: Primary | ICD-10-CM

## 2025-08-19 DIAGNOSIS — R92.8 OTHER ABNORMAL AND INCONCLUSIVE FINDINGS ON DIAGNOSTIC IMAGING OF BREAST: ICD-10-CM

## 2025-08-19 PROCEDURE — 2780000003 HC OR 278 NO HCPCS

## 2025-08-19 PROCEDURE — 3008F BODY MASS INDEX DOCD: CPT | Performed by: SURGERY

## 2025-08-19 PROCEDURE — C1819 TISSUE LOCALIZATION-EXCISION: HCPCS

## 2025-08-19 PROCEDURE — 99214 OFFICE O/P EST MOD 30 MIN: CPT | Performed by: SURGERY

## 2025-08-19 PROCEDURE — 99204 OFFICE O/P NEW MOD 45 MIN: CPT | Performed by: SURGERY

## 2025-08-19 PROCEDURE — 2500000004 HC RX 250 GENERAL PHARMACY W/ HCPCS (ALT 636 FOR OP/ED): Performed by: GENERAL PRACTICE

## 2025-08-19 PROCEDURE — 19083 BX BREAST 1ST LESION US IMAG: CPT | Mod: RT

## 2025-08-19 PROCEDURE — 19083 BX BREAST 1ST LESION US IMAG: CPT | Mod: RIGHT SIDE | Performed by: GENERAL PRACTICE

## 2025-08-19 PROCEDURE — 1036F TOBACCO NON-USER: CPT | Performed by: SURGERY

## 2025-08-19 RX ADMIN — Medication 9 ML: at 14:43

## 2025-08-19 ASSESSMENT — PAIN SCALES - GENERAL
PAINLEVEL_OUTOF10: 0 - NO PAIN
PAINLEVEL_OUTOF10: 0-NO PAIN
PAINLEVEL_OUTOF10: 0 - NO PAIN

## 2025-08-19 ASSESSMENT — PATIENT HEALTH QUESTIONNAIRE - PHQ9
1. LITTLE INTEREST OR PLEASURE IN DOING THINGS: NOT AT ALL
SUM OF ALL RESPONSES TO PHQ9 QUESTIONS 1 & 2: 0
2. FEELING DOWN, DEPRESSED OR HOPELESS: NOT AT ALL

## 2025-08-19 ASSESSMENT — LIFESTYLE VARIABLES
HOW MANY STANDARD DRINKS CONTAINING ALCOHOL DO YOU HAVE ON A TYPICAL DAY: PATIENT DOES NOT DRINK
HOW OFTEN DO YOU HAVE A DRINK CONTAINING ALCOHOL: NEVER
HOW OFTEN DO YOU HAVE SIX OR MORE DRINKS ON ONE OCCASION: NEVER
AUDIT-C TOTAL SCORE: 0
SKIP TO QUESTIONS 9-10: 1

## 2025-08-19 ASSESSMENT — ENCOUNTER SYMPTOMS
OCCASIONAL FEELINGS OF UNSTEADINESS: 0
LOSS OF SENSATION IN FEET: 0
DEPRESSION: 0

## 2025-08-20 ENCOUNTER — APPOINTMENT (OUTPATIENT)
Dept: OTOLARYNGOLOGY | Facility: CLINIC | Age: 35
End: 2025-08-20
Payer: COMMERCIAL

## 2025-08-21 ENCOUNTER — APPOINTMENT (OUTPATIENT)
Dept: OBSTETRICS AND GYNECOLOGY | Facility: CLINIC | Age: 35
End: 2025-08-21
Payer: COMMERCIAL

## 2025-08-25 LAB
LABORATORY COMMENT REPORT: NORMAL
PATH REPORT.FINAL DX SPEC: NORMAL
PATH REPORT.GROSS SPEC: NORMAL
PATH REPORT.RELEVANT HX SPEC: NORMAL
PATH REPORT.TOTAL CANCER: NORMAL

## 2025-08-26 ENCOUNTER — RESULTS FOLLOW-UP (OUTPATIENT)
Dept: SURGERY | Facility: CLINIC | Age: 35
End: 2025-08-26
Payer: COMMERCIAL

## 2025-09-05 ENCOUNTER — APPOINTMENT (OUTPATIENT)
Dept: SLEEP MEDICINE | Facility: CLINIC | Age: 35
End: 2025-09-05
Payer: COMMERCIAL

## 2025-09-11 ENCOUNTER — APPOINTMENT (OUTPATIENT)
Dept: SLEEP MEDICINE | Facility: HOSPITAL | Age: 35
End: 2025-09-11
Payer: COMMERCIAL

## 2025-09-11 ENCOUNTER — APPOINTMENT (OUTPATIENT)
Dept: SURGERY | Facility: CLINIC | Age: 35
End: 2025-09-11
Payer: COMMERCIAL

## 2025-09-17 ENCOUNTER — APPOINTMENT (OUTPATIENT)
Dept: OTOLARYNGOLOGY | Facility: HOSPITAL | Age: 35
End: 2025-09-17
Payer: COMMERCIAL

## 2025-09-25 ENCOUNTER — APPOINTMENT (OUTPATIENT)
Facility: CLINIC | Age: 35
End: 2025-09-25
Payer: COMMERCIAL

## 2025-11-04 ENCOUNTER — APPOINTMENT (OUTPATIENT)
Dept: SLEEP MEDICINE | Facility: HOSPITAL | Age: 35
End: 2025-11-04
Payer: COMMERCIAL

## 2025-12-12 ENCOUNTER — APPOINTMENT (OUTPATIENT)
Dept: PRIMARY CARE | Facility: CLINIC | Age: 35
End: 2025-12-12
Payer: COMMERCIAL

## 2026-01-06 ENCOUNTER — APPOINTMENT (OUTPATIENT)
Dept: OPHTHALMOLOGY | Facility: CLINIC | Age: 36
End: 2026-01-06
Payer: COMMERCIAL

## (undated) DEVICE — SEALANT, HEMOSTATIC, FLOSEAL, 10 ML

## (undated) DEVICE — DRAPE, SHEET, 17 X 23 IN

## (undated) DEVICE — Device

## (undated) DEVICE — BUR, ROUTER BIT, FA2, TAPERED, 2.3MM

## (undated) DEVICE — COVER, TABLE, UHC

## (undated) DEVICE — NEEDLE, ELECTRODE, SUBDERMAL, PAIRED, 2.0 LEAD, DISP

## (undated) DEVICE — MANIFOLD, 4 PORT NEPTUNE STANDARD

## (undated) DEVICE — GOWN, SURGICAL, SMARTGOWN, LARGE, STERILE

## (undated) DEVICE — DRAPE, SMARTDRAPE, FOR TIVATO MICROSCOPE

## (undated) DEVICE — DRESSING, ADAPTIC, NON-ADHERENT, 3 X 8 IN

## (undated) DEVICE — SUTURE, VICRYL, 2-0, 27 IN, BR/SH 27, VIOLET

## (undated) DEVICE — RETRACTOR, HOOK, FISH, NEURO

## (undated) DEVICE — SOLUTION, HYDROGEN PEROXIDE 3%, 8 OZ

## (undated) DEVICE — SYRINGE, 50 CC, LUER LOCK

## (undated) DEVICE — SUTURE, POLYSORB, 2-0, 18 IN, GS23, DETACHABLE, MULTIPACK, UNDYED

## (undated) DEVICE — BOWL, UTILITY, 32 OZ, PLASTIC, STERILE

## (undated) DEVICE — DRESSING, MEPILEX, BORDER, SACRUM, 8.7 X 9.8 IN

## (undated) DEVICE — BALL, COTTON, STANDARD, STERILE

## (undated) DEVICE — DRAPE, IRRIGATION, W/POUCH, ADHESIVE STRIP, STERI DRAPE, 19 X 23 IN, DISPOSABLE, STERILE

## (undated) DEVICE — PROTECTOR, NERVE, ULNAR, PINK

## (undated) DEVICE — IMPLANTABLE DEVICE: Type: IMPLANTABLE DEVICE | Site: CRANIAL | Status: NON-FUNCTIONAL

## (undated) DEVICE — COTTON BALL, DBL STRUNG, XRAY DETECT, MEDIUM, 0.75IN, ST(5PK)

## (undated) DEVICE — STOCKINETTE, DOUBLE PLY, 6 X 48 IN, STERILE

## (undated) DEVICE — GOWN, SURGICAL, SMARTGOWN, XLARGE, STERILE

## (undated) DEVICE — TUBING, SUCTION, CONNECTING, STERILE 0.25 X 120 IN., LF

## (undated) DEVICE — DRAPE, SHEET, FAN FOLDED, HALF, 44 X 58 IN, DISPOSABLE, LF, STERILE

## (undated) DEVICE — BAG, DRAINAGE, URINARY, W/ANTI-REFLUX CHAMBER, INFECTION CON

## (undated) DEVICE — CONTAINER, SPECIMEN, 120 ML, STERILE

## (undated) DEVICE — TOWEL, SURGICAL, NEURO, O/R, 16 X 26, BLUE, STERILE

## (undated) DEVICE — CLEANER, WIPE, INSTRUMENT, 3.25 X 3.25 IN

## (undated) DEVICE — STOCKINETTE, IMPERVIOUS, 12 X 48 IN, STERILE

## (undated) DEVICE — SUTURE, NUROLON, 4-0, 18 IN, TF, CONTROL RELEASE, MULTIPACK, BLACK

## (undated) DEVICE — TUBING, SMOKE EVAC, 3/8 X 10 FT

## (undated) DEVICE — BUR, PERFORATOR, CRANIAL, ACRA-CUT 14/11MM, CDM

## (undated) DEVICE — REST, HEAD, BAGEL, 9 IN

## (undated) DEVICE — CASSETTE, SONOPET IQ IRRIGATION SUCTION

## (undated) DEVICE — STOCKINETTE, IMPERVIOUS, 12 X 48 IN, LF, STERILE

## (undated) DEVICE — MARKER, SKIN, DUAL TIP INK W/9 LABEL AND REMOVABLE TIME OUT SLEEVE

## (undated) DEVICE — SPONGE, HEMOSTATIC, GELATIN, SURGIFOAM, 8 X 12.5 CM X 10 MM

## (undated) DEVICE — DRAPE, TIBURON, SPLIT SHEET, REINF ADH STRIP, 77X122

## (undated) DEVICE — DRAPE PACK, CRANIOTOMY, CUSTOM, UHC

## (undated) DEVICE — SPONGE, HEMOSTATIC, CELLULOSE, SURGICEL, 2 X 14 IN

## (undated) DEVICE — DRESSING, GAUZE, 16 PLY, 4 X 4 IN, STERILE

## (undated) DEVICE — BUR, ACORN 9MM PRECISION

## (undated) DEVICE — ELECTRODE, GROUND PLATE

## (undated) DEVICE — CATHETER TRAY, SURESTEP, 16FR, URINE METER W/STATLOCK

## (undated) DEVICE — PLEDGET, PTFE, FIRM, 3/8 X 3/16 X 1/6 IN, MULTIPACK

## (undated) DEVICE — COTTON BALL, DOUBLE STRING, SMALL

## (undated) DEVICE — ELECTRODE, CORKSCREW NEEDLE 1.5M LENGTH

## (undated) DEVICE — MARKER, SKIN, RULER AND LABEL PACK, CUSTOM

## (undated) DEVICE — BLADE, OPHTHALMIC, MINI, STRAIGHT, DOUBLE BEVEL, SHARP ALL AROUND

## (undated) DEVICE — SYRINGE, 2 OZ, IRRIGATION, BULB, EAR/ULCER, BLUE, LF

## (undated) DEVICE — PROBE, STIMULATOR, MONOPOLAR, FLUSH TIP, PRASS, W/HANDLE, STANDARD

## (undated) DEVICE — PAD, GROUNDING, ELECTROSURGICAL, W/9 FT CABLE, POLYHESIVE II, ADULT, LF

## (undated) DEVICE — TUBE, GASTROSTOMY, PONSKY, NON-BALLOON REPLACE, 20 FR

## (undated) DEVICE — TRACKER, STINGRAY, NON-INVASIVE, AXIEM STEALTH NAVIGATION, NEURO

## (undated) DEVICE — DRESSING, MEPILEX, BORDER, HEEL, 8.7 X 9.1 IN

## (undated) DEVICE — BUR, STRAIGHT ROUTER

## (undated) DEVICE — COVER, CART, 45 X 27 X 48 IN, CLEAR

## (undated) DEVICE — SPONGE, HEMOSTAT, SURGICEL FIBRILLAR, ABS, 4 X 4, LF

## (undated) DEVICE — DRAPE, INSTRUMENT, W/POUCH, STERI DRAPE, 7 X 11 IN, DISPOSABLE, STERILE

## (undated) DEVICE — ADHESIVE, SKIN, MASTISOL, 2/3 CC VIAL

## (undated) DEVICE — CLAMP, DRAPE/TUBE, DISPOSABLE, NS

## (undated) DEVICE — SUTURE, ETHILON, 2-0, 18 IN, FS, BLACK, BX/12

## (undated) DEVICE — PREP, SKIN, DURAPREP, 6 CC

## (undated) DEVICE — TIP, SONOPET IQ, 12CM

## (undated) DEVICE — COVER, TABLE, 54X90

## (undated) DEVICE — DRESSING, TRANSPARENT, TEGADERM, 2-3/8 X 2-3/4 IN

## (undated) DEVICE — BAG, PLASTIC, 10 X 17 IN

## (undated) DEVICE — STYLET, AXIEM STEALTH NAVIGATION

## (undated) DEVICE — BUR, ACORN 6MM PRECISION

## (undated) DEVICE — BACKGROUND MATERIAL, MERCIAN, YELLOW, 1MM GRID, LF

## (undated) DEVICE — DRAPE, INCISE, ANTIMICROBIAL, IOBAN 2, LARGE, 17 X 23 IN, DISPOSABLE, STERILE

## (undated) DEVICE — SPHERE, STEALTHSTATION, 5-PK

## (undated) DEVICE — STRIP, SKIN CLOSURE, STERI STRIP, REINFORCED, 0.5 X 4 IN

## (undated) DEVICE — DRESSING, GAUZE, PETROLATUM, PATCH, XEROFORM, 1 X 8 IN, STERILE

## (undated) DEVICE — TUBE, TRACH, CUFFED, LOW PRESS, LPC, SZ-6, 6.4MM ID, LF

## (undated) DEVICE — COVER, PLASTIC, MAYO STAND, 29.5IN X 55.5IN

## (undated) DEVICE — DRAPE, FLUID WARMER

## (undated) DEVICE — TISSEEL FIBRIN SEALANT, PRIMA, FROZEN, 10ML